# Patient Record
Sex: FEMALE | Race: WHITE | Employment: UNEMPLOYED | URBAN - METROPOLITAN AREA
[De-identification: names, ages, dates, MRNs, and addresses within clinical notes are randomized per-mention and may not be internally consistent; named-entity substitution may affect disease eponyms.]

---

## 2017-07-10 ENCOUNTER — TELEPHONE (OUTPATIENT)
Dept: TELEMETRY | Age: 42
End: 2017-07-10

## 2017-08-23 PROBLEM — E66.9 OBESITY: Chronic | Status: ACTIVE | Noted: 2017-08-23

## 2017-08-23 PROBLEM — A41.9 SEPSIS (HCC): Status: ACTIVE | Noted: 2017-08-23

## 2017-08-23 PROBLEM — T40.1X1A HEROIN OVERDOSE (HCC): Status: ACTIVE | Noted: 2017-08-23

## 2017-08-23 PROBLEM — N39.0 UTI (URINARY TRACT INFECTION): Status: ACTIVE | Noted: 2017-08-23

## 2017-08-23 PROBLEM — E87.5 HYPERKALEMIA: Status: ACTIVE | Noted: 2017-08-23

## 2017-08-23 PROBLEM — J96.01 ACUTE RESPIRATORY FAILURE WITH HYPOXIA AND HYPERCAPNIA (HCC): Status: ACTIVE | Noted: 2017-08-23

## 2017-08-23 PROBLEM — J96.02 ACUTE RESPIRATORY FAILURE WITH HYPOXIA AND HYPERCAPNIA (HCC): Status: ACTIVE | Noted: 2017-08-23

## 2017-08-24 PROBLEM — E66.01 MORBID OBESITY WITH BMI OF 50.0-59.9, ADULT (HCC): Chronic | Status: ACTIVE | Noted: 2017-08-23

## 2017-11-14 ENCOUNTER — CASE MANAGEMENT (OUTPATIENT)
Dept: EMERGENCY DEPT | Age: 42
End: 2017-11-14

## 2017-11-14 PROBLEM — J96.21 ACUTE ON CHRONIC RESPIRATORY FAILURE WITH HYPOXIA AND HYPERCAPNIA (HCC): Status: ACTIVE | Noted: 2017-08-23

## 2017-11-14 PROBLEM — J96.22 ACUTE ON CHRONIC RESPIRATORY FAILURE WITH HYPOXIA AND HYPERCAPNIA (HCC): Status: ACTIVE | Noted: 2017-08-23

## 2017-11-17 ENCOUNTER — TELEPHONE (OUTPATIENT)
Dept: PULMONOLOGY | Age: 42
End: 2017-11-17

## 2017-12-06 ENCOUNTER — TELEPHONE (OUTPATIENT)
Dept: PULMONOLOGY | Age: 42
End: 2017-12-06

## 2017-12-06 PROBLEM — J96.00 ACUTE RESPIRATORY FAILURE (HCC): Status: ACTIVE | Noted: 2017-12-06

## 2017-12-07 ENCOUNTER — CASE MANAGEMENT (OUTPATIENT)
Dept: EMERGENCY DEPT | Age: 42
End: 2017-12-07

## 2017-12-07 PROBLEM — J18.9 HCAP (HEALTHCARE-ASSOCIATED PNEUMONIA): Status: ACTIVE | Noted: 2017-12-07

## 2017-12-07 PROBLEM — A41.9 SEPSIS (HCC): Status: RESOLVED | Noted: 2017-08-23 | Resolved: 2017-12-07

## 2017-12-07 PROBLEM — J18.9 PNEUMONIA DUE TO INFECTIOUS ORGANISM: Status: ACTIVE | Noted: 2017-12-07

## 2017-12-07 PROBLEM — T17.908S: Status: ACTIVE | Noted: 2017-12-07

## 2017-12-07 PROBLEM — J18.9: Status: ACTIVE | Noted: 2017-12-07

## 2017-12-07 PROBLEM — N39.0 UTI (URINARY TRACT INFECTION): Status: RESOLVED | Noted: 2017-08-23 | Resolved: 2017-12-07

## 2017-12-07 PROBLEM — E87.5 HYPERKALEMIA: Status: RESOLVED | Noted: 2017-08-23 | Resolved: 2017-12-07

## 2017-12-14 ENCOUNTER — TELEPHONE (OUTPATIENT)
Dept: INTERNAL MEDICINE CLINIC | Age: 42
End: 2017-12-14

## 2017-12-21 ENCOUNTER — CASE MANAGEMENT (OUTPATIENT)
Dept: EMERGENCY DEPT | Age: 42
End: 2017-12-21

## 2017-12-21 PROBLEM — J96.01 ACUTE RESPIRATORY FAILURE WITH HYPOXIA (HCC): Status: ACTIVE | Noted: 2017-12-21

## 2017-12-25 PROBLEM — J96.01 ACUTE RESPIRATORY FAILURE WITH HYPOXIA (HCC): Status: RESOLVED | Noted: 2017-12-21 | Resolved: 2017-12-25

## 2017-12-25 PROBLEM — J96.00 ACUTE RESPIRATORY FAILURE (HCC): Status: RESOLVED | Noted: 2017-12-06 | Resolved: 2017-12-25

## 2018-01-06 PROBLEM — J96.01 ACUTE RESPIRATORY FAILURE WITH HYPOXIA AND HYPERCAPNIA (HCC): Status: ACTIVE | Noted: 2018-01-06

## 2018-01-06 PROBLEM — J96.02 ACUTE RESPIRATORY FAILURE WITH HYPOXIA AND HYPERCAPNIA (HCC): Status: ACTIVE | Noted: 2018-01-06

## 2018-01-08 ENCOUNTER — CASE MANAGEMENT (OUTPATIENT)
Dept: EMERGENCY DEPT | Age: 43
End: 2018-01-08

## 2018-01-10 ENCOUNTER — TELEPHONE (OUTPATIENT)
Dept: PULMONOLOGY | Age: 43
End: 2018-01-10

## 2018-01-10 PROBLEM — E66.2 OBESITY HYPOVENTILATION SYNDROME (HCC): Status: ACTIVE | Noted: 2018-01-10

## 2018-01-11 DIAGNOSIS — G47.33 OSA (OBSTRUCTIVE SLEEP APNEA): Primary | ICD-10-CM

## 2018-01-12 ENCOUNTER — TELEPHONE (OUTPATIENT)
Dept: MEDSURG UNIT | Age: 43
End: 2018-01-12

## 2018-02-14 ENCOUNTER — OFFICE VISIT (OUTPATIENT)
Dept: PULMONOLOGY | Age: 43
End: 2018-02-14

## 2018-02-14 VITALS
DIASTOLIC BLOOD PRESSURE: 87 MMHG | OXYGEN SATURATION: 98 % | HEIGHT: 63 IN | BODY MASS INDEX: 47.84 KG/M2 | WEIGHT: 270 LBS | HEART RATE: 101 BPM | RESPIRATION RATE: 16 BRPM | TEMPERATURE: 98 F | SYSTOLIC BLOOD PRESSURE: 134 MMHG

## 2018-02-14 DIAGNOSIS — I50.32 CHRONIC DIASTOLIC CHF (CONGESTIVE HEART FAILURE) (HCC): ICD-10-CM

## 2018-02-14 DIAGNOSIS — R06.83 SNORING: Primary | ICD-10-CM

## 2018-02-14 DIAGNOSIS — J96.11 CHRONIC HYPOXEMIC RESPIRATORY FAILURE (HCC): ICD-10-CM

## 2018-02-14 DIAGNOSIS — E66.2 OBESITY HYPOVENTILATION SYNDROME (HCC): ICD-10-CM

## 2018-02-14 DIAGNOSIS — R93.89 ABNORMAL CT OF THE CHEST: ICD-10-CM

## 2018-02-14 DIAGNOSIS — Z72.0 TOBACCO ABUSE DISORDER: ICD-10-CM

## 2018-02-14 DIAGNOSIS — G47.10 HYPERSOMNIA: ICD-10-CM

## 2018-02-14 DIAGNOSIS — R06.81 WITNESSED APNEIC SPELLS: ICD-10-CM

## 2018-02-14 DIAGNOSIS — J41.8 MIXED SIMPLE AND MUCOPURULENT CHRONIC BRONCHITIS (HCC): ICD-10-CM

## 2018-02-14 PROCEDURE — G8427 DOCREV CUR MEDS BY ELIG CLIN: HCPCS | Performed by: NURSE PRACTITIONER

## 2018-02-14 PROCEDURE — G8926 SPIRO NO PERF OR DOC: HCPCS | Performed by: NURSE PRACTITIONER

## 2018-02-14 PROCEDURE — G8417 CALC BMI ABV UP PARAM F/U: HCPCS | Performed by: NURSE PRACTITIONER

## 2018-02-14 PROCEDURE — 3023F SPIROM DOC REV: CPT | Performed by: NURSE PRACTITIONER

## 2018-02-14 PROCEDURE — 99214 OFFICE O/P EST MOD 30 MIN: CPT | Performed by: NURSE PRACTITIONER

## 2018-02-14 PROCEDURE — 4004F PT TOBACCO SCREEN RCVD TLK: CPT | Performed by: NURSE PRACTITIONER

## 2018-02-14 PROCEDURE — G8484 FLU IMMUNIZE NO ADMIN: HCPCS | Performed by: NURSE PRACTITIONER

## 2018-02-14 RX ORDER — GABAPENTIN 100 MG/1
300 CAPSULE ORAL 3 TIMES DAILY
COMMUNITY
End: 2019-08-08 | Stop reason: DRUGHIGH

## 2018-02-14 ASSESSMENT — SLEEP AND FATIGUE QUESTIONNAIRES
ESS TOTAL SCORE: 12
HOW LIKELY ARE YOU TO NOD OFF OR FALL ASLEEP WHILE LYING DOWN TO REST IN THE AFTERNOON WHEN CIRCUMSTANCES PERMIT: 3
HOW LIKELY ARE YOU TO NOD OFF OR FALL ASLEEP WHILE SITTING INACTIVE IN A PUBLIC PLACE: 0
HOW LIKELY ARE YOU TO NOD OFF OR FALL ASLEEP WHEN YOU ARE A PASSENGER IN A CAR FOR AN HOUR WITHOUT A BREAK: 3
HOW LIKELY ARE YOU TO NOD OFF OR FALL ASLEEP WHILE SITTING QUIETLY AFTER LUNCH WITHOUT ALCOHOL: 0
HOW LIKELY ARE YOU TO NOD OFF OR FALL ASLEEP WHILE SITTING AND READING: 3
HOW LIKELY ARE YOU TO NOD OFF OR FALL ASLEEP WHILE SITTING AND TALKING TO SOMEONE: 0
HOW LIKELY ARE YOU TO NOD OFF OR FALL ASLEEP WHILE WATCHING TV: 3
HOW LIKELY ARE YOU TO NOD OFF OR FALL ASLEEP IN A CAR, WHILE STOPPED FOR A FEW MINUTES IN TRAFFIC: 0
NECK CIRCUMFERENCE (INCHES): 20.25

## 2018-02-14 NOTE — PROGRESS NOTES
Pt is UTD on both flu and pneumonia vaccines.
sclera icterus. No conjunctival injection. ENT: No discharge. Pharynx clear. Mallampati class IV. Large tongue with ridging. Neck: Trachea midline. No obvious mass. Neck circumference 20.5\"  Resp: No accessory muscle use. No crackles. No wheezes. No rhonchi. Good air entry. CV: Regular rate. Regular rhythm. No murmur or rub. No edema. GI: Non-tender. Non-distended. No hernia. Skin: Warm and dry. No nodule on exposed extremities. Lymph: No cervical LAD. No supraclavicular LAD. M/S: No cyanosis. No joint deformity. No clubbing. Neuro: Awake. Alert. Moves all four extremities. Psych: Oriented x 3. No anxiety. DATA reviewed by me:   CT Chest 1/6/18:   Multifocal subsegmental atelectasis and/ or focal scarring.  Otherwise no  evidence of acute airspace disease. Persistent nonspecific adenopathy. Assessment:       · COPD  · Chronic respiratory failure with hypoxemia - 3.5L supplemental home oxygen   · Obesity hypoventilation syndrome   · Chronic diastolic CHF - increased BLE edema and weight gain   · Abnormal CT of the chest 8/2017 and 1/6/18 with persistent mediastinal and hilar adenopathy. Will require f/u   · Snoring/Witnessed apnea   · Hypersomnia   · Insomnia   · Inadequate sleep hygiene   · Comorbid conditions: HTN, DM  · Tobacco abuse - 1 ppd for 15 years, down to 5 cigs daily       Plan:       · Supplemental oxygen 3-4L with exertion. Order for portable oxygen concentrator   · Start Spiriva 1 puff daily   · Albuterol INH/Neb Q 4 hrs PRN   · Lasix - take additional tablet for 3 days  · PFTs  · Split night to evaluate for sleep related breathing disorder. · Treatment options were discussed with patient if PSG reveals PAPITO, including CPAP therapy, oral appliances, upper airway surgery and hypoglossal nerve stimulation. · Discussed with patient the pathophysiology of apnea.    · Sleep hygiene discussed and provided written education in AVS  · Avoid sedatives, alcohol and

## 2018-02-14 NOTE — PATIENT INSTRUCTIONS
Uncomfortable bedding can prevent good sleep. Ensure your bedroom is quiet and comfortable. A cooler room along with enough blankets to stay warm is recommended. If your room is too noisy, try a white noise machine. If too bright, try black out shades or an eye mask. Dont take worries to bed. Leave worries about work, school etc. behind you when you go to bed. Some people find it helpful to assign a worry period in the evening or late afternoon to write down your worries and get them out of your system.

## 2018-03-06 ENCOUNTER — HOSPITAL ENCOUNTER (OUTPATIENT)
Dept: SLEEP MEDICINE | Age: 43
Discharge: HOME | End: 2018-03-08
Attending: INTERNAL MEDICINE | Admitting: INTERNAL MEDICINE

## 2018-03-06 DIAGNOSIS — G47.33 OSA (OBSTRUCTIVE SLEEP APNEA): ICD-10-CM

## 2018-03-12 DIAGNOSIS — G47.33 OSA (OBSTRUCTIVE SLEEP APNEA): Primary | ICD-10-CM

## 2018-05-07 ENCOUNTER — TELEPHONE (OUTPATIENT)
Dept: PULMONOLOGY | Age: 43
End: 2018-05-07

## 2018-11-17 ENCOUNTER — APPOINTMENT (OUTPATIENT)
Dept: GENERAL RADIOLOGY | Age: 43
DRG: 140 | End: 2018-11-17
Attending: HOSPITALIST
Payer: MEDICARE

## 2018-11-17 ENCOUNTER — HOSPITAL ENCOUNTER (INPATIENT)
Age: 43
LOS: 1 days | Discharge: HOME | DRG: 140 | End: 2018-11-18
Attending: HOSPITALIST | Admitting: HOSPITALIST
Payer: MEDICARE

## 2018-11-17 PROBLEM — R76.8 HEPATITIS C ANTIBODY POSITIVE IN BLOOD: Status: RESOLVED | Noted: 2017-08-24 | Resolved: 2018-11-17

## 2018-11-17 PROBLEM — J96.20 ACUTE ON CHRONIC RESPIRATORY FAILURE (HCC): Status: ACTIVE | Noted: 2018-11-17

## 2018-11-17 PROBLEM — J96.02 ACUTE RESPIRATORY FAILURE WITH HYPOXIA AND HYPERCAPNIA (HCC): Status: RESOLVED | Noted: 2018-01-06 | Resolved: 2018-11-17

## 2018-11-17 PROBLEM — J18.9: Status: RESOLVED | Noted: 2017-12-07 | Resolved: 2018-11-17

## 2018-11-17 PROBLEM — E66.2 OBESITY HYPOVENTILATION SYNDROME (HCC): Chronic | Status: ACTIVE | Noted: 2018-01-10

## 2018-11-17 PROBLEM — J96.11 CHRONIC RESPIRATORY FAILURE WITH HYPOXIA AND HYPERCAPNIA (HCC): Chronic | Status: ACTIVE | Noted: 2018-11-17

## 2018-11-17 PROBLEM — T40.1X1A HEROIN OVERDOSE (HCC): Chronic | Status: ACTIVE | Noted: 2017-08-23

## 2018-11-17 PROBLEM — J18.9 PNEUMONIA DUE TO INFECTIOUS ORGANISM: Status: RESOLVED | Noted: 2017-12-07 | Resolved: 2018-11-17

## 2018-11-17 PROBLEM — E87.5 HYPERKALEMIA: Status: ACTIVE | Noted: 2018-11-17

## 2018-11-17 PROBLEM — B19.20 HEPATITIS C: Chronic | Status: ACTIVE | Noted: 2018-11-17

## 2018-11-17 PROBLEM — R76.8 HEPATITIS C ANTIBODY POSITIVE IN BLOOD: Status: ACTIVE | Noted: 2017-08-24

## 2018-11-17 PROBLEM — J96.20 ACUTE ON CHRONIC RESPIRATORY FAILURE (HCC): Status: RESOLVED | Noted: 2018-11-17 | Resolved: 2018-11-17

## 2018-11-17 PROBLEM — Z22.322 MRSA (METHICILLIN RESISTANT STAPH AUREUS) CULTURE POSITIVE: Chronic | Status: ACTIVE | Noted: 2018-11-17

## 2018-11-17 PROBLEM — L30.4 INTERTRIGO: Status: ACTIVE | Noted: 2018-11-17

## 2018-11-17 PROBLEM — J96.12 CHRONIC RESPIRATORY FAILURE WITH HYPOXIA AND HYPERCAPNIA (HCC): Chronic | Status: ACTIVE | Noted: 2018-11-17

## 2018-11-17 PROBLEM — R09.89 PULMONARY VASCULAR CONGESTION: Status: ACTIVE | Noted: 2018-11-17

## 2018-11-17 PROBLEM — J96.01 ACUTE RESPIRATORY FAILURE WITH HYPOXIA AND HYPERCAPNIA (HCC): Status: RESOLVED | Noted: 2018-01-06 | Resolved: 2018-11-17

## 2018-11-17 PROBLEM — T17.908S: Status: RESOLVED | Noted: 2017-12-07 | Resolved: 2018-11-17

## 2018-11-17 PROBLEM — J18.9 HCAP (HEALTHCARE-ASSOCIATED PNEUMONIA): Status: RESOLVED | Noted: 2017-12-07 | Resolved: 2018-11-17

## 2018-11-17 LAB
ALBUMIN SERPL-MCNC: 4 G/DL (ref 3.4–5)
ANION GAP SERPL CALCULATED.3IONS-SCNC: 9 MMOL/L (ref 3–16)
APTT: 33.5 SEC (ref 26–36)
BACTERIA: ABNORMAL /HPF
BASE EXCESS ARTERIAL: -3.9 MMOL/L (ref -3–3)
BASOPHILS ABSOLUTE: 0 K/UL (ref 0–0.2)
BASOPHILS RELATIVE PERCENT: 0.4 %
BILIRUBIN URINE: NEGATIVE
BLOOD, URINE: ABNORMAL
BUN BLDV-MCNC: 8 MG/DL (ref 7–20)
CALCIUM SERPL-MCNC: 8.3 MG/DL (ref 8.3–10.6)
CARBOXYHEMOGLOBIN ARTERIAL: 3.7 % (ref 0–1.5)
CASTS 2: ABNORMAL /LPF
CASTS: ABNORMAL /LPF
CHLORIDE BLD-SCNC: 98 MMOL/L (ref 99–110)
CHOLESTEROL, TOTAL: 164 MG/DL (ref 0–199)
CLARITY: CLEAR
CO2: 29 MMOL/L (ref 21–32)
COLOR: YELLOW
CREAT SERPL-MCNC: 1 MG/DL (ref 0.6–1.1)
EOSINOPHILS ABSOLUTE: 0 K/UL (ref 0–0.6)
EOSINOPHILS RELATIVE PERCENT: 0 %
EPITHELIAL CELLS, UA: ABNORMAL /HPF
GFR AFRICAN AMERICAN: >60
GFR NON-AFRICAN AMERICAN: 60
GLUCOSE BLD-MCNC: 142 MG/DL (ref 70–99)
GLUCOSE BLD-MCNC: 156 MG/DL (ref 70–99)
GLUCOSE BLD-MCNC: 167 MG/DL (ref 70–99)
GLUCOSE BLD-MCNC: 173 MG/DL (ref 70–99)
GLUCOSE URINE: NEGATIVE MG/DL
HCO3 ARTERIAL: 25.7 MMOL/L (ref 21–29)
HCT VFR BLD CALC: 47.2 % (ref 36–48)
HDLC SERPL-MCNC: 40 MG/DL (ref 40–60)
HEMOGLOBIN, ART, EXTENDED: 15.9 G/DL (ref 12–16)
HEMOGLOBIN: 15.6 G/DL (ref 12–16)
INR BLD: 1.02 (ref 0.86–1.14)
KETONES, URINE: NEGATIVE MG/DL
LACTIC ACID: 1.9 MMOL/L (ref 0.4–2)
LDL CHOLESTEROL CALCULATED: 106 MG/DL
LEUKOCYTE ESTERASE, URINE: ABNORMAL
LYMPHOCYTES ABSOLUTE: 0.7 K/UL (ref 1–5.1)
LYMPHOCYTES RELATIVE PERCENT: 7.6 %
MAGNESIUM: 2 MG/DL (ref 1.8–2.4)
MCH RBC QN AUTO: 28.4 PG (ref 26–34)
MCHC RBC AUTO-ENTMCNC: 33 G/DL (ref 31–36)
MCV RBC AUTO: 86.2 FL (ref 80–100)
METHEMOGLOBIN ARTERIAL: 0.5 %
MICROSCOPIC EXAMINATION: YES
MONOCYTES ABSOLUTE: 0.1 K/UL (ref 0–1.3)
MONOCYTES RELATIVE PERCENT: 1.4 %
NEUTROPHILS ABSOLUTE: 8.5 K/UL (ref 1.7–7.7)
NEUTROPHILS RELATIVE PERCENT: 90.6 %
NITRITE, URINE: NEGATIVE
O2 CONTENT ARTERIAL: 20 ML/DL
O2 SAT, ARTERIAL: 91.5 %
O2 THERAPY: ABNORMAL
PCO2 ARTERIAL: 66.4 MMHG (ref 35–45)
PDW BLD-RTO: 15.4 % (ref 12.4–15.4)
PERFORMED ON: ABNORMAL
PH ARTERIAL: 7.21 (ref 7.35–7.45)
PH UA: 5
PHOSPHORUS: 4.7 MG/DL (ref 2.5–4.9)
PLATELET # BLD: 171 K/UL (ref 135–450)
PMV BLD AUTO: 9.5 FL (ref 5–10.5)
PO2 ARTERIAL: 69 MMHG (ref 75–108)
POTASSIUM SERPL-SCNC: 5.4 MMOL/L (ref 3.5–5.1)
PRO-BNP: 559 PG/ML (ref 0–124)
PROCALCITONIN: 0.14 NG/ML (ref 0–0.15)
PROTEIN UA: NEGATIVE MG/DL
PROTHROMBIN TIME: 11.6 SEC (ref 9.8–13)
RBC # BLD: 5.48 M/UL (ref 4–5.2)
RBC UA: ABNORMAL /HPF (ref 0–2)
SODIUM BLD-SCNC: 136 MMOL/L (ref 136–145)
SPECIFIC GRAVITY UA: 1.02
TCO2 ARTERIAL: 27.7 MMOL/L
TRIGL SERPL-MCNC: 91 MG/DL (ref 0–150)
TROPONIN: <0.01 NG/ML
TROPONIN: <0.01 NG/ML
TSH REFLEX: 1.14 UIU/ML (ref 0.27–4.2)
UROBILINOGEN, URINE: 0.2 E.U./DL
VLDLC SERPL CALC-MCNC: 18 MG/DL
WBC # BLD: 9.4 K/UL (ref 4–11)
WBC UA: ABNORMAL /HPF (ref 0–5)

## 2018-11-17 PROCEDURE — 85610 PROTHROMBIN TIME: CPT

## 2018-11-17 PROCEDURE — 85025 COMPLETE CBC W/AUTO DIFF WBC: CPT

## 2018-11-17 PROCEDURE — 82803 BLOOD GASES ANY COMBINATION: CPT

## 2018-11-17 PROCEDURE — 83880 ASSAY OF NATRIURETIC PEPTIDE: CPT

## 2018-11-17 PROCEDURE — 94660 CPAP INITIATION&MGMT: CPT

## 2018-11-17 PROCEDURE — 84484 ASSAY OF TROPONIN QUANT: CPT

## 2018-11-17 PROCEDURE — 80061 LIPID PANEL: CPT

## 2018-11-17 PROCEDURE — 83605 ASSAY OF LACTIC ACID: CPT

## 2018-11-17 PROCEDURE — 94761 N-INVAS EAR/PLS OXIMETRY MLT: CPT

## 2018-11-17 PROCEDURE — 36600 WITHDRAWAL OF ARTERIAL BLOOD: CPT

## 2018-11-17 PROCEDURE — 99254 IP/OBS CNSLTJ NEW/EST MOD 60: CPT | Performed by: INTERNAL MEDICINE

## 2018-11-17 PROCEDURE — 94667 MNPJ CHEST WALL 1ST: CPT

## 2018-11-17 PROCEDURE — 6360000002 HC RX W HCPCS: Performed by: INTERNAL MEDICINE

## 2018-11-17 PROCEDURE — 2700000000 HC OXYGEN THERAPY PER DAY

## 2018-11-17 PROCEDURE — 85730 THROMBOPLASTIN TIME PARTIAL: CPT

## 2018-11-17 PROCEDURE — 2060000000 HC ICU INTERMEDIATE R&B

## 2018-11-17 PROCEDURE — 99406 BEHAV CHNG SMOKING 3-10 MIN: CPT

## 2018-11-17 PROCEDURE — 83735 ASSAY OF MAGNESIUM: CPT

## 2018-11-17 PROCEDURE — 87040 BLOOD CULTURE FOR BACTERIA: CPT

## 2018-11-17 PROCEDURE — 80069 RENAL FUNCTION PANEL: CPT

## 2018-11-17 PROCEDURE — 94668 MNPJ CHEST WALL SBSQ: CPT

## 2018-11-17 PROCEDURE — 71045 X-RAY EXAM CHEST 1 VIEW: CPT

## 2018-11-17 PROCEDURE — 83036 HEMOGLOBIN GLYCOSYLATED A1C: CPT

## 2018-11-17 PROCEDURE — 84145 PROCALCITONIN (PCT): CPT

## 2018-11-17 PROCEDURE — 6370000000 HC RX 637 (ALT 250 FOR IP): Performed by: HOSPITALIST

## 2018-11-17 PROCEDURE — 93005 ELECTROCARDIOGRAM TRACING: CPT | Performed by: HOSPITALIST

## 2018-11-17 PROCEDURE — 81001 URINALYSIS AUTO W/SCOPE: CPT

## 2018-11-17 PROCEDURE — 94664 DEMO&/EVAL PT USE INHALER: CPT

## 2018-11-17 PROCEDURE — 84443 ASSAY THYROID STIM HORMONE: CPT

## 2018-11-17 PROCEDURE — 6360000002 HC RX W HCPCS: Performed by: HOSPITALIST

## 2018-11-17 PROCEDURE — 93010 ELECTROCARDIOGRAM REPORT: CPT | Performed by: INTERNAL MEDICINE

## 2018-11-17 PROCEDURE — 94640 AIRWAY INHALATION TREATMENT: CPT

## 2018-11-17 PROCEDURE — 36415 COLL VENOUS BLD VENIPUNCTURE: CPT

## 2018-11-17 PROCEDURE — 2580000003 HC RX 258: Performed by: HOSPITALIST

## 2018-11-17 PROCEDURE — 2500000003 HC RX 250 WO HCPCS: Performed by: HOSPITALIST

## 2018-11-17 RX ORDER — PREDNISONE 10 MG/1
40 TABLET ORAL DAILY
Status: DISCONTINUED | OUTPATIENT
Start: 2018-11-18 | End: 2018-11-18 | Stop reason: HOSPADM

## 2018-11-17 RX ORDER — GUAIFENESIN 600 MG/1
600 TABLET, EXTENDED RELEASE ORAL 2 TIMES DAILY
Status: DISCONTINUED | OUTPATIENT
Start: 2018-11-17 | End: 2018-11-18 | Stop reason: HOSPADM

## 2018-11-17 RX ORDER — FAMOTIDINE 20 MG/1
20 TABLET, FILM COATED ORAL 2 TIMES DAILY
Status: DISCONTINUED | OUTPATIENT
Start: 2018-11-17 | End: 2018-11-18 | Stop reason: HOSPADM

## 2018-11-17 RX ORDER — SODIUM CHLORIDE 0.9 % (FLUSH) 0.9 %
10 SYRINGE (ML) INJECTION EVERY 12 HOURS SCHEDULED
Status: DISCONTINUED | OUTPATIENT
Start: 2018-11-17 | End: 2018-11-18 | Stop reason: HOSPADM

## 2018-11-17 RX ORDER — IBUPROFEN 400 MG/1
400 TABLET ORAL EVERY 6 HOURS PRN
Status: DISCONTINUED | OUTPATIENT
Start: 2018-11-17 | End: 2018-11-18 | Stop reason: HOSPADM

## 2018-11-17 RX ORDER — POTASSIUM CHLORIDE 7.45 MG/ML
10 INJECTION INTRAVENOUS PRN
Status: DISCONTINUED | OUTPATIENT
Start: 2018-11-17 | End: 2018-11-17

## 2018-11-17 RX ORDER — LISINOPRIL 10 MG/1
10 TABLET ORAL DAILY
Status: DISCONTINUED | OUTPATIENT
Start: 2018-11-17 | End: 2018-11-17

## 2018-11-17 RX ORDER — NICOTINE 21 MG/24HR
1 PATCH, TRANSDERMAL 24 HOURS TRANSDERMAL DAILY
Status: DISCONTINUED | OUTPATIENT
Start: 2018-11-17 | End: 2018-11-18 | Stop reason: HOSPADM

## 2018-11-17 RX ORDER — POTASSIUM CHLORIDE 20 MEQ/1
40 TABLET, EXTENDED RELEASE ORAL PRN
Status: DISCONTINUED | OUTPATIENT
Start: 2018-11-17 | End: 2018-11-17

## 2018-11-17 RX ORDER — DEXTROSE MONOHYDRATE 50 MG/ML
100 INJECTION, SOLUTION INTRAVENOUS PRN
Status: DISCONTINUED | OUTPATIENT
Start: 2018-11-17 | End: 2018-11-18 | Stop reason: HOSPADM

## 2018-11-17 RX ORDER — SODIUM CHLORIDE 0.9 % (FLUSH) 0.9 %
10 SYRINGE (ML) INJECTION PRN
Status: DISCONTINUED | OUTPATIENT
Start: 2018-11-17 | End: 2018-11-18 | Stop reason: HOSPADM

## 2018-11-17 RX ORDER — IPRATROPIUM BROMIDE AND ALBUTEROL SULFATE 2.5; .5 MG/3ML; MG/3ML
1 SOLUTION RESPIRATORY (INHALATION) EVERY 4 HOURS
Status: DISCONTINUED | OUTPATIENT
Start: 2018-11-17 | End: 2018-11-17

## 2018-11-17 RX ORDER — FUROSEMIDE 10 MG/ML
40 INJECTION INTRAMUSCULAR; INTRAVENOUS 2 TIMES DAILY
Status: DISCONTINUED | OUTPATIENT
Start: 2018-11-17 | End: 2018-11-17

## 2018-11-17 RX ORDER — POTASSIUM CHLORIDE 20MEQ/15ML
40 LIQUID (ML) ORAL PRN
Status: DISCONTINUED | OUTPATIENT
Start: 2018-11-17 | End: 2018-11-17

## 2018-11-17 RX ORDER — NICOTINE POLACRILEX 4 MG
15 LOZENGE BUCCAL PRN
Status: DISCONTINUED | OUTPATIENT
Start: 2018-11-17 | End: 2018-11-18 | Stop reason: HOSPADM

## 2018-11-17 RX ORDER — ACETAMINOPHEN 325 MG/1
650 TABLET ORAL EVERY 4 HOURS PRN
Status: DISCONTINUED | OUTPATIENT
Start: 2018-11-17 | End: 2018-11-18 | Stop reason: HOSPADM

## 2018-11-17 RX ORDER — DEXTROSE MONOHYDRATE 25 G/50ML
12.5 INJECTION, SOLUTION INTRAVENOUS PRN
Status: DISCONTINUED | OUTPATIENT
Start: 2018-11-17 | End: 2018-11-17 | Stop reason: SDUPTHER

## 2018-11-17 RX ORDER — LEVOFLOXACIN 5 MG/ML
500 INJECTION, SOLUTION INTRAVENOUS EVERY 24 HOURS
Status: DISCONTINUED | OUTPATIENT
Start: 2018-11-17 | End: 2018-11-17

## 2018-11-17 RX ORDER — ONDANSETRON 2 MG/ML
4 INJECTION INTRAMUSCULAR; INTRAVENOUS EVERY 6 HOURS PRN
Status: DISCONTINUED | OUTPATIENT
Start: 2018-11-17 | End: 2018-11-18 | Stop reason: HOSPADM

## 2018-11-17 RX ORDER — NICOTINE POLACRILEX 4 MG
15 LOZENGE BUCCAL PRN
Status: DISCONTINUED | OUTPATIENT
Start: 2018-11-17 | End: 2018-11-17 | Stop reason: SDUPTHER

## 2018-11-17 RX ORDER — ASPIRIN 81 MG/1
81 TABLET ORAL DAILY
Status: DISCONTINUED | OUTPATIENT
Start: 2018-11-17 | End: 2018-11-18 | Stop reason: HOSPADM

## 2018-11-17 RX ORDER — MAGNESIUM SULFATE 1 G/100ML
1 INJECTION INTRAVENOUS PRN
Status: DISCONTINUED | OUTPATIENT
Start: 2018-11-17 | End: 2018-11-17

## 2018-11-17 RX ORDER — LEVOFLOXACIN 500 MG/1
500 TABLET, FILM COATED ORAL DAILY
Status: DISCONTINUED | OUTPATIENT
Start: 2018-11-18 | End: 2018-11-18 | Stop reason: HOSPADM

## 2018-11-17 RX ORDER — DEXTROSE MONOHYDRATE 25 G/50ML
12.5 INJECTION, SOLUTION INTRAVENOUS PRN
Status: DISCONTINUED | OUTPATIENT
Start: 2018-11-17 | End: 2018-11-18 | Stop reason: HOSPADM

## 2018-11-17 RX ORDER — FUROSEMIDE 10 MG/ML
40 INJECTION INTRAMUSCULAR; INTRAVENOUS DAILY
Status: DISCONTINUED | OUTPATIENT
Start: 2018-11-18 | End: 2018-11-18 | Stop reason: HOSPADM

## 2018-11-17 RX ORDER — METHYLPREDNISOLONE SODIUM SUCCINATE 40 MG/ML
40 INJECTION, POWDER, LYOPHILIZED, FOR SOLUTION INTRAMUSCULAR; INTRAVENOUS EVERY 12 HOURS
Status: COMPLETED | OUTPATIENT
Start: 2018-11-17 | End: 2018-11-17

## 2018-11-17 RX ORDER — IPRATROPIUM BROMIDE AND ALBUTEROL SULFATE 2.5; .5 MG/3ML; MG/3ML
1 SOLUTION RESPIRATORY (INHALATION) EVERY 4 HOURS
Status: DISCONTINUED | OUTPATIENT
Start: 2018-11-17 | End: 2018-11-18 | Stop reason: HOSPADM

## 2018-11-17 RX ORDER — GABAPENTIN 100 MG/1
100 CAPSULE ORAL 3 TIMES DAILY
Status: DISCONTINUED | OUTPATIENT
Start: 2018-11-17 | End: 2018-11-17

## 2018-11-17 RX ORDER — SODIUM CHLORIDE 9 MG/ML
INJECTION, SOLUTION INTRAVENOUS
Status: DISPENSED
Start: 2018-11-17 | End: 2018-11-17

## 2018-11-17 RX ORDER — GABAPENTIN 300 MG/1
300 CAPSULE ORAL 3 TIMES DAILY
Status: DISCONTINUED | OUTPATIENT
Start: 2018-11-17 | End: 2018-11-18 | Stop reason: HOSPADM

## 2018-11-17 RX ADMIN — GABAPENTIN 300 MG: 300 CAPSULE ORAL at 13:30

## 2018-11-17 RX ADMIN — SODIUM CHLORIDE, PRESERVATIVE FREE 10 ML: 5 INJECTION INTRAVENOUS at 22:37

## 2018-11-17 RX ADMIN — MICONAZOLE NITRATE: 2 POWDER TOPICAL at 06:24

## 2018-11-17 RX ADMIN — FAMOTIDINE 20 MG: 20 TABLET ORAL at 10:46

## 2018-11-17 RX ADMIN — METHYLPREDNISOLONE SODIUM SUCCINATE 40 MG: 40 INJECTION, POWDER, FOR SOLUTION INTRAMUSCULAR; INTRAVENOUS at 22:34

## 2018-11-17 RX ADMIN — INSULIN LISPRO 1 UNITS: 100 INJECTION, SOLUTION INTRAVENOUS; SUBCUTANEOUS at 13:30

## 2018-11-17 RX ADMIN — IPRATROPIUM BROMIDE AND ALBUTEROL SULFATE 1 AMPULE: .5; 3 SOLUTION RESPIRATORY (INHALATION) at 11:59

## 2018-11-17 RX ADMIN — GABAPENTIN 100 MG: 100 CAPSULE ORAL at 10:46

## 2018-11-17 RX ADMIN — IPRATROPIUM BROMIDE AND ALBUTEROL SULFATE 1 AMPULE: .5; 3 SOLUTION RESPIRATORY (INHALATION) at 16:01

## 2018-11-17 RX ADMIN — ACETAMINOPHEN 650 MG: 325 TABLET ORAL at 14:37

## 2018-11-17 RX ADMIN — MICONAZOLE NITRATE: 2 POWDER TOPICAL at 22:35

## 2018-11-17 RX ADMIN — IPRATROPIUM BROMIDE AND ALBUTEROL SULFATE 1 AMPULE: .5; 3 SOLUTION RESPIRATORY (INHALATION) at 08:10

## 2018-11-17 RX ADMIN — IPRATROPIUM BROMIDE AND ALBUTEROL SULFATE 1 AMPULE: .5; 3 SOLUTION RESPIRATORY (INHALATION) at 20:24

## 2018-11-17 RX ADMIN — ASPIRIN 81 MG: 81 TABLET, COATED ORAL at 10:46

## 2018-11-17 RX ADMIN — IPRATROPIUM BROMIDE AND ALBUTEROL SULFATE 1 AMPULE: .5; 3 SOLUTION RESPIRATORY (INHALATION) at 05:15

## 2018-11-17 RX ADMIN — GUAIFENESIN 600 MG: 600 TABLET, EXTENDED RELEASE ORAL at 14:37

## 2018-11-17 RX ADMIN — SODIUM CHLORIDE, PRESERVATIVE FREE 10 ML: 5 INJECTION INTRAVENOUS at 10:48

## 2018-11-17 RX ADMIN — METHYLPREDNISOLONE SODIUM SUCCINATE 40 MG: 40 INJECTION, POWDER, FOR SOLUTION INTRAMUSCULAR; INTRAVENOUS at 10:47

## 2018-11-17 RX ADMIN — FUROSEMIDE 40 MG: 10 INJECTION, SOLUTION INTRAMUSCULAR; INTRAVENOUS at 10:47

## 2018-11-17 RX ADMIN — LEVOFLOXACIN 500 MG: 5 INJECTION, SOLUTION INTRAVENOUS at 10:47

## 2018-11-17 ASSESSMENT — PAIN DESCRIPTION - ORIENTATION: ORIENTATION: RIGHT;MID

## 2018-11-17 ASSESSMENT — PAIN SCALES - GENERAL
PAINLEVEL_OUTOF10: 10
PAINLEVEL_OUTOF10: 3

## 2018-11-17 ASSESSMENT — PAIN DESCRIPTION - LOCATION: LOCATION: CHEST;FOOT

## 2018-11-17 ASSESSMENT — PAIN DESCRIPTION - PAIN TYPE: TYPE: ACUTE PAIN

## 2018-11-17 NOTE — CONSULTS
INPATIENT PULMONARY CRITICAL CARE CONSULT NOTE      Chief Complaint/Referring Provider:  Patient is being seen at the request of Dr. Deja Branch for a consultation for acute on chronic hypoxemic/hypercapnic respiratory failure on BiPAP. Presenting HPI: Sally Ward is a 24-year-old female who has advanced lung disease, PAPITO and morbid obesity. She has a very heavy smoking history, was smoking up to 2 PPD, did cut back to 0.5 PPD for the last 4-5 months. She tried the electronic cigarettes, now is using a nicotine patch. She has not had a PFT, does not see a pulmonologist regularly. She says she lives in [facility], finds transportation difficulty. She has been seen by the pulmonary group at [facility], has been hospitalized multiple times. She was treated with NIPPV in November 2017, intubated twice in December 2017, intubated in January this year. She had a sleep study in April 2018, had moderate sleep apnea with AHI 29 per hour and desaturation to 73%, placed on BiPAP 17/11 cm H2O with oxygen at 2 LPM.  She says she is compliant with BiPAP. She has been using oxygen between 2 and 4 LPM in the daytime, does not have a pulse oximeter. She is short of breath walking less than 50 feet. He has a cough, does not expectorate every day. She does wheeze. She has gained a lot of weight. She has decreased urine output unless she uses a diuretic, has had a diagnosis of CHF. She had prolonged illness at age 33 due to MRSA infection, had a tracheotomy and prolonged mechanical ventilation as per her history. She has a history of IVDU. The patient was in her usual state of health, but had a cough for the last 2 days. She also had an upper respirator symptoms but no fever, chills or sweats. The patient says she was out in [place] drinking beer with friends when she developed severe onset of chest pain, severe shortness of breath. She was seen at an outside ER and transferred to [facility] for further care. She was placed on BiPAP for hypoxemic and hypercarbic respiratory failure, given Lasix prior to transfer. She has been having a persistent cough which produces retrosternal chest pain. She does feel better since being admitted. She has gained a lot of weight, but lost some recently. The patient lives with her daughter.        Patient Active Problem List    Diagnosis Date Noted    COPD exacerbation ([facility] [address] 11/03/2015     Priority: High    Chronic respiratory failure with hypoxia and hypercapnia on 4 L home O2 11/17/2018    Hx MRSA 11/17/2018    Hepatitis C 11/17/2018    Pulmonary vascular congestion on CXR 11/17/2018    Intertrigo 11/17/2018    Diabetes mellitus type 2, uncontrolled ([address] 11/17/2018    Hyperkalemia 11/17/2018    Neuropathy     Hx IV heroin abuse     PAPITO/OHS on CPAP (non-compliant) 01/10/2018    DM2 (diabetes mellitus, type 2) ([address]     Hx heroin overdose 08/23/2017    Acute on chronic respiratory failure with hypoxia and hypercapnia ([address] 08/23/2017    Morbid obesity with BMI of 50.0-59.9, adult ([address] 08/23/2017    Tobacco smoker 01/19/2012    COPD (chronic obstructive pulmonary disease) ([address] 12/15/2011    HTN (hypertension) 12/15/2011       Past Medical History:   Diagnosis Date    Bacteremia 08/23/2017    staph aureus    CHF (congestive heart failure) ([address]     Colonization status 7/26/12    DNA probe negative for MRSA    COPD (chronic obstructive pulmonary disease) ([address]     Diabetes mellitus ([address]     FOR ABOUT 4 YEARS    DM (diabetes mellitus) ([address]     Drug abuse, IV (HCC)     Hepatitis     Hepatitis C antibody positive in blood 08/24/2017    Hepatitis C AB positive     Heroin overdose ([address]     Hypertension     MRSA infection     LUNGS    Neuropathy     legs with pain    Other disorders of kidney and ureter     KIDNEY FAILURE, ACUTE    Pneumonia     Smoking history         Past Surgical History:   Procedure Laterality Date    BRONCHOSCOPY  12/21/2017    BAL sounds present. No distension tenderness. Musculoskeletal: No cyanosis. No clubbing. No obvious joint deformity. Lymphadenopathy: No cervical or supraclavicular adenopathy. Skin: Skin is warm and dry. No rash or nodules on the exposed extremities. Multiple tattoos. Psychiatric: Normal mood and affect. Behavior is normal.  No anxiety. Neurologic: Alert, awake and oriented. PERRL. Speech fluent        Results:  CBC:   Recent Labs      11/17/18   0720   WBC  9.4   HGB  15.6   HCT  47.2   MCV  86.2   PLT  171     BMP:   Recent Labs      11/17/18   0720   NA  136   K  5.4*   CL  98*   CO2  29   PHOS  4.7   BUN  8   CREATININE  1.0     LIVER PROFILE: No results for input(s): AST, ALT, LIPASE, BILIDIR, BILITOT, ALKPHOS in the last 72 hours. Invalid input(s): AMYLASE,  ALB  PT/INR:   Recent Labs      11/17/18   0720   PROTIME  11.6   INR  1.02     APTT:   Recent Labs      11/17/18   0720   APTT  33.5     UA:  Recent Labs      11/17/18   0622   COLORU  Yellow   PHUR  5.0   LABCAST  10-20 Hyaline*   WBCUA  20-50*   RBCUA  10-20*   BACTERIA  2+*   CLARITYU  Clear   SPECGRAV  1.020   LEUKOCYTESUR  SMALL*   UROBILINOGEN  0.2   BILIRUBINUR  Negative   BLOODU  MODERATE*   GLUCOSEU  Negative       Imaging:  I have reviewed radiology images personally. XR CHEST 1 VW   Final Result   Mild left sided airspace disease could represent atelectasis or pneumonia. Xr Chest 1 Vw    Result Date: 11/17/2018  EXAMINATION: SINGLE XRAY VIEW OF THE CHEST 11/17/2018 5:57 am COMPARISON: Radiograph 03/12/2018 HISTORY: ORDERING SYSTEM PROVIDED HISTORY: acute-on-chronic hypoxemic/hypercapnic respiratory failure TECHNOLOGIST PROVIDED HISTORY: Reason for exam:->acute-on-chronic hypoxemic/hypercapnic respiratory failure Ordering Physician Provided Reason for Exam: acute-on-chronic hypoxemic/hypercapnic respiratory failure FINDINGS: Cardiomediastinal silhouette is unchanged. No pneumothorax.   Metallic device overlying the left

## 2018-11-17 NOTE — PROGRESS NOTES
Notified Dr. Salazar Arias @ 6121 11/17/18 re: pulmonary consult. [phone] [facility]

## 2018-11-17 NOTE — H&P
Medications Prior to Admission:      Prior to Admission medications    Medication Sig Start Date End Date Taking? Authorizing Provider   furosemide (LASIX) 20 MG tablet Take 1 tablet by mouth 2 times daily 3/12/18  Yes Lolita Aparicio MD   lisinopril (PRINIVIL) 10 MG tablet Take 1 tablet by mouth daily 3/12/18  Yes Lolita Aparicio MD   tiotropium (Antonio Juvenal) 18 MCG inhalation capsule Inhale 1 capsule into the lungs daily 2/16/18  Yes Velton Severs, APRN - CNP   gabapentin (NEURONTIN) 100 MG capsule Take 300 mg by mouth 3 times daily. .   Yes Historical Provider, MD   aspirin 81 MG tablet Take 81 mg by mouth daily   Yes Historical Provider, MD   metFORMIN (GLUCOPHAGE) 500 MG tablet Take 2 tablets by mouth 2 times daily (with meals) 1/11/18  Yes Amarilys Morgan MD   glucose monitoring kit (FREESTYLE) monitoring kit 1 kit by Does not apply route daily 12/12/17  Yes Amarilys Morgan MD   glucose blood VI test strips [facility] BLOOD GLUCOSE TEST) strip As needed. 12/12/17  Yes Amarilys Morgan MD   [employer] Lancets Ultra Thin MISC 1 each by Does not apply route daily 12/12/17  Yes Amarilys Morgan MD   OXYGEN Inhale 4 L into the lungs continuous 11/16/17  Yes Dana Yepez MD   predniSONE (DELTASONE) 10 MG tablet Take 3 tabs a day for 3 days,  Then 2 tabs a day for 3 days ,  Then 1 tab a day for 3 days. 1/11/18   Amarilys Morgan MD   nicotine (NICODERM CQ) 14 MG/24HR Place 1 patch onto the skin daily 1/11/18   Amarilys Morgan MD   ipratropium-albuterol (DUONEB) 0.5-2.5 (3) MG/3ML SOLN nebulizer solution Inhale 3 mLs into the lungs 4 times daily 11/16/17   Dana Yepez MD       Allergies:  Pcn [penicillins]; Aspirin; Pcn [penicillins]; and Sulfamethoxazole-trimethoprim    Social History:      The patient currently lives at home    TOBACCO:   reports that she has been smoking E-Cigarettes and Cigarettes. She has a 15.00 pack-year smoking history.  She has never used smokeless tobacco.  ETOH:   reports that she does not drink alcohol. Family History:      Reviewed in detail and Positive as follows:    Family History   Problem Relation Age of Onset    No Known Problems Father     No Known Problems Sister     No Known Problems Brother     No Known Problems Maternal Grandmother     No Known Problems Maternal Grandfather     No Known Problems Paternal Grandmother     No Known Problems Paternal Grandfather     No Known Problems Other     Heart Disease Mother        REVIEW OF SYSTEMS:   Pertinent positives as noted in the HPI. All other systems reviewed and negative. PHYSICAL EXAM PERFORMED:    /60   Pulse 121   Temp 97.8 °F (36.6 °C) (Oral)   Resp 18   Ht 5' 3\" (1.6 m)   Wt 258 lb 13.1 oz (117.4 kg)   SpO2 93%   BMI 45.85 kg/m²     General appearance:  No apparent distress, appears stated age and cooperative. HEENT:  Normal cephalic, atraumatic without obvious deformity. Pupils equal, round, and reactive to light. Extra ocular muscles intact. Conjunctivae/corneas clear. Neck: Supple, with full range of motion. No jugular venous distention. Trachea midline. Respiratory:  Diminished BS, with scant expiratory wheezing, rhonchi noted. Cardiovascular:  Regular rate and rhythm with normal S1/S2 without murmurs, rubs or gallops. Abdomen: Soft, non-tender, non-distended with normal bowel sounds. Musculoskeletal: Trace LE edema bilaterally. Full range of motion without deformity. Skin: Skin color, texture, turgor normal.  No rashes or lesions. Neurologic:  Neurovascularly intact without any focal sensory/motor deficits.   Psychiatric:  Alert and oriented, thought content appropriate, normal insight  Capillary Refill: Brisk,< 3 seconds   Peripheral Pulses: +2 palpable, equal bilaterally       Labs:     Recent Labs      11/17/18   0720   WBC  9.4   HGB  15.6   HCT  47.2   PLT  171     Recent Labs      11/17/18   0720   NA  136   K  5.4*   CL  98*   CO2  29   BUN  8 care provider on file. for the opportunity to be involved in this patient's care. If you have any questions or concerns please feel free to contact me at [phone].

## 2018-11-18 VITALS
BODY MASS INDEX: 46.16 KG/M2 | HEART RATE: 104 BPM | RESPIRATION RATE: 18 BRPM | SYSTOLIC BLOOD PRESSURE: 138 MMHG | DIASTOLIC BLOOD PRESSURE: 86 MMHG | WEIGHT: 260.55 LBS | TEMPERATURE: 98 F | OXYGEN SATURATION: 94 % | HEIGHT: 63 IN

## 2018-11-18 PROBLEM — E66.01 MORBID OBESITY (HCC): Chronic | Status: ACTIVE | Noted: 2018-01-10

## 2018-11-18 LAB
ALBUMIN SERPL-MCNC: 3.9 G/DL (ref 3.4–5)
ANION GAP SERPL CALCULATED.3IONS-SCNC: 7 MMOL/L (ref 3–16)
BASOPHILS ABSOLUTE: 0 K/UL (ref 0–0.2)
BASOPHILS RELATIVE PERCENT: 0.2 %
BUN BLDV-MCNC: 21 MG/DL (ref 7–20)
CALCIUM SERPL-MCNC: 9 MG/DL (ref 8.3–10.6)
CHLORIDE BLD-SCNC: 96 MMOL/L (ref 99–110)
CO2: 33 MMOL/L (ref 21–32)
CREAT SERPL-MCNC: 0.8 MG/DL (ref 0.6–1.1)
EOSINOPHILS ABSOLUTE: 0 K/UL (ref 0–0.6)
EOSINOPHILS RELATIVE PERCENT: 0 %
GFR AFRICAN AMERICAN: >60
GFR NON-AFRICAN AMERICAN: >60
GLUCOSE BLD-MCNC: 107 MG/DL (ref 70–99)
GLUCOSE BLD-MCNC: 140 MG/DL (ref 70–99)
GLUCOSE BLD-MCNC: 175 MG/DL (ref 70–99)
GLUCOSE BLD-MCNC: 176 MG/DL (ref 70–99)
GLUCOSE BLD-MCNC: 194 MG/DL (ref 70–99)
HCT VFR BLD CALC: 46.2 % (ref 36–48)
HEMOGLOBIN: 15 G/DL (ref 12–16)
LYMPHOCYTES ABSOLUTE: 1.3 K/UL (ref 1–5.1)
LYMPHOCYTES RELATIVE PERCENT: 11.8 %
MAGNESIUM: 2.2 MG/DL (ref 1.8–2.4)
MCH RBC QN AUTO: 28 PG (ref 26–34)
MCHC RBC AUTO-ENTMCNC: 32.4 G/DL (ref 31–36)
MCV RBC AUTO: 86.3 FL (ref 80–100)
MONOCYTES ABSOLUTE: 0.5 K/UL (ref 0–1.3)
MONOCYTES RELATIVE PERCENT: 5.1 %
NEUTROPHILS ABSOLUTE: 8.8 K/UL (ref 1.7–7.7)
NEUTROPHILS RELATIVE PERCENT: 82.9 %
PDW BLD-RTO: 15.5 % (ref 12.4–15.4)
PERFORMED ON: ABNORMAL
PHOSPHORUS: 3.9 MG/DL (ref 2.5–4.9)
PLATELET # BLD: 172 K/UL (ref 135–450)
PMV BLD AUTO: 9.8 FL (ref 5–10.5)
POTASSIUM SERPL-SCNC: 4.6 MMOL/L (ref 3.5–5.1)
RBC # BLD: 5.35 M/UL (ref 4–5.2)
SODIUM BLD-SCNC: 136 MMOL/L (ref 136–145)
WBC # BLD: 10.6 K/UL (ref 4–11)

## 2018-11-18 PROCEDURE — 6370000000 HC RX 637 (ALT 250 FOR IP): Performed by: HOSPITALIST

## 2018-11-18 PROCEDURE — 80069 RENAL FUNCTION PANEL: CPT

## 2018-11-18 PROCEDURE — 2580000003 HC RX 258: Performed by: HOSPITALIST

## 2018-11-18 PROCEDURE — 85025 COMPLETE CBC W/AUTO DIFF WBC: CPT

## 2018-11-18 PROCEDURE — 94660 CPAP INITIATION&MGMT: CPT

## 2018-11-18 PROCEDURE — 94668 MNPJ CHEST WALL SBSQ: CPT

## 2018-11-18 PROCEDURE — 94761 N-INVAS EAR/PLS OXIMETRY MLT: CPT

## 2018-11-18 PROCEDURE — 94640 AIRWAY INHALATION TREATMENT: CPT

## 2018-11-18 PROCEDURE — 2700000000 HC OXYGEN THERAPY PER DAY

## 2018-11-18 PROCEDURE — 83735 ASSAY OF MAGNESIUM: CPT

## 2018-11-18 PROCEDURE — 99231 SBSQ HOSP IP/OBS SF/LOW 25: CPT | Performed by: INTERNAL MEDICINE

## 2018-11-18 PROCEDURE — 6360000002 HC RX W HCPCS: Performed by: HOSPITALIST

## 2018-11-18 PROCEDURE — 6370000000 HC RX 637 (ALT 250 FOR IP): Performed by: INTERNAL MEDICINE

## 2018-11-18 PROCEDURE — 36415 COLL VENOUS BLD VENIPUNCTURE: CPT

## 2018-11-18 RX ORDER — GUAIFENESIN 600 MG/1
600 TABLET, EXTENDED RELEASE ORAL 2 TIMES DAILY
Qty: 60 TABLET | Refills: 1 | Status: SHIPPED | OUTPATIENT
Start: 2018-11-18 | End: 2018-12-18

## 2018-11-18 RX ORDER — PREDNISONE 20 MG/1
40 TABLET ORAL DAILY
Qty: 10 TABLET | Refills: 0 | Status: SHIPPED | OUTPATIENT
Start: 2018-11-19 | End: 2018-11-24

## 2018-11-18 RX ADMIN — IPRATROPIUM BROMIDE AND ALBUTEROL SULFATE 1 AMPULE: .5; 3 SOLUTION RESPIRATORY (INHALATION) at 15:47

## 2018-11-18 RX ADMIN — ASPIRIN 81 MG: 81 TABLET, COATED ORAL at 09:17

## 2018-11-18 RX ADMIN — ACETAMINOPHEN 650 MG: 325 TABLET ORAL at 14:12

## 2018-11-18 RX ADMIN — FAMOTIDINE 20 MG: 20 TABLET ORAL at 09:16

## 2018-11-18 RX ADMIN — IPRATROPIUM BROMIDE AND ALBUTEROL SULFATE 1 AMPULE: .5; 3 SOLUTION RESPIRATORY (INHALATION) at 00:06

## 2018-11-18 RX ADMIN — LEVOFLOXACIN 500 MG: 500 TABLET, FILM COATED ORAL at 09:16

## 2018-11-18 RX ADMIN — ACETAMINOPHEN 650 MG: 325 TABLET ORAL at 09:24

## 2018-11-18 RX ADMIN — IPRATROPIUM BROMIDE AND ALBUTEROL SULFATE 1 AMPULE: .5; 3 SOLUTION RESPIRATORY (INHALATION) at 03:56

## 2018-11-18 RX ADMIN — GABAPENTIN 300 MG: 300 CAPSULE ORAL at 09:16

## 2018-11-18 RX ADMIN — PREDNISONE 40 MG: 10 TABLET ORAL at 09:17

## 2018-11-18 RX ADMIN — MICONAZOLE NITRATE: 2 POWDER TOPICAL at 09:18

## 2018-11-18 RX ADMIN — IPRATROPIUM BROMIDE AND ALBUTEROL SULFATE 1 AMPULE: .5; 3 SOLUTION RESPIRATORY (INHALATION) at 12:31

## 2018-11-18 RX ADMIN — INSULIN LISPRO 1 UNITS: 100 INJECTION, SOLUTION INTRAVENOUS; SUBCUTANEOUS at 09:26

## 2018-11-18 RX ADMIN — FUROSEMIDE 40 MG: 10 INJECTION, SOLUTION INTRAMUSCULAR; INTRAVENOUS at 09:17

## 2018-11-18 RX ADMIN — SODIUM CHLORIDE, PRESERVATIVE FREE 10 ML: 5 INJECTION INTRAVENOUS at 09:09

## 2018-11-18 RX ADMIN — GABAPENTIN 300 MG: 300 CAPSULE ORAL at 14:11

## 2018-11-18 RX ADMIN — ENOXAPARIN SODIUM 40 MG: 40 INJECTION SUBCUTANEOUS at 09:16

## 2018-11-18 RX ADMIN — GUAIFENESIN 600 MG: 600 TABLET, EXTENDED RELEASE ORAL at 09:17

## 2018-11-18 RX ADMIN — IPRATROPIUM BROMIDE AND ALBUTEROL SULFATE 1 AMPULE: .5; 3 SOLUTION RESPIRATORY (INHALATION) at 07:53

## 2018-11-18 ASSESSMENT — PAIN SCALES - GENERAL
PAINLEVEL_OUTOF10: 8
PAINLEVEL_OUTOF10: 10
PAINLEVEL_OUTOF10: 8

## 2018-11-18 ASSESSMENT — PAIN DESCRIPTION - ORIENTATION: ORIENTATION: LEFT

## 2018-11-18 ASSESSMENT — PAIN DESCRIPTION - LOCATION: LOCATION: ANKLE

## 2018-11-18 ASSESSMENT — PAIN DESCRIPTION - PAIN TYPE: TYPE: ACUTE PAIN

## 2018-11-18 NOTE — PROGRESS NOTES
11/18/18 0358   NIV Type   Equipment Type V60   Mode BIPAP   Mask Type Full face mask   Mask Size Medium   Settings/Measurements   Comfort Level Good   Using Accessory Muscles No   IPAP 17 cmH20   EPAP 11 cmH2O   Rate Ordered 12   Resp 21   SpO2 98   FiO2  50 %   Vt Exhaled 593 mL   Mask Leak (lpm) 3 lpm   Skin Protection for O2 Device Yes   Breath Sounds   Right Upper Lobe Diminished   Right Middle Lobe Diminished   Right Lower Lobe Diminished   Left Upper Lobe Diminished   Left Lower Lobe Diminished   Patient Observation   Observations mepilex on nose   Alarm Settings   Alarms On Y   Press Low Alarm 6 cmH2O   High Pressure Alarm 30 cmH2O   Apnea (secs) 20 secs   Resp Rate Low Alarm 6   High Respiratory Rate 45 br/min

## 2018-11-18 NOTE — DISCHARGE SUMMARY
Hospital Discharge Summary    Patient's PCP: No primary care provider on file. Admit Date: 11/17/2018   Discharge Date: 11/18/2018    Admitting Physician: Dr. Lexx Cunningham MD  Discharge Physician: Dr. Rosalba Olvera   Consults: pulmonary/intensive care    HPI: 38 yo F admitted with COPD exac. Brief hospital course:   Admitted with acute, chronic hypoxic, hypercapnic respiratory failure due to COPD exac, +- LLL PNA, likely gram neg, underlying PAPITO, HTN, DM 2 uncontrolled. Improved with IV steroids, IV diuresis, IV abx. Cx data neg. Plan to dc to home on Prednisone 40 mg daily x 5 more days, cont home Lasix, also cont abx which was prescribed by her PCP and she filled but did not take, Levaquin. Script for Mucinex as well. Cont home nebs on dc. Cont CPAP qhs, home Metformin. Dc to home today.     Discharge Diagnoses:   Patient Active Problem List   Diagnosis Code    COPD (chronic obstructive pulmonary disease) ([facility] [address] J44.9    HTN (hypertension) I10    Tobacco smoker Z72.0    COPD exacerbation ([facility] [address] J44.1    Hx heroin overdose T40.1X1A    Acute on chronic respiratory failure with hypoxia and hypercapnia (HCC) J96.21, J96.22    Morbid obesity with BMI of 50.0-59.9, adult (HCC) E66.01, Z68.43    DM2 (diabetes mellitus, type 2) (HCC) E11.9    PAPITO/OHS on CPAP (non-compliant) E66.2    Chronic respiratory failure with hypoxia and hypercapnia on 4 L home O2 J96.11, J96.12    Neuropathy G62.9    Hx IV heroin abuse F19.10    Hx MRSA Z22.322    Hepatitis C B19.20    Pulmonary vascular congestion on CXR R09.89    Intertrigo L30.4    Diabetes mellitus type 2, uncontrolled ([facility] [address] E11.65    Hyperkalemia E87.5       Physical Exam: /89   Pulse 95   Temp 98.2 °F (36.8 °C) (Oral)   Resp 18   Ht 5' 3\" (1.6 m)   Wt 260 lb 8.8 oz (118.2 kg)   SpO2 93%   BMI 46.15 kg/m²     Recent Labs      11/17/18   1239  11/17/18   1754  11/18/18   0005  11/18/18   0602  11/18/18   0926   MILAN

## 2018-11-18 NOTE — PROGRESS NOTES
INPATIENT PULMONARY CRITICAL CARE PROGRESS NOTE      Reason for visit:     SUBJECTIVE:  The patient feels much better, although she is still dyspneic and has a cough. She says she will try very hard to quit smoking. She has remained hemodynamically stable, is on oxygen at 3 LPM with SaO2 94%. She did use BiPAP last night. She has minimal phlegm, denies chest pain. She has no GI or  complaints. She is keen to go home today. Physical Exam:  Blood pressure 138/86, pulse 104, temperature 98 °F (36.7 °C), temperature source Oral, resp. rate 18, height 5' 3\" (1.6 m), weight 260 lb 8.8 oz (118.2 kg), SpO2 93 %, not currently breastfeeding.'   Constitutional:  Short, morbidly obese, less congested cough. Hoarse voice improved. No acute distress. HENT:  Oropharynx is clear and moist.  Class III airway. Eyes:  Conjunctivae are normal. No scleral icterus. Neck: Short and large neck, no JVD  Cardiovascular: Distant heart sounds. No lower extremity edema. Pulmonary/Chest: Diminished air entry, minimal scattered wheezes. No rales. No accessory muscle usage or stridor. Abdominal: Deferred. Musculoskeletal: No cyanosis. No clubbing. No obvious joint deformity. Lymphadenopathy: Deferred   Skin: Skin is warm and dry. No rash or nodules on the exposed extremities. Multiple tattoos. Psychiatric: Normal mood and affect. Behavior is normal.  No anxiety. Neurologic: Alert, awake and oriented. PERRL. Speech fluent        Results:  CBC:   Recent Labs      11/17/18   0720  11/18/18   0524   WBC  9.4  10.6   HGB  15.6  15.0   HCT  47.2  46.2   MCV  86.2  86.3   PLT  171  172     BMP:   Recent Labs      11/17/18   0720  11/18/18   0524   NA  136  136   K  5.4*  4.6   CL  98*  96*   CO2  29  33*   PHOS  4.7  3.9   BUN  8  21*   CREATININE  1.0  0.8     LIVER PROFILE: No results for input(s): AST, ALT, LIPASE, BILIDIR, BILITOT, ALKPHOS in the last 72 hours. Invalid input(s):   AMYLASE,  ALB  PT/INR:   Recent

## 2018-11-20 LAB
EKG ATRIAL RATE: 109 BPM
EKG DIAGNOSIS: NORMAL
EKG P AXIS: 65 DEGREES
EKG P-R INTERVAL: 140 MS
EKG Q-T INTERVAL: 368 MS
EKG QRS DURATION: 70 MS
EKG QTC CALCULATION (BAZETT): 495 MS
EKG R AXIS: 86 DEGREES
EKG T AXIS: 30 DEGREES
EKG VENTRICULAR RATE: 109 BPM
ESTIMATED AVERAGE GLUCOSE: 125.5 MG/DL
HBA1C MFR BLD: 6 %

## 2018-11-22 LAB
BLOOD CULTURE, ROUTINE: NORMAL
CULTURE, BLOOD 2: NORMAL

## 2019-05-23 ENCOUNTER — HOSPITAL ENCOUNTER (EMERGENCY)
Age: 44
Discharge: HOME | End: 2019-05-23
Attending: EMERGENCY MEDICINE
Payer: MEDICARE

## 2019-05-23 ENCOUNTER — APPOINTMENT (OUTPATIENT)
Dept: GENERAL RADIOLOGY | Age: 44
End: 2019-05-23
Payer: MEDICARE

## 2019-05-23 VITALS
BODY MASS INDEX: 39.87 KG/M2 | HEIGHT: 63 IN | DIASTOLIC BLOOD PRESSURE: 97 MMHG | SYSTOLIC BLOOD PRESSURE: 182 MMHG | RESPIRATION RATE: 20 BRPM | OXYGEN SATURATION: 93 % | TEMPERATURE: 97.8 F | HEART RATE: 96 BPM | WEIGHT: 225 LBS

## 2019-05-23 DIAGNOSIS — I50.9 ACUTE ON CHRONIC CONGESTIVE HEART FAILURE, UNSPECIFIED HEART FAILURE TYPE (HCC): Primary | ICD-10-CM

## 2019-05-23 LAB
A/G RATIO: 1.1 (ref 1.1–2.2)
ALBUMIN SERPL-MCNC: 3.9 G/DL (ref 3.4–5)
ALP BLD-CCNC: 75 U/L (ref 40–129)
ALT SERPL-CCNC: 30 U/L (ref 10–40)
ANION GAP SERPL CALCULATED.3IONS-SCNC: 7 MMOL/L (ref 3–16)
APTT: 29.2 SEC (ref 26–36)
AST SERPL-CCNC: 25 U/L (ref 15–37)
BASOPHILS ABSOLUTE: 0.1 K/UL (ref 0–0.2)
BASOPHILS RELATIVE PERCENT: 0.9 %
BILIRUB SERPL-MCNC: 0.6 MG/DL (ref 0–1)
BUN BLDV-MCNC: 9 MG/DL (ref 7–20)
CALCIUM SERPL-MCNC: 9.4 MG/DL (ref 8.3–10.6)
CHLORIDE BLD-SCNC: 95 MMOL/L (ref 99–110)
CO2: 36 MMOL/L (ref 21–32)
CREAT SERPL-MCNC: 0.6 MG/DL (ref 0.6–1.1)
EKG ATRIAL RATE: 100 BPM
EKG DIAGNOSIS: NORMAL
EKG P AXIS: 65 DEGREES
EKG P-R INTERVAL: 138 MS
EKG Q-T INTERVAL: 336 MS
EKG QRS DURATION: 62 MS
EKG QTC CALCULATION (BAZETT): 433 MS
EKG R AXIS: 87 DEGREES
EKG T AXIS: 61 DEGREES
EKG VENTRICULAR RATE: 100 BPM
EOSINOPHILS ABSOLUTE: 0.1 K/UL (ref 0–0.6)
EOSINOPHILS RELATIVE PERCENT: 1.2 %
GFR AFRICAN AMERICAN: >60
GFR NON-AFRICAN AMERICAN: >60
GLOBULIN: 3.6 G/DL
GLUCOSE BLD-MCNC: 92 MG/DL (ref 70–99)
HCT VFR BLD CALC: 50 % (ref 36–48)
HEMOGLOBIN: 15.9 G/DL (ref 12–16)
INR BLD: 0.95 (ref 0.86–1.14)
LYMPHOCYTES ABSOLUTE: 2.2 K/UL (ref 1–5.1)
LYMPHOCYTES RELATIVE PERCENT: 26.2 %
MCH RBC QN AUTO: 27.3 PG (ref 26–34)
MCHC RBC AUTO-ENTMCNC: 31.7 G/DL (ref 31–36)
MCV RBC AUTO: 85.9 FL (ref 80–100)
MONOCYTES ABSOLUTE: 0.7 K/UL (ref 0–1.3)
MONOCYTES RELATIVE PERCENT: 8 %
NEUTROPHILS ABSOLUTE: 5.4 K/UL (ref 1.7–7.7)
NEUTROPHILS RELATIVE PERCENT: 63.7 %
PDW BLD-RTO: 16.1 % (ref 12.4–15.4)
PLATELET # BLD: 153 K/UL (ref 135–450)
PMV BLD AUTO: 9.5 FL (ref 5–10.5)
POTASSIUM SERPL-SCNC: 4.5 MMOL/L (ref 3.5–5.1)
PRO-BNP: 1565 PG/ML (ref 0–124)
PROTHROMBIN TIME: 10.8 SEC (ref 9.8–13)
RBC # BLD: 5.81 M/UL (ref 4–5.2)
SODIUM BLD-SCNC: 138 MMOL/L (ref 136–145)
TOTAL PROTEIN: 7.5 G/DL (ref 6.4–8.2)
TROPONIN: <0.01 NG/ML
WBC # BLD: 8.5 K/UL (ref 4–11)

## 2019-05-23 PROCEDURE — 93010 ELECTROCARDIOGRAM REPORT: CPT | Performed by: INTERNAL MEDICINE

## 2019-05-23 PROCEDURE — 84484 ASSAY OF TROPONIN QUANT: CPT

## 2019-05-23 PROCEDURE — 36415 COLL VENOUS BLD VENIPUNCTURE: CPT

## 2019-05-23 PROCEDURE — 71045 X-RAY EXAM CHEST 1 VIEW: CPT

## 2019-05-23 PROCEDURE — 99285 EMERGENCY DEPT VISIT HI MDM: CPT

## 2019-05-23 PROCEDURE — 80053 COMPREHEN METABOLIC PANEL: CPT

## 2019-05-23 PROCEDURE — 6360000002 HC RX W HCPCS: Performed by: EMERGENCY MEDICINE

## 2019-05-23 PROCEDURE — 6370000000 HC RX 637 (ALT 250 FOR IP): Performed by: EMERGENCY MEDICINE

## 2019-05-23 PROCEDURE — 96374 THER/PROPH/DIAG INJ IV PUSH: CPT

## 2019-05-23 PROCEDURE — 93005 ELECTROCARDIOGRAM TRACING: CPT | Performed by: EMERGENCY MEDICINE

## 2019-05-23 PROCEDURE — 85025 COMPLETE CBC W/AUTO DIFF WBC: CPT

## 2019-05-23 PROCEDURE — 83880 ASSAY OF NATRIURETIC PEPTIDE: CPT

## 2019-05-23 PROCEDURE — 85730 THROMBOPLASTIN TIME PARTIAL: CPT

## 2019-05-23 PROCEDURE — 85610 PROTHROMBIN TIME: CPT

## 2019-05-23 RX ORDER — FUROSEMIDE 10 MG/ML
40 INJECTION INTRAMUSCULAR; INTRAVENOUS ONCE
Status: COMPLETED | OUTPATIENT
Start: 2019-05-23 | End: 2019-05-23

## 2019-05-23 RX ORDER — ALBUTEROL SULFATE 90 UG/1
2 AEROSOL, METERED RESPIRATORY (INHALATION) EVERY 4 HOURS PRN
Qty: 1 INHALER | Refills: 0 | Status: SHIPPED | OUTPATIENT
Start: 2019-05-23 | End: 2019-10-04 | Stop reason: SDUPTHER

## 2019-05-23 RX ORDER — FUROSEMIDE 40 MG/1
40 TABLET ORAL DAILY
Qty: 10 TABLET | Refills: 0 | Status: SHIPPED | OUTPATIENT
Start: 2019-05-23 | End: 2019-06-11 | Stop reason: SDUPTHER

## 2019-05-23 RX ORDER — IPRATROPIUM BROMIDE AND ALBUTEROL SULFATE 2.5; .5 MG/3ML; MG/3ML
1 SOLUTION RESPIRATORY (INHALATION) ONCE
Status: COMPLETED | OUTPATIENT
Start: 2019-05-23 | End: 2019-05-23

## 2019-05-23 RX ORDER — LISINOPRIL 10 MG/1
10 TABLET ORAL DAILY
Qty: 10 TABLET | Refills: 0 | Status: SHIPPED | OUTPATIENT
Start: 2019-05-23 | End: 2019-06-11 | Stop reason: SDUPTHER

## 2019-05-23 RX ORDER — IPRATROPIUM BROMIDE AND ALBUTEROL SULFATE 2.5; .5 MG/3ML; MG/3ML
3 SOLUTION RESPIRATORY (INHALATION) 4 TIMES DAILY
Qty: 360 ML | Refills: 1 | Status: SHIPPED | OUTPATIENT
Start: 2019-05-23 | End: 2020-04-24 | Stop reason: SDUPTHER

## 2019-05-23 RX ORDER — PREDNISONE 20 MG/1
60 TABLET ORAL ONCE
Status: COMPLETED | OUTPATIENT
Start: 2019-05-23 | End: 2019-05-23

## 2019-05-23 RX ADMIN — IPRATROPIUM BROMIDE AND ALBUTEROL SULFATE 1 AMPULE: .5; 3 SOLUTION RESPIRATORY (INHALATION) at 14:08

## 2019-05-23 RX ADMIN — PREDNISONE 60 MG: 20 TABLET ORAL at 14:23

## 2019-05-23 RX ADMIN — FUROSEMIDE 40 MG: 10 INJECTION, SOLUTION INTRAMUSCULAR; INTRAVENOUS at 14:23

## 2019-05-23 ASSESSMENT — ENCOUNTER SYMPTOMS
VOMITING: 0
FACIAL SWELLING: 0
VOICE CHANGE: 0
COLOR CHANGE: 0
SWOLLEN GLANDS: 0
COUGH: 1
WHEEZING: 1
SPUTUM PRODUCTION: 0
ABDOMINAL PAIN: 0
SHORTNESS OF BREATH: 1
NAUSEA: 0
TROUBLE SWALLOWING: 0
STRIDOR: 0

## 2019-05-23 ASSESSMENT — PAIN SCALES - GENERAL: PAINLEVEL_OUTOF10: 7

## 2019-05-23 NOTE — ED NOTES
Pt arrived to ED with c/o CP, SOB. Pt states chest pain started last night and described the pain as a heaviness, something sitting/pressure to the center of chest. Pt states nothing makes the pain better or worse. Pt also c/o SOB for the past couple of days that is more than usual. Pt O2 sat was at 85% RA upon arrival; reports usually wears 3L continuous O2 at home. Pt alert and oriented. symone Pack completed EKG. IV placed, blood drawn and taken to lab. Pt verbalizes no further needs at this time. Will cont to monitor. Call light within reach.       Drake Honeycutt RN  05/23/19 0055

## 2019-05-23 NOTE — ED NOTES
Patient provided with discharge instructions and any prescriptions. Follow-up reviewed with patient. No further questions verbalized at this time. Vital signs and patient stable upon discharge.         Lanye Sotelo, RN  05/23/19 6644

## 2019-05-23 NOTE — ED PROVIDER NOTES
[facility]  eMERGENCY dEPARTMENT eNCOUnter      Pt Name: Alex Simmons  MRN: [id number]  Leonelgffrank 1975  Date of evaluation: 5/23/2019  Provider: Angeline Gomez MD    [address] [place] [address]       Chief Complaint   Patient presents with    Chest Pain     Pt c/o chest pain that started last night, states her chest feels heavy. Pt states nothing makes the pain better or worse. Pt also reports SOB that started two days ago that is worse than normal.     Shortness of Breath         HISTORY OF PRESENT ILLNESS   (Location/Symptom, Timing/Onset, Context/Setting, Quality, Duration, Modifying Factors, Severity)  Note limiting factors. The history is provided by the patient. Shortness of Breath   Severity:  Severe  Onset quality:  Gradual  Duration:  2 days  Timing:  Constant  Progression:  Worsening  Chronicity:  Recurrent  Context comment:  Hx of COPD and CHF. Reports she has been off of all of her medications including metformin and lasix and nebulizers for the past month  Relieved by:  Sitting up  Exacerbated by: laying flat. Associated symptoms: chest pain, cough and wheezing    Associated symptoms: no abdominal pain, no fever, no neck pain, no sputum production (patient reports cough non-productive), no swollen glands and no vomiting    Risk factors: no hx of PE/DVT and no prolonged immobilization        Nursing Notes were reviewed. REVIEW OFSYSTEMS    (2-9 systems for level 4, 10 or more for level 5)     Review of Systems   Constitutional: Negative for appetite change, fatigue, fever and unexpected weight change. HENT: Negative for facial swelling, trouble swallowing and voice change. Eyes: Negative for visual disturbance. Respiratory: Positive for cough, shortness of breath and wheezing. Negative for sputum production (patient reports cough non-productive) and stridor. Cardiovascular: Positive for chest pain. Negative for palpitations.    Gastrointestinal: Negative for abdominal pain, nausea and vomiting. Genitourinary: Negative for dysuria and vaginal bleeding. Musculoskeletal: Negative for neck pain and neck stiffness. Skin: Negative for color change and wound. Neurological: Negative for seizures and syncope. Psychiatric/Behavioral: Negative for self-injury and suicidal ideas. Except as noted above the remainder of the review of systems was reviewed and negative. PAST MEDICAL HISTORY     Past Medical History:   Diagnosis Date    Bacteremia 08/23/2017    staph aureus    CHF (congestive heart failure) ([facility] [address]     Colonization status 7/26/12    DNA probe negative for MRSA    COPD (chronic obstructive pulmonary disease) ([facility] [address]     Diabetes mellitus ([facility] [address]     FOR ABOUT 4 YEARS    DM (diabetes mellitus) ([facility] [address]     Drug abuse, IV (HCC)     Hepatitis     Hepatitis C antibody positive in blood 08/24/2017    Hepatitis C AB positive     [facility]     Hypertension     MRSA infection     LUNGS    Neuropathy     legs with pain    Other disorders of kidney and ureter     KIDNEY FAILURE, ACUTE    Pneumonia     Smoking history          SURGICAL HISTORY       Past Surgical History:   Procedure Laterality Date    BRONCHOSCOPY  12/21/2017    BAL    CYSTOSCOPY  1/5/15    cysto with left stent placement    CYSTOSCOPY  10/6/15    stent removal    TRACHEOSTOMY CLOSURE      TUBAL LIGATION      URETER STENT PLACEMENT           CURRENT MEDICATIONS       Previous Medications    ASPIRIN 81 MG TABLET    Take 81 mg by mouth daily    [employer] LANCETS ULTRA THIN MISC    1 each by Does not apply route daily    GABAPENTIN (NEURONTIN) 100 MG CAPSULE    Take 300 mg by mouth 3 times daily. Zenaida Perry GLUCOSE BLOOD VI TEST STRIPS (ACURA BLOOD GLUCOSE TEST) STRIP    As needed.     GLUCOSE MONITORING KIT (FREESTYLE) MONITORING KIT    1 kit by Does not apply route daily    METFORMIN (GLUCOPHAGE) 500 MG TABLET    Take 2 tablets by mouth 2 times daily (with meals)    NICOTINE (NICODERM CQ) 14 MG/24HR    Place 1 patch onto the skin daily    OXYGEN    Inhale 4 L into the lungs continuous    TIOTROPIUM (SPIRIVA HANDIHALER) 18 MCG INHALATION CAPSULE    Inhale 1 capsule into the lungs daily       ALLERGIES     Pcn [penicillins];  Aspirin; Pcn [penicillins]; and Sulfamethoxazole-trimethoprim    FAMILY HISTORY       Family History   Problem Relation Age of Onset    No Known Problems Father     No Known Problems Sister     No Known Problems Brother     No Known Problems Maternal Grandmother     No Known Problems Maternal Grandfather     No Known Problems Paternal Grandmother     No Known Problems Paternal Grandfather     No Known Problems Other     Heart Disease Mother           SOCIAL HISTORY       Social History     Socioeconomic History    Marital status: Single     Spouse name: None    Number of children: None    Years of education: None    Highest education level: None   Occupational History    None   Social Needs    Financial resource strain: None    Food insecurity:     Worry: None     Inability: None    Transportation needs:     Medical: None     Non-medical: None   Tobacco Use    Smoking status: Current Every Day Smoker     Packs/day: 1.00     Years: 15.00     Pack years: 15.00     Types: E-Cigarettes, Cigarettes    Smokeless tobacco: Never Used    Tobacco comment: 2/14/18 - smokes 5 cigs daily   Substance and Sexual Activity    Alcohol use: No    Drug use: No     Comment: Heroin    Sexual activity: Not Currently     Partners: Male   Lifestyle    Physical activity:     Days per week: None     Minutes per session: None    Stress: None   Relationships    Social connections:     Talks on phone: None     Gets together: None     Attends Protestant service: None     Active member of club or organization: None     Attends meetings of clubs or organizations: None     Relationship status: None    Intimate partner violence:     Fear of current or ex partner: None Durations are unremarkable. ST Segments: normal  Reduction in heart rate from previous EKG on 11/17/2018      RADIOLOGY:     Interpretation per the Radiologist below, if available at the time of this note:    XR CHEST PORTABLE   Final Result   Pulmonary vascular congestion with no edema or suspicious infiltrate             LABS:  Labs Reviewed   CBC WITH AUTO DIFFERENTIAL - Abnormal; Notable for the following components:       Result Value    RBC 5.81 (*)     Hematocrit 50.0 (*)     RDW 16.1 (*)     All other components within normal limits    Narrative:     Performed at:  [facility]  [address] [place] [address]  [employer]   Phone [phone]   COMPREHENSIVE METABOLIC PANEL - Abnormal; Notable for the following components:    Chloride 95 (*)     CO2 36 (*)     All other components within normal limits    Narrative:     Performed at:  [facility]  [address] [place], [place] [address],  [employer]   Phone [phone] - Abnormal; Notable for the following components:    Pro-BNP 1,565 (*)     All other components within normal limits    Narrative:     Performed at:  [facility]  [address] [place] [address]  [employer]   Phone [phone]    Narrative:     Performed at:  [facility]  [address] [place] [address]  [employer]   Phone [phone]   APTT    Narrative:     Performed at:  [facility]  [address] [place] [address]  [employer]   Phone [phone]   TROPONIN    Narrative:     Performed at:  [facility]  [address] [place], [place] [address]  [employer]   Phone [phone]       All otherlabs were within normal range or not returned as of this dictation.     EMERGENCY DEPARTMENT COURSE and DIFFERENTIAL DIAGNOSIS/MDM:   Vitals:    Vitals:    05/23/19 1313   BP: (!) 182/97   Pulse: 96   Resp: 20   Temp: 97.8 °F (36.6 °C)   TempSrc: Oral   SpO2: 93%   Weight: 225 lb (102.1 kg)   Height: 5' 3\" (1.6 m)         MDM  Patient's history and physical exam is concerning for mild COPD and CHF exacerbations. The patient reports being out of her Lasix and Cipro for the past month but does report to having some left over metformin this which she took as recently as today. Laboratory evaluation is concerning for elevated BNP but is otherwise unremarkable. Chest x-ray shows pulmonary congestion but no emergent pathology. Troponin is undetectable. No ischemic changes on EKG. The patient is given one dose of IV Lasix as well and it back and forth to the bathroom multiple times. After urinating several times she reports improvement in shortness of breath and complete resolution of chest pain. I discussed the possibility of a PE, heart attack, or other emergent pathology with her but I do not feel this is likely. I feel the patient is appropriate for beta agonists, diuretics, ace inhibitors, and urgent primary care follow-up. An urgent order is placed during this visit. Strict ER return precautions given for any new or worsening symptoms. The patient expresses understanding and agreement with this plan and is discharged home. I estimate there is low risk for pericardial tamponade, pneumothorax, pulmonary embolism, acute coronary syndrome or thoracic aortic dissection, thus I consider the discharge disposition reasonable. We have discussed the diagnosis and risks, and we agree with discharging home to follow-up with their primary doctor. We also discussed returning to the Emergency Department immediately if new or worsening symptoms occur.  We have discussed the symptoms which are most concerning (e.g., bloody sputum, fever, worsening pain or shortenss of breath, vomiting) that necessitate immediate return. Procedures    FINAL IMPRESSION      1. Acute on chronic congestive heart failure, unspecified heart failure type [facility])          DISPOSITION/PLAN   DISPOSITION Decision To Discharge 05/23/2019 03:23:25 PM      PATIENT REFERRED TO:  Nicolas Lyn  [phone]  In 4 days      [place] Emergency Department  [address] [place] [address]  [phone]    If symptoms worsen      DISCHARGE MEDICATIONS:  New Prescriptions    ALBUTEROL SULFATE HFA (PROVENTIL HFA) 108 (90 BASE) MCG/ACT INHALER    Inhale 2 puffs into the lungs every 4 hours as needed for Wheezing or Shortness of Breath (Space out to every 6 hours as symptoms improve) Space out to every 6 hours as symptoms improve. (Please note that portions of this note were completed with a voice recognition program.  Efforts were made to edit the dictations but occasionally words aremis-transcribed. )    Jose Juan Nixon MD (electronically signed)  Attending Emergency Physician           Jose Juan Nixon MD  05/23/19 8482

## 2019-06-06 ENCOUNTER — APPOINTMENT (OUTPATIENT)
Dept: GENERAL RADIOLOGY | Age: 44
End: 2019-06-06
Payer: MEDICARE

## 2019-06-06 ENCOUNTER — HOSPITAL ENCOUNTER (EMERGENCY)
Age: 44
Discharge: HOME | End: 2019-06-06
Attending: EMERGENCY MEDICINE
Payer: MEDICARE

## 2019-06-06 VITALS
OXYGEN SATURATION: 96 % | BODY MASS INDEX: 38.98 KG/M2 | HEART RATE: 102 BPM | HEIGHT: 63 IN | RESPIRATION RATE: 18 BRPM | WEIGHT: 220 LBS | SYSTOLIC BLOOD PRESSURE: 159 MMHG | DIASTOLIC BLOOD PRESSURE: 113 MMHG | TEMPERATURE: 98.8 F

## 2019-06-06 DIAGNOSIS — J44.1 COPD EXACERBATION (HCC): Primary | ICD-10-CM

## 2019-06-06 DIAGNOSIS — J44.9 CHRONIC OBSTRUCTIVE PULMONARY DISEASE, UNSPECIFIED COPD TYPE (HCC): Chronic | ICD-10-CM

## 2019-06-06 DIAGNOSIS — A59.9 TRICHOMONAS INFECTION: ICD-10-CM

## 2019-06-06 DIAGNOSIS — B37.9 YEAST INFECTION: ICD-10-CM

## 2019-06-06 DIAGNOSIS — I10 HYPERTENSION, UNSPECIFIED TYPE: Chronic | ICD-10-CM

## 2019-06-06 LAB
A/G RATIO: 1 (ref 1.1–2.2)
ALBUMIN SERPL-MCNC: 3.6 G/DL (ref 3.4–5)
ALP BLD-CCNC: 77 U/L (ref 40–129)
ALT SERPL-CCNC: 24 U/L (ref 10–40)
ANION GAP SERPL CALCULATED.3IONS-SCNC: 8 MMOL/L (ref 3–16)
AST SERPL-CCNC: 25 U/L (ref 15–37)
BACTERIA: ABNORMAL /HPF
BASOPHILS ABSOLUTE: 0.1 K/UL (ref 0–0.2)
BASOPHILS RELATIVE PERCENT: 0.6 %
BILIRUB SERPL-MCNC: 0.5 MG/DL (ref 0–1)
BILIRUBIN URINE: NEGATIVE
BLOOD, URINE: NEGATIVE
BUN BLDV-MCNC: 8 MG/DL (ref 7–20)
CALCIUM SERPL-MCNC: 8.7 MG/DL (ref 8.3–10.6)
CHLORIDE BLD-SCNC: 98 MMOL/L (ref 99–110)
CLARITY: CLEAR
CO2: 31 MMOL/L (ref 21–32)
COLOR: YELLOW
CREAT SERPL-MCNC: 0.7 MG/DL (ref 0.6–1.1)
EKG ATRIAL RATE: 102 BPM
EKG DIAGNOSIS: NORMAL
EKG P AXIS: 69 DEGREES
EKG P-R INTERVAL: 134 MS
EKG Q-T INTERVAL: 350 MS
EKG QRS DURATION: 74 MS
EKG QTC CALCULATION (BAZETT): 456 MS
EKG R AXIS: 93 DEGREES
EKG T AXIS: 53 DEGREES
EKG VENTRICULAR RATE: 102 BPM
EOSINOPHILS ABSOLUTE: 0.1 K/UL (ref 0–0.6)
EOSINOPHILS RELATIVE PERCENT: 1.3 %
EPITHELIAL CELLS, UA: ABNORMAL /HPF
GFR AFRICAN AMERICAN: >60
GFR NON-AFRICAN AMERICAN: >60
GLOBULIN: 3.6 G/DL
GLUCOSE BLD-MCNC: 149 MG/DL (ref 70–99)
GLUCOSE URINE: NEGATIVE MG/DL
HCT VFR BLD CALC: 47.1 % (ref 36–48)
HEMOGLOBIN: 15.1 G/DL (ref 12–16)
KETONES, URINE: NEGATIVE MG/DL
LACTIC ACID: 0.9 MMOL/L (ref 0.4–2)
LEUKOCYTE ESTERASE, URINE: ABNORMAL
LYMPHOCYTES ABSOLUTE: 2.2 K/UL (ref 1–5.1)
LYMPHOCYTES RELATIVE PERCENT: 20.7 %
MCH RBC QN AUTO: 27.6 PG (ref 26–34)
MCHC RBC AUTO-ENTMCNC: 32.1 G/DL (ref 31–36)
MCV RBC AUTO: 86.1 FL (ref 80–100)
MICROSCOPIC EXAMINATION: YES
MONOCYTES ABSOLUTE: 0.6 K/UL (ref 0–1.3)
MONOCYTES RELATIVE PERCENT: 5.7 %
MUCUS: ABNORMAL /LPF
NEUTROPHILS ABSOLUTE: 7.7 K/UL (ref 1.7–7.7)
NEUTROPHILS RELATIVE PERCENT: 71.7 %
NITRITE, URINE: NEGATIVE
PDW BLD-RTO: 16.6 % (ref 12.4–15.4)
PH UA: 6 (ref 5–8)
PLATELET # BLD: 167 K/UL (ref 135–450)
PMV BLD AUTO: 9.1 FL (ref 5–10.5)
POTASSIUM REFLEX MAGNESIUM: 4 MMOL/L (ref 3.5–5.1)
PRO-BNP: 562 PG/ML (ref 0–124)
PROCALCITONIN: 0.05 NG/ML (ref 0–0.15)
PROTEIN UA: ABNORMAL MG/DL
RBC # BLD: 5.47 M/UL (ref 4–5.2)
RBC UA: ABNORMAL /HPF (ref 0–2)
SODIUM BLD-SCNC: 137 MMOL/L (ref 136–145)
SPECIFIC GRAVITY UA: 1.01 (ref 1–1.03)
TOTAL PROTEIN: 7.2 G/DL (ref 6.4–8.2)
TRICHOMONAS: PRESENT /HPF
TROPONIN: <0.01 NG/ML
URINE REFLEX TO CULTURE: YES
URINE TYPE: ABNORMAL
UROBILINOGEN, URINE: 0.2 E.U./DL
WBC # BLD: 10.7 K/UL (ref 4–11)
WBC UA: ABNORMAL /HPF (ref 0–5)
YEAST: PRESENT /HPF

## 2019-06-06 PROCEDURE — 84145 PROCALCITONIN (PCT): CPT

## 2019-06-06 PROCEDURE — 87086 URINE CULTURE/COLONY COUNT: CPT

## 2019-06-06 PROCEDURE — 6370000000 HC RX 637 (ALT 250 FOR IP): Performed by: PHYSICIAN ASSISTANT

## 2019-06-06 PROCEDURE — 99285 EMERGENCY DEPT VISIT HI MDM: CPT

## 2019-06-06 PROCEDURE — 71046 X-RAY EXAM CHEST 2 VIEWS: CPT

## 2019-06-06 PROCEDURE — 83605 ASSAY OF LACTIC ACID: CPT

## 2019-06-06 PROCEDURE — 96374 THER/PROPH/DIAG INJ IV PUSH: CPT

## 2019-06-06 PROCEDURE — 6360000002 HC RX W HCPCS: Performed by: PHYSICIAN ASSISTANT

## 2019-06-06 PROCEDURE — 85025 COMPLETE CBC W/AUTO DIFF WBC: CPT

## 2019-06-06 PROCEDURE — 83880 ASSAY OF NATRIURETIC PEPTIDE: CPT

## 2019-06-06 PROCEDURE — 93010 ELECTROCARDIOGRAM REPORT: CPT | Performed by: INTERNAL MEDICINE

## 2019-06-06 PROCEDURE — 80053 COMPREHEN METABOLIC PANEL: CPT

## 2019-06-06 PROCEDURE — 93005 ELECTROCARDIOGRAM TRACING: CPT | Performed by: PHYSICIAN ASSISTANT

## 2019-06-06 PROCEDURE — 84484 ASSAY OF TROPONIN QUANT: CPT

## 2019-06-06 PROCEDURE — 87040 BLOOD CULTURE FOR BACTERIA: CPT

## 2019-06-06 PROCEDURE — 6370000000 HC RX 637 (ALT 250 FOR IP)

## 2019-06-06 PROCEDURE — 87491 CHLMYD TRACH DNA AMP PROBE: CPT

## 2019-06-06 PROCEDURE — 87591 N.GONORRHOEAE DNA AMP PROB: CPT

## 2019-06-06 PROCEDURE — 81001 URINALYSIS AUTO W/SCOPE: CPT

## 2019-06-06 RX ORDER — METRONIDAZOLE 250 MG/1
2000 TABLET ORAL ONCE
Status: COMPLETED | OUTPATIENT
Start: 2019-06-06 | End: 2019-06-06

## 2019-06-06 RX ORDER — FLUCONAZOLE 100 MG/1
200 TABLET ORAL ONCE
Status: COMPLETED | OUTPATIENT
Start: 2019-06-06 | End: 2019-06-06

## 2019-06-06 RX ORDER — METRONIDAZOLE 250 MG/1
500 TABLET ORAL ONCE
Status: DISCONTINUED | OUTPATIENT
Start: 2019-06-06 | End: 2019-06-06

## 2019-06-06 RX ORDER — ACETAMINOPHEN 500 MG
500 TABLET ORAL ONCE
Status: COMPLETED | OUTPATIENT
Start: 2019-06-06 | End: 2019-06-06

## 2019-06-06 RX ORDER — ACETAMINOPHEN 500 MG
TABLET ORAL
Status: COMPLETED
Start: 2019-06-06 | End: 2019-06-06

## 2019-06-06 RX ORDER — NITROFURANTOIN 25; 75 MG/1; MG/1
100 CAPSULE ORAL 2 TIMES DAILY
Qty: 10 CAPSULE | Refills: 0 | Status: SHIPPED | OUTPATIENT
Start: 2019-06-06 | End: 2019-06-11

## 2019-06-06 RX ORDER — IPRATROPIUM BROMIDE AND ALBUTEROL SULFATE 2.5; .5 MG/3ML; MG/3ML
1 SOLUTION RESPIRATORY (INHALATION) ONCE
Status: COMPLETED | OUTPATIENT
Start: 2019-06-06 | End: 2019-06-06

## 2019-06-06 RX ORDER — FUROSEMIDE 10 MG/ML
40 INJECTION INTRAMUSCULAR; INTRAVENOUS ONCE
Status: COMPLETED | OUTPATIENT
Start: 2019-06-06 | End: 2019-06-06

## 2019-06-06 RX ADMIN — FUROSEMIDE 40 MG: 10 INJECTION, SOLUTION INTRAMUSCULAR; INTRAVENOUS at 18:00

## 2019-06-06 RX ADMIN — FLUCONAZOLE 200 MG: 100 TABLET ORAL at 19:22

## 2019-06-06 RX ADMIN — IPRATROPIUM BROMIDE AND ALBUTEROL SULFATE 1 AMPULE: .5; 3 SOLUTION RESPIRATORY (INHALATION) at 17:21

## 2019-06-06 RX ADMIN — METRONIDAZOLE 2000 MG: 250 TABLET ORAL at 19:29

## 2019-06-06 RX ADMIN — Medication 500 MG: at 18:08

## 2019-06-06 RX ADMIN — ACETAMINOPHEN 500 MG: 500 TABLET ORAL at 18:08

## 2019-06-06 ASSESSMENT — ENCOUNTER SYMPTOMS
NAUSEA: 0
COUGH: 1
VOMITING: 0
WHEEZING: 1
SHORTNESS OF BREATH: 1

## 2019-06-06 ASSESSMENT — PAIN DESCRIPTION - FREQUENCY: FREQUENCY: CONTINUOUS

## 2019-06-06 ASSESSMENT — PAIN SCALES - GENERAL
PAINLEVEL_OUTOF10: 10
PAINLEVEL_OUTOF10: 7

## 2019-06-06 ASSESSMENT — PAIN DESCRIPTION - PROGRESSION: CLINICAL_PROGRESSION: GRADUALLY WORSENING

## 2019-06-06 ASSESSMENT — PAIN DESCRIPTION - PAIN TYPE: TYPE: ACUTE PAIN

## 2019-06-06 ASSESSMENT — PAIN DESCRIPTION - ORIENTATION: ORIENTATION: RIGHT;LEFT

## 2019-06-06 ASSESSMENT — PAIN DESCRIPTION - ONSET: ONSET: ON-GOING

## 2019-06-06 ASSESSMENT — PAIN DESCRIPTION - LOCATION: LOCATION: LEG

## 2019-06-06 ASSESSMENT — PAIN DESCRIPTION - DESCRIPTORS: DESCRIPTORS: TIGHTNESS

## 2019-06-06 NOTE — ED PROVIDER NOTES
pulmonary disease) ([facility]     Diabetes mellitus ([facility]     FOR ABOUT 4 YEARS    DM (diabetes mellitus) ([facility]     Drug abuse, IV ([facility]     Hepatitis     Hepatitis C antibody positive in blood 08/24/2017    Hepatitis C AB positive     Heroin overdose ([facility]     Hypertension     MRSA infection     LUNGS    Neuropathy     legs with pain    Other disorders of kidney and ureter     KIDNEY FAILURE, ACUTE    Pneumonia     Smoking history          SURGICAL HISTORY       Past Surgical History:   Procedure Laterality Date    BRONCHOSCOPY  12/21/2017    BAL    CYSTOSCOPY  1/5/15    cysto with left stent placement    CYSTOSCOPY  10/6/15    stent removal    TRACHEOSTOMY CLOSURE      TUBAL LIGATION      URETER STENT PLACEMENT           CURRENT MEDICATIONS       Discharge Medication List as of 6/6/2019  6:41 PM      CONTINUE these medications which have NOT CHANGED    Details   ipratropium-albuterol (DUONEB) 0.5-2.5 (3) MG/3ML SOLN nebulizer solution Inhale 3 mLs into the lungs 4 times daily, Disp-360 mL, R-1Print      albuterol sulfate HFA (PROVENTIL HFA) 108 (90 Base) MCG/ACT inhaler Inhale 2 puffs into the lungs every 4 hours as needed for Wheezing or Shortness of Breath (Space out to every 6 hours as symptoms improve) Space out to every 6 hours as symptoms improve., Disp-1 Inhaler, R-0Print      tiotropium (SPIRIVA HANDIHALER) 18 MCG inhalation capsule Inhale 1 capsule into the lungs daily, Disp-30 capsule, R-5Normal      gabapentin (NEURONTIN) 100 MG capsule Take 300 mg by mouth 3 times daily. Emerson Muro Historical Med      aspirin 81 MG tablet Take 81 mg by mouth dailyHistorical Med      metFORMIN (GLUCOPHAGE) 500 MG tablet Take 2 tablets by mouth 2 times daily (with meals), Disp-60 tablet, R-1Print      nicotine (NICODERM CQ) 14 MG/24HR Place 1 patch onto the skin daily, Disp-30 patch, R-0Print      glucose monitoring kit (FREESTYLE) monitoring kit DAILY Starting Tue 12/12/2017, Disp-1 kit, R-0, Print      glucose blood VI test strips [facility] BLOOD GLUCOSE TEST) strip Disp-60 strip, R-0, PrintAs needed. [facility] Lancets Ultra Thin MISC DAILY Starting Tue 12/12/2017, Disp-100 each, R-0, Print      OXYGEN Inhale 4 L into the lungs continuous, R-0Historical Med      furosemide (LASIX) 40 MG tablet Take 1 tablet by mouth daily for 10 days, Disp-10 tablet, R-0Print      lisinopril (PRINIVIL) 10 MG tablet Take 1 tablet by mouth daily for 10 days, Disp-10 tablet, R-0Print               ALLERGIES     Pcn [penicillins];  Aspirin; Pcn [penicillins]; and Sulfamethoxazole-trimethoprim    FAMILY HISTORY       Family History   Problem Relation Age of Onset    No Known Problems Father     No Known Problems Sister     No Known Problems Brother     No Known Problems Maternal Grandmother     No Known Problems Maternal Grandfather     No Known Problems Paternal Grandmother     No Known Problems Paternal Grandfather     No Known Problems Other     Heart Disease Mother          SOCIAL HISTORY       Social History     Socioeconomic History    Marital status: Single     Spouse name: None    Number of children: None    Years of education: None    Highest education level: None   Occupational History    None   Social Needs    Financial resource strain: None    Food insecurity:     Worry: None     Inability: None    Transportation needs:     Medical: None     Non-medical: None   Tobacco Use    Smoking status: Current Every Day Smoker     Packs/day: 0.50     Years: 33.00     Pack years: 16.50     Types: E-Cigarettes, Cigarettes    Smokeless tobacco: Never Used    Tobacco comment: 2/14/18 - smokes 5 cigs daily   Substance and Sexual Activity    Alcohol use: No    Drug use: No     Comment: Heroin    Sexual activity: Not Currently     Partners: Male   Lifestyle    Physical activity:     Days per week: None     Minutes per session: None    Stress: None   Relationships    Social connections:     Talks on phone: None     Gets together: None Attends Congregational service: None     Active member of club or organization: None     Attends meetings of clubs or organizations: None     Relationship status: None    Intimate partner violence:     Fear of current or ex partner: None     Emotionally abused: None     Physically abused: None     Forced sexual activity: None   Other Topics Concern    None   Social History Narrative    ** Merged History Encounter **            SCREENINGS             PHYSICAL EXAM    (up to 7 for level 4, 8 ormore for level 5)     ED Triage Vitals [06/06/19 1626]   BP Temp Temp Source Pulse Resp SpO2 Height Weight   (!) 165/99 98.8 °F (37.1 °C) Oral 106 16 96 % 5' 3\" (1.6 m) 220 lb (99.8 kg)       Physical Exam   Constitutional: She is oriented to person, place, and time. She appears well-developed and well-nourished. obese   HENT:   Head: Normocephalic and atraumatic. Eyes: Right eye exhibits no discharge. Left eye exhibits no discharge. Neck: Normal range of motion. Neck supple. Pulmonary/Chest: Effort normal. No respiratory distress. Musculoskeletal: Normal range of motion. Neurological: She is alert and oriented to person, place, and time. Skin: Skin is warm and dry. No pallor. Psychiatric: She has a normal mood and affect. Her behavior is normal.   Nursing note and vitals reviewed.       DIAGNOSTIC RESULTS   LABS:    Labs Reviewed   CBC WITH AUTO DIFFERENTIAL - Abnormal; Notable for the following components:       Result Value    RBC 5.47 (*)     RDW 16.6 (*)     All other components within normal limits    Narrative:     Performed at:  [facility],  [place], [facility]   Phone [phone]   COMPREHENSIVE METABOLIC PANEL W/ REFLEX TO MG FOR LOW K - Abnormal; Notable for the following components:    Chloride 98 (*)     Glucose 149 (*)     Albumin/Globulin Ratio 1.0 (*)     All other components within normal limits    Narrative:     Performed at:  [facility]) [facility],  [place] [facility]   Phone [phone]   BRAIN NATRIURETIC PEPTIDE - Abnormal; Notable for the following components:    Pro- (*)     All other components within normal limits    Narrative:     Performed at:  [place] [address],  [place] [facility]   Phone [phone]   URINE RT REFLEX TO CULTURE - Abnormal; Notable for the following components:    Protein, UA TRACE (*)     Leukocyte Esterase, Urine SMALL (*)     All other components within normal limits    Narrative:     Performed at:  [place] [address],  [place], [facility]   Phone [phone]   MICROSCOPIC URINALYSIS - Abnormal; Notable for the following components:    Mucus, UA 1+ (*)     WBC, UA 20-50 (*)     Bacteria, UA Rare (*)     Yeast, UA Present (*)     Trichomonas, UA Present (*)     All other components within normal limits    Narrative:     Performed at:  [place] [address],  [place] [employer]   Phone [phone]   CULTURE BLOOD #1    Narrative:     ORDER#: [id number]                          ORDERED BY: ERICKA MURCIA  SOURCE: Blood No site given                COLLECTED:  06/06/19 16:45  ANTIBIOTICS AT NATHAN.:                      RECEIVED :  06/07/19 03:08  If child <=2 yrs old please draw pediatric bottle. ~Blood Culture #1  Performed at:  [facility]  [address] [place], [place] [employer] [address]   Phone [phone]   URINE CULTURE    Narrative:     ORDER#: [id number]                          ORDERED BY: Mason Muro  SOURCE: Urine Clean Catch                  COLLECTED:  06/06/19 18:09  ANTIBIOTICS AT NATHAN.:                      RECEIVED :  06/06/19 18:34  Performed at:  [facility]  [address] [place], [place] [employer] [address]   Phone [phone]   C. TRACHOMATIS N.GONORRHOEAE DNA, URINE    Narrative:     Performed at:  [facility]  [address] [facility], [place] [address]   Phone [phone]   TROPONIN    Narrative:     Performed at:  [facility] 75,  [place], [place]   Phone [phone]   PROCALCITONIN    Narrative:     Performed at:  [facility] [address],  [place], [facility]   Phone [phone]   LACTIC ACID, PLASMA    Narrative:     Performed at:  [facility] [address],  [place], [facility]   Phone [phone]       All other labs were within normal range or notreturned as of this dictation. EKG: All EKG's are interpreted by the Emergency Department Physician who either signs or Co-signs this chart in the absence of a cardiologist.  Please see their note for interpretation of EKG. RADIOLOGY:         Interpretation per the Radiologist below, if available at the time of this note:    XR CHEST STANDARD (2 VW)   Final Result   Increased interstitial markings could be due to interstitial edema. No results found.       PROCEDURES   Unless otherwise noted below, none     Procedures    CRITICAL CARE TIME   N/A    CONSULTS:  None      EMERGENCY DEPARTMENT COURSE and DIFFERENTIAL DIAGNOSIS/MDM:   Vitals:    Vitals:    06/06/19 1626 06/06/19 1640 06/06/19 1803 06/06/19 1806   BP: (!) 165/99 (!) 165/99  (!) 159/113   Pulse: 106  102    Resp: 16  18    Temp: 98.8 °F (37.1 °C)      TempSrc: Oral      SpO2: 96% 96% 95% 96%   Weight: 220 lb (99.8 kg)      Height: 5' 3\" (1.6 m)          Patient was given the following medications:  Medications   ipratropium-albuterol (DUONEB) nebulizer solution 1 ampule (1 ampule Inhalation Given 6/6/19 1721)   ipratropium-albuterol (DUONEB) nebulizer solution 1 ampule (1 ampule Inhalation Given 6/6/19 1721)   furosemide (LASIX) injection 40 mg (40 mg Intravenous Given 6/6/19 1800)   acetaminophen (TYLENOL) tablet 500 mg (500 mg Oral Given 6/6/19 1808)   fluconazole (DIFLUCAN) tablet 200 mg (200 mg Oral Given 6/6/19 1922)   metroNIDAZOLE (FLAGYL) tablet 2,000 mg (2,000 mg Oral Given 6/6/19 1929)       Afebrile, stable, patient presents to the ED for evaluation. Seen in conjunction with attending ED provider who agrees with assessment and plan. Dr Adilia Strickland. Patients PO2 is 96% on 4L NCO2 consistent with patient's chronic hypoxemia. Patients also tachycardic, initial assessment. Patient is provided with several breathing treatments which do help her symptoms on reevaluation  Labs evaluated reveal no leukocytosis. No anemia. No electrolyte abnormality urinalysis shows a small amount of leukocytes, along with a yeast infection and trichomonas. Patient is provided with Flagyl and Diflucan in the department to address treated. Chest x-ray shows interstitial edema. Patient is advised close follow-up with her pulmonologist she verbalizes understanding she will be discharged in stable condition  All questions are answered. ndications for return to the ED are discussed. Patient is advised if any new or worsening symptoms arise they should immediately return to the emergency room. Follow-up with primary care in 1-2 days. The patient tolerated their visit well. They were seen and evaluated by the Kushal Dawkins MD who agreed with the assessment and plan. The patient and / or the family were informed of the results of any tests, a time was given to answer questions, a plan was proposed and they agreed Rian Lara. Results for orders placed or performed during the hospital encounter of 06/06/19   Culture blood #1   Result Value Ref Range    Blood Culture, Routine No growth after 5 days of incubation. Urine Culture   Result Value Ref Range    Urine Culture, Routine       >50,000 CFU/ml mixed skin/urogenital mat.  No further workup   C.trachomatis Protein, UA TRACE (A) Negative mg/dL    Urobilinogen, Urine 0.2 <2.0 E.U./dL    Nitrite, Urine Negative Negative    Leukocyte Esterase, Urine SMALL (A) Negative    Microscopic Examination YES     Urine Reflex to Culture Yes     Urine Type Not Specified    Procalcitonin   Result Value Ref Range    Procalcitonin 0.05 0.00 - 0.15 ng/mL   Lactic Acid, Plasma   Result Value Ref Range    Lactic Acid 0.9 0.4 - 2.0 mmol/L   Microscopic Urinalysis   Result Value Ref Range    Mucus, UA 1+ (A) /LPF    WBC, UA 20-50 (A) 0 - 5 /HPF    RBC, UA None seen 0 - 2 /HPF    Epi Cells 10-20 /HPF    Bacteria, UA Rare (A) /HPF    Yeast, UA Present (A) /HPF    Trichomonas, UA Present (A) /HPF   EKG 12 Lead   Result Value Ref Range    Ventricular Rate 102 BPM    Atrial Rate 102 BPM    P-R Interval 134 ms    QRS Duration 74 ms    Q-T Interval 350 ms    QTc Calculation (Bazett) 456 ms    P Axis 69 degrees    R Axis 93 degrees    T Axis 53 degrees    Diagnosis       Sinus tachycardiaPossible Left atrial enlargementRightward axisBorderline ECGNo previous ECGs availableConfirmed by SELMA MCPHERSON MD ([id number]) on 6/6/2019 5:52:24 PM       I estimate there is LOW risk for PULMONARY EMBOLISM, ACUTE CORONARY SYNDROME, OR THORACIC AORTIC DISSECTION, thus I consider the discharge disposition reasonable. Oj Biswas and I have discussed the diagnosis and risks, and we agree with discharging home to follow-up with their primary doctor. We also discussed returning to the Emergency Department immediately if new or worsening symptoms occur. We have discussed the symptoms which are most concerning (e.g., bloody sputum, fever, worsening pain or shortness of breath, vomiting) that necessitate immediate return. FINAL Impression    1. COPD exacerbation ([address]    2. Yeast infection    3. Trichomonas infection    4. Chronic obstructive pulmonary disease, unspecified COPD type ([address]    5.  Hypertension, unspecified type        Blood pressure (!) 159/113, pulse 102, temperature 98.8 °F (37.1 °C), temperature source Oral, resp. rate 18, height 5' 3\" (1.6 m), weight 220 lb (99.8 kg), last menstrual period 05/08/2019, SpO2 96 %, not currently breastfeeding. FINAL IMPRESSION      1. COPD exacerbation ([facility]    2. Yeast infection    3. Trichomonas infection    4. Chronic obstructive pulmonary disease, unspecified COPD type ([facility]    5.  Hypertension, unspecified type          DISPOSITION/PLAN   DISPOSITION        PATIENT REFERRED TO:  [facility]) Pre-Services  [phone]  Schedule an appointment as soon as possible for a visit   for a recheck in 2-3  days      DISCHARGE MEDICATIONS:  Discharge Medication List as of 6/6/2019  6:41 PM      START taking these medications    Details   nitrofurantoin, macrocrystal-monohydrate, (MACROBID) 100 MG capsule Take 1 capsule by mouth 2 times daily for 5 days, Disp-10 capsule, R-0Print             DISCONTINUED MEDICATIONS:  Discharge Medication List as of 6/6/2019  6:41 PM                 (Please note that portions of this note were completed with a voice recognition program.  Efforts were made to edit the dictations but occasionally words are mis-transcribed.)    Chuck Anaya PA-C (electronically signed)        Chuck Anaya PA-C  06/15/19 [id number]       Virginie Granados PA-C  07/08/19 1193

## 2019-06-07 LAB
C. TRACHOMATIS DNA ,URINE: NEGATIVE
N. GONORRHOEAE DNA, URINE: NEGATIVE

## 2019-06-08 LAB — URINE CULTURE, ROUTINE: NORMAL

## 2019-06-11 ENCOUNTER — APPOINTMENT (OUTPATIENT)
Dept: GENERAL RADIOLOGY | Age: 44
End: 2019-06-11
Payer: MEDICARE

## 2019-06-11 ENCOUNTER — HOSPITAL ENCOUNTER (EMERGENCY)
Age: 44
Discharge: HOME | End: 2019-06-11
Attending: EMERGENCY MEDICINE
Payer: MEDICARE

## 2019-06-11 VITALS
HEIGHT: 63 IN | RESPIRATION RATE: 22 BRPM | WEIGHT: 220 LBS | HEART RATE: 90 BPM | DIASTOLIC BLOOD PRESSURE: 78 MMHG | OXYGEN SATURATION: 96 % | SYSTOLIC BLOOD PRESSURE: 138 MMHG | TEMPERATURE: 97.6 F | BODY MASS INDEX: 38.98 KG/M2

## 2019-06-11 DIAGNOSIS — L02.91 ABSCESS: ICD-10-CM

## 2019-06-11 DIAGNOSIS — J44.1 COPD EXACERBATION (HCC): Primary | ICD-10-CM

## 2019-06-11 DIAGNOSIS — R09.89 PULMONARY VASCULAR CONGESTION: ICD-10-CM

## 2019-06-11 DIAGNOSIS — J98.9 CHRONIC RESPIRATORY DISEASE: ICD-10-CM

## 2019-06-11 DIAGNOSIS — Z99.81 CHRONIC RESPIRATORY FAILURE WITH HYPOXIA, ON HOME OXYGEN THERAPY (HCC): ICD-10-CM

## 2019-06-11 DIAGNOSIS — J96.11 CHRONIC RESPIRATORY FAILURE WITH HYPOXIA, ON HOME OXYGEN THERAPY (HCC): ICD-10-CM

## 2019-06-11 DIAGNOSIS — I10 ESSENTIAL HYPERTENSION: ICD-10-CM

## 2019-06-11 LAB
A/G RATIO: 1 (ref 1.1–2.2)
ALBUMIN SERPL-MCNC: 3.5 G/DL (ref 3.4–5)
ALP BLD-CCNC: 74 U/L (ref 40–129)
ALT SERPL-CCNC: 31 U/L (ref 10–40)
AMPHETAMINE SCREEN, URINE: NORMAL
ANION GAP SERPL CALCULATED.3IONS-SCNC: 7 MMOL/L (ref 3–16)
AST SERPL-CCNC: 27 U/L (ref 15–37)
BACTERIA: ABNORMAL /HPF
BARBITURATE SCREEN URINE: NORMAL
BASE EXCESS ARTERIAL: 6.6 MMOL/L (ref -3–3)
BASOPHILS ABSOLUTE: 0.1 K/UL (ref 0–0.2)
BASOPHILS RELATIVE PERCENT: 0.8 %
BENZODIAZEPINE SCREEN, URINE: NORMAL
BILIRUB SERPL-MCNC: 0.4 MG/DL (ref 0–1)
BILIRUBIN URINE: NEGATIVE
BLOOD CULTURE, ROUTINE: NORMAL
BLOOD, URINE: NEGATIVE
BUN BLDV-MCNC: 13 MG/DL (ref 7–20)
CALCIUM SERPL-MCNC: 9 MG/DL (ref 8.3–10.6)
CANNABINOID SCREEN URINE: NORMAL
CARBOXYHEMOGLOBIN ARTERIAL: 10.2 % (ref 0–1.5)
CHLORIDE BLD-SCNC: 97 MMOL/L (ref 99–110)
CLARITY: CLEAR
CO2: 34 MMOL/L (ref 21–32)
COCAINE METABOLITE SCREEN URINE: NORMAL
COLOR: YELLOW
CREAT SERPL-MCNC: 0.7 MG/DL (ref 0.6–1.1)
EKG ATRIAL RATE: 107 BPM
EKG DIAGNOSIS: NORMAL
EKG P AXIS: 76 DEGREES
EKG P-R INTERVAL: 128 MS
EKG Q-T INTERVAL: 338 MS
EKG QRS DURATION: 76 MS
EKG QTC CALCULATION (BAZETT): 451 MS
EKG R AXIS: 110 DEGREES
EKG T AXIS: 49 DEGREES
EKG VENTRICULAR RATE: 107 BPM
EOSINOPHILS ABSOLUTE: 0.1 K/UL (ref 0–0.6)
EOSINOPHILS RELATIVE PERCENT: 1.5 %
EPITHELIAL CELLS, UA: ABNORMAL /HPF
GFR AFRICAN AMERICAN: >60
GFR NON-AFRICAN AMERICAN: >60
GLOBULIN: 3.5 G/DL
GLUCOSE BLD-MCNC: 95 MG/DL (ref 70–99)
GLUCOSE URINE: NEGATIVE MG/DL
HCG QUALITATIVE: NEGATIVE
HCO3 ARTERIAL: 35.3 MMOL/L (ref 21–29)
HCT VFR BLD CALC: 48.6 % (ref 36–48)
HEMOGLOBIN, ART, EXTENDED: 15.8 G/DL (ref 12–16)
HEMOGLOBIN: 15.3 G/DL (ref 12–16)
KETONES, URINE: NEGATIVE MG/DL
LACTIC ACID: 0.7 MMOL/L (ref 0.4–2)
LEUKOCYTE ESTERASE, URINE: ABNORMAL
LYMPHOCYTES ABSOLUTE: 2.1 K/UL (ref 1–5.1)
LYMPHOCYTES RELATIVE PERCENT: 23.3 %
Lab: NORMAL
MCH RBC QN AUTO: 26.9 PG (ref 26–34)
MCHC RBC AUTO-ENTMCNC: 31.5 G/DL (ref 31–36)
MCV RBC AUTO: 85.2 FL (ref 80–100)
METHADONE SCREEN, URINE: NORMAL
METHEMOGLOBIN ARTERIAL: 0.3 %
MICROSCOPIC EXAMINATION: YES
MONOCYTES ABSOLUTE: 0.8 K/UL (ref 0–1.3)
MONOCYTES RELATIVE PERCENT: 8.6 %
MUCUS: ABNORMAL /LPF
NEUTROPHILS ABSOLUTE: 6 K/UL (ref 1.7–7.7)
NEUTROPHILS RELATIVE PERCENT: 65.8 %
NITRITE, URINE: NEGATIVE
O2 CONTENT ARTERIAL: 19 ML/DL
O2 SAT, ARTERIAL: 93.4 %
O2 THERAPY: ABNORMAL
OPIATE SCREEN URINE: NORMAL
OXYCODONE URINE: NORMAL
PCO2 ARTERIAL: 68.2 MMHG (ref 35–45)
PDW BLD-RTO: 16.8 % (ref 12.4–15.4)
PH ARTERIAL: 7.33 (ref 7.35–7.45)
PH UA: 6
PH UA: 6 (ref 5–8)
PHENCYCLIDINE SCREEN URINE: NORMAL
PLATELET # BLD: 158 K/UL (ref 135–450)
PMV BLD AUTO: 10 FL (ref 5–10.5)
PO2 ARTERIAL: 74 MMHG (ref 75–108)
POTASSIUM REFLEX MAGNESIUM: 4.8 MMOL/L (ref 3.5–5.1)
PRO-BNP: 573 PG/ML (ref 0–124)
PROPOXYPHENE SCREEN: NORMAL
PROTEIN UA: NEGATIVE MG/DL
RBC # BLD: 5.7 M/UL (ref 4–5.2)
RBC UA: ABNORMAL /HPF (ref 0–2)
SODIUM BLD-SCNC: 138 MMOL/L (ref 136–145)
SPECIFIC GRAVITY UA: 1.01 (ref 1–1.03)
TCO2 ARTERIAL: 37.4 MMOL/L
TOTAL PROTEIN: 7 G/DL (ref 6.4–8.2)
TROPONIN: <0.01 NG/ML
URINE REFLEX TO CULTURE: YES
URINE TYPE: ABNORMAL
UROBILINOGEN, URINE: 0.2 E.U./DL
WBC # BLD: 9.1 K/UL (ref 4–11)
WBC UA: ABNORMAL /HPF (ref 0–5)

## 2019-06-11 PROCEDURE — 36600 WITHDRAWAL OF ARTERIAL BLOOD: CPT

## 2019-06-11 PROCEDURE — 87086 URINE CULTURE/COLONY COUNT: CPT

## 2019-06-11 PROCEDURE — 82803 BLOOD GASES ANY COMBINATION: CPT

## 2019-06-11 PROCEDURE — 6370000000 HC RX 637 (ALT 250 FOR IP): Performed by: PHYSICIAN ASSISTANT

## 2019-06-11 PROCEDURE — 6360000002 HC RX W HCPCS: Performed by: PHYSICIAN ASSISTANT

## 2019-06-11 PROCEDURE — 84703 CHORIONIC GONADOTROPIN ASSAY: CPT

## 2019-06-11 PROCEDURE — 87040 BLOOD CULTURE FOR BACTERIA: CPT

## 2019-06-11 PROCEDURE — 96374 THER/PROPH/DIAG INJ IV PUSH: CPT

## 2019-06-11 PROCEDURE — 93005 ELECTROCARDIOGRAM TRACING: CPT | Performed by: PHYSICIAN ASSISTANT

## 2019-06-11 PROCEDURE — 80053 COMPREHEN METABOLIC PANEL: CPT

## 2019-06-11 PROCEDURE — 80307 DRUG TEST PRSMV CHEM ANLYZR: CPT

## 2019-06-11 PROCEDURE — 83880 ASSAY OF NATRIURETIC PEPTIDE: CPT

## 2019-06-11 PROCEDURE — 71045 X-RAY EXAM CHEST 1 VIEW: CPT

## 2019-06-11 PROCEDURE — 99285 EMERGENCY DEPT VISIT HI MDM: CPT

## 2019-06-11 PROCEDURE — 83605 ASSAY OF LACTIC ACID: CPT

## 2019-06-11 PROCEDURE — 85025 COMPLETE CBC W/AUTO DIFF WBC: CPT

## 2019-06-11 PROCEDURE — 81001 URINALYSIS AUTO W/SCOPE: CPT

## 2019-06-11 PROCEDURE — 84484 ASSAY OF TROPONIN QUANT: CPT

## 2019-06-11 PROCEDURE — 93010 ELECTROCARDIOGRAM REPORT: CPT | Performed by: INTERNAL MEDICINE

## 2019-06-11 PROCEDURE — 36415 COLL VENOUS BLD VENIPUNCTURE: CPT

## 2019-06-11 RX ORDER — FUROSEMIDE 40 MG/1
40 TABLET ORAL DAILY
Qty: 7 TABLET | Refills: 0 | Status: SHIPPED | OUTPATIENT
Start: 2019-06-11 | End: 2019-08-08 | Stop reason: SDUPTHER

## 2019-06-11 RX ORDER — DOXYCYCLINE HYCLATE 100 MG
100 TABLET ORAL 2 TIMES DAILY
Qty: 20 TABLET | Refills: 0 | Status: SHIPPED | OUTPATIENT
Start: 2019-06-11 | End: 2019-06-21

## 2019-06-11 RX ORDER — IPRATROPIUM BROMIDE AND ALBUTEROL SULFATE 2.5; .5 MG/3ML; MG/3ML
1 SOLUTION RESPIRATORY (INHALATION) ONCE
Status: COMPLETED | OUTPATIENT
Start: 2019-06-11 | End: 2019-06-11

## 2019-06-11 RX ORDER — PREDNISONE 20 MG/1
40 TABLET ORAL DAILY
Qty: 8 TABLET | Refills: 0 | Status: SHIPPED | OUTPATIENT
Start: 2019-06-11 | End: 2019-06-15

## 2019-06-11 RX ORDER — METHYLPREDNISOLONE SODIUM SUCCINATE 125 MG/2ML
125 INJECTION, POWDER, LYOPHILIZED, FOR SOLUTION INTRAMUSCULAR; INTRAVENOUS ONCE
Status: COMPLETED | OUTPATIENT
Start: 2019-06-11 | End: 2019-06-11

## 2019-06-11 RX ORDER — LISINOPRIL 10 MG/1
10 TABLET ORAL DAILY
Qty: 30 TABLET | Refills: 0 | Status: SHIPPED | OUTPATIENT
Start: 2019-06-11 | End: 2019-08-08

## 2019-06-11 RX ORDER — DOXYCYCLINE HYCLATE 100 MG
100 TABLET ORAL ONCE
Status: COMPLETED | OUTPATIENT
Start: 2019-06-11 | End: 2019-06-11

## 2019-06-11 RX ORDER — FUROSEMIDE 40 MG/1
40 TABLET ORAL ONCE
Status: COMPLETED | OUTPATIENT
Start: 2019-06-11 | End: 2019-06-11

## 2019-06-11 RX ADMIN — IPRATROPIUM BROMIDE AND ALBUTEROL SULFATE 1 AMPULE: .5; 3 SOLUTION RESPIRATORY (INHALATION) at 16:29

## 2019-06-11 RX ADMIN — METHYLPREDNISOLONE SODIUM SUCCINATE 125 MG: 125 INJECTION, POWDER, FOR SOLUTION INTRAMUSCULAR; INTRAVENOUS at 16:29

## 2019-06-11 RX ADMIN — DOXYCYCLINE HYCLATE 100 MG: 100 TABLET, COATED ORAL at 18:18

## 2019-06-11 RX ADMIN — FUROSEMIDE 40 MG: 40 TABLET ORAL at 18:18

## 2019-06-11 ASSESSMENT — ENCOUNTER SYMPTOMS
SHORTNESS OF BREATH: 1
COUGH: 1
SPUTUM PRODUCTION: 0
ABDOMINAL PAIN: 0
VOMITING: 0

## 2019-06-11 ASSESSMENT — PAIN SCALES - GENERAL: PAINLEVEL_OUTOF10: 9

## 2019-06-11 ASSESSMENT — PAIN DESCRIPTION - ORIENTATION: ORIENTATION: RIGHT;LEFT

## 2019-06-11 ASSESSMENT — PAIN DESCRIPTION - DESCRIPTORS: DESCRIPTORS: BURNING

## 2019-06-11 ASSESSMENT — PAIN DESCRIPTION - FREQUENCY: FREQUENCY: CONTINUOUS

## 2019-06-11 ASSESSMENT — PAIN DESCRIPTION - PROGRESSION: CLINICAL_PROGRESSION: GRADUALLY WORSENING

## 2019-06-11 ASSESSMENT — PAIN DESCRIPTION - PAIN TYPE: TYPE: CHRONIC PAIN

## 2019-06-11 ASSESSMENT — PAIN DESCRIPTION - LOCATION: LOCATION: LEG

## 2019-06-11 NOTE — ED NOTES
[facility] and they states that pt had rx filled from 6-7-19 from [facility] but see no blood pressure med or water pill. Pt has a abscess on right breast approx 2 cm times two underneath right breast. Clear drainage at this time.       James Mascorro RN  06/11/19 1640

## 2019-06-11 NOTE — ED TRIAGE NOTES
Pt has a moist cough but not productive. Pt denies fever. Pt gives hx of being on 4 liters on for past year. Pt came into ED with oxygen due to no tank available. Pt states on 2 other pain medicines but doesn't know the names. Pt has wheezing and rales thru out.   Pt is able to talk in full statements
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
## 2019-06-11 NOTE — ED PROVIDER NOTES
The patient was seen by me in conjunction with a mid-level provider (nurse practitioner or physician assistant). I have personally performed and/or participated in the history, exam and medical decision making and agree with all pertinent clinical information. I have also reviewed and agree with the past medical, family and social history unless otherwise noted. All lab results, EKG tracings, and radiographic studies or interpretations were reviewed by me. I have personally performed a face-to-face diagnostic evaluation on the patient. My findings are as follows:    History:  This patient presents emergency department  Old charts reviewed patient does have a history of COPD oxygen dependent on 4 L of oxygen 24/7 patient also has history of peripheral edema possibly cardiomyopathy and CHF says that she has been getting some water weight she did have a change in her diuretic she said  She denies chest pain denies fever denies productive cough said that she is wheezing and is been using her nebulizer and inhaler  It    It looks like she has a long history of COPD looks like she seen Dr. Ary Angel in the past  Interestingly due to her obesity and diabetes  She is also had in her chart heroin overdose so at this point I think this may be multisystem decompensation at this point we are giving the breathing treatments times a Medrol will check her for overt heart failure but I do not hear a heart murmur at this point to suggest endocarditis but since she used IV drugs obviously she needs to be warned about this advised to return if any worsening    Exam shows:    Patient Vitals for the past 24 hrs:   BP Temp Temp src Pulse Resp SpO2 Height Weight   06/11/19 1614 -- -- -- -- -- 96 % -- --   06/11/19 1559 (!) 156/93 97.6 °F (36.4 °C) Oral 108 24 (!) 72 % 5' 3\" (1.6 m) 220 lb (99.8 kg)           Initial pulse ox which was recorded was on room air and is mention she is normally on 24 7 and 4 L she was placed on the 4 L and is doing much better she is actually in the mid 90s now on her normal oxygen. On exam she does have some wheezing but very obese obviously has obstructive sleep apnea no significant rales noted trace pitting edema no of the legs no significant Homans sign no hemoptysis  Slight sinus tach at only [id number] but no [id number] pattern and no signs otherwise of pulmonary embolism    Labs and x-rays reviewed patient was given intravenous site Medrol breathing treatments in the form of bad beta agonist  Lab      Radiology      EKG         Emergency Department Course:              Voice Recognition Dictation disclaimer:  Please note that portions of this chart were generated using Dragon dictation software. Although every effort was made to ensure the accuracy of this automated transcription, some errors in transcription may have occurred.           Ame Felipe MD  06/11/19 1279       Ame Felipe MD  06/11/19 2941

## 2019-06-13 LAB — URINE CULTURE, ROUTINE: NORMAL

## 2019-06-17 LAB
BLOOD CULTURE, ROUTINE: NORMAL
CULTURE, BLOOD 2: NORMAL

## 2019-06-18 ENCOUNTER — APPOINTMENT (OUTPATIENT)
Dept: GENERAL RADIOLOGY | Age: 44
End: 2019-06-18
Payer: MEDICARE

## 2019-06-18 ENCOUNTER — HOSPITAL ENCOUNTER (EMERGENCY)
Age: 44
Discharge: HOME | End: 2019-06-18
Attending: EMERGENCY MEDICINE
Payer: MEDICARE

## 2019-06-18 VITALS
HEIGHT: 63 IN | TEMPERATURE: 98.6 F | BODY MASS INDEX: 41.64 KG/M2 | SYSTOLIC BLOOD PRESSURE: 132 MMHG | RESPIRATION RATE: 27 BRPM | WEIGHT: 235 LBS | OXYGEN SATURATION: 97 % | DIASTOLIC BLOOD PRESSURE: 75 MMHG | HEART RATE: 107 BPM

## 2019-06-18 DIAGNOSIS — I10 HYPERTENSION, UNSPECIFIED TYPE: Chronic | ICD-10-CM

## 2019-06-18 DIAGNOSIS — L03.119 CELLULITIS OF LOWER EXTREMITY, UNSPECIFIED LATERALITY: Primary | ICD-10-CM

## 2019-06-18 LAB
A/G RATIO: 1 (ref 1.1–2.2)
ALBUMIN SERPL-MCNC: 3.6 G/DL (ref 3.4–5)
ALP BLD-CCNC: 68 U/L (ref 40–129)
ALT SERPL-CCNC: 28 U/L (ref 10–40)
ANION GAP SERPL CALCULATED.3IONS-SCNC: 10 MMOL/L (ref 3–16)
AST SERPL-CCNC: 28 U/L (ref 15–37)
BASE EXCESS ARTERIAL: 7 MMOL/L (ref -3–3)
BASOPHILS ABSOLUTE: 0.1 K/UL (ref 0–0.2)
BASOPHILS RELATIVE PERCENT: 1.1 %
BILIRUB SERPL-MCNC: 0.4 MG/DL (ref 0–1)
BILIRUBIN URINE: NEGATIVE
BLOOD, URINE: NEGATIVE
BUN BLDV-MCNC: 15 MG/DL (ref 7–20)
CALCIUM SERPL-MCNC: 9.6 MG/DL (ref 8.3–10.6)
CARBOXYHEMOGLOBIN ARTERIAL: 9.4 % (ref 0–1.5)
CHLORIDE BLD-SCNC: 97 MMOL/L (ref 99–110)
CLARITY: CLEAR
CO2: 31 MMOL/L (ref 21–32)
COLOR: NORMAL
CREAT SERPL-MCNC: 0.7 MG/DL (ref 0.6–1.1)
EOSINOPHILS ABSOLUTE: 0.2 K/UL (ref 0–0.6)
EOSINOPHILS RELATIVE PERCENT: 2.3 %
GFR AFRICAN AMERICAN: >60
GFR NON-AFRICAN AMERICAN: >60
GLOBULIN: 3.7 G/DL
GLUCOSE BLD-MCNC: 108 MG/DL (ref 70–99)
GLUCOSE URINE: NEGATIVE MG/DL
HCO3 ARTERIAL: 33.5 MMOL/L (ref 21–29)
HCT VFR BLD CALC: 49.2 % (ref 36–48)
HEMOGLOBIN, ART, EXTENDED: 16.2 G/DL (ref 12–16)
HEMOGLOBIN: 15.7 G/DL (ref 12–16)
INR BLD: 0.88 (ref 0.86–1.14)
KETONES, URINE: NEGATIVE MG/DL
LACTIC ACID: 1 MMOL/L (ref 0.4–2)
LEUKOCYTE ESTERASE, URINE: NEGATIVE
LYMPHOCYTES ABSOLUTE: 2.4 K/UL (ref 1–5.1)
LYMPHOCYTES RELATIVE PERCENT: 25.6 %
MCH RBC QN AUTO: 27.3 PG (ref 26–34)
MCHC RBC AUTO-ENTMCNC: 32 G/DL (ref 31–36)
MCV RBC AUTO: 85.1 FL (ref 80–100)
METHEMOGLOBIN ARTERIAL: 0.2 %
MICROSCOPIC EXAMINATION: NORMAL
MONOCYTES ABSOLUTE: 0.9 K/UL (ref 0–1.3)
MONOCYTES RELATIVE PERCENT: 9.9 %
NEUTROPHILS ABSOLUTE: 5.8 K/UL (ref 1.7–7.7)
NEUTROPHILS RELATIVE PERCENT: 61.1 %
NITRITE, URINE: NEGATIVE
O2 CONTENT ARTERIAL: 22 ML/DL
O2 SAT, ARTERIAL: 97.3 %
O2 THERAPY: ABNORMAL
PCO2 ARTERIAL: 53.8 MMHG (ref 35–45)
PDW BLD-RTO: 16.7 % (ref 12.4–15.4)
PH ARTERIAL: 7.41 (ref 7.35–7.45)
PH UA: 8 (ref 5–8)
PLATELET # BLD: 126 K/UL (ref 135–450)
PMV BLD AUTO: 9.4 FL (ref 5–10.5)
PO2 ARTERIAL: 96.8 MMHG (ref 75–108)
POTASSIUM REFLEX MAGNESIUM: 4.9 MMOL/L (ref 3.5–5.1)
PRO-BNP: 260 PG/ML (ref 0–124)
PROTEIN UA: NEGATIVE MG/DL
PROTHROMBIN TIME: 10 SEC (ref 9.8–13)
RBC # BLD: 5.78 M/UL (ref 4–5.2)
SODIUM BLD-SCNC: 138 MMOL/L (ref 136–145)
SPECIFIC GRAVITY UA: 1.01 (ref 1–1.03)
TCO2 ARTERIAL: 35.1 MMOL/L
TOTAL PROTEIN: 7.3 G/DL (ref 6.4–8.2)
TROPONIN: <0.01 NG/ML
URINE REFLEX TO CULTURE: NORMAL
URINE TYPE: NORMAL
UROBILINOGEN, URINE: 0.2 E.U./DL
WBC # BLD: 9.6 K/UL (ref 4–11)

## 2019-06-18 PROCEDURE — 83605 ASSAY OF LACTIC ACID: CPT

## 2019-06-18 PROCEDURE — 71046 X-RAY EXAM CHEST 2 VIEWS: CPT

## 2019-06-18 PROCEDURE — 81003 URINALYSIS AUTO W/O SCOPE: CPT

## 2019-06-18 PROCEDURE — 51798 US URINE CAPACITY MEASURE: CPT

## 2019-06-18 PROCEDURE — 99285 EMERGENCY DEPT VISIT HI MDM: CPT

## 2019-06-18 PROCEDURE — 83880 ASSAY OF NATRIURETIC PEPTIDE: CPT

## 2019-06-18 PROCEDURE — 6370000000 HC RX 637 (ALT 250 FOR IP): Performed by: NURSE PRACTITIONER

## 2019-06-18 PROCEDURE — 6360000002 HC RX W HCPCS: Performed by: NURSE PRACTITIONER

## 2019-06-18 PROCEDURE — 82803 BLOOD GASES ANY COMBINATION: CPT

## 2019-06-18 PROCEDURE — 96374 THER/PROPH/DIAG INJ IV PUSH: CPT

## 2019-06-18 PROCEDURE — 87040 BLOOD CULTURE FOR BACTERIA: CPT

## 2019-06-18 PROCEDURE — 85025 COMPLETE CBC W/AUTO DIFF WBC: CPT

## 2019-06-18 PROCEDURE — 80053 COMPREHEN METABOLIC PANEL: CPT

## 2019-06-18 PROCEDURE — 85610 PROTHROMBIN TIME: CPT

## 2019-06-18 PROCEDURE — 93005 ELECTROCARDIOGRAM TRACING: CPT | Performed by: EMERGENCY MEDICINE

## 2019-06-18 PROCEDURE — 84484 ASSAY OF TROPONIN QUANT: CPT

## 2019-06-18 RX ORDER — HYDROCODONE BITARTRATE AND ACETAMINOPHEN 5; 325 MG/1; MG/1
1 TABLET ORAL ONCE
Status: COMPLETED | OUTPATIENT
Start: 2019-06-18 | End: 2019-06-18

## 2019-06-18 RX ORDER — GABAPENTIN 300 MG/1
300 CAPSULE ORAL 3 TIMES DAILY
Qty: 15 CAPSULE | Refills: 0 | Status: SHIPPED | OUTPATIENT
Start: 2019-06-18 | End: 2019-10-04 | Stop reason: SDUPTHER

## 2019-06-18 RX ORDER — FUROSEMIDE 40 MG/1
80 TABLET ORAL 2 TIMES DAILY
Qty: 20 TABLET | Refills: 0 | Status: SHIPPED | OUTPATIENT
Start: 2019-06-18 | End: 2019-08-08 | Stop reason: SDUPTHER

## 2019-06-18 RX ORDER — CLINDAMYCIN HYDROCHLORIDE 150 MG/1
450 CAPSULE ORAL ONCE
Status: COMPLETED | OUTPATIENT
Start: 2019-06-18 | End: 2019-06-18

## 2019-06-18 RX ORDER — CLINDAMYCIN HYDROCHLORIDE 150 MG/1
450 CAPSULE ORAL 3 TIMES DAILY
Qty: 90 CAPSULE | Refills: 0 | Status: SHIPPED | OUTPATIENT
Start: 2019-06-18 | End: 2019-06-28

## 2019-06-18 RX ORDER — FUROSEMIDE 10 MG/ML
80 INJECTION INTRAMUSCULAR; INTRAVENOUS ONCE
Status: COMPLETED | OUTPATIENT
Start: 2019-06-18 | End: 2019-06-18

## 2019-06-18 RX ADMIN — CLINDAMYCIN HYDROCHLORIDE 450 MG: 150 CAPSULE ORAL at 21:11

## 2019-06-18 RX ADMIN — FUROSEMIDE 80 MG: 10 INJECTION, SOLUTION INTRAMUSCULAR; INTRAVENOUS at 18:51

## 2019-06-18 RX ADMIN — HYDROCODONE BITARTRATE AND ACETAMINOPHEN 1 TABLET: 5; 325 TABLET ORAL at 21:11

## 2019-06-18 ASSESSMENT — PAIN DESCRIPTION - DESCRIPTORS
DESCRIPTORS: BURNING;CRAMPING
DESCRIPTORS: BURNING

## 2019-06-18 ASSESSMENT — PAIN DESCRIPTION - PAIN TYPE: TYPE: ACUTE PAIN

## 2019-06-18 ASSESSMENT — ENCOUNTER SYMPTOMS
ABDOMINAL PAIN: 0
SHORTNESS OF BREATH: 1

## 2019-06-18 ASSESSMENT — PAIN DESCRIPTION - ORIENTATION: ORIENTATION: RIGHT;LEFT

## 2019-06-18 ASSESSMENT — PAIN SCALES - GENERAL
PAINLEVEL_OUTOF10: 10
PAINLEVEL_OUTOF10: 7
PAINLEVEL_OUTOF10: 10

## 2019-06-18 ASSESSMENT — PAIN DESCRIPTION - LOCATION: LOCATION: LEG

## 2019-06-18 ASSESSMENT — PAIN - FUNCTIONAL ASSESSMENT: PAIN_FUNCTIONAL_ASSESSMENT: 0-10

## 2019-06-18 ASSESSMENT — PAIN DESCRIPTION - FREQUENCY
FREQUENCY: CONTINUOUS
FREQUENCY: CONTINUOUS

## 2019-06-18 NOTE — ED PROVIDER NOTES
[address] ED  eMERGENCY dEPARTMENT eNCOUnter        Pt Name: Chad Fernandez  MRN: [id number]  [place] 1975  Dateof evaluation: 6/18/2019  Provider: SABAS Oleary - HUMBERTO  PCP: Mena Saxena PA-C  ED Attending: No att. providers found    [facility]       Chief Complaint   Patient presents with    Shortness of Breath     patient states that she is short of breath. patient states she has a hx of CHF and is having increased swelling.  Chest Pain       HISTORY OF PRESENTILLNESS   (Location/Symptom, Timing/Onset, Context/Setting, Quality, Duration, Modifying Factors, Severity)  Note limiting factors. Chad Fernandez is a 53 y.o. female for sob and leg swelling. Onset was the past few days. Duration has been since the onset. Context includes pt states she has a history of chf and has had 20#wt gain in 2 days and has swollen legs. Alleviating factors include nothing. Aggravating factors include nothing. Pain is 10/10. nothing has been used for pain today. Nursing Notes were all reviewed and agreed with or any disagreements were addressed  in the HPI. REVIEW OF SYSTEMS    (2-9 systems for level 4, 10 or more for level 5)     Review of Systems   Constitutional: Negative for fever. Respiratory: Positive for shortness of breath. Pt uses 4lnc o2 at baseline   Cardiovascular: Positive for leg swelling. Negative for chest pain. Gastrointestinal: Negative for abdominal pain. Genitourinary: Negative for difficulty urinating. All other systems reviewed and are negative. Positives and Pertinent negatives as per HPI. Except as noted above in the ROS, all other systems were reviewed and negative.        PAST MEDICAL HISTORY     Past Medical History:   Diagnosis Date    Bacteremia 08/23/2017    staph aureus    [facility]     Colonization status 7/26/12    DNA probe negative for MRSA    COPD [facility] disease[facility]     Diabetes mellitus ([facility]     FOR ABOUT 4 YEARS    DM (diabetes mellitus) ([facility]     Drug abuse, IV ([facility]     Hepatitis     Hepatitis C antibody positive in blood 08/24/2017    Hepatitis C AB positive     Heroin overdose ([facility]     Hypertension     MRSA infection     LUNGS    Neuropathy     legs with pain    Other disorders of kidney and ureter     KIDNEY FAILURE, ACUTE    Pneumonia     Smoking history          SURGICAL HISTORY       Past Surgical History:   Procedure Laterality Date    BRONCHOSCOPY  12/21/2017    BAL    CYSTOSCOPY  1/5/15    cysto with left stent placement    CYSTOSCOPY  10/6/15    stent removal    TRACHEOSTOMY CLOSURE      TUBAL LIGATION      URETER STENT PLACEMENT           CURRENT MEDICATIONS       Discharge Medication List as of 6/18/2019  9:03 PM      CONTINUE these medications which have NOT CHANGED    Details   !! furosemide (LASIX) 40 MG tablet Take 1 tablet by mouth daily for 7 days, Disp-7 tablet, R-0Print      lisinopril (PRINIVIL) 10 MG tablet Take 1 tablet by mouth daily for 10 days, Disp-30 tablet, R-0Print      doxycycline hyclate (VIBRA-TABS) 100 MG tablet Take 1 tablet by mouth 2 times daily for 10 days, Disp-20 tablet, R-0Print      albuterol (PROVENTIL) (5 MG/ML) 0.5% nebulizer solution Take 0.5 mLs by nebulization 2 times daily, Disp-30 mL, R-0Print      ipratropium-albuterol (DUONEB) 0.5-2.5 (3) MG/3ML SOLN nebulizer solution Inhale 3 mLs into the lungs 4 times daily, Disp-360 mL, R-1Print      albuterol sulfate HFA (PROVENTIL HFA) 108 (90 Base) MCG/ACT inhaler Inhale 2 puffs into the lungs every 4 hours as needed for Wheezing or Shortness of Breath (Space out to every 6 hours as symptoms improve) Space out to every 6 hours as symptoms improve., Disp-1 Inhaler, R-0Print      tiotropium (SPIRIVA HANDIHALER) 18 MCG inhalation capsule Inhale 1 capsule into the lungs daily, Disp-30 capsule, R-5Normal      !! gabapentin (NEURONTIN) 100 MG capsule Substance and Sexual Activity    Alcohol use: No    Drug use: No     Comment: Heroin    Sexual activity: Not Currently     Partners: Male   Lifestyle    Physical activity:     Days per week: None     Minutes per session: None    Stress: None   Relationships    Social connections:     Talks on phone: None     Gets together: None     Attends L[employer] service: None     Active member of club or organization: None     Attends meetings of clubs or organizations: None     Relationship status: None    Intimate partner violence:     Fear of current or ex partner: None     Emotionally abused: None     Physically abused: None     Forced sexual activity: None   Other Topics Concern    None   Social History Narrative    ** Merged History Encounter **            SCREENINGS    [place] Coma Scale  Eye Opening: Spontaneous  Best Verbal Response: Oriented  Best Motor Response: Obeys commands  [place] Coma Scale Score: 15        PHYSICAL EXAM  (up to 7 for level 4, 8 or more for level 5)     ED Triage Vitals [06/18/19 1803]   BP Temp Temp Source Pulse Resp SpO2 Height Weight   (!) 208/87 98.6 °F (37 °C) Oral 112 16 99 % 5' 3\" (1.6 m) 235 lb (106.6 kg)       Physical Exam   Constitutional: She is oriented to person, place, and time. Obese female   HENT:   Head: Normocephalic and atraumatic. Neck: Normal range of motion. Cardiovascular: Tachycardia present. Pulmonary/Chest: Effort normal. No respiratory distress. She has decreased breath sounds in the right lower field and the left lower field. She has no rales. Abdominal: Soft. She exhibits no distension. Musculoskeletal: Normal range of motion. Neurological: She is alert and oriented to person, place, and time. Skin: Skin is warm and dry. There is erythema. Pitting edema and erythema to bilat lower legs   Psychiatric: She has a normal mood and affect.        DIAGNOSTIC RESULTS   LABS:    Labs Reviewed   CBC WITH AUTO DIFFERENTIAL - Abnormal; Notable for the following components:       Result Value    RBC 5.78 (*)     Hematocrit 49.2 (*)     RDW 16.7 (*)     Platelets 978 (*)     All other components within normal limits    Narrative:     Performed at:  [place] [address],  [employer] [place]   Phone [phone]   COMPREHENSIVE METABOLIC PANEL W/ REFLEX TO MG FOR LOW K - Abnormal; Notable for the following components:    Chloride 97 (*)     Glucose 108 (*)     Albumin/Globulin Ratio 1.0 (*)     All other components within normal limits    Narrative:     Performed at:  [place] [address],  [employer]   Phone [phone]   BRAIN NATRIURETIC PEPTIDE - Abnormal; Notable for the following components:    Pro- (*)     All other components within normal limits    Narrative:     Performed at:  [place] [address],  [employer]   Phone [phone]   BLOOD GAS, ARTERIAL - Abnormal; Notable for the following components:    pCO2, Arterial 53.8 (*)     HCO3, Arterial 33.5 (*)     Base Excess, Arterial 7.0 (*)     Hemoglobin, Art, Extended 16.2 (*)     Carboxyhgb, Arterial 9.4 (*)     All other components within normal limits    Narrative:     Performed at:  [place] [address],  [employer] [place]   Phone [phone]   CULTURE BLOOD #2   CULTURE BLOOD #1   URINE RT REFLEX TO CULTURE    Narrative:     Performed at:  [place] [address],  [employer] [place]   Phone [phone]   TROPONIN    Narrative:     Performed at:  [place] [address],  [employer] [place]   Phone [phone]   PROTIME-INR    Narrative:     Performed at:  [place] [address],  [employer] [place]   Phone [phone]   LACTIC ACID, PLASMA    Narrative:     Performed at:  [facility]  [address],  [place], [place]   Phone [phone]       All other labs werewithin normal range or not returned as of this dictation. EKG: All EKG's are interpreted by the Emergency Department Physician who either signs or Co-signs this chart in the absence of acardiologist.  Please see their note for interpretation of EKG. RADIOLOGY:       Chest x-ray interpreted by radiologist for mild prominence of interstitial markings. Correlation for interstitial edema is recommended. Interpretation per the Radiologist below, if available at the time of this note:    XR CHEST STANDARD (2 VW)   Final Result   Mild prominence of the interstitial markings. Correlation for interstitial   edema is recommended. No results found. PROCEDURES   Unless otherwise noted below, none     Procedures    CRITICAL CARE TIME   N/A    CONSULTS:  None      EMERGENCYDEPARTMENT COURSE and DIFFERENTIAL DIAGNOSIS/MDM:   Vitals:    Vitals:    06/18/19 1936 06/18/19 2005 06/18/19 2035 06/18/19 2116   BP: (!) 147/94 (!) 144/85 (!) 150/83 132/75   Pulse: 109 109 107 107   Resp: 28 28 16 27   Temp:       TempSrc:       SpO2: 98% 97% 97% 97%   Weight:       Height:           Patient was given the following medications:  Medications   furosemide (LASIX) injection 80 mg (80 mg Intravenous Given 6/18/19 1851)   clindamycin (CLEOCIN) capsule 450 mg (450 mg Oral Given 6/18/19 2111)   HYDROcodone-acetaminophen (NORCO) 5-325 MG per tablet 1 tablet (1 tablet Oral Given 6/18/19 2111)       Patient was seen and evaluated by myself and Dr. Thang Castelan. Patient here today for concerns for shortness of breath. Patient also reports that she has gained 20 pounds in 2 days. She does complain of edema noted to her lower extremities as well. On exam she is awake and alert she was found to be slightly tachycardic on arrival.  Patient uses 4 L of oxygen via nasal cannula at baseline. Lab values have been reviewed and interpreted. Patient was given IV Lasix in the ED. At this point the patient will be discharged home with instructions on cellulitis and hypertension. Patient reports that she has not followed up with cardiology so she was given a referral for cardiology. She was also given clindamycin in the ED and a prescription for clindamycin. She was given a prescription for gabapentin as well. She was encouraged to follow-up with her primary care doctor in the next 2 days and return to the ED for worsening symptoms. Patient was discharged home with all questions answered. The patient tolerated their visit well. I have evaluated thispatient. My supervising physician was available for consultation. The patient and / or the family were informed of the results of any tests, a time was given to answer questions, a plan was proposed and they agreed Rian Lara. FINAL IMPRESSION      1. Cellulitis of lower extremity, unspecified laterality    2. Hypertension, unspecified type          DISPOSITION/PLAN   DISPOSITION Decision To Discharge 06/18/2019 09:00:03 PM      PATIENT REFERRED TO:  Kalin Henderson PA-C  [facility] [address]   [phone]    Schedule an appointment as soon as possible for a visit in 2 days      [facility] [place] [facility]  [address] [facility]  [phone]    If symptoms worsen      DISCHARGE MEDICATIONS:  Discharge Medication List as of 6/18/2019  9:03 PM      START taking these medications    Details   clindamycin (CLEOCIN) 150 MG capsule Take 3 capsules by mouth 3 times daily for 10 days, Disp-90 capsule, R-0Print      !! furosemide (LASIX) 40 MG tablet Take 2 tablets by mouth 2 times daily for 5 days, Disp-20 tablet, R-0Print      !! gabapentin (NEURONTIN) 300 MG capsule Take 1 capsule by mouth 3 times daily for 5 days. , Disp-15 capsule, R-0Print       !! - Potential duplicate medications found. Please discuss with provider. DISCONTINUED MEDICATIONS:  Discharge Medication List as of 6/18/2019  9:03 PM                 (Please note that portions of this note were completed with a voice recognition program.  Efforts were made to edit the dictations but occasionally words are mis-transcribed.)    SABAS Gooden CNP (electronically signed)         SABAS Gooden CNP  06/19/19 0037

## 2019-06-19 LAB
EKG ATRIAL RATE: 104 BPM
EKG DIAGNOSIS: NORMAL
EKG P AXIS: 79 DEGREES
EKG P-R INTERVAL: 132 MS
EKG Q-T INTERVAL: 326 MS
EKG QRS DURATION: 60 MS
EKG QTC CALCULATION (BAZETT): 428 MS
EKG R AXIS: 102 DEGREES
EKG T AXIS: 61 DEGREES
EKG VENTRICULAR RATE: 104 BPM

## 2019-06-19 PROCEDURE — 93010 ELECTROCARDIOGRAM REPORT: CPT | Performed by: INTERNAL MEDICINE

## 2019-06-19 NOTE — ED NOTES
Catheter emptied of 700ml clear yellow urine. Patient complain of ronan leg pain.       Pj Ahn RN  06/18/19 9897

## 2019-06-19 NOTE — ED NOTES
Patient son in hallway asking to speak to provider regarding patient staying as inpatient. Patient reports she did not want to speak to provider, she wants to leave. Information relayed to son.  Patient son left to ubaldo Eaton RN  06/18/19 6542

## 2019-06-19 NOTE — ED NOTES
Discharge instructions reviewed. Patient expressed understanding. IV removed from left AC, tip intact, gauze pressure dressing applied, no complications noted.  Indwelling urinary catheter removed, no complications noted     Nydia Yoder RN  06/18/19 3062

## 2019-06-19 NOTE — ED NOTES
Patient given option to leave with  Indwelling urinary catheter. Patient declined.       Mittie Habermann, RN  06/18/19 2953

## 2019-06-19 NOTE — ED PROVIDER NOTES
ED Course as of Jun 19 0134 Wed Jun 19, 2019   0128 Sinus rhythm at 104. Right axis. . No acute ST-T changes.   Compared to prior June 11, 2019, stable from prior   EKG 12 Lead [WL]   0129 Blood gas, arterial(!):    pH, Arterial 7.412   pCO2, Arterial 53.8(!)   pO2, Arterial 96.8   HCO3, Arterial 33.5(!)   Base Excess, Arterial 7.0(!)   Hemoglobin, Art, Extended 16.2(!)   O2 Sat, Arterial 97.3   Carboxyhgb, Arterial 9.4(!)   Methemoglobin, Arterial 0.2   TCO2 (calc), Art 35.1   O2 Content, Arterial 22   O2 Therapy See comment [WL]   0129 Urinalysis Reflex to Culture:    Color, UA Straw   Clarity, UA Clear   Glucose, UA Negative   Bilirubin, Urine Negative   Ketones, Urine Negative   Specific Gravity, UA 1.010   Blood, Urine Negative   pH, UA 8.0   Protein, UA Negative   Urobilinogen, Urine 0.2   Nitrite, Urine Negative   Leukocyte Esterase, Urine Negative   Microscopic Examination Not Indicated   Urine Reflex to Culture Not Indicated   Urine Type Not Specified [WL]   0129 Troponin:    Troponin <0.01 [WL]   0130 Comprehensive Metabolic Panel w/ Reflex to MG(!):    Sodium 138   Potassium 4.9   Chloride 97(!)   CO2 31   Anion Gap 10   Glucose 108(!)   BUN 15   Creatinine 0.7   GFR Non- >60   GFR African American >60   Calcium 9.6   Total Protein 7.3   Albumin 3.6   Albumin/Globulin Ratio 1.0(!)   Bilirubin 0.4   Alk Phos 68   ALT 28   AST 28   Globulin 3.7 [WL]   0130 Brain Natriuretic Peptide(!):    Pro-(!) [WL]   0130 Lactic Acid, Plasma:    Lactic Acid 1.0 [WL]   0130 Protime-INR:    Prothrombin Time 10.0   INR 0.88 [WL]   0130 CBC Auto Differential(!):    WBC 9.6   RBC 5.78(!)   Hemoglobin Quant 15.7   Hematocrit 49.2(!)   MCV 85.1   MCH 27.3   MCHC 32.0   RDW 16.7(!)   Platelet Count 945(!)   MPV 9.4   Neutrophils % 61.1   Lymphocyte % 25.6   Monocytes % 9.9   Eosinophils % 2.3   Basophils % 1.1   Neutrophils # 5.8   Lymphocytes # 2.4   Monocytes # 0.9   Eosinophils # 0.2 Basophils # 0.1 [WL]   0130 No infiltrate or effusion   XR CHEST STANDARD (2 VW) [WL]   0130 May benefit from admission at that time. Patient discharged in stable condition. Bledsoe Katherine outpatient therapy she as if she continues to inpatient admission at this time. She has had multiple visits but symptoms have been very slowly progressive. Discussed need for follow-up with cardiology and close return precautions she did not have any hypoxia or significant respiratory symptoms requiring on extremities which may be contributing to symptoms. Treated for this. Cellulitic change potential she was treated for same. Also concern for CHF exacerbation. Patient with significant lower extremity edema consistent with, Tender to touch. Erythema and warmth bilateral lower extremity edema with she has lungs are clear with no wheezing rhonchi or rales. On my evaluation patient is nontoxic in no acute distress. She is on her baseline 4 L nasal cannula oxygen. She always lays upright in bed and has not noticed a difference in elevation required. .  Or oxygen requirement shortness of breath. She denies significant increase in and weight gain. She has a history of congestive heart failure patient presents for evaluation of bilateral lower extremity swelling n have personally performed and/or participated in the history, exam and medical decision making and agree with all pertinent clinical information. I have also reviewed and agree with the past medical, family and social history unless otherwise noted.         [WL]      ED Course User Index  [WL] DO Nelida Guerra DO  06/19/19 5952

## 2019-06-23 LAB
BLOOD CULTURE, ROUTINE: NORMAL
CULTURE, BLOOD 2: NORMAL

## 2019-08-08 ENCOUNTER — OFFICE VISIT (OUTPATIENT)
Dept: FAMILY MEDICINE CLINIC | Age: 44
End: 2019-08-08
Payer: MEDICARE

## 2019-08-08 VITALS
WEIGHT: 244.25 LBS | OXYGEN SATURATION: 98 % | BODY MASS INDEX: 44.95 KG/M2 | HEIGHT: 62 IN | TEMPERATURE: 98.2 F | DIASTOLIC BLOOD PRESSURE: 88 MMHG | SYSTOLIC BLOOD PRESSURE: 160 MMHG | HEART RATE: 105 BPM

## 2019-08-08 DIAGNOSIS — J44.9 CHRONIC OBSTRUCTIVE PULMONARY DISEASE, UNSPECIFIED COPD TYPE (HCC): Primary | Chronic | ICD-10-CM

## 2019-08-08 DIAGNOSIS — E11.9 TYPE 2 DIABETES MELLITUS WITHOUT COMPLICATION, WITHOUT LONG-TERM CURRENT USE OF INSULIN (HCC): ICD-10-CM

## 2019-08-08 DIAGNOSIS — I10 HYPERTENSION, UNSPECIFIED TYPE: Chronic | ICD-10-CM

## 2019-08-08 DIAGNOSIS — F17.200 SMOKER: Chronic | ICD-10-CM

## 2019-08-08 DIAGNOSIS — R06.09 DYSPNEA ON EXERTION: ICD-10-CM

## 2019-08-08 DIAGNOSIS — G47.33 OSA (OBSTRUCTIVE SLEEP APNEA): ICD-10-CM

## 2019-08-08 DIAGNOSIS — G62.9 NEUROPATHY: Chronic | ICD-10-CM

## 2019-08-08 PROBLEM — J96.22 ACUTE ON CHRONIC RESPIRATORY FAILURE WITH HYPOXIA AND HYPERCAPNIA (HCC): Status: RESOLVED | Noted: 2017-08-23 | Resolved: 2019-08-08

## 2019-08-08 PROBLEM — E87.5 HYPERKALEMIA: Status: RESOLVED | Noted: 2018-11-17 | Resolved: 2019-08-08

## 2019-08-08 PROBLEM — E66.01 MORBID OBESITY (HCC): Chronic | Status: RESOLVED | Noted: 2018-01-10 | Resolved: 2019-08-08

## 2019-08-08 PROBLEM — J96.21 ACUTE ON CHRONIC RESPIRATORY FAILURE WITH HYPOXIA AND HYPERCAPNIA (HCC): Status: RESOLVED | Noted: 2017-08-23 | Resolved: 2019-08-08

## 2019-08-08 LAB
BASOPHILS ABSOLUTE: 0.1 K/UL (ref 0–0.2)
BASOPHILS RELATIVE PERCENT: 0.6 %
EOSINOPHILS ABSOLUTE: 0.1 K/UL (ref 0–0.6)
EOSINOPHILS RELATIVE PERCENT: 0.8 %
HCT VFR BLD CALC: 52 % (ref 36–48)
HEMOGLOBIN: 16.7 G/DL (ref 12–16)
LYMPHOCYTES ABSOLUTE: 2.3 K/UL (ref 1–5.1)
LYMPHOCYTES RELATIVE PERCENT: 24.5 %
MCH RBC QN AUTO: 28.5 PG (ref 26–34)
MCHC RBC AUTO-ENTMCNC: 32.2 G/DL (ref 31–36)
MCV RBC AUTO: 88.6 FL (ref 80–100)
MONOCYTES ABSOLUTE: 0.8 K/UL (ref 0–1.3)
MONOCYTES RELATIVE PERCENT: 8 %
NEUTROPHILS ABSOLUTE: 6.3 K/UL (ref 1.7–7.7)
NEUTROPHILS RELATIVE PERCENT: 66.1 %
PDW BLD-RTO: 18.7 % (ref 12.4–15.4)
PLATELET # BLD: 140 K/UL (ref 135–450)
PMV BLD AUTO: 9.9 FL (ref 5–10.5)
RBC # BLD: 5.87 M/UL (ref 4–5.2)
WBC # BLD: 9.5 K/UL (ref 4–11)

## 2019-08-08 PROCEDURE — G8427 DOCREV CUR MEDS BY ELIG CLIN: HCPCS | Performed by: NURSE PRACTITIONER

## 2019-08-08 PROCEDURE — 90471 IMMUNIZATION ADMIN: CPT | Performed by: NURSE PRACTITIONER

## 2019-08-08 PROCEDURE — 99205 OFFICE O/P NEW HI 60 MIN: CPT | Performed by: NURSE PRACTITIONER

## 2019-08-08 PROCEDURE — 36415 COLL VENOUS BLD VENIPUNCTURE: CPT | Performed by: NURSE PRACTITIONER

## 2019-08-08 PROCEDURE — G8417 CALC BMI ABV UP PARAM F/U: HCPCS | Performed by: NURSE PRACTITIONER

## 2019-08-08 PROCEDURE — 2022F DILAT RTA XM EVC RTNOPTHY: CPT | Performed by: NURSE PRACTITIONER

## 2019-08-08 PROCEDURE — G8926 SPIRO NO PERF OR DOC: HCPCS | Performed by: NURSE PRACTITIONER

## 2019-08-08 PROCEDURE — 4004F PT TOBACCO SCREEN RCVD TLK: CPT | Performed by: NURSE PRACTITIONER

## 2019-08-08 PROCEDURE — 3023F SPIROM DOC REV: CPT | Performed by: NURSE PRACTITIONER

## 2019-08-08 PROCEDURE — 90746 HEPB VACCINE 3 DOSE ADULT IM: CPT | Performed by: NURSE PRACTITIONER

## 2019-08-08 PROCEDURE — 3046F HEMOGLOBIN A1C LEVEL >9.0%: CPT | Performed by: NURSE PRACTITIONER

## 2019-08-08 RX ORDER — FLUTICASONE FUROATE AND VILANTEROL 100; 25 UG/1; UG/1
1 POWDER RESPIRATORY (INHALATION) DAILY
Qty: 30 EACH | Refills: 2 | Status: SHIPPED | OUTPATIENT
Start: 2019-08-08 | End: 2019-08-12

## 2019-08-08 RX ORDER — FUROSEMIDE 20 MG/1
20 TABLET ORAL 2 TIMES DAILY
COMMUNITY
End: 2020-01-10 | Stop reason: SDUPTHER

## 2019-08-08 RX ORDER — LISINOPRIL 10 MG/1
10 TABLET ORAL DAILY
COMMUNITY
End: 2019-08-08 | Stop reason: SDUPTHER

## 2019-08-08 RX ORDER — LISINOPRIL 10 MG/1
10 TABLET ORAL DAILY
Qty: 90 TABLET | Refills: 1 | Status: SHIPPED | OUTPATIENT
Start: 2019-08-08 | End: 2019-10-04

## 2019-08-08 RX ORDER — ALBUTEROL SULFATE 90 UG/1
2 AEROSOL, METERED RESPIRATORY (INHALATION) EVERY 6 HOURS PRN
Qty: 1 INHALER | Refills: 3 | Status: SHIPPED | OUTPATIENT
Start: 2019-08-08 | End: 2020-09-04 | Stop reason: SDUPTHER

## 2019-08-08 RX ORDER — GABAPENTIN 300 MG/1
300 CAPSULE ORAL 3 TIMES DAILY
Qty: 90 CAPSULE | Refills: 0 | Status: SHIPPED | OUTPATIENT
Start: 2019-08-08 | End: 2019-09-02 | Stop reason: SDUPTHER

## 2019-08-08 RX ORDER — METOPROLOL SUCCINATE 25 MG/1
25 TABLET, EXTENDED RELEASE ORAL DAILY
Qty: 90 TABLET | Refills: 1 | Status: SHIPPED | OUTPATIENT
Start: 2019-08-08 | End: 2020-02-05

## 2019-08-08 ASSESSMENT — ENCOUNTER SYMPTOMS
NAUSEA: 0
SORE THROAT: 0
DIARRHEA: 0
RHINORRHEA: 0
TROUBLE SWALLOWING: 0
ABDOMINAL PAIN: 0
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
## 2019-08-08 NOTE — PATIENT INSTRUCTIONS
Please read the healthy family handout that you were given and share it with your family. Please compare this printed medication list with your medications at home to be sure they are the same. If you have any medications that are different please contact us immediately at [phone]. Also review your allergies that we have listed, these may also include medications that you have not been able to tolerate, make sure everything listed is correct. If you have any allergies that are different please contact us immediately at [phone]. Patient Education        Stopping Smoking: Care Instructions  Your Care Instructions  Cigarette smokers crave the nicotine in cigarettes. Giving it up is much harder than simply changing a habit. Your body has to stop craving the nicotine. It is hard to quit, but you can do it. There are many tools that people use to quit smoking. You may find that combining tools works best for you. There are several steps to quitting. First you get ready to quit. Then you get support to help you. After that, you learn new skills and behaviors to become a nonsmoker. For many people, a necessary step is getting and using medicine. Your doctor will help you set up the plan that best meets your needs. You may want to attend a smoking cessation program to help you quit smoking. When you choose a program, look for one that has proven success. Ask your doctor for ideas. You will greatly increase your chances of success if you take medicine as well as get counseling or join a cessation program.  Some of the changes you feel when you first quit tobacco are uncomfortable. Your body will miss the nicotine at first, and you may feel short-tempered and grumpy. You may have trouble sleeping or concentrating. Medicine can help you deal with these symptoms. You may struggle with changing your smoking habits and rituals. The last step is the tricky one:  Be prepared for the smoking urge to continue for a

## 2019-08-09 ENCOUNTER — TELEPHONE (OUTPATIENT)
Dept: ORTHOPEDIC SURGERY | Age: 44
End: 2019-08-09

## 2019-08-09 ENCOUNTER — TELEPHONE (OUTPATIENT)
Dept: FAMILY MEDICINE CLINIC | Age: 44
End: 2019-08-09

## 2019-08-09 LAB
ESTIMATED AVERAGE GLUCOSE: 122.6 MG/DL
HBA1C MFR BLD: 5.9 %

## 2019-08-12 RX ORDER — BUDESONIDE AND FORMOTEROL FUMARATE DIHYDRATE 80; 4.5 UG/1; UG/1
2 AEROSOL RESPIRATORY (INHALATION) 2 TIMES DAILY
Qty: 1 INHALER | Refills: 3 | Status: SHIPPED | OUTPATIENT
Start: 2019-08-12 | End: 2019-09-25 | Stop reason: SDUPTHER

## 2019-08-28 ENCOUNTER — TELEPHONE (OUTPATIENT)
Dept: FAMILY MEDICINE CLINIC | Age: 44
End: 2019-08-28

## 2019-08-28 NOTE — TELEPHONE ENCOUNTER
Patient has head lice, she was exposed by her grandchildren. She is requesting medication be sent in for her because her insurance will cover it. She does have very long hair and would require extra bottle.

## 2019-09-02 DIAGNOSIS — G62.9 NEUROPATHY: Chronic | ICD-10-CM

## 2019-09-03 RX ORDER — GABAPENTIN 300 MG/1
300 CAPSULE ORAL 3 TIMES DAILY
Qty: 90 CAPSULE | Refills: 2 | Status: SHIPPED | OUTPATIENT
Start: 2019-09-03 | End: 2019-10-04

## 2019-09-25 RX ORDER — BUDESONIDE AND FORMOTEROL FUMARATE DIHYDRATE 80; 4.5 UG/1; UG/1
2 AEROSOL RESPIRATORY (INHALATION) 2 TIMES DAILY
Qty: 1 INHALER | Refills: 3 | Status: SHIPPED | OUTPATIENT
Start: 2019-09-25 | End: 2020-09-04 | Stop reason: SDUPTHER

## 2019-09-25 NOTE — TELEPHONE ENCOUNTER
rx sent to pharmacy for Symbicort, patient said pharmacy did not receive the one that was sent in August

## 2019-10-01 ENCOUNTER — TELEPHONE (OUTPATIENT)
Dept: FAMILY MEDICINE CLINIC | Age: 44
End: 2019-10-01

## 2019-10-04 ENCOUNTER — OFFICE VISIT (OUTPATIENT)
Dept: FAMILY MEDICINE CLINIC | Age: 44
End: 2019-10-04
Payer: MEDICARE

## 2019-10-04 VITALS
DIASTOLIC BLOOD PRESSURE: 94 MMHG | OXYGEN SATURATION: 82 % | SYSTOLIC BLOOD PRESSURE: 160 MMHG | WEIGHT: 241.38 LBS | HEART RATE: 112 BPM | TEMPERATURE: 98.3 F | BODY MASS INDEX: 44.15 KG/M2

## 2019-10-04 DIAGNOSIS — Z23 NEED FOR INFLUENZA VACCINATION: ICD-10-CM

## 2019-10-04 DIAGNOSIS — J96.12 CHRONIC RESPIRATORY FAILURE WITH HYPOXIA AND HYPERCAPNIA (HCC): Chronic | ICD-10-CM

## 2019-10-04 DIAGNOSIS — E11.9 TYPE 2 DIABETES MELLITUS WITHOUT COMPLICATION, WITHOUT LONG-TERM CURRENT USE OF INSULIN (HCC): ICD-10-CM

## 2019-10-04 DIAGNOSIS — R09.89 CHEST CONGESTION: ICD-10-CM

## 2019-10-04 DIAGNOSIS — I10 HYPERTENSION, UNSPECIFIED TYPE: Primary | Chronic | ICD-10-CM

## 2019-10-04 DIAGNOSIS — J96.11 CHRONIC RESPIRATORY FAILURE WITH HYPOXIA AND HYPERCAPNIA (HCC): Chronic | ICD-10-CM

## 2019-10-04 DIAGNOSIS — G62.9 NEUROPATHY: Chronic | ICD-10-CM

## 2019-10-04 LAB
A/G RATIO: 1.3 (ref 1.1–2.2)
ALBUMIN SERPL-MCNC: 4.3 G/DL (ref 3.4–5)
ALP BLD-CCNC: 66 U/L (ref 40–129)
ALT SERPL-CCNC: 47 U/L (ref 10–40)
ANION GAP SERPL CALCULATED.3IONS-SCNC: 17 MMOL/L (ref 3–16)
AST SERPL-CCNC: 41 U/L (ref 15–37)
BILIRUB SERPL-MCNC: 0.7 MG/DL (ref 0–1)
BUN BLDV-MCNC: 14 MG/DL (ref 7–20)
CALCIUM SERPL-MCNC: 9.5 MG/DL (ref 8.3–10.6)
CHLORIDE BLD-SCNC: 101 MMOL/L (ref 99–110)
CO2: 25 MMOL/L (ref 21–32)
CREAT SERPL-MCNC: 0.7 MG/DL (ref 0.6–1.1)
FOLATE: 19.75 NG/ML (ref 4.78–24.2)
GFR AFRICAN AMERICAN: >60
GFR NON-AFRICAN AMERICAN: >60
GLOBULIN: 3.2 G/DL
GLUCOSE BLD-MCNC: 109 MG/DL (ref 70–99)
POTASSIUM SERPL-SCNC: 4.6 MMOL/L (ref 3.5–5.1)
SODIUM BLD-SCNC: 143 MMOL/L (ref 136–145)
TOTAL PROTEIN: 7.5 G/DL (ref 6.4–8.2)
VITAMIN B-12: 494 PG/ML (ref 211–911)

## 2019-10-04 PROCEDURE — 99214 OFFICE O/P EST MOD 30 MIN: CPT | Performed by: NURSE PRACTITIONER

## 2019-10-04 PROCEDURE — 36415 COLL VENOUS BLD VENIPUNCTURE: CPT | Performed by: NURSE PRACTITIONER

## 2019-10-04 PROCEDURE — G8482 FLU IMMUNIZE ORDER/ADMIN: HCPCS | Performed by: NURSE PRACTITIONER

## 2019-10-04 PROCEDURE — 3044F HG A1C LEVEL LT 7.0%: CPT | Performed by: NURSE PRACTITIONER

## 2019-10-04 PROCEDURE — 4004F PT TOBACCO SCREEN RCVD TLK: CPT | Performed by: NURSE PRACTITIONER

## 2019-10-04 PROCEDURE — 90471 IMMUNIZATION ADMIN: CPT | Performed by: NURSE PRACTITIONER

## 2019-10-04 PROCEDURE — 2022F DILAT RTA XM EVC RTNOPTHY: CPT | Performed by: NURSE PRACTITIONER

## 2019-10-04 PROCEDURE — G8417 CALC BMI ABV UP PARAM F/U: HCPCS | Performed by: NURSE PRACTITIONER

## 2019-10-04 PROCEDURE — 90688 IIV4 VACCINE SPLT 0.5 ML IM: CPT | Performed by: NURSE PRACTITIONER

## 2019-10-04 PROCEDURE — G8427 DOCREV CUR MEDS BY ELIG CLIN: HCPCS | Performed by: NURSE PRACTITIONER

## 2019-10-04 RX ORDER — CETIRIZINE HYDROCHLORIDE 10 MG/1
10 TABLET ORAL DAILY
Qty: 30 TABLET | Refills: 0 | Status: SHIPPED | OUTPATIENT
Start: 2019-10-04 | End: 2019-10-31 | Stop reason: SDUPTHER

## 2019-10-04 RX ORDER — LISINOPRIL 20 MG/1
20 TABLET ORAL DAILY
Qty: 90 TABLET | Refills: 0 | Status: SHIPPED | OUTPATIENT
Start: 2019-10-04 | End: 2019-12-30

## 2019-10-04 RX ORDER — GABAPENTIN 400 MG/1
400 CAPSULE ORAL 3 TIMES DAILY
Qty: 90 CAPSULE | Refills: 1 | Status: SHIPPED | OUTPATIENT
Start: 2019-10-04 | End: 2019-11-30 | Stop reason: SDUPTHER

## 2019-10-04 RX ORDER — GUAIFENESIN 600 MG/1
1200 TABLET, EXTENDED RELEASE ORAL 2 TIMES DAILY
Qty: 120 TABLET | Refills: 0 | Status: SHIPPED | OUTPATIENT
Start: 2019-10-04 | End: 2019-11-03

## 2019-10-05 LAB
ESTIMATED AVERAGE GLUCOSE: 119.8 MG/DL
HBA1C MFR BLD: 5.8 %

## 2019-10-31 DIAGNOSIS — R09.89 CHEST CONGESTION: ICD-10-CM

## 2019-10-31 RX ORDER — CETIRIZINE HYDROCHLORIDE 10 MG/1
TABLET ORAL
Qty: 30 TABLET | Refills: 0 | Status: SHIPPED | OUTPATIENT
Start: 2019-10-31 | End: 2020-01-07 | Stop reason: ALTCHOICE

## 2019-11-05 ENCOUNTER — TELEPHONE (OUTPATIENT)
Dept: PULMONOLOGY | Age: 44
End: 2019-11-05

## 2019-11-08 ENCOUNTER — HOSPITAL ENCOUNTER (OUTPATIENT)
Age: 44
Setting detail: OBSERVATION
LOS: 1 days | Discharge: HOME | DRG: 446 | End: 2019-11-09
Attending: EMERGENCY MEDICINE | Admitting: INTERNAL MEDICINE
Payer: MEDICARE

## 2019-11-08 ENCOUNTER — APPOINTMENT (OUTPATIENT)
Dept: CT IMAGING | Age: 44
DRG: 446 | End: 2019-11-08
Payer: MEDICARE

## 2019-11-08 ENCOUNTER — APPOINTMENT (OUTPATIENT)
Dept: GENERAL RADIOLOGY | Age: 44
DRG: 446 | End: 2019-11-08
Payer: MEDICARE

## 2019-11-08 DIAGNOSIS — R09.89 PULMONARY VASCULAR CONGESTION: ICD-10-CM

## 2019-11-08 DIAGNOSIS — A41.9 SEPTICEMIA (HCC): ICD-10-CM

## 2019-11-08 DIAGNOSIS — N20.0 KIDNEY STONE: Primary | ICD-10-CM

## 2019-11-08 DIAGNOSIS — N20.0 NEPHROLITHIASIS: ICD-10-CM

## 2019-11-08 DIAGNOSIS — N30.01 ACUTE CYSTITIS WITH HEMATURIA: ICD-10-CM

## 2019-11-08 LAB
A/G RATIO: 1 (ref 1.1–2.2)
ALBUMIN SERPL-MCNC: 3.8 G/DL (ref 3.4–5)
ALP BLD-CCNC: 65 U/L (ref 40–129)
ALT SERPL-CCNC: 42 U/L (ref 10–40)
ANION GAP SERPL CALCULATED.3IONS-SCNC: 7 MMOL/L (ref 3–16)
AST SERPL-CCNC: 31 U/L (ref 15–37)
BACTERIA: ABNORMAL /HPF
BASOPHILS ABSOLUTE: 0.1 K/UL (ref 0–0.2)
BASOPHILS RELATIVE PERCENT: 0.6 %
BILIRUB SERPL-MCNC: 0.6 MG/DL (ref 0–1)
BILIRUBIN URINE: NEGATIVE
BLOOD, URINE: ABNORMAL
BUN BLDV-MCNC: 17 MG/DL (ref 7–20)
CALCIUM SERPL-MCNC: 9.7 MG/DL (ref 8.3–10.6)
CHLORIDE BLD-SCNC: 93 MMOL/L (ref 99–110)
CLARITY: CLEAR
CO2: 35 MMOL/L (ref 21–32)
COLOR: YELLOW
CREAT SERPL-MCNC: 0.7 MG/DL (ref 0.6–1.1)
EKG ATRIAL RATE: 120 BPM
EKG DIAGNOSIS: NORMAL
EKG P AXIS: 69 DEGREES
EKG P-R INTERVAL: 154 MS
EKG Q-T INTERVAL: 304 MS
EKG QRS DURATION: 58 MS
EKG QTC CALCULATION (BAZETT): 429 MS
EKG R AXIS: 88 DEGREES
EKG T AXIS: 60 DEGREES
EKG VENTRICULAR RATE: 120 BPM
EOSINOPHILS ABSOLUTE: 0 K/UL (ref 0–0.6)
EOSINOPHILS RELATIVE PERCENT: 0 %
GFR AFRICAN AMERICAN: >60
GFR NON-AFRICAN AMERICAN: >60
GLOBULIN: 3.9 G/DL
GLUCOSE BLD-MCNC: 104 MG/DL (ref 70–99)
GLUCOSE BLD-MCNC: 200 MG/DL (ref 70–99)
GLUCOSE URINE: 250 MG/DL
HCG QUALITATIVE: NEGATIVE
HCT VFR BLD CALC: 51.7 % (ref 36–48)
HEMOGLOBIN: 16.6 G/DL (ref 12–16)
KETONES, URINE: NEGATIVE MG/DL
LACTIC ACID, SEPSIS: 1.5 MMOL/L (ref 0.4–1.9)
LEUKOCYTE ESTERASE, URINE: ABNORMAL
LIPASE: 40 U/L (ref 13–60)
LYMPHOCYTES ABSOLUTE: 0.8 K/UL (ref 1–5.1)
LYMPHOCYTES RELATIVE PERCENT: 5.3 %
MCH RBC QN AUTO: 29.1 PG (ref 26–34)
MCHC RBC AUTO-ENTMCNC: 32 G/DL (ref 31–36)
MCV RBC AUTO: 90.8 FL (ref 80–100)
MICROSCOPIC EXAMINATION: YES
MONOCYTES ABSOLUTE: 0.4 K/UL (ref 0–1.3)
MONOCYTES RELATIVE PERCENT: 2.8 %
MUCUS: ABNORMAL /LPF
NEUTROPHILS ABSOLUTE: 13.3 K/UL (ref 1.7–7.7)
NEUTROPHILS RELATIVE PERCENT: 91.3 %
NITRITE, URINE: NEGATIVE
PDW BLD-RTO: 15.5 % (ref 12.4–15.4)
PERFORMED ON: ABNORMAL
PH UA: 7.5 (ref 5–8)
PLATELET # BLD: 142 K/UL (ref 135–450)
PMV BLD AUTO: 9.7 FL (ref 5–10.5)
POTASSIUM REFLEX MAGNESIUM: 5 MMOL/L (ref 3.5–5.1)
PROCALCITONIN: 0.08 NG/ML (ref 0–0.15)
PROTEIN UA: 100 MG/DL
RBC # BLD: 5.69 M/UL (ref 4–5.2)
RBC UA: ABNORMAL /HPF (ref 0–2)
SODIUM BLD-SCNC: 135 MMOL/L (ref 136–145)
SPECIFIC GRAVITY UA: 1.01 (ref 1–1.03)
TOTAL PROTEIN: 7.7 G/DL (ref 6.4–8.2)
TROPONIN: <0.01 NG/ML
URINE REFLEX TO CULTURE: YES
URINE TYPE: ABNORMAL
UROBILINOGEN, URINE: 0.2 E.U./DL
WBC # BLD: 14.6 K/UL (ref 4–11)
WBC UA: ABNORMAL /HPF (ref 0–5)

## 2019-11-08 PROCEDURE — 87186 SC STD MICRODIL/AGAR DIL: CPT

## 2019-11-08 PROCEDURE — 96361 HYDRATE IV INFUSION ADD-ON: CPT

## 2019-11-08 PROCEDURE — 96367 TX/PROPH/DG ADDL SEQ IV INF: CPT

## 2019-11-08 PROCEDURE — 93005 ELECTROCARDIOGRAM TRACING: CPT | Performed by: EMERGENCY MEDICINE

## 2019-11-08 PROCEDURE — 83036 HEMOGLOBIN GLYCOSYLATED A1C: CPT

## 2019-11-08 PROCEDURE — 87077 CULTURE AEROBIC IDENTIFY: CPT

## 2019-11-08 PROCEDURE — 99285 EMERGENCY DEPT VISIT HI MDM: CPT

## 2019-11-08 PROCEDURE — 74176 CT ABD & PELVIS W/O CONTRAST: CPT

## 2019-11-08 PROCEDURE — 6370000000 HC RX 637 (ALT 250 FOR IP): Performed by: EMERGENCY MEDICINE

## 2019-11-08 PROCEDURE — 94660 CPAP INITIATION&MGMT: CPT

## 2019-11-08 PROCEDURE — 6370000000 HC RX 637 (ALT 250 FOR IP): Performed by: INTERNAL MEDICINE

## 2019-11-08 PROCEDURE — 83605 ASSAY OF LACTIC ACID: CPT

## 2019-11-08 PROCEDURE — 2580000003 HC RX 258: Performed by: EMERGENCY MEDICINE

## 2019-11-08 PROCEDURE — 6360000002 HC RX W HCPCS: Performed by: EMERGENCY MEDICINE

## 2019-11-08 PROCEDURE — 83690 ASSAY OF LIPASE: CPT

## 2019-11-08 PROCEDURE — 2700000000 HC OXYGEN THERAPY PER DAY

## 2019-11-08 PROCEDURE — 96375 TX/PRO/DX INJ NEW DRUG ADDON: CPT

## 2019-11-08 PROCEDURE — G0378 HOSPITAL OBSERVATION PER HR: HCPCS

## 2019-11-08 PROCEDURE — 36415 COLL VENOUS BLD VENIPUNCTURE: CPT

## 2019-11-08 PROCEDURE — 96365 THER/PROPH/DIAG IV INF INIT: CPT

## 2019-11-08 PROCEDURE — 85025 COMPLETE CBC W/AUTO DIFF WBC: CPT

## 2019-11-08 PROCEDURE — 87040 BLOOD CULTURE FOR BACTERIA: CPT

## 2019-11-08 PROCEDURE — 84703 CHORIONIC GONADOTROPIN ASSAY: CPT

## 2019-11-08 PROCEDURE — 80053 COMPREHEN METABOLIC PANEL: CPT

## 2019-11-08 PROCEDURE — 87086 URINE CULTURE/COLONY COUNT: CPT

## 2019-11-08 PROCEDURE — 71046 X-RAY EXAM CHEST 2 VIEWS: CPT

## 2019-11-08 PROCEDURE — 94761 N-INVAS EAR/PLS OXIMETRY MLT: CPT

## 2019-11-08 PROCEDURE — 93010 ELECTROCARDIOGRAM REPORT: CPT | Performed by: INTERNAL MEDICINE

## 2019-11-08 PROCEDURE — 84145 PROCALCITONIN (PCT): CPT

## 2019-11-08 PROCEDURE — 81001 URINALYSIS AUTO W/SCOPE: CPT

## 2019-11-08 PROCEDURE — 84484 ASSAY OF TROPONIN QUANT: CPT

## 2019-11-08 RX ORDER — GABAPENTIN 400 MG/1
400 CAPSULE ORAL 3 TIMES DAILY
Status: DISCONTINUED | OUTPATIENT
Start: 2019-11-08 | End: 2019-11-09 | Stop reason: HOSPADM

## 2019-11-08 RX ORDER — ACETAMINOPHEN 325 MG/1
650 TABLET ORAL EVERY 4 HOURS PRN
Status: DISCONTINUED | OUTPATIENT
Start: 2019-11-08 | End: 2019-11-09 | Stop reason: HOSPADM

## 2019-11-08 RX ORDER — MORPHINE SULFATE 4 MG/ML
4 INJECTION, SOLUTION INTRAMUSCULAR; INTRAVENOUS ONCE
Status: COMPLETED | OUTPATIENT
Start: 2019-11-08 | End: 2019-11-08

## 2019-11-08 RX ORDER — SODIUM CHLORIDE 9 MG/ML
1000 INJECTION, SOLUTION INTRAVENOUS CONTINUOUS
Status: DISCONTINUED | OUTPATIENT
Start: 2019-11-08 | End: 2019-11-08

## 2019-11-08 RX ORDER — KETOROLAC TROMETHAMINE 30 MG/ML
30 INJECTION, SOLUTION INTRAMUSCULAR; INTRAVENOUS EVERY 6 HOURS
Status: DISCONTINUED | OUTPATIENT
Start: 2019-11-08 | End: 2019-11-09 | Stop reason: HOSPADM

## 2019-11-08 RX ORDER — SODIUM CHLORIDE 9 MG/ML
INJECTION, SOLUTION INTRAVENOUS CONTINUOUS
Status: DISCONTINUED | OUTPATIENT
Start: 2019-11-08 | End: 2019-11-09

## 2019-11-08 RX ORDER — 0.9 % SODIUM CHLORIDE 0.9 %
500 INTRAVENOUS SOLUTION INTRAVENOUS ONCE
Status: COMPLETED | OUTPATIENT
Start: 2019-11-08 | End: 2019-11-08

## 2019-11-08 RX ORDER — TAMSULOSIN HYDROCHLORIDE 0.4 MG/1
0.4 CAPSULE ORAL DAILY
Status: DISCONTINUED | OUTPATIENT
Start: 2019-11-09 | End: 2019-11-09 | Stop reason: HOSPADM

## 2019-11-08 RX ORDER — DEXTROSE MONOHYDRATE 50 MG/ML
100 INJECTION, SOLUTION INTRAVENOUS PRN
Status: DISCONTINUED | OUTPATIENT
Start: 2019-11-08 | End: 2019-11-09 | Stop reason: HOSPADM

## 2019-11-08 RX ORDER — CIPROFLOXACIN 250 MG/1
250 TABLET, FILM COATED ORAL ONCE
Status: COMPLETED | OUTPATIENT
Start: 2019-11-08 | End: 2019-11-08

## 2019-11-08 RX ORDER — LISINOPRIL 20 MG/1
20 TABLET ORAL DAILY
Status: DISCONTINUED | OUTPATIENT
Start: 2019-11-09 | End: 2019-11-09 | Stop reason: HOSPADM

## 2019-11-08 RX ORDER — NICOTINE POLACRILEX 4 MG
15 LOZENGE BUCCAL PRN
Status: DISCONTINUED | OUTPATIENT
Start: 2019-11-08 | End: 2019-11-09 | Stop reason: HOSPADM

## 2019-11-08 RX ORDER — ONDANSETRON 2 MG/ML
4 INJECTION INTRAMUSCULAR; INTRAVENOUS EVERY 6 HOURS PRN
Status: DISCONTINUED | OUTPATIENT
Start: 2019-11-08 | End: 2019-11-09 | Stop reason: HOSPADM

## 2019-11-08 RX ORDER — KETOROLAC TROMETHAMINE 30 MG/ML
15 INJECTION, SOLUTION INTRAMUSCULAR; INTRAVENOUS ONCE
Status: COMPLETED | OUTPATIENT
Start: 2019-11-08 | End: 2019-11-08

## 2019-11-08 RX ORDER — DEXTROSE MONOHYDRATE 25 G/50ML
12.5 INJECTION, SOLUTION INTRAVENOUS PRN
Status: DISCONTINUED | OUTPATIENT
Start: 2019-11-08 | End: 2019-11-09 | Stop reason: HOSPADM

## 2019-11-08 RX ORDER — SODIUM CHLORIDE 0.9 % (FLUSH) 0.9 %
10 SYRINGE (ML) INJECTION PRN
Status: DISCONTINUED | OUTPATIENT
Start: 2019-11-08 | End: 2019-11-09 | Stop reason: HOSPADM

## 2019-11-08 RX ORDER — ASPIRIN 81 MG/1
81 TABLET, CHEWABLE ORAL DAILY
Status: DISCONTINUED | OUTPATIENT
Start: 2019-11-09 | End: 2019-11-09 | Stop reason: HOSPADM

## 2019-11-08 RX ORDER — IPRATROPIUM BROMIDE AND ALBUTEROL SULFATE 2.5; .5 MG/3ML; MG/3ML
1 SOLUTION RESPIRATORY (INHALATION)
Status: DISCONTINUED | OUTPATIENT
Start: 2019-11-09 | End: 2019-11-09

## 2019-11-08 RX ORDER — METOPROLOL SUCCINATE 25 MG/1
25 TABLET, EXTENDED RELEASE ORAL DAILY
Status: DISCONTINUED | OUTPATIENT
Start: 2019-11-09 | End: 2019-11-09 | Stop reason: HOSPADM

## 2019-11-08 RX ORDER — ONDANSETRON 2 MG/ML
4 INJECTION INTRAMUSCULAR; INTRAVENOUS ONCE
Status: COMPLETED | OUTPATIENT
Start: 2019-11-08 | End: 2019-11-08

## 2019-11-08 RX ORDER — SODIUM CHLORIDE 0.9 % (FLUSH) 0.9 %
10 SYRINGE (ML) INJECTION EVERY 12 HOURS SCHEDULED
Status: DISCONTINUED | OUTPATIENT
Start: 2019-11-08 | End: 2019-11-09 | Stop reason: HOSPADM

## 2019-11-08 RX ADMIN — MEROPENEM 1 G: 1 INJECTION, POWDER, FOR SOLUTION INTRAVENOUS at 21:23

## 2019-11-08 RX ADMIN — KETOROLAC TROMETHAMINE 15 MG: 30 INJECTION, SOLUTION INTRAMUSCULAR; INTRAVENOUS at 19:51

## 2019-11-08 RX ADMIN — MORPHINE SULFATE 4 MG: 4 INJECTION INTRAVENOUS at 18:26

## 2019-11-08 RX ADMIN — ONDANSETRON HYDROCHLORIDE 4 MG: 2 INJECTION, SOLUTION INTRAMUSCULAR; INTRAVENOUS at 18:26

## 2019-11-08 RX ADMIN — HYDROMORPHONE HYDROCHLORIDE 1 MG: 1 INJECTION, SOLUTION INTRAMUSCULAR; INTRAVENOUS; SUBCUTANEOUS at 21:10

## 2019-11-08 RX ADMIN — SODIUM CHLORIDE 1000 ML: 9 INJECTION, SOLUTION INTRAVENOUS at 21:54

## 2019-11-08 RX ADMIN — CIPROFLOXACIN 250 MG: 250 TABLET, FILM COATED ORAL at 19:51

## 2019-11-08 RX ADMIN — SODIUM CHLORIDE 500 ML: 9 INJECTION, SOLUTION INTRAVENOUS at 18:25

## 2019-11-08 ASSESSMENT — PAIN SCALES - GENERAL
PAINLEVEL_OUTOF10: 10
PAINLEVEL_OUTOF10: 8
PAINLEVEL_OUTOF10: 8
PAINLEVEL_OUTOF10: 10

## 2019-11-08 ASSESSMENT — PAIN DESCRIPTION - LOCATION: LOCATION: FLANK

## 2019-11-08 ASSESSMENT — PAIN DESCRIPTION - DESCRIPTORS: DESCRIPTORS: SHARP

## 2019-11-09 ENCOUNTER — ANESTHESIA EVENT (OUTPATIENT)
Dept: OPERATING ROOM | Age: 44
DRG: 446 | End: 2019-11-09
Payer: MEDICARE

## 2019-11-09 ENCOUNTER — APPOINTMENT (OUTPATIENT)
Dept: GENERAL RADIOLOGY | Age: 44
DRG: 446 | End: 2019-11-09
Payer: MEDICARE

## 2019-11-09 ENCOUNTER — ANESTHESIA (OUTPATIENT)
Dept: OPERATING ROOM | Age: 44
DRG: 446 | End: 2019-11-09
Payer: MEDICARE

## 2019-11-09 VITALS
OXYGEN SATURATION: 92 % | HEIGHT: 62 IN | SYSTOLIC BLOOD PRESSURE: 116 MMHG | DIASTOLIC BLOOD PRESSURE: 74 MMHG | TEMPERATURE: 96.9 F | BODY MASS INDEX: 46.01 KG/M2 | WEIGHT: 250 LBS | RESPIRATION RATE: 18 BRPM | HEART RATE: 105 BPM

## 2019-11-09 VITALS — SYSTOLIC BLOOD PRESSURE: 151 MMHG | DIASTOLIC BLOOD PRESSURE: 77 MMHG | OXYGEN SATURATION: 90 %

## 2019-11-09 LAB
ANION GAP SERPL CALCULATED.3IONS-SCNC: 5 MMOL/L (ref 3–16)
BASOPHILS ABSOLUTE: 0 K/UL (ref 0–0.2)
BASOPHILS RELATIVE PERCENT: 0.3 %
BUN BLDV-MCNC: 19 MG/DL (ref 7–20)
CALCIUM SERPL-MCNC: 8.6 MG/DL (ref 8.3–10.6)
CHLORIDE BLD-SCNC: 93 MMOL/L (ref 99–110)
CO2: 33 MMOL/L (ref 21–32)
CREAT SERPL-MCNC: 0.9 MG/DL (ref 0.6–1.1)
EKG ATRIAL RATE: 129 BPM
EKG DIAGNOSIS: NORMAL
EKG P AXIS: 71 DEGREES
EKG P-R INTERVAL: 130 MS
EKG Q-T INTERVAL: 302 MS
EKG QRS DURATION: 60 MS
EKG QTC CALCULATION (BAZETT): 442 MS
EKG R AXIS: 93 DEGREES
EKG T AXIS: 61 DEGREES
EKG VENTRICULAR RATE: 129 BPM
EOSINOPHILS ABSOLUTE: 0 K/UL (ref 0–0.6)
EOSINOPHILS RELATIVE PERCENT: 0.2 %
ESTIMATED AVERAGE GLUCOSE: 125.5 MG/DL
GFR AFRICAN AMERICAN: >60
GFR NON-AFRICAN AMERICAN: >60
GLUCOSE BLD-MCNC: 112 MG/DL (ref 70–99)
GLUCOSE BLD-MCNC: 125 MG/DL (ref 70–99)
GLUCOSE BLD-MCNC: 125 MG/DL (ref 70–99)
GLUCOSE BLD-MCNC: 143 MG/DL (ref 70–99)
HBA1C MFR BLD: 6 %
HCT VFR BLD CALC: 47.2 % (ref 36–48)
HEMOGLOBIN: 15 G/DL (ref 12–16)
LACTIC ACID, SEPSIS: 0.7 MMOL/L (ref 0.4–1.9)
LYMPHOCYTES ABSOLUTE: 1.2 K/UL (ref 1–5.1)
LYMPHOCYTES RELATIVE PERCENT: 9.3 %
MCH RBC QN AUTO: 29.1 PG (ref 26–34)
MCHC RBC AUTO-ENTMCNC: 31.8 G/DL (ref 31–36)
MCV RBC AUTO: 91.5 FL (ref 80–100)
MONOCYTES ABSOLUTE: 0.7 K/UL (ref 0–1.3)
MONOCYTES RELATIVE PERCENT: 5.9 %
NEUTROPHILS ABSOLUTE: 10.7 K/UL (ref 1.7–7.7)
NEUTROPHILS RELATIVE PERCENT: 84.3 %
PDW BLD-RTO: 15.4 % (ref 12.4–15.4)
PERFORMED ON: ABNORMAL
PLATELET # BLD: 122 K/UL (ref 135–450)
PMV BLD AUTO: 9 FL (ref 5–10.5)
POTASSIUM REFLEX MAGNESIUM: 4.5 MMOL/L (ref 3.5–5.1)
RBC # BLD: 5.16 M/UL (ref 4–5.2)
SODIUM BLD-SCNC: 131 MMOL/L (ref 136–145)
WBC # BLD: 12.7 K/UL (ref 4–11)

## 2019-11-09 PROCEDURE — 99238 HOSP IP/OBS DSCHRG MGMT 30/<: CPT | Performed by: INTERNAL MEDICINE

## 2019-11-09 PROCEDURE — 6370000000 HC RX 637 (ALT 250 FOR IP): Performed by: UROLOGY

## 2019-11-09 PROCEDURE — 93010 ELECTROCARDIOGRAM REPORT: CPT | Performed by: INTERNAL MEDICINE

## 2019-11-09 PROCEDURE — C1769 GUIDE WIRE: HCPCS | Performed by: UROLOGY

## 2019-11-09 PROCEDURE — 74420 UROGRAPHY RTRGR +-KUB: CPT

## 2019-11-09 PROCEDURE — 2580000003 HC RX 258: Performed by: ANESTHESIOLOGY

## 2019-11-09 PROCEDURE — 80048 BASIC METABOLIC PNL TOTAL CA: CPT

## 2019-11-09 PROCEDURE — 6360000004 HC RX CONTRAST MEDICATION: Performed by: UROLOGY

## 2019-11-09 PROCEDURE — 7100000001 HC PACU RECOVERY - ADDTL 15 MIN: Performed by: UROLOGY

## 2019-11-09 PROCEDURE — 2500000003 HC RX 250 WO HCPCS: Performed by: ANESTHESIOLOGY

## 2019-11-09 PROCEDURE — 36415 COLL VENOUS BLD VENIPUNCTURE: CPT

## 2019-11-09 PROCEDURE — 85025 COMPLETE CBC W/AUTO DIFF WBC: CPT

## 2019-11-09 PROCEDURE — 94640 AIRWAY INHALATION TREATMENT: CPT

## 2019-11-09 PROCEDURE — 83605 ASSAY OF LACTIC ACID: CPT

## 2019-11-09 PROCEDURE — 3600000014 HC SURGERY LEVEL 4 ADDTL 15MIN: Performed by: UROLOGY

## 2019-11-09 PROCEDURE — 3600000004 HC SURGERY LEVEL 4 BASE: Performed by: UROLOGY

## 2019-11-09 PROCEDURE — 2580000003 HC RX 258: Performed by: INTERNAL MEDICINE

## 2019-11-09 PROCEDURE — 2709999900 HC NON-CHARGEABLE SUPPLY: Performed by: UROLOGY

## 2019-11-09 PROCEDURE — 6360000002 HC RX W HCPCS: Performed by: INTERNAL MEDICINE

## 2019-11-09 PROCEDURE — 6370000000 HC RX 637 (ALT 250 FOR IP): Performed by: INTERNAL MEDICINE

## 2019-11-09 PROCEDURE — 6370000000 HC RX 637 (ALT 250 FOR IP): Performed by: ANESTHESIOLOGY

## 2019-11-09 PROCEDURE — C2617 STENT, NON-COR, TEM W/O DEL: HCPCS | Performed by: UROLOGY

## 2019-11-09 PROCEDURE — 0T778DZ DILATION OF LEFT URETER WITH INTRALUMINAL DEVICE, VIA NATURAL OR ARTIFICIAL OPENING ENDOSCOPIC: ICD-10-PCS | Performed by: UROLOGY

## 2019-11-09 PROCEDURE — 6360000002 HC RX W HCPCS: Performed by: ANESTHESIOLOGY

## 2019-11-09 PROCEDURE — 96366 THER/PROPH/DIAG IV INF ADDON: CPT

## 2019-11-09 PROCEDURE — G0378 HOSPITAL OBSERVATION PER HR: HCPCS

## 2019-11-09 PROCEDURE — 7100000000 HC PACU RECOVERY - FIRST 15 MIN: Performed by: UROLOGY

## 2019-11-09 PROCEDURE — 96376 TX/PRO/DX INJ SAME DRUG ADON: CPT

## 2019-11-09 PROCEDURE — 83036 HEMOGLOBIN GLYCOSYLATED A1C: CPT

## 2019-11-09 PROCEDURE — 6360000002 HC RX W HCPCS: Performed by: UROLOGY

## 2019-11-09 PROCEDURE — 2580000003 HC RX 258: Performed by: UROLOGY

## 2019-11-09 PROCEDURE — BT1F1ZZ FLUOROSCOPY OF LEFT KIDNEY, URETER AND BLADDER USING LOW OSMOLAR CONTRAST: ICD-10-PCS | Performed by: UROLOGY

## 2019-11-09 PROCEDURE — 94150 VITAL CAPACITY TEST: CPT

## 2019-11-09 PROCEDURE — C1758 CATHETER, URETERAL: HCPCS | Performed by: UROLOGY

## 2019-11-09 PROCEDURE — 3700000000 HC ANESTHESIA ATTENDED CARE: Performed by: UROLOGY

## 2019-11-09 PROCEDURE — 3700000001 HC ADD 15 MINUTES (ANESTHESIA): Performed by: UROLOGY

## 2019-11-09 DEVICE — URETERAL STENT
Type: IMPLANTABLE DEVICE | Site: URETER | Status: FUNCTIONAL
Brand: CONTOUR™

## 2019-11-09 RX ORDER — ONDANSETRON 2 MG/ML
4 INJECTION INTRAMUSCULAR; INTRAVENOUS
Status: DISCONTINUED | OUTPATIENT
Start: 2019-11-09 | End: 2019-11-09 | Stop reason: HOSPADM

## 2019-11-09 RX ORDER — TAMSULOSIN HYDROCHLORIDE 0.4 MG/1
0.4 CAPSULE ORAL DAILY
Qty: 30 CAPSULE | Refills: 0 | Status: ON HOLD | OUTPATIENT
Start: 2019-11-10 | End: 2021-01-08 | Stop reason: CLARIF

## 2019-11-09 RX ORDER — MORPHINE SULFATE 10 MG/ML
2 INJECTION, SOLUTION INTRAMUSCULAR; INTRAVENOUS EVERY 5 MIN PRN
Status: DISCONTINUED | OUTPATIENT
Start: 2019-11-09 | End: 2019-11-09 | Stop reason: HOSPADM

## 2019-11-09 RX ORDER — CIPROFLOXACIN 250 MG/1
250 TABLET, FILM COATED ORAL 2 TIMES DAILY
Qty: 10 TABLET | Refills: 0 | Status: ON HOLD | OUTPATIENT
Start: 2019-11-09 | End: 2019-11-14 | Stop reason: HOSPADM

## 2019-11-09 RX ORDER — ONDANSETRON 2 MG/ML
INJECTION INTRAMUSCULAR; INTRAVENOUS PRN
Status: DISCONTINUED | OUTPATIENT
Start: 2019-11-09 | End: 2019-11-09 | Stop reason: SDUPTHER

## 2019-11-09 RX ORDER — OXYCODONE HYDROCHLORIDE AND ACETAMINOPHEN 5; 325 MG/1; MG/1
2 TABLET ORAL PRN
Status: COMPLETED | OUTPATIENT
Start: 2019-11-09 | End: 2019-11-09

## 2019-11-09 RX ORDER — IPRATROPIUM BROMIDE AND ALBUTEROL SULFATE 2.5; .5 MG/3ML; MG/3ML
1 SOLUTION RESPIRATORY (INHALATION) 4 TIMES DAILY
Status: DISCONTINUED | OUTPATIENT
Start: 2019-11-09 | End: 2019-11-09 | Stop reason: HOSPADM

## 2019-11-09 RX ORDER — MIDAZOLAM HYDROCHLORIDE 1 MG/ML
INJECTION INTRAMUSCULAR; INTRAVENOUS PRN
Status: DISCONTINUED | OUTPATIENT
Start: 2019-11-09 | End: 2019-11-09 | Stop reason: SDUPTHER

## 2019-11-09 RX ORDER — MORPHINE SULFATE 10 MG/ML
1 INJECTION, SOLUTION INTRAMUSCULAR; INTRAVENOUS EVERY 5 MIN PRN
Status: DISCONTINUED | OUTPATIENT
Start: 2019-11-09 | End: 2019-11-09 | Stop reason: HOSPADM

## 2019-11-09 RX ORDER — OXYCODONE HYDROCHLORIDE AND ACETAMINOPHEN 5; 325 MG/1; MG/1
1 TABLET ORAL PRN
Status: COMPLETED | OUTPATIENT
Start: 2019-11-09 | End: 2019-11-09

## 2019-11-09 RX ORDER — KETOROLAC TROMETHAMINE 30 MG/ML
30 INJECTION, SOLUTION INTRAMUSCULAR; INTRAVENOUS ONCE
Status: COMPLETED | OUTPATIENT
Start: 2019-11-09 | End: 2019-11-09

## 2019-11-09 RX ORDER — OXYCODONE HYDROCHLORIDE AND ACETAMINOPHEN 5; 325 MG/1; MG/1
1 TABLET ORAL EVERY 6 HOURS PRN
Qty: 12 TABLET | Refills: 0 | Status: ON HOLD | OUTPATIENT
Start: 2019-11-09 | End: 2019-11-14 | Stop reason: HOSPADM

## 2019-11-09 RX ORDER — SODIUM CHLORIDE 9 MG/ML
INJECTION, SOLUTION INTRAVENOUS CONTINUOUS PRN
Status: DISCONTINUED | OUTPATIENT
Start: 2019-11-09 | End: 2019-11-09 | Stop reason: SDUPTHER

## 2019-11-09 RX ORDER — FENTANYL CITRATE 50 UG/ML
INJECTION, SOLUTION INTRAMUSCULAR; INTRAVENOUS PRN
Status: DISCONTINUED | OUTPATIENT
Start: 2019-11-09 | End: 2019-11-09 | Stop reason: SDUPTHER

## 2019-11-09 RX ORDER — FUROSEMIDE 40 MG/1
20 TABLET ORAL 2 TIMES DAILY
Status: DISCONTINUED | OUTPATIENT
Start: 2019-11-09 | End: 2019-11-09 | Stop reason: HOSPADM

## 2019-11-09 RX ORDER — MAGNESIUM HYDROXIDE 1200 MG/15ML
LIQUID ORAL PRN
Status: DISCONTINUED | OUTPATIENT
Start: 2019-11-09 | End: 2019-11-09 | Stop reason: ALTCHOICE

## 2019-11-09 RX ORDER — PROPOFOL 10 MG/ML
INJECTION, EMULSION INTRAVENOUS PRN
Status: DISCONTINUED | OUTPATIENT
Start: 2019-11-09 | End: 2019-11-09 | Stop reason: SDUPTHER

## 2019-11-09 RX ADMIN — HYDROMORPHONE HYDROCHLORIDE 0.5 MG: 1 INJECTION, SOLUTION INTRAMUSCULAR; INTRAVENOUS; SUBCUTANEOUS at 04:50

## 2019-11-09 RX ADMIN — PROPOFOL 30 MG: 10 INJECTION, EMULSION INTRAVENOUS at 09:00

## 2019-11-09 RX ADMIN — MEROPENEM 1 G: 1 INJECTION, POWDER, FOR SOLUTION INTRAVENOUS at 05:35

## 2019-11-09 RX ADMIN — ONDANSETRON 4 MG: 2 INJECTION, SOLUTION INTRAMUSCULAR; INTRAVENOUS at 08:34

## 2019-11-09 RX ADMIN — SODIUM CHLORIDE: 9 INJECTION, SOLUTION INTRAVENOUS at 07:59

## 2019-11-09 RX ADMIN — MIDAZOLAM 1 MG: 1 INJECTION INTRAMUSCULAR; INTRAVENOUS at 08:41

## 2019-11-09 RX ADMIN — FENTANYL CITRATE 50 MCG: 50 INJECTION INTRAMUSCULAR; INTRAVENOUS at 08:38

## 2019-11-09 RX ADMIN — GABAPENTIN 400 MG: 400 CAPSULE ORAL at 00:26

## 2019-11-09 RX ADMIN — KETOROLAC TROMETHAMINE 30 MG: 30 INJECTION, SOLUTION INTRAMUSCULAR at 09:49

## 2019-11-09 RX ADMIN — IPRATROPIUM BROMIDE AND ALBUTEROL SULFATE 1 AMPULE: .5; 3 SOLUTION RESPIRATORY (INHALATION) at 07:27

## 2019-11-09 RX ADMIN — SODIUM CHLORIDE: 900 INJECTION, SOLUTION INTRAVENOUS at 00:26

## 2019-11-09 RX ADMIN — KETOROLAC TROMETHAMINE 30 MG: 30 INJECTION, SOLUTION INTRAMUSCULAR at 11:21

## 2019-11-09 RX ADMIN — OXYCODONE HYDROCHLORIDE AND ACETAMINOPHEN 1 TABLET: 5; 325 TABLET ORAL at 09:38

## 2019-11-09 RX ADMIN — PROPOFOL 30 MG: 10 INJECTION, EMULSION INTRAVENOUS at 08:42

## 2019-11-09 RX ADMIN — KETOROLAC TROMETHAMINE 30 MG: 30 INJECTION, SOLUTION INTRAMUSCULAR at 04:49

## 2019-11-09 RX ADMIN — FENTANYL CITRATE 50 MCG: 50 INJECTION INTRAMUSCULAR; INTRAVENOUS at 08:50

## 2019-11-09 RX ADMIN — Medication 2 PUFF: at 07:28

## 2019-11-09 RX ADMIN — PROPOFOL 30 MG: 10 INJECTION, EMULSION INTRAVENOUS at 08:50

## 2019-11-09 RX ADMIN — IPRATROPIUM BROMIDE AND ALBUTEROL SULFATE 1 AMPULE: .5; 3 SOLUTION RESPIRATORY (INHALATION) at 11:04

## 2019-11-09 RX ADMIN — FAMOTIDINE 20 MG: 10 INJECTION, SOLUTION INTRAVENOUS at 08:34

## 2019-11-09 RX ADMIN — INSULIN LISPRO 2 UNITS: 100 INJECTION, SOLUTION INTRAVENOUS; SUBCUTANEOUS at 11:23

## 2019-11-09 RX ADMIN — MIDAZOLAM 1 MG: 1 INJECTION INTRAMUSCULAR; INTRAVENOUS at 08:42

## 2019-11-09 ASSESSMENT — PAIN DESCRIPTION - ORIENTATION
ORIENTATION: LEFT

## 2019-11-09 ASSESSMENT — PULMONARY FUNCTION TESTS
PIF_VALUE: 0
PIF_VALUE: 1
PIF_VALUE: 0
PIF_VALUE: 1
PIF_VALUE: 0

## 2019-11-09 ASSESSMENT — PAIN SCALES - GENERAL
PAINLEVEL_OUTOF10: 7
PAINLEVEL_OUTOF10: 5
PAINLEVEL_OUTOF10: 7
PAINLEVEL_OUTOF10: 8
PAINLEVEL_OUTOF10: 5
PAINLEVEL_OUTOF10: 3
PAINLEVEL_OUTOF10: 8
PAINLEVEL_OUTOF10: 0

## 2019-11-09 ASSESSMENT — PAIN DESCRIPTION - PAIN TYPE
TYPE: ACUTE PAIN
TYPE: ACUTE PAIN
TYPE: SURGICAL PAIN
TYPE: ACUTE PAIN
TYPE: SURGICAL PAIN
TYPE: ACUTE PAIN

## 2019-11-09 ASSESSMENT — PAIN DESCRIPTION - FREQUENCY
FREQUENCY: CONTINUOUS
FREQUENCY: CONTINUOUS

## 2019-11-09 ASSESSMENT — PAIN DESCRIPTION - DESCRIPTORS
DESCRIPTORS: ACHING
DESCRIPTORS: BURNING
DESCRIPTORS: BURNING
DESCRIPTORS: SQUEEZING

## 2019-11-09 ASSESSMENT — LIFESTYLE VARIABLES: SMOKING_STATUS: 1

## 2019-11-09 ASSESSMENT — PAIN DESCRIPTION - ONSET
ONSET: ON-GOING
ONSET: ON-GOING

## 2019-11-09 ASSESSMENT — PAIN DESCRIPTION - LOCATION
LOCATION: FLANK
LOCATION: GROIN
LOCATION: FLANK
LOCATION: VAGINA

## 2019-11-10 ENCOUNTER — APPOINTMENT (OUTPATIENT)
Dept: GENERAL RADIOLOGY | Age: 44
DRG: 446 | End: 2019-11-10
Payer: MEDICARE

## 2019-11-10 ENCOUNTER — HOSPITAL ENCOUNTER (INPATIENT)
Age: 44
LOS: 4 days | Discharge: HOME | DRG: 446 | End: 2019-11-14
Attending: EMERGENCY MEDICINE | Admitting: INTERNAL MEDICINE
Payer: MEDICARE

## 2019-11-10 ENCOUNTER — APPOINTMENT (OUTPATIENT)
Dept: CT IMAGING | Age: 44
DRG: 446 | End: 2019-11-10
Payer: MEDICARE

## 2019-11-10 DIAGNOSIS — R10.32 ABDOMINAL PAIN, LEFT LOWER QUADRANT: ICD-10-CM

## 2019-11-10 DIAGNOSIS — J96.22 ACUTE ON CHRONIC RESPIRATORY FAILURE WITH HYPOXIA AND HYPERCAPNIA (HCC): Primary | ICD-10-CM

## 2019-11-10 DIAGNOSIS — N28.1 RENAL CYST: ICD-10-CM

## 2019-11-10 DIAGNOSIS — J96.21 ACUTE ON CHRONIC RESPIRATORY FAILURE WITH HYPOXIA AND HYPERCAPNIA (HCC): Primary | ICD-10-CM

## 2019-11-10 DIAGNOSIS — D35.00 ADRENAL ADENOMA, UNSPECIFIED LATERALITY: ICD-10-CM

## 2019-11-10 DIAGNOSIS — J44.1 COPD EXACERBATION (HCC): ICD-10-CM

## 2019-11-10 DIAGNOSIS — E87.20 METABOLIC ACIDOSIS: ICD-10-CM

## 2019-11-10 DIAGNOSIS — K76.89 HEPATIC CYST: ICD-10-CM

## 2019-11-10 PROBLEM — J96.00 ACUTE RESPIRATORY FAILURE (HCC): Status: ACTIVE | Noted: 2019-11-10

## 2019-11-10 LAB
A/G RATIO: 0.8 (ref 1.1–2.2)
ALBUMIN SERPL-MCNC: 3.2 G/DL (ref 3.4–5)
ALP BLD-CCNC: 61 U/L (ref 40–129)
ALT SERPL-CCNC: 36 U/L (ref 10–40)
ANION GAP SERPL CALCULATED.3IONS-SCNC: 6 MMOL/L (ref 3–16)
AST SERPL-CCNC: 27 U/L (ref 15–37)
BASE EXCESS VENOUS: 3.2 MMOL/L (ref -3–3)
BASE EXCESS VENOUS: 3.9 MMOL/L (ref -3–3)
BASOPHILS ABSOLUTE: 0.1 K/UL (ref 0–0.2)
BASOPHILS RELATIVE PERCENT: 0.5 %
BILIRUB SERPL-MCNC: 0.3 MG/DL (ref 0–1)
BUN BLDV-MCNC: 21 MG/DL (ref 7–20)
CALCIUM SERPL-MCNC: 8.6 MG/DL (ref 8.3–10.6)
CARBOXYHEMOGLOBIN: 10 % (ref 0–1.5)
CARBOXYHEMOGLOBIN: 11.4 % (ref 0–1.5)
CHLORIDE BLD-SCNC: 96 MMOL/L (ref 99–110)
CO2: 32 MMOL/L (ref 21–32)
CREAT SERPL-MCNC: 0.8 MG/DL (ref 0.6–1.1)
EOSINOPHILS ABSOLUTE: 0 K/UL (ref 0–0.6)
EOSINOPHILS RELATIVE PERCENT: 0.1 %
ESTIMATED AVERAGE GLUCOSE: 131.2 MG/DL
GFR AFRICAN AMERICAN: >60
GFR NON-AFRICAN AMERICAN: >60
GLOBULIN: 3.9 G/DL
GLUCOSE BLD-MCNC: 162 MG/DL (ref 70–99)
GLUCOSE BLD-MCNC: 174 MG/DL (ref 70–99)
HBA1C MFR BLD: 6.2 %
HCG QUALITATIVE: NEGATIVE
HCO3 VENOUS: 31.3 MMOL/L (ref 23–29)
HCO3 VENOUS: 34.2 MMOL/L (ref 23–29)
HCT VFR BLD CALC: 46.6 % (ref 36–48)
HEMOGLOBIN: 14.8 G/DL (ref 12–16)
LACTIC ACID: 1 MMOL/L (ref 0.4–2)
LYMPHOCYTES ABSOLUTE: 1 K/UL (ref 1–5.1)
LYMPHOCYTES RELATIVE PERCENT: 9.8 %
MCH RBC QN AUTO: 29 PG (ref 26–34)
MCHC RBC AUTO-ENTMCNC: 31.9 G/DL (ref 31–36)
MCV RBC AUTO: 90.9 FL (ref 80–100)
METHEMOGLOBIN VENOUS: 0.1 %
METHEMOGLOBIN VENOUS: 0.3 %
MONOCYTES ABSOLUTE: 0.5 K/UL (ref 0–1.3)
MONOCYTES RELATIVE PERCENT: 5 %
NEUTROPHILS ABSOLUTE: 8.5 K/UL (ref 1.7–7.7)
NEUTROPHILS RELATIVE PERCENT: 84.6 %
O2 CONTENT, VEN: 21 VOL %
O2 CONTENT, VEN: 21 VOL %
O2 SAT, VEN: 96 %
O2 SAT, VEN: 99 %
O2 THERAPY: ABNORMAL
O2 THERAPY: ABNORMAL
ORGANISM: ABNORMAL
PCO2, VEN: 63 MMHG (ref 40–50)
PCO2, VEN: 80 MMHG (ref 40–50)
PDW BLD-RTO: 15.8 % (ref 12.4–15.4)
PERFORMED ON: ABNORMAL
PH VENOUS: 7.25 (ref 7.35–7.45)
PH VENOUS: 7.31 (ref 7.35–7.45)
PLATELET # BLD: 140 K/UL (ref 135–450)
PMV BLD AUTO: 9.3 FL (ref 5–10.5)
PO2, VEN: 194.9 MMHG (ref 25–40)
PO2, VEN: 95.3 MMHG (ref 25–40)
POTASSIUM REFLEX MAGNESIUM: 5.3 MMOL/L (ref 3.5–5.1)
PRO-BNP: 1421 PG/ML (ref 0–124)
PROCALCITONIN: 0.23 NG/ML (ref 0–0.15)
RBC # BLD: 5.12 M/UL (ref 4–5.2)
SODIUM BLD-SCNC: 134 MMOL/L (ref 136–145)
TCO2 CALC VENOUS: 33 MMOL/L
TCO2 CALC VENOUS: 37 MMOL/L
TOTAL PROTEIN: 7.1 G/DL (ref 6.4–8.2)
TROPONIN: <0.01 NG/ML
TROPONIN: <0.01 NG/ML
URINE CULTURE, ROUTINE: ABNORMAL
WBC # BLD: 10.1 K/UL (ref 4–11)

## 2019-11-10 PROCEDURE — 2700000000 HC OXYGEN THERAPY PER DAY

## 2019-11-10 PROCEDURE — 2500000003 HC RX 250 WO HCPCS: Performed by: EMERGENCY MEDICINE

## 2019-11-10 PROCEDURE — 6360000002 HC RX W HCPCS: Performed by: EMERGENCY MEDICINE

## 2019-11-10 PROCEDURE — 6360000004 HC RX CONTRAST MEDICATION: Performed by: EMERGENCY MEDICINE

## 2019-11-10 PROCEDURE — 83605 ASSAY OF LACTIC ACID: CPT

## 2019-11-10 PROCEDURE — 82803 BLOOD GASES ANY COMBINATION: CPT

## 2019-11-10 PROCEDURE — 87040 BLOOD CULTURE FOR BACTERIA: CPT

## 2019-11-10 PROCEDURE — 6370000000 HC RX 637 (ALT 250 FOR IP): Performed by: INTERNAL MEDICINE

## 2019-11-10 PROCEDURE — 71260 CT THORAX DX C+: CPT

## 2019-11-10 PROCEDURE — 84703 CHORIONIC GONADOTROPIN ASSAY: CPT

## 2019-11-10 PROCEDURE — 36415 COLL VENOUS BLD VENIPUNCTURE: CPT

## 2019-11-10 PROCEDURE — 2580000003 HC RX 258: Performed by: EMERGENCY MEDICINE

## 2019-11-10 PROCEDURE — 80053 COMPREHEN METABOLIC PANEL: CPT

## 2019-11-10 PROCEDURE — 2000000000 HC ICU R&B

## 2019-11-10 PROCEDURE — 83880 ASSAY OF NATRIURETIC PEPTIDE: CPT

## 2019-11-10 PROCEDURE — 6370000000 HC RX 637 (ALT 250 FOR IP): Performed by: EMERGENCY MEDICINE

## 2019-11-10 PROCEDURE — 96374 THER/PROPH/DIAG INJ IV PUSH: CPT

## 2019-11-10 PROCEDURE — 85025 COMPLETE CBC W/AUTO DIFF WBC: CPT

## 2019-11-10 PROCEDURE — 84484 ASSAY OF TROPONIN QUANT: CPT

## 2019-11-10 PROCEDURE — 93005 ELECTROCARDIOGRAM TRACING: CPT | Performed by: EMERGENCY MEDICINE

## 2019-11-10 PROCEDURE — 94660 CPAP INITIATION&MGMT: CPT

## 2019-11-10 PROCEDURE — 94640 AIRWAY INHALATION TREATMENT: CPT

## 2019-11-10 PROCEDURE — 83036 HEMOGLOBIN GLYCOSYLATED A1C: CPT

## 2019-11-10 PROCEDURE — 99285 EMERGENCY DEPT VISIT HI MDM: CPT

## 2019-11-10 PROCEDURE — 96361 HYDRATE IV INFUSION ADD-ON: CPT

## 2019-11-10 PROCEDURE — 74177 CT ABD & PELVIS W/CONTRAST: CPT

## 2019-11-10 PROCEDURE — 6360000002 HC RX W HCPCS: Performed by: INTERNAL MEDICINE

## 2019-11-10 PROCEDURE — 2580000003 HC RX 258: Performed by: INTERNAL MEDICINE

## 2019-11-10 PROCEDURE — 96375 TX/PRO/DX INJ NEW DRUG ADDON: CPT

## 2019-11-10 PROCEDURE — 94761 N-INVAS EAR/PLS OXIMETRY MLT: CPT

## 2019-11-10 PROCEDURE — 94644 CONT INHLJ TX 1ST HOUR: CPT

## 2019-11-10 PROCEDURE — 71045 X-RAY EXAM CHEST 1 VIEW: CPT

## 2019-11-10 PROCEDURE — 84145 PROCALCITONIN (PCT): CPT

## 2019-11-10 RX ORDER — DIPHENHYDRAMINE HYDROCHLORIDE 50 MG/ML
6.25 INJECTION INTRAMUSCULAR; INTRAVENOUS ONCE
Status: COMPLETED | OUTPATIENT
Start: 2019-11-10 | End: 2019-11-10

## 2019-11-10 RX ORDER — ALBUTEROL SULFATE 2.5 MG/3ML
2.5 SOLUTION RESPIRATORY (INHALATION)
Status: DISCONTINUED | OUTPATIENT
Start: 2019-11-10 | End: 2019-11-11

## 2019-11-10 RX ORDER — IPRATROPIUM BROMIDE AND ALBUTEROL SULFATE 2.5; .5 MG/3ML; MG/3ML
1 SOLUTION RESPIRATORY (INHALATION) ONCE
Status: DISCONTINUED | OUTPATIENT
Start: 2019-11-10 | End: 2019-11-10

## 2019-11-10 RX ORDER — METOPROLOL SUCCINATE 25 MG/1
25 TABLET, EXTENDED RELEASE ORAL DAILY
Status: DISCONTINUED | OUTPATIENT
Start: 2019-11-10 | End: 2019-11-14 | Stop reason: HOSPADM

## 2019-11-10 RX ORDER — NALOXONE HYDROCHLORIDE 1 MG/ML
2 INJECTION INTRAMUSCULAR; INTRAVENOUS; SUBCUTANEOUS ONCE
Status: COMPLETED | OUTPATIENT
Start: 2019-11-10 | End: 2019-11-10

## 2019-11-10 RX ORDER — MORPHINE SULFATE 2 MG/ML
2 INJECTION, SOLUTION INTRAMUSCULAR; INTRAVENOUS ONCE
Status: COMPLETED | OUTPATIENT
Start: 2019-11-10 | End: 2019-11-10

## 2019-11-10 RX ORDER — SODIUM CHLORIDE 9 MG/ML
INJECTION, SOLUTION INTRAVENOUS CONTINUOUS
Status: DISCONTINUED | OUTPATIENT
Start: 2019-11-10 | End: 2019-11-12

## 2019-11-10 RX ORDER — LISINOPRIL 20 MG/1
20 TABLET ORAL DAILY
Status: DISCONTINUED | OUTPATIENT
Start: 2019-11-10 | End: 2019-11-14 | Stop reason: HOSPADM

## 2019-11-10 RX ORDER — MORPHINE SULFATE 4 MG/ML
4 INJECTION, SOLUTION INTRAMUSCULAR; INTRAVENOUS ONCE
Status: DISCONTINUED | OUTPATIENT
Start: 2019-11-10 | End: 2019-11-10

## 2019-11-10 RX ORDER — CETIRIZINE HYDROCHLORIDE 10 MG/1
10 TABLET ORAL DAILY
Status: DISCONTINUED | OUTPATIENT
Start: 2019-11-10 | End: 2019-11-14 | Stop reason: HOSPADM

## 2019-11-10 RX ORDER — MORPHINE SULFATE 2 MG/ML
2 INJECTION, SOLUTION INTRAMUSCULAR; INTRAVENOUS
Status: DISCONTINUED | OUTPATIENT
Start: 2019-11-10 | End: 2019-11-14 | Stop reason: HOSPADM

## 2019-11-10 RX ORDER — 0.9 % SODIUM CHLORIDE 0.9 %
500 INTRAVENOUS SOLUTION INTRAVENOUS ONCE
Status: COMPLETED | OUTPATIENT
Start: 2019-11-10 | End: 2019-11-10

## 2019-11-10 RX ORDER — IPRATROPIUM BROMIDE AND ALBUTEROL SULFATE 2.5; .5 MG/3ML; MG/3ML
3 SOLUTION RESPIRATORY (INHALATION) ONCE
Status: COMPLETED | OUTPATIENT
Start: 2019-11-10 | End: 2019-11-10

## 2019-11-10 RX ORDER — SODIUM CHLORIDE 0.9 % (FLUSH) 0.9 %
10 SYRINGE (ML) INJECTION PRN
Status: DISCONTINUED | OUTPATIENT
Start: 2019-11-10 | End: 2019-11-14 | Stop reason: HOSPADM

## 2019-11-10 RX ORDER — GUAIFENESIN/DEXTROMETHORPHAN 100-10MG/5
5 SYRUP ORAL EVERY 4 HOURS PRN
Status: DISCONTINUED | OUTPATIENT
Start: 2019-11-10 | End: 2019-11-14 | Stop reason: HOSPADM

## 2019-11-10 RX ORDER — NALOXONE HYDROCHLORIDE 1 MG/ML
1 INJECTION INTRAMUSCULAR; INTRAVENOUS; SUBCUTANEOUS ONCE
Status: COMPLETED | OUTPATIENT
Start: 2019-11-10 | End: 2019-11-10

## 2019-11-10 RX ORDER — ONDANSETRON 2 MG/ML
4 INJECTION INTRAMUSCULAR; INTRAVENOUS EVERY 6 HOURS PRN
Status: DISCONTINUED | OUTPATIENT
Start: 2019-11-10 | End: 2019-11-14 | Stop reason: HOSPADM

## 2019-11-10 RX ORDER — ONDANSETRON 2 MG/ML
4 INJECTION INTRAMUSCULAR; INTRAVENOUS ONCE
Status: COMPLETED | OUTPATIENT
Start: 2019-11-10 | End: 2019-11-10

## 2019-11-10 RX ORDER — SODIUM CHLORIDE 0.9 % (FLUSH) 0.9 %
10 SYRINGE (ML) INJECTION EVERY 12 HOURS SCHEDULED
Status: DISCONTINUED | OUTPATIENT
Start: 2019-11-10 | End: 2019-11-14 | Stop reason: HOSPADM

## 2019-11-10 RX ORDER — FUROSEMIDE 20 MG/1
20 TABLET ORAL 2 TIMES DAILY
Status: DISCONTINUED | OUTPATIENT
Start: 2019-11-10 | End: 2019-11-10

## 2019-11-10 RX ORDER — ASPIRIN 81 MG/1
81 TABLET ORAL DAILY
Status: DISCONTINUED | OUTPATIENT
Start: 2019-11-10 | End: 2019-11-14 | Stop reason: HOSPADM

## 2019-11-10 RX ORDER — DEXTROSE MONOHYDRATE 50 MG/ML
100 INJECTION, SOLUTION INTRAVENOUS PRN
Status: DISCONTINUED | OUTPATIENT
Start: 2019-11-10 | End: 2019-11-14 | Stop reason: HOSPADM

## 2019-11-10 RX ORDER — TAMSULOSIN HYDROCHLORIDE 0.4 MG/1
0.4 CAPSULE ORAL DAILY
Status: DISCONTINUED | OUTPATIENT
Start: 2019-11-10 | End: 2019-11-14 | Stop reason: HOSPADM

## 2019-11-10 RX ORDER — METHYLPREDNISOLONE SODIUM SUCCINATE 40 MG/ML
40 INJECTION, POWDER, LYOPHILIZED, FOR SOLUTION INTRAMUSCULAR; INTRAVENOUS EVERY 12 HOURS
Status: DISCONTINUED | OUTPATIENT
Start: 2019-11-11 | End: 2019-11-14 | Stop reason: HOSPADM

## 2019-11-10 RX ORDER — LEVOFLOXACIN 5 MG/ML
500 INJECTION, SOLUTION INTRAVENOUS EVERY 24 HOURS
Status: DISCONTINUED | OUTPATIENT
Start: 2019-11-10 | End: 2019-11-13

## 2019-11-10 RX ORDER — NICOTINE POLACRILEX 4 MG
15 LOZENGE BUCCAL PRN
Status: DISCONTINUED | OUTPATIENT
Start: 2019-11-10 | End: 2019-11-14 | Stop reason: HOSPADM

## 2019-11-10 RX ORDER — IPRATROPIUM BROMIDE AND ALBUTEROL SULFATE 2.5; .5 MG/3ML; MG/3ML
1 SOLUTION RESPIRATORY (INHALATION) EVERY 4 HOURS
Status: DISCONTINUED | OUTPATIENT
Start: 2019-11-10 | End: 2019-11-11

## 2019-11-10 RX ORDER — IPRATROPIUM BROMIDE AND ALBUTEROL SULFATE 2.5; .5 MG/3ML; MG/3ML
1 SOLUTION RESPIRATORY (INHALATION)
Status: DISCONTINUED | OUTPATIENT
Start: 2019-11-10 | End: 2019-11-10

## 2019-11-10 RX ORDER — ALBUTEROL SULFATE 90 UG/1
2 AEROSOL, METERED RESPIRATORY (INHALATION) EVERY 6 HOURS PRN
Status: DISCONTINUED | OUTPATIENT
Start: 2019-11-10 | End: 2019-11-11

## 2019-11-10 RX ORDER — DEXAMETHASONE SODIUM PHOSPHATE 10 MG/ML
8 INJECTION INTRAMUSCULAR; INTRAVENOUS ONCE
Status: COMPLETED | OUTPATIENT
Start: 2019-11-10 | End: 2019-11-10

## 2019-11-10 RX ORDER — DEXTROSE MONOHYDRATE 25 G/50ML
12.5 INJECTION, SOLUTION INTRAVENOUS PRN
Status: DISCONTINUED | OUTPATIENT
Start: 2019-11-10 | End: 2019-11-14 | Stop reason: HOSPADM

## 2019-11-10 RX ADMIN — MORPHINE SULFATE 2 MG: 2 INJECTION, SOLUTION INTRAMUSCULAR; INTRAVENOUS at 22:41

## 2019-11-10 RX ADMIN — NALOXONE HYDROCHLORIDE 2 MG: 1 INJECTION PARENTERAL at 15:38

## 2019-11-10 RX ADMIN — DEXAMETHASONE SODIUM PHOSPHATE 8 MG: 10 INJECTION, SOLUTION INTRAMUSCULAR; INTRAVENOUS at 13:28

## 2019-11-10 RX ADMIN — INSULIN LISPRO 1 UNITS: 100 INJECTION, SOLUTION INTRAVENOUS; SUBCUTANEOUS at 22:41

## 2019-11-10 RX ADMIN — ALBUTEROL SULFATE 5 MG: 2.5 SOLUTION RESPIRATORY (INHALATION) at 15:59

## 2019-11-10 RX ADMIN — DIPHENHYDRAMINE HYDROCHLORIDE 6.25 MG: 50 INJECTION, SOLUTION INTRAMUSCULAR; INTRAVENOUS at 13:36

## 2019-11-10 RX ADMIN — SODIUM CHLORIDE 500 ML: 9 INJECTION, SOLUTION INTRAVENOUS at 15:48

## 2019-11-10 RX ADMIN — FAMOTIDINE 20 MG: 10 INJECTION, SOLUTION INTRAVENOUS at 13:39

## 2019-11-10 RX ADMIN — IOPAMIDOL 85 ML: 755 INJECTION, SOLUTION INTRAVENOUS at 14:25

## 2019-11-10 RX ADMIN — SODIUM CHLORIDE 500 ML: 9 INJECTION, SOLUTION INTRAVENOUS at 13:21

## 2019-11-10 RX ADMIN — NITROGLYCERIN 1 INCH: 20 OINTMENT TOPICAL at 13:23

## 2019-11-10 RX ADMIN — MORPHINE SULFATE 2 MG: 2 INJECTION, SOLUTION INTRAMUSCULAR; INTRAVENOUS at 13:24

## 2019-11-10 RX ADMIN — SODIUM CHLORIDE: 9 INJECTION, SOLUTION INTRAVENOUS at 18:24

## 2019-11-10 RX ADMIN — IPRATROPIUM BROMIDE AND ALBUTEROL SULFATE 3 AMPULE: .5; 3 SOLUTION RESPIRATORY (INHALATION) at 13:06

## 2019-11-10 RX ADMIN — LEVOFLOXACIN 500 MG: 5 INJECTION, SOLUTION INTRAVENOUS at 18:24

## 2019-11-10 RX ADMIN — IPRATROPIUM BROMIDE AND ALBUTEROL SULFATE 1 AMPULE: .5; 3 SOLUTION RESPIRATORY (INHALATION) at 22:15

## 2019-11-10 RX ADMIN — ONDANSETRON HYDROCHLORIDE 4 MG: 2 INJECTION, SOLUTION INTRAMUSCULAR; INTRAVENOUS at 13:26

## 2019-11-10 RX ADMIN — NALOXONE HYDROCHLORIDE 1 MG: 1 INJECTION PARENTERAL at 15:15

## 2019-11-10 RX ADMIN — ENOXAPARIN SODIUM 40 MG: 40 INJECTION SUBCUTANEOUS at 18:25

## 2019-11-10 RX ADMIN — IPRATROPIUM BROMIDE AND ALBUTEROL SULFATE 1 AMPULE: .5; 3 SOLUTION RESPIRATORY (INHALATION) at 18:45

## 2019-11-10 RX ADMIN — Medication 10 ML: at 20:29

## 2019-11-10 ASSESSMENT — PAIN SCALES - GENERAL
PAINLEVEL_OUTOF10: 8
PAINLEVEL_OUTOF10: 0
PAINLEVEL_OUTOF10: 2
PAINLEVEL_OUTOF10: 10
PAINLEVEL_OUTOF10: 10
PAINLEVEL_OUTOF10: 4

## 2019-11-10 ASSESSMENT — PAIN DESCRIPTION - ONSET: ONSET: AWAKENED FROM SLEEP

## 2019-11-10 ASSESSMENT — ENCOUNTER SYMPTOMS
WHEEZING: 1
CHEST TIGHTNESS: 1
NAUSEA: 1
COUGH: 1
SHORTNESS OF BREATH: 1
ABDOMINAL PAIN: 1
STRIDOR: 0
VOMITING: 0
BACK PAIN: 1

## 2019-11-10 ASSESSMENT — PAIN DESCRIPTION - LOCATION
LOCATION: FLANK
LOCATION: FLANK

## 2019-11-10 ASSESSMENT — PAIN DESCRIPTION - FREQUENCY: FREQUENCY: CONTINUOUS

## 2019-11-10 ASSESSMENT — PAIN DESCRIPTION - DESCRIPTORS
DESCRIPTORS: SHARP;BURNING
DESCRIPTORS: BURNING

## 2019-11-10 ASSESSMENT — PAIN DESCRIPTION - ORIENTATION
ORIENTATION: LEFT
ORIENTATION: LEFT

## 2019-11-10 ASSESSMENT — PAIN DESCRIPTION - PAIN TYPE: TYPE: ACUTE PAIN

## 2019-11-11 ENCOUNTER — TELEPHONE (OUTPATIENT)
Dept: FAMILY MEDICINE CLINIC | Age: 44
End: 2019-11-11

## 2019-11-11 LAB
ANION GAP SERPL CALCULATED.3IONS-SCNC: 8 MMOL/L (ref 3–16)
BASE EXCESS ARTERIAL: 1.6 MMOL/L (ref -3–3)
BASE EXCESS ARTERIAL: 5.2 MMOL/L (ref -3–3)
BASOPHILS ABSOLUTE: 0 K/UL (ref 0–0.2)
BASOPHILS RELATIVE PERCENT: 0.1 %
BUN BLDV-MCNC: 19 MG/DL (ref 7–20)
CALCIUM SERPL-MCNC: 8.5 MG/DL (ref 8.3–10.6)
CARBOXYHEMOGLOBIN ARTERIAL: 3.6 % (ref 0–1.5)
CHLORIDE BLD-SCNC: 99 MMOL/L (ref 99–110)
CO2: 31 MMOL/L (ref 21–32)
CREAT SERPL-MCNC: 0.8 MG/DL (ref 0.6–1.1)
EKG ATRIAL RATE: 118 BPM
EKG DIAGNOSIS: NORMAL
EKG P AXIS: 64 DEGREES
EKG P-R INTERVAL: 132 MS
EKG Q-T INTERVAL: 316 MS
EKG QRS DURATION: 74 MS
EKG QTC CALCULATION (BAZETT): 442 MS
EKG R AXIS: 95 DEGREES
EKG T AXIS: 46 DEGREES
EKG VENTRICULAR RATE: 118 BPM
EOSINOPHILS ABSOLUTE: 0 K/UL (ref 0–0.6)
EOSINOPHILS RELATIVE PERCENT: 0 %
ESTIMATED AVERAGE GLUCOSE: 134.1 MG/DL
GFR AFRICAN AMERICAN: >60
GFR NON-AFRICAN AMERICAN: >60
GLUCOSE BLD-MCNC: 114 MG/DL (ref 70–99)
GLUCOSE BLD-MCNC: 124 MG/DL (ref 70–99)
GLUCOSE BLD-MCNC: 137 MG/DL (ref 70–99)
GLUCOSE BLD-MCNC: 153 MG/DL (ref 70–99)
GLUCOSE BLD-MCNC: 161 MG/DL (ref 70–99)
GLUCOSE BLD-MCNC: 99 MG/DL (ref 70–99)
HBA1C MFR BLD: 6.3 %
HCO3 ARTERIAL: 31.7 MMOL/L (ref 21–29)
HCO3 ARTERIAL: 34.3 MMOL/L (ref 21–29)
HCT VFR BLD CALC: 43.3 % (ref 36–48)
HEMOGLOBIN, ART, EXTENDED: 14.7 G/DL (ref 12–16)
HEMOGLOBIN: 13.6 G/DL (ref 12–16)
LV EF: 65 %
LVEF MODALITY: NORMAL
LYMPHOCYTES ABSOLUTE: 0.9 K/UL (ref 1–5.1)
LYMPHOCYTES RELATIVE PERCENT: 10.1 %
MCH RBC QN AUTO: 29 PG (ref 26–34)
MCHC RBC AUTO-ENTMCNC: 31.5 G/DL (ref 31–36)
MCV RBC AUTO: 92 FL (ref 80–100)
METHEMOGLOBIN ARTERIAL: 0.2 %
MONOCYTES ABSOLUTE: 0.7 K/UL (ref 0–1.3)
MONOCYTES RELATIVE PERCENT: 8.2 %
NEUTROPHILS ABSOLUTE: 7.3 K/UL (ref 1.7–7.7)
NEUTROPHILS RELATIVE PERCENT: 81.6 %
O2 CONTENT ARTERIAL: 19 ML/DL
O2 SAT, ARTERIAL: 92.4 %
O2 SAT, ARTERIAL: 98.4 %
O2 THERAPY: ABNORMAL
O2 THERAPY: ABNORMAL
PCO2 ARTERIAL: 71.9 MMHG (ref 35–45)
PCO2 ARTERIAL: 76.9 MMHG (ref 35–45)
PDW BLD-RTO: 15.3 % (ref 12.4–15.4)
PERFORMED ON: ABNORMAL
PERFORMED ON: NORMAL
PH ARTERIAL: 7.23 (ref 7.35–7.45)
PH ARTERIAL: 7.3 (ref 7.35–7.45)
PLATELET # BLD: 138 K/UL (ref 135–450)
PMV BLD AUTO: 8.9 FL (ref 5–10.5)
PO2 ARTERIAL: 148.5 MMHG (ref 75–108)
PO2 ARTERIAL: 72.5 MMHG (ref 75–108)
POTASSIUM SERPL-SCNC: 5.3 MMOL/L (ref 3.5–5.1)
RBC # BLD: 4.7 M/UL (ref 4–5.2)
SODIUM BLD-SCNC: 138 MMOL/L (ref 136–145)
TCO2 ARTERIAL: 34.1 MMOL/L
TCO2 ARTERIAL: 36.6 MMOL/L
TROPONIN: <0.01 NG/ML
WBC # BLD: 8.9 K/UL (ref 4–11)

## 2019-11-11 PROCEDURE — 6360000004 HC RX CONTRAST MEDICATION: Performed by: INTERNAL MEDICINE

## 2019-11-11 PROCEDURE — 6370000000 HC RX 637 (ALT 250 FOR IP): Performed by: INTERNAL MEDICINE

## 2019-11-11 PROCEDURE — 36600 WITHDRAWAL OF ARTERIAL BLOOD: CPT

## 2019-11-11 PROCEDURE — 36415 COLL VENOUS BLD VENIPUNCTURE: CPT

## 2019-11-11 PROCEDURE — 2580000003 HC RX 258: Performed by: INTERNAL MEDICINE

## 2019-11-11 PROCEDURE — 82803 BLOOD GASES ANY COMBINATION: CPT

## 2019-11-11 PROCEDURE — 99255 IP/OBS CONSLTJ NEW/EST HI 80: CPT | Performed by: INTERNAL MEDICINE

## 2019-11-11 PROCEDURE — 6360000002 HC RX W HCPCS: Performed by: INTERNAL MEDICINE

## 2019-11-11 PROCEDURE — 94640 AIRWAY INHALATION TREATMENT: CPT

## 2019-11-11 PROCEDURE — 84484 ASSAY OF TROPONIN QUANT: CPT

## 2019-11-11 PROCEDURE — C8929 TTE W OR WO FOL WCON,DOPPLER: HCPCS

## 2019-11-11 PROCEDURE — 94660 CPAP INITIATION&MGMT: CPT

## 2019-11-11 PROCEDURE — 2700000000 HC OXYGEN THERAPY PER DAY

## 2019-11-11 PROCEDURE — 99232 SBSQ HOSP IP/OBS MODERATE 35: CPT | Performed by: INTERNAL MEDICINE

## 2019-11-11 PROCEDURE — 2060000000 HC ICU INTERMEDIATE R&B

## 2019-11-11 PROCEDURE — 85025 COMPLETE CBC W/AUTO DIFF WBC: CPT

## 2019-11-11 PROCEDURE — 80048 BASIC METABOLIC PNL TOTAL CA: CPT

## 2019-11-11 PROCEDURE — 93010 ELECTROCARDIOGRAM REPORT: CPT | Performed by: INTERNAL MEDICINE

## 2019-11-11 PROCEDURE — 94761 N-INVAS EAR/PLS OXIMETRY MLT: CPT

## 2019-11-11 RX ORDER — IPRATROPIUM BROMIDE AND ALBUTEROL SULFATE 2.5; .5 MG/3ML; MG/3ML
1 SOLUTION RESPIRATORY (INHALATION)
Status: DISCONTINUED | OUTPATIENT
Start: 2019-11-11 | End: 2019-11-14 | Stop reason: HOSPADM

## 2019-11-11 RX ORDER — GABAPENTIN 400 MG/1
400 CAPSULE ORAL 3 TIMES DAILY
Status: DISCONTINUED | OUTPATIENT
Start: 2019-11-11 | End: 2019-11-14 | Stop reason: HOSPADM

## 2019-11-11 RX ORDER — ALBUTEROL SULFATE 2.5 MG/3ML
2.5 SOLUTION RESPIRATORY (INHALATION) EVERY 4 HOURS PRN
Status: DISCONTINUED | OUTPATIENT
Start: 2019-11-11 | End: 2019-11-14 | Stop reason: HOSPADM

## 2019-11-11 RX ORDER — KETOROLAC TROMETHAMINE 30 MG/ML
15 INJECTION, SOLUTION INTRAMUSCULAR; INTRAVENOUS EVERY 6 HOURS PRN
Status: DISCONTINUED | OUTPATIENT
Start: 2019-11-11 | End: 2019-11-14 | Stop reason: HOSPADM

## 2019-11-11 RX ADMIN — CETIRIZINE HYDROCHLORIDE 10 MG: 10 TABLET ORAL at 08:33

## 2019-11-11 RX ADMIN — MUPIROCIN: 20 OINTMENT TOPICAL at 21:07

## 2019-11-11 RX ADMIN — INSULIN LISPRO 1 UNITS: 100 INJECTION, SOLUTION INTRAVENOUS; SUBCUTANEOUS at 12:04

## 2019-11-11 RX ADMIN — IPRATROPIUM BROMIDE AND ALBUTEROL SULFATE 1 AMPULE: .5; 3 SOLUTION RESPIRATORY (INHALATION) at 11:19

## 2019-11-11 RX ADMIN — ONDANSETRON HYDROCHLORIDE 4 MG: 2 INJECTION, SOLUTION INTRAMUSCULAR; INTRAVENOUS at 21:14

## 2019-11-11 RX ADMIN — GUAIFENESIN AND DEXTROMETHORPHAN 5 ML: 100; 10 SYRUP ORAL at 00:25

## 2019-11-11 RX ADMIN — KETOROLAC TROMETHAMINE 15 MG: 30 INJECTION, SOLUTION INTRAMUSCULAR at 17:18

## 2019-11-11 RX ADMIN — METHYLPREDNISOLONE SODIUM SUCCINATE 40 MG: 40 INJECTION, POWDER, FOR SOLUTION INTRAMUSCULAR; INTRAVENOUS at 08:25

## 2019-11-11 RX ADMIN — Medication 10 ML: at 08:33

## 2019-11-11 RX ADMIN — GABAPENTIN 400 MG: 400 CAPSULE ORAL at 14:16

## 2019-11-11 RX ADMIN — MORPHINE SULFATE 2 MG: 2 INJECTION, SOLUTION INTRAMUSCULAR; INTRAVENOUS at 21:13

## 2019-11-11 RX ADMIN — MORPHINE SULFATE 2 MG: 2 INJECTION, SOLUTION INTRAMUSCULAR; INTRAVENOUS at 17:18

## 2019-11-11 RX ADMIN — KETOROLAC TROMETHAMINE 15 MG: 30 INJECTION, SOLUTION INTRAMUSCULAR at 10:12

## 2019-11-11 RX ADMIN — METOPROLOL SUCCINATE 25 MG: 25 TABLET, FILM COATED, EXTENDED RELEASE ORAL at 08:33

## 2019-11-11 RX ADMIN — IPRATROPIUM BROMIDE AND ALBUTEROL SULFATE 1 AMPULE: .5; 3 SOLUTION RESPIRATORY (INHALATION) at 15:12

## 2019-11-11 RX ADMIN — IPRATROPIUM BROMIDE AND ALBUTEROL SULFATE 1 AMPULE: .5; 3 SOLUTION RESPIRATORY (INHALATION) at 23:33

## 2019-11-11 RX ADMIN — GABAPENTIN 400 MG: 400 CAPSULE ORAL at 10:12

## 2019-11-11 RX ADMIN — SODIUM CHLORIDE: 9 INJECTION, SOLUTION INTRAVENOUS at 23:58

## 2019-11-11 RX ADMIN — Medication 10 ML: at 21:07

## 2019-11-11 RX ADMIN — ENOXAPARIN SODIUM 40 MG: 40 INJECTION SUBCUTANEOUS at 08:25

## 2019-11-11 RX ADMIN — SODIUM CHLORIDE: 9 INJECTION, SOLUTION INTRAVENOUS at 08:24

## 2019-11-11 RX ADMIN — MORPHINE SULFATE 2 MG: 2 INJECTION, SOLUTION INTRAMUSCULAR; INTRAVENOUS at 07:52

## 2019-11-11 RX ADMIN — ASPIRIN 81 MG: 81 TABLET, COATED ORAL at 08:33

## 2019-11-11 RX ADMIN — KETOROLAC TROMETHAMINE 15 MG: 30 INJECTION, SOLUTION INTRAMUSCULAR at 00:25

## 2019-11-11 RX ADMIN — KETOROLAC TROMETHAMINE 15 MG: 30 INJECTION, SOLUTION INTRAMUSCULAR at 23:44

## 2019-11-11 RX ADMIN — LEVOFLOXACIN 500 MG: 5 INJECTION, SOLUTION INTRAVENOUS at 17:23

## 2019-11-11 RX ADMIN — MUPIROCIN: 20 OINTMENT TOPICAL at 08:35

## 2019-11-11 RX ADMIN — SODIUM CHLORIDE: 9 INJECTION, SOLUTION INTRAVENOUS at 06:22

## 2019-11-11 RX ADMIN — METHYLPREDNISOLONE SODIUM SUCCINATE 40 MG: 40 INJECTION, POWDER, FOR SOLUTION INTRAMUSCULAR; INTRAVENOUS at 21:07

## 2019-11-11 RX ADMIN — PERFLUTREN 1.43 MG: 6.52 INJECTION, SUSPENSION INTRAVENOUS at 08:32

## 2019-11-11 RX ADMIN — GABAPENTIN 400 MG: 400 CAPSULE ORAL at 21:07

## 2019-11-11 RX ADMIN — IPRATROPIUM BROMIDE AND ALBUTEROL SULFATE 1 AMPULE: .5; 3 SOLUTION RESPIRATORY (INHALATION) at 19:45

## 2019-11-11 RX ADMIN — IPRATROPIUM BROMIDE AND ALBUTEROL SULFATE 1 AMPULE: .5; 3 SOLUTION RESPIRATORY (INHALATION) at 07:13

## 2019-11-11 RX ADMIN — TAMSULOSIN HYDROCHLORIDE 0.4 MG: 0.4 CAPSULE ORAL at 08:33

## 2019-11-11 RX ADMIN — LISINOPRIL 20 MG: 20 TABLET ORAL at 08:33

## 2019-11-11 RX ADMIN — IPRATROPIUM BROMIDE AND ALBUTEROL SULFATE 1 AMPULE: .5; 3 SOLUTION RESPIRATORY (INHALATION) at 02:20

## 2019-11-11 RX ADMIN — INSULIN LISPRO 1 UNITS: 100 INJECTION, SOLUTION INTRAVENOUS; SUBCUTANEOUS at 17:18

## 2019-11-11 RX ADMIN — MORPHINE SULFATE 2 MG: 2 INJECTION, SOLUTION INTRAMUSCULAR; INTRAVENOUS at 14:16

## 2019-11-11 ASSESSMENT — PAIN SCALES - GENERAL
PAINLEVEL_OUTOF10: 6
PAINLEVEL_OUTOF10: 7
PAINLEVEL_OUTOF10: 10
PAINLEVEL_OUTOF10: 0
PAINLEVEL_OUTOF10: 10
PAINLEVEL_OUTOF10: 6
PAINLEVEL_OUTOF10: 10
PAINLEVEL_OUTOF10: 6
PAINLEVEL_OUTOF10: 10
PAINLEVEL_OUTOF10: 8

## 2019-11-11 ASSESSMENT — PAIN - FUNCTIONAL ASSESSMENT: PAIN_FUNCTIONAL_ASSESSMENT: PREVENTS OR INTERFERES SOME ACTIVE ACTIVITIES AND ADLS

## 2019-11-11 ASSESSMENT — PAIN DESCRIPTION - PAIN TYPE
TYPE: ACUTE PAIN
TYPE: ACUTE PAIN

## 2019-11-11 ASSESSMENT — PAIN DESCRIPTION - PROGRESSION: CLINICAL_PROGRESSION: GRADUALLY WORSENING

## 2019-11-11 ASSESSMENT — PAIN DESCRIPTION - DESCRIPTORS
DESCRIPTORS: BURNING
DESCRIPTORS: SHARP;BURNING

## 2019-11-11 ASSESSMENT — PAIN DESCRIPTION - FREQUENCY
FREQUENCY: CONTINUOUS
FREQUENCY: CONTINUOUS

## 2019-11-11 ASSESSMENT — PAIN DESCRIPTION - ONSET
ONSET: ON-GOING
ONSET: ON-GOING

## 2019-11-11 ASSESSMENT — PAIN DESCRIPTION - ORIENTATION
ORIENTATION: LEFT
ORIENTATION: LEFT

## 2019-11-11 ASSESSMENT — PAIN DESCRIPTION - LOCATION
LOCATION: FLANK
LOCATION: FLANK

## 2019-11-12 LAB
GLUCOSE BLD-MCNC: 146 MG/DL (ref 70–99)
GLUCOSE BLD-MCNC: 147 MG/DL (ref 70–99)
GLUCOSE BLD-MCNC: 150 MG/DL (ref 70–99)
GLUCOSE BLD-MCNC: 195 MG/DL (ref 70–99)
PERFORMED ON: ABNORMAL

## 2019-11-12 PROCEDURE — 99232 SBSQ HOSP IP/OBS MODERATE 35: CPT | Performed by: INTERNAL MEDICINE

## 2019-11-12 PROCEDURE — 6370000000 HC RX 637 (ALT 250 FOR IP): Performed by: INTERNAL MEDICINE

## 2019-11-12 PROCEDURE — 2060000000 HC ICU INTERMEDIATE R&B

## 2019-11-12 PROCEDURE — 6360000002 HC RX W HCPCS: Performed by: INTERNAL MEDICINE

## 2019-11-12 PROCEDURE — 87641 MR-STAPH DNA AMP PROBE: CPT

## 2019-11-12 PROCEDURE — 94640 AIRWAY INHALATION TREATMENT: CPT

## 2019-11-12 PROCEDURE — 94761 N-INVAS EAR/PLS OXIMETRY MLT: CPT

## 2019-11-12 PROCEDURE — 2500000003 HC RX 250 WO HCPCS: Performed by: INTERNAL MEDICINE

## 2019-11-12 PROCEDURE — 2700000000 HC OXYGEN THERAPY PER DAY

## 2019-11-12 PROCEDURE — 2580000003 HC RX 258: Performed by: INTERNAL MEDICINE

## 2019-11-12 PROCEDURE — 94660 CPAP INITIATION&MGMT: CPT

## 2019-11-12 RX ORDER — FUROSEMIDE 40 MG/1
20 TABLET ORAL 2 TIMES DAILY
Status: DISCONTINUED | OUTPATIENT
Start: 2019-11-12 | End: 2019-11-14 | Stop reason: HOSPADM

## 2019-11-12 RX ADMIN — Medication 10 ML: at 21:31

## 2019-11-12 RX ADMIN — INSULIN LISPRO 1 UNITS: 100 INJECTION, SOLUTION INTRAVENOUS; SUBCUTANEOUS at 17:15

## 2019-11-12 RX ADMIN — HYDROMORPHONE HYDROCHLORIDE 0.5 MG: 1 INJECTION, SOLUTION INTRAMUSCULAR; INTRAVENOUS; SUBCUTANEOUS at 21:30

## 2019-11-12 RX ADMIN — Medication 10 ML: at 21:15

## 2019-11-12 RX ADMIN — INSULIN LISPRO 1 UNITS: 100 INJECTION, SOLUTION INTRAVENOUS; SUBCUTANEOUS at 11:39

## 2019-11-12 RX ADMIN — INSULIN LISPRO 1 UNITS: 100 INJECTION, SOLUTION INTRAVENOUS; SUBCUTANEOUS at 21:22

## 2019-11-12 RX ADMIN — MORPHINE SULFATE 2 MG: 2 INJECTION, SOLUTION INTRAMUSCULAR; INTRAVENOUS at 13:49

## 2019-11-12 RX ADMIN — FUROSEMIDE 20 MG: 40 TABLET ORAL at 17:15

## 2019-11-12 RX ADMIN — Medication 10 ML: at 09:25

## 2019-11-12 RX ADMIN — IPRATROPIUM BROMIDE AND ALBUTEROL SULFATE 1 AMPULE: .5; 3 SOLUTION RESPIRATORY (INHALATION) at 15:34

## 2019-11-12 RX ADMIN — MORPHINE SULFATE 2 MG: 2 INJECTION, SOLUTION INTRAMUSCULAR; INTRAVENOUS at 03:57

## 2019-11-12 RX ADMIN — GABAPENTIN 400 MG: 400 CAPSULE ORAL at 21:15

## 2019-11-12 RX ADMIN — MUPIROCIN: 20 OINTMENT TOPICAL at 09:25

## 2019-11-12 RX ADMIN — IPRATROPIUM BROMIDE AND ALBUTEROL SULFATE 1 AMPULE: .5; 3 SOLUTION RESPIRATORY (INHALATION) at 23:02

## 2019-11-12 RX ADMIN — ENOXAPARIN SODIUM 40 MG: 40 INJECTION SUBCUTANEOUS at 09:35

## 2019-11-12 RX ADMIN — TAMSULOSIN HYDROCHLORIDE 0.4 MG: 0.4 CAPSULE ORAL at 09:35

## 2019-11-12 RX ADMIN — HYDROMORPHONE HYDROCHLORIDE 0.5 MG: 1 INJECTION, SOLUTION INTRAMUSCULAR; INTRAVENOUS; SUBCUTANEOUS at 15:23

## 2019-11-12 RX ADMIN — IPRATROPIUM BROMIDE AND ALBUTEROL SULFATE 1 AMPULE: .5; 3 SOLUTION RESPIRATORY (INHALATION) at 11:11

## 2019-11-12 RX ADMIN — IPRATROPIUM BROMIDE AND ALBUTEROL SULFATE 1 AMPULE: .5; 3 SOLUTION RESPIRATORY (INHALATION) at 07:07

## 2019-11-12 RX ADMIN — LEVOFLOXACIN 500 MG: 5 INJECTION, SOLUTION INTRAVENOUS at 17:15

## 2019-11-12 RX ADMIN — METOPROLOL SUCCINATE 25 MG: 25 TABLET, FILM COATED, EXTENDED RELEASE ORAL at 09:35

## 2019-11-12 RX ADMIN — MORPHINE SULFATE 2 MG: 2 INJECTION, SOLUTION INTRAMUSCULAR; INTRAVENOUS at 09:35

## 2019-11-12 RX ADMIN — CETIRIZINE HYDROCHLORIDE 10 MG: 10 TABLET ORAL at 09:35

## 2019-11-12 RX ADMIN — GABAPENTIN 400 MG: 400 CAPSULE ORAL at 09:35

## 2019-11-12 RX ADMIN — KETOROLAC TROMETHAMINE 15 MG: 30 INJECTION, SOLUTION INTRAMUSCULAR at 11:39

## 2019-11-12 RX ADMIN — GABAPENTIN 400 MG: 400 CAPSULE ORAL at 13:49

## 2019-11-12 RX ADMIN — IPRATROPIUM BROMIDE AND ALBUTEROL SULFATE 1 AMPULE: .5; 3 SOLUTION RESPIRATORY (INHALATION) at 19:45

## 2019-11-12 RX ADMIN — GUAIFENESIN AND DEXTROMETHORPHAN 5 ML: 100; 10 SYRUP ORAL at 10:58

## 2019-11-12 RX ADMIN — MUPIROCIN: 20 OINTMENT TOPICAL at 21:22

## 2019-11-12 RX ADMIN — METHYLPREDNISOLONE SODIUM SUCCINATE 40 MG: 40 INJECTION, POWDER, FOR SOLUTION INTRAMUSCULAR; INTRAVENOUS at 09:25

## 2019-11-12 RX ADMIN — LISINOPRIL 20 MG: 20 TABLET ORAL at 09:35

## 2019-11-12 RX ADMIN — ASPIRIN 81 MG: 81 TABLET, COATED ORAL at 09:35

## 2019-11-12 RX ADMIN — METHYLPREDNISOLONE SODIUM SUCCINATE 40 MG: 40 INJECTION, POWDER, FOR SOLUTION INTRAMUSCULAR; INTRAVENOUS at 21:14

## 2019-11-12 ASSESSMENT — PAIN DESCRIPTION - ONSET
ONSET: ON-GOING

## 2019-11-12 ASSESSMENT — PAIN SCALES - GENERAL
PAINLEVEL_OUTOF10: 8
PAINLEVEL_OUTOF10: 5
PAINLEVEL_OUTOF10: 2
PAINLEVEL_OUTOF10: 3
PAINLEVEL_OUTOF10: 7
PAINLEVEL_OUTOF10: 0
PAINLEVEL_OUTOF10: 8
PAINLEVEL_OUTOF10: 0
PAINLEVEL_OUTOF10: 8
PAINLEVEL_OUTOF10: 0
PAINLEVEL_OUTOF10: 8

## 2019-11-12 ASSESSMENT — PAIN DESCRIPTION - DESCRIPTORS
DESCRIPTORS: SHARP
DESCRIPTORS: ACHING;JABBING;PRESSURE

## 2019-11-12 ASSESSMENT — PAIN DESCRIPTION - ORIENTATION
ORIENTATION: LEFT;LOWER
ORIENTATION: LEFT;RIGHT
ORIENTATION: RIGHT;LEFT
ORIENTATION: RIGHT;LEFT
ORIENTATION: LEFT
ORIENTATION: LEFT

## 2019-11-12 ASSESSMENT — PAIN DESCRIPTION - PAIN TYPE
TYPE: ACUTE PAIN

## 2019-11-12 ASSESSMENT — PAIN DESCRIPTION - FREQUENCY
FREQUENCY: CONTINUOUS

## 2019-11-12 ASSESSMENT — PAIN DESCRIPTION - LOCATION
LOCATION: FLANK
LOCATION: FLANK
LOCATION: FLANK;ABDOMEN
LOCATION: FLANK
LOCATION: FLANK
LOCATION: ABDOMEN

## 2019-11-12 ASSESSMENT — PAIN DESCRIPTION - PROGRESSION
CLINICAL_PROGRESSION: GRADUALLY WORSENING
CLINICAL_PROGRESSION: GRADUALLY WORSENING
CLINICAL_PROGRESSION: NOT CHANGED
CLINICAL_PROGRESSION: GRADUALLY WORSENING

## 2019-11-13 ENCOUNTER — ANESTHESIA EVENT (OUTPATIENT)
Dept: OPERATING ROOM | Age: 44
DRG: 446 | End: 2019-11-13
Payer: MEDICARE

## 2019-11-13 ENCOUNTER — APPOINTMENT (OUTPATIENT)
Dept: GENERAL RADIOLOGY | Age: 44
DRG: 446 | End: 2019-11-13
Payer: MEDICARE

## 2019-11-13 ENCOUNTER — ANESTHESIA (OUTPATIENT)
Dept: OPERATING ROOM | Age: 44
DRG: 446 | End: 2019-11-13
Payer: MEDICARE

## 2019-11-13 VITALS — DIASTOLIC BLOOD PRESSURE: 109 MMHG | OXYGEN SATURATION: 98 % | SYSTOLIC BLOOD PRESSURE: 136 MMHG

## 2019-11-13 LAB
BLOOD CULTURE, ROUTINE: NORMAL
CULTURE, BLOOD 2: NORMAL
GLUCOSE BLD-MCNC: 115 MG/DL (ref 70–99)
GLUCOSE BLD-MCNC: 134 MG/DL (ref 70–99)
GLUCOSE BLD-MCNC: 134 MG/DL (ref 70–99)
GLUCOSE BLD-MCNC: 138 MG/DL (ref 70–99)
GLUCOSE BLD-MCNC: 140 MG/DL (ref 70–99)
GLUCOSE BLD-MCNC: 150 MG/DL (ref 70–99)
GLUCOSE BLD-MCNC: 162 MG/DL (ref 70–99)
HCG(URINE) PREGNANCY TEST: NEGATIVE
MRSA SCREEN RT-PCR: NORMAL
PERFORMED ON: ABNORMAL

## 2019-11-13 PROCEDURE — 94660 CPAP INITIATION&MGMT: CPT

## 2019-11-13 PROCEDURE — 6360000002 HC RX W HCPCS: Performed by: NURSE ANESTHETIST, CERTIFIED REGISTERED

## 2019-11-13 PROCEDURE — 6370000000 HC RX 637 (ALT 250 FOR IP): Performed by: UROLOGY

## 2019-11-13 PROCEDURE — 2500000003 HC RX 250 WO HCPCS: Performed by: NURSE ANESTHETIST, CERTIFIED REGISTERED

## 2019-11-13 PROCEDURE — 2580000003 HC RX 258: Performed by: INTERNAL MEDICINE

## 2019-11-13 PROCEDURE — 2500000003 HC RX 250 WO HCPCS: Performed by: INTERNAL MEDICINE

## 2019-11-13 PROCEDURE — 94761 N-INVAS EAR/PLS OXIMETRY MLT: CPT

## 2019-11-13 PROCEDURE — 6360000002 HC RX W HCPCS: Performed by: UROLOGY

## 2019-11-13 PROCEDURE — 3700000001 HC ADD 15 MINUTES (ANESTHESIA): Performed by: UROLOGY

## 2019-11-13 PROCEDURE — 6370000000 HC RX 637 (ALT 250 FOR IP): Performed by: INTERNAL MEDICINE

## 2019-11-13 PROCEDURE — 7100000000 HC PACU RECOVERY - FIRST 15 MIN: Performed by: UROLOGY

## 2019-11-13 PROCEDURE — 88300 SURGICAL PATH GROSS: CPT

## 2019-11-13 PROCEDURE — 2720000010 HC SURG SUPPLY STERILE: Performed by: UROLOGY

## 2019-11-13 PROCEDURE — 2700000000 HC OXYGEN THERAPY PER DAY

## 2019-11-13 PROCEDURE — 99232 SBSQ HOSP IP/OBS MODERATE 35: CPT | Performed by: INTERNAL MEDICINE

## 2019-11-13 PROCEDURE — C1769 GUIDE WIRE: HCPCS | Performed by: UROLOGY

## 2019-11-13 PROCEDURE — 3700000000 HC ANESTHESIA ATTENDED CARE: Performed by: UROLOGY

## 2019-11-13 PROCEDURE — 94640 AIRWAY INHALATION TREATMENT: CPT

## 2019-11-13 PROCEDURE — 6370000000 HC RX 637 (ALT 250 FOR IP): Performed by: ANESTHESIOLOGY

## 2019-11-13 PROCEDURE — 2580000003 HC RX 258: Performed by: UROLOGY

## 2019-11-13 PROCEDURE — 82365 CALCULUS SPECTROSCOPY: CPT

## 2019-11-13 PROCEDURE — 2060000000 HC ICU INTERMEDIATE R&B

## 2019-11-13 PROCEDURE — 0TP98DZ REMOVAL OF INTRALUMINAL DEVICE FROM URETER, VIA NATURAL OR ARTIFICIAL OPENING ENDOSCOPIC: ICD-10-PCS | Performed by: UROLOGY

## 2019-11-13 PROCEDURE — 0TC78ZZ EXTIRPATION OF MATTER FROM LEFT URETER, VIA NATURAL OR ARTIFICIAL OPENING ENDOSCOPIC: ICD-10-PCS | Performed by: UROLOGY

## 2019-11-13 PROCEDURE — 2709999900 HC NON-CHARGEABLE SUPPLY: Performed by: UROLOGY

## 2019-11-13 PROCEDURE — 84703 CHORIONIC GONADOTROPIN ASSAY: CPT

## 2019-11-13 PROCEDURE — 3600000014 HC SURGERY LEVEL 4 ADDTL 15MIN: Performed by: UROLOGY

## 2019-11-13 PROCEDURE — C1758 CATHETER, URETERAL: HCPCS | Performed by: UROLOGY

## 2019-11-13 PROCEDURE — 6360000002 HC RX W HCPCS: Performed by: INTERNAL MEDICINE

## 2019-11-13 PROCEDURE — 6360000004 HC RX CONTRAST MEDICATION: Performed by: UROLOGY

## 2019-11-13 PROCEDURE — 74420 UROGRAPHY RTRGR +-KUB: CPT

## 2019-11-13 PROCEDURE — 6360000002 HC RX W HCPCS: Performed by: ANESTHESIOLOGY

## 2019-11-13 PROCEDURE — 3600000004 HC SURGERY LEVEL 4 BASE: Performed by: UROLOGY

## 2019-11-13 PROCEDURE — 7100000001 HC PACU RECOVERY - ADDTL 15 MIN: Performed by: UROLOGY

## 2019-11-13 RX ORDER — SODIUM CHLORIDE, SODIUM LACTATE, POTASSIUM CHLORIDE, CALCIUM CHLORIDE 600; 310; 30; 20 MG/100ML; MG/100ML; MG/100ML; MG/100ML
INJECTION, SOLUTION INTRAVENOUS ONCE
Status: DISCONTINUED | OUTPATIENT
Start: 2019-11-13 | End: 2019-11-14 | Stop reason: HOSPADM

## 2019-11-13 RX ORDER — OXYCODONE HYDROCHLORIDE AND ACETAMINOPHEN 5; 325 MG/1; MG/1
2 TABLET ORAL PRN
Status: COMPLETED | OUTPATIENT
Start: 2019-11-13 | End: 2019-11-13

## 2019-11-13 RX ORDER — GLYCOPYRROLATE 0.2 MG/ML
INJECTION INTRAMUSCULAR; INTRAVENOUS PRN
Status: DISCONTINUED | OUTPATIENT
Start: 2019-11-13 | End: 2019-11-13 | Stop reason: SDUPTHER

## 2019-11-13 RX ORDER — FENTANYL CITRATE 50 UG/ML
INJECTION, SOLUTION INTRAMUSCULAR; INTRAVENOUS PRN
Status: DISCONTINUED | OUTPATIENT
Start: 2019-11-13 | End: 2019-11-13 | Stop reason: SDUPTHER

## 2019-11-13 RX ORDER — OXYCODONE HYDROCHLORIDE AND ACETAMINOPHEN 5; 325 MG/1; MG/1
1 TABLET ORAL PRN
Status: COMPLETED | OUTPATIENT
Start: 2019-11-13 | End: 2019-11-13

## 2019-11-13 RX ORDER — LEVOFLOXACIN 500 MG/1
500 TABLET, FILM COATED ORAL DAILY
Status: DISCONTINUED | OUTPATIENT
Start: 2019-11-14 | End: 2019-11-14 | Stop reason: HOSPADM

## 2019-11-13 RX ORDER — ROCURONIUM BROMIDE 10 MG/ML
INJECTION, SOLUTION INTRAVENOUS PRN
Status: DISCONTINUED | OUTPATIENT
Start: 2019-11-13 | End: 2019-11-13 | Stop reason: SDUPTHER

## 2019-11-13 RX ORDER — SODIUM CHLORIDE, SODIUM LACTATE, POTASSIUM CHLORIDE, CALCIUM CHLORIDE 600; 310; 30; 20 MG/100ML; MG/100ML; MG/100ML; MG/100ML
INJECTION, SOLUTION INTRAVENOUS ONCE
Status: DISCONTINUED | OUTPATIENT
Start: 2019-11-13 | End: 2019-11-13 | Stop reason: CLARIF

## 2019-11-13 RX ORDER — PROPOFOL 10 MG/ML
INJECTION, EMULSION INTRAVENOUS PRN
Status: DISCONTINUED | OUTPATIENT
Start: 2019-11-13 | End: 2019-11-13 | Stop reason: SDUPTHER

## 2019-11-13 RX ORDER — ONDANSETRON 2 MG/ML
4 INJECTION INTRAMUSCULAR; INTRAVENOUS EVERY 30 MIN PRN
Status: DISCONTINUED | OUTPATIENT
Start: 2019-11-13 | End: 2019-11-13

## 2019-11-13 RX ORDER — LIDOCAINE HYDROCHLORIDE 20 MG/ML
JELLY TOPICAL PRN
Status: DISCONTINUED | OUTPATIENT
Start: 2019-11-13 | End: 2019-11-13 | Stop reason: ALTCHOICE

## 2019-11-13 RX ORDER — DIPHENHYDRAMINE HYDROCHLORIDE 50 MG/ML
6.25 INJECTION INTRAMUSCULAR; INTRAVENOUS
Status: DISCONTINUED | OUTPATIENT
Start: 2019-11-13 | End: 2019-11-13

## 2019-11-13 RX ORDER — MAGNESIUM HYDROXIDE 1200 MG/15ML
LIQUID ORAL PRN
Status: DISCONTINUED | OUTPATIENT
Start: 2019-11-13 | End: 2019-11-13 | Stop reason: ALTCHOICE

## 2019-11-13 RX ORDER — LABETALOL HYDROCHLORIDE 5 MG/ML
5 INJECTION, SOLUTION INTRAVENOUS
Status: DISCONTINUED | OUTPATIENT
Start: 2019-11-13 | End: 2019-11-13

## 2019-11-13 RX ORDER — SODIUM CHLORIDE, SODIUM LACTATE, POTASSIUM CHLORIDE, CALCIUM CHLORIDE 600; 310; 30; 20 MG/100ML; MG/100ML; MG/100ML; MG/100ML
INJECTION, SOLUTION INTRAVENOUS CONTINUOUS
Status: DISCONTINUED | OUTPATIENT
Start: 2019-11-13 | End: 2019-11-13 | Stop reason: CLARIF

## 2019-11-13 RX ORDER — HYDRALAZINE HYDROCHLORIDE 20 MG/ML
5 INJECTION INTRAMUSCULAR; INTRAVENOUS EVERY 30 MIN PRN
Status: DISCONTINUED | OUTPATIENT
Start: 2019-11-13 | End: 2019-11-13

## 2019-11-13 RX ADMIN — HYDROMORPHONE HYDROCHLORIDE 0.5 MG: 1 INJECTION, SOLUTION INTRAMUSCULAR; INTRAVENOUS; SUBCUTANEOUS at 20:52

## 2019-11-13 RX ADMIN — METFORMIN HYDROCHLORIDE 1000 MG: 500 TABLET ORAL at 16:21

## 2019-11-13 RX ADMIN — FENTANYL CITRATE 150 MCG: 50 INJECTION, SOLUTION INTRAMUSCULAR; INTRAVENOUS at 13:31

## 2019-11-13 RX ADMIN — Medication 10 ML: at 08:55

## 2019-11-13 RX ADMIN — SUGAMMADEX 200 MG: 100 INJECTION, SOLUTION INTRAVENOUS at 14:10

## 2019-11-13 RX ADMIN — FUROSEMIDE 20 MG: 40 TABLET ORAL at 20:41

## 2019-11-13 RX ADMIN — GLYCOPYRROLATE 0.2 MG: 0.2 INJECTION, SOLUTION INTRAMUSCULAR; INTRAVENOUS at 13:31

## 2019-11-13 RX ADMIN — PROPOFOL 200 MG: 10 INJECTION, EMULSION INTRAVENOUS at 13:32

## 2019-11-13 RX ADMIN — OXYCODONE HYDROCHLORIDE AND ACETAMINOPHEN 1 TABLET: 5; 325 TABLET ORAL at 15:03

## 2019-11-13 RX ADMIN — TAMSULOSIN HYDROCHLORIDE 0.4 MG: 0.4 CAPSULE ORAL at 08:55

## 2019-11-13 RX ADMIN — IPRATROPIUM BROMIDE AND ALBUTEROL SULFATE 1 AMPULE: .5; 3 SOLUTION RESPIRATORY (INHALATION) at 07:18

## 2019-11-13 RX ADMIN — METHYLPREDNISOLONE SODIUM SUCCINATE 40 MG: 40 INJECTION, POWDER, FOR SOLUTION INTRAMUSCULAR; INTRAVENOUS at 20:38

## 2019-11-13 RX ADMIN — METHYLPREDNISOLONE SODIUM SUCCINATE 40 MG: 40 INJECTION, POWDER, FOR SOLUTION INTRAMUSCULAR; INTRAVENOUS at 08:55

## 2019-11-13 RX ADMIN — ROCURONIUM BROMIDE 20 MG: 10 INJECTION, SOLUTION INTRAVENOUS at 13:53

## 2019-11-13 RX ADMIN — IPRATROPIUM BROMIDE AND ALBUTEROL SULFATE 1 AMPULE: .5; 3 SOLUTION RESPIRATORY (INHALATION) at 23:47

## 2019-11-13 RX ADMIN — GABAPENTIN 400 MG: 400 CAPSULE ORAL at 20:41

## 2019-11-13 RX ADMIN — IPRATROPIUM BROMIDE AND ALBUTEROL SULFATE 1 AMPULE: .5; 3 SOLUTION RESPIRATORY (INHALATION) at 19:53

## 2019-11-13 RX ADMIN — HYDROMORPHONE HYDROCHLORIDE 0.5 MG: 1 INJECTION, SOLUTION INTRAMUSCULAR; INTRAVENOUS; SUBCUTANEOUS at 09:04

## 2019-11-13 RX ADMIN — IPRATROPIUM BROMIDE AND ALBUTEROL SULFATE 1 AMPULE: .5; 3 SOLUTION RESPIRATORY (INHALATION) at 10:57

## 2019-11-13 RX ADMIN — Medication 10 ML: at 20:37

## 2019-11-13 RX ADMIN — INSULIN LISPRO 1 UNITS: 100 INJECTION, SOLUTION INTRAVENOUS; SUBCUTANEOUS at 04:23

## 2019-11-13 RX ADMIN — Medication 10 ML: at 05:17

## 2019-11-13 RX ADMIN — FENTANYL CITRATE 100 MCG: 50 INJECTION, SOLUTION INTRAMUSCULAR; INTRAVENOUS at 13:53

## 2019-11-13 RX ADMIN — ROCURONIUM BROMIDE 50 MG: 10 INJECTION, SOLUTION INTRAVENOUS at 13:32

## 2019-11-13 RX ADMIN — HYDROMORPHONE HYDROCHLORIDE 0.5 MG: 1 INJECTION, SOLUTION INTRAMUSCULAR; INTRAVENOUS; SUBCUTANEOUS at 14:58

## 2019-11-13 RX ADMIN — LEVOFLOXACIN 500 MG: 5 INJECTION, SOLUTION INTRAVENOUS at 16:22

## 2019-11-13 RX ADMIN — GUAIFENESIN AND DEXTROMETHORPHAN 5 ML: 100; 10 SYRUP ORAL at 09:04

## 2019-11-13 RX ADMIN — METOPROLOL SUCCINATE 25 MG: 25 TABLET, FILM COATED, EXTENDED RELEASE ORAL at 08:55

## 2019-11-13 RX ADMIN — INSULIN LISPRO 1 UNITS: 100 INJECTION, SOLUTION INTRAVENOUS; SUBCUTANEOUS at 00:17

## 2019-11-13 RX ADMIN — MUPIROCIN: 20 OINTMENT TOPICAL at 08:55

## 2019-11-13 RX ADMIN — HYDROMORPHONE HYDROCHLORIDE 0.5 MG: 1 INJECTION, SOLUTION INTRAMUSCULAR; INTRAVENOUS; SUBCUTANEOUS at 05:13

## 2019-11-13 ASSESSMENT — PULMONARY FUNCTION TESTS
PIF_VALUE: 29
PIF_VALUE: 36
PIF_VALUE: 28
PIF_VALUE: 30
PIF_VALUE: 27
PIF_VALUE: 28
PIF_VALUE: 29
PIF_VALUE: 0
PIF_VALUE: 18
PIF_VALUE: 29
PIF_VALUE: 28
PIF_VALUE: 29
PIF_VALUE: 3
PIF_VALUE: 28
PIF_VALUE: 37
PIF_VALUE: 27
PIF_VALUE: 1
PIF_VALUE: 6
PIF_VALUE: 28
PIF_VALUE: 28
PIF_VALUE: 27
PIF_VALUE: 27
PIF_VALUE: 0
PIF_VALUE: 28
PIF_VALUE: 29
PIF_VALUE: 1
PIF_VALUE: 28
PIF_VALUE: 29
PIF_VALUE: 31
PIF_VALUE: 29
PIF_VALUE: 28
PIF_VALUE: 29
PIF_VALUE: 36
PIF_VALUE: 29
PIF_VALUE: 3
PIF_VALUE: 27
PIF_VALUE: 30
PIF_VALUE: 28
PIF_VALUE: 27
PIF_VALUE: 28
PIF_VALUE: 0
PIF_VALUE: 27
PIF_VALUE: 27
PIF_VALUE: 4
PIF_VALUE: 29
PIF_VALUE: 28
PIF_VALUE: 29
PIF_VALUE: 27
PIF_VALUE: 29
PIF_VALUE: 3
PIF_VALUE: 10

## 2019-11-13 ASSESSMENT — PAIN DESCRIPTION - PAIN TYPE
TYPE: SURGICAL PAIN
TYPE: ACUTE PAIN
TYPE: ACUTE PAIN

## 2019-11-13 ASSESSMENT — PAIN SCALES - GENERAL
PAINLEVEL_OUTOF10: 7
PAINLEVEL_OUTOF10: 0
PAINLEVEL_OUTOF10: 0
PAINLEVEL_OUTOF10: 7
PAINLEVEL_OUTOF10: 6
PAINLEVEL_OUTOF10: 7
PAINLEVEL_OUTOF10: 5
PAINLEVEL_OUTOF10: 6
PAINLEVEL_OUTOF10: 6
PAINLEVEL_OUTOF10: 7
PAINLEVEL_OUTOF10: 6

## 2019-11-13 ASSESSMENT — PAIN DESCRIPTION - LOCATION
LOCATION: FLANK
LOCATION: FLANK
LOCATION: VAGINA

## 2019-11-13 ASSESSMENT — PAIN DESCRIPTION - ORIENTATION
ORIENTATION: LEFT
ORIENTATION: LEFT

## 2019-11-13 ASSESSMENT — PAIN DESCRIPTION - ONSET
ONSET: GRADUAL
ONSET: ON-GOING

## 2019-11-13 ASSESSMENT — PAIN DESCRIPTION - DESCRIPTORS
DESCRIPTORS: ACHING;STABBING
DESCRIPTORS: BURNING
DESCRIPTORS: ACHING

## 2019-11-13 ASSESSMENT — PAIN DESCRIPTION - PROGRESSION
CLINICAL_PROGRESSION: NOT CHANGED
CLINICAL_PROGRESSION: GRADUALLY WORSENING

## 2019-11-13 ASSESSMENT — PAIN - FUNCTIONAL ASSESSMENT
PAIN_FUNCTIONAL_ASSESSMENT: ACTIVITIES ARE NOT PREVENTED
PAIN_FUNCTIONAL_ASSESSMENT: ACTIVITIES ARE NOT PREVENTED

## 2019-11-13 ASSESSMENT — COPD QUESTIONNAIRES: CAT_SEVERITY: SEVERE

## 2019-11-13 ASSESSMENT — PAIN DESCRIPTION - FREQUENCY
FREQUENCY: INTERMITTENT
FREQUENCY: CONTINUOUS

## 2019-11-13 ASSESSMENT — LIFESTYLE VARIABLES: SMOKING_STATUS: 1

## 2019-11-13 ASSESSMENT — ENCOUNTER SYMPTOMS: SHORTNESS OF BREATH: 1

## 2019-11-14 ENCOUNTER — TELEPHONE (OUTPATIENT)
Dept: PULMONOLOGY | Age: 44
End: 2019-11-14

## 2019-11-14 VITALS
BODY MASS INDEX: 46.09 KG/M2 | RESPIRATION RATE: 22 BRPM | OXYGEN SATURATION: 96 % | DIASTOLIC BLOOD PRESSURE: 86 MMHG | WEIGHT: 260.1 LBS | HEART RATE: 99 BPM | SYSTOLIC BLOOD PRESSURE: 148 MMHG | HEIGHT: 63 IN | TEMPERATURE: 97.9 F

## 2019-11-14 LAB
GLUCOSE BLD-MCNC: 112 MG/DL (ref 70–99)
GLUCOSE BLD-MCNC: 129 MG/DL (ref 70–99)
GLUCOSE BLD-MCNC: 147 MG/DL (ref 70–99)
PERFORMED ON: ABNORMAL

## 2019-11-14 PROCEDURE — 6370000000 HC RX 637 (ALT 250 FOR IP): Performed by: UROLOGY

## 2019-11-14 PROCEDURE — 6360000002 HC RX W HCPCS: Performed by: UROLOGY

## 2019-11-14 PROCEDURE — 99239 HOSP IP/OBS DSCHRG MGMT >30: CPT | Performed by: INTERNAL MEDICINE

## 2019-11-14 PROCEDURE — 2700000000 HC OXYGEN THERAPY PER DAY

## 2019-11-14 PROCEDURE — 2580000003 HC RX 258: Performed by: UROLOGY

## 2019-11-14 PROCEDURE — 94640 AIRWAY INHALATION TREATMENT: CPT

## 2019-11-14 PROCEDURE — 94761 N-INVAS EAR/PLS OXIMETRY MLT: CPT

## 2019-11-14 PROCEDURE — 99232 SBSQ HOSP IP/OBS MODERATE 35: CPT | Performed by: INTERNAL MEDICINE

## 2019-11-14 RX ORDER — PREDNISONE 20 MG/1
TABLET ORAL
Qty: 9 TABLET | Refills: 0 | Status: SHIPPED | OUTPATIENT
Start: 2019-11-14 | End: 2020-01-07 | Stop reason: ALTCHOICE

## 2019-11-14 RX ORDER — LEVOFLOXACIN 500 MG/1
500 TABLET, FILM COATED ORAL DAILY
Qty: 4 TABLET | Refills: 0 | Status: SHIPPED | OUTPATIENT
Start: 2019-11-14 | End: 2019-11-18

## 2019-11-14 RX ADMIN — LEVOFLOXACIN 500 MG: 500 TABLET, FILM COATED ORAL at 09:22

## 2019-11-14 RX ADMIN — LISINOPRIL 20 MG: 20 TABLET ORAL at 09:23

## 2019-11-14 RX ADMIN — METFORMIN HYDROCHLORIDE 1000 MG: 500 TABLET ORAL at 09:23

## 2019-11-14 RX ADMIN — IPRATROPIUM BROMIDE AND ALBUTEROL SULFATE 1 AMPULE: .5; 3 SOLUTION RESPIRATORY (INHALATION) at 11:24

## 2019-11-14 RX ADMIN — CETIRIZINE HYDROCHLORIDE 10 MG: 10 TABLET ORAL at 09:22

## 2019-11-14 RX ADMIN — GABAPENTIN 400 MG: 400 CAPSULE ORAL at 09:23

## 2019-11-14 RX ADMIN — HYDROMORPHONE HYDROCHLORIDE 0.5 MG: 1 INJECTION, SOLUTION INTRAMUSCULAR; INTRAVENOUS; SUBCUTANEOUS at 04:43

## 2019-11-14 RX ADMIN — Medication 10 ML: at 09:32

## 2019-11-14 RX ADMIN — METOPROLOL SUCCINATE 25 MG: 25 TABLET, FILM COATED, EXTENDED RELEASE ORAL at 09:23

## 2019-11-14 RX ADMIN — METHYLPREDNISOLONE SODIUM SUCCINATE 40 MG: 40 INJECTION, POWDER, FOR SOLUTION INTRAMUSCULAR; INTRAVENOUS at 09:23

## 2019-11-14 RX ADMIN — HYDROMORPHONE HYDROCHLORIDE 0.5 MG: 1 INJECTION, SOLUTION INTRAMUSCULAR; INTRAVENOUS; SUBCUTANEOUS at 09:25

## 2019-11-14 RX ADMIN — ASPIRIN 81 MG: 81 TABLET, COATED ORAL at 09:22

## 2019-11-14 RX ADMIN — IPRATROPIUM BROMIDE AND ALBUTEROL SULFATE 1 AMPULE: .5; 3 SOLUTION RESPIRATORY (INHALATION) at 06:56

## 2019-11-14 RX ADMIN — TAMSULOSIN HYDROCHLORIDE 0.4 MG: 0.4 CAPSULE ORAL at 09:34

## 2019-11-14 RX ADMIN — FUROSEMIDE 20 MG: 40 TABLET ORAL at 09:23

## 2019-11-14 ASSESSMENT — PAIN DESCRIPTION - LOCATION
LOCATION: FLANK
LOCATION: FLANK

## 2019-11-14 ASSESSMENT — PAIN SCALES - GENERAL
PAINLEVEL_OUTOF10: 0
PAINLEVEL_OUTOF10: 6
PAINLEVEL_OUTOF10: 7

## 2019-11-14 ASSESSMENT — PAIN DESCRIPTION - PAIN TYPE
TYPE: ACUTE PAIN
TYPE: ACUTE PAIN

## 2019-11-14 ASSESSMENT — PAIN DESCRIPTION - DESCRIPTORS
DESCRIPTORS: ACHING
DESCRIPTORS: OTHER (COMMENT)

## 2019-11-14 ASSESSMENT — PAIN DESCRIPTION - PROGRESSION: CLINICAL_PROGRESSION: GRADUALLY WORSENING

## 2019-11-14 ASSESSMENT — PAIN DESCRIPTION - ONSET
ONSET: ON-GOING
ONSET: GRADUAL

## 2019-11-14 ASSESSMENT — PAIN DESCRIPTION - ORIENTATION
ORIENTATION: LEFT
ORIENTATION: LEFT

## 2019-11-14 ASSESSMENT — PAIN DESCRIPTION - FREQUENCY
FREQUENCY: INTERMITTENT
FREQUENCY: INTERMITTENT

## 2019-11-15 ENCOUNTER — TELEPHONE (OUTPATIENT)
Dept: FAMILY MEDICINE CLINIC | Age: 44
End: 2019-11-15

## 2019-11-15 LAB
BLOOD CULTURE, ROUTINE: NORMAL
CULTURE, BLOOD 2: NORMAL

## 2019-11-16 LAB
CALCULI COMPOSITION: NORMAL
MASS: 129 MG
STONE DESCRIPTION: NORMAL
STONE NUMBER: NORMAL
STONE SIZE: NORMAL MM

## 2019-12-16 ENCOUNTER — TELEPHONE (OUTPATIENT)
Dept: PULMONOLOGY | Age: 44
End: 2019-12-16

## 2019-12-30 DIAGNOSIS — I10 HYPERTENSION, UNSPECIFIED TYPE: Chronic | ICD-10-CM

## 2019-12-30 RX ORDER — LISINOPRIL 20 MG/1
TABLET ORAL
Qty: 90 TABLET | Refills: 0 | Status: SHIPPED | OUTPATIENT
Start: 2019-12-30 | End: 2020-01-09 | Stop reason: SDUPTHER

## 2020-01-07 ENCOUNTER — OFFICE VISIT (OUTPATIENT)
Dept: FAMILY MEDICINE CLINIC | Age: 45
End: 2020-01-07
Payer: MEDICARE

## 2020-01-07 VITALS
WEIGHT: 255.38 LBS | SYSTOLIC BLOOD PRESSURE: 162 MMHG | HEART RATE: 101 BPM | BODY MASS INDEX: 45.24 KG/M2 | DIASTOLIC BLOOD PRESSURE: 90 MMHG | TEMPERATURE: 98 F | OXYGEN SATURATION: 92 %

## 2020-01-07 LAB
A/G RATIO: 1.4 (ref 1.1–2.2)
ALBUMIN SERPL-MCNC: 4.2 G/DL (ref 3.4–5)
ALP BLD-CCNC: 58 U/L (ref 40–129)
ALT SERPL-CCNC: 59 U/L (ref 10–40)
ANION GAP SERPL CALCULATED.3IONS-SCNC: 12 MMOL/L (ref 3–16)
AST SERPL-CCNC: 45 U/L (ref 15–37)
BASOPHILS ABSOLUTE: 0.1 K/UL (ref 0–0.2)
BASOPHILS RELATIVE PERCENT: 0.9 %
BILIRUB SERPL-MCNC: 0.6 MG/DL (ref 0–1)
BUN BLDV-MCNC: 18 MG/DL (ref 7–20)
CALCIUM SERPL-MCNC: 9.1 MG/DL (ref 8.3–10.6)
CHLORIDE BLD-SCNC: 97 MMOL/L (ref 99–110)
CO2: 29 MMOL/L (ref 21–32)
CREAT SERPL-MCNC: 0.9 MG/DL (ref 0.6–1.1)
CREATININE URINE POCT: 50
EOSINOPHILS ABSOLUTE: 0.1 K/UL (ref 0–0.6)
EOSINOPHILS RELATIVE PERCENT: 1.5 %
GFR AFRICAN AMERICAN: >60
GFR NON-AFRICAN AMERICAN: >60
GLOBULIN: 3 G/DL
GLUCOSE BLD-MCNC: 97 MG/DL (ref 70–99)
HCT VFR BLD CALC: 48.8 % (ref 36–48)
HEMOGLOBIN: 15.9 G/DL (ref 12–16)
LYMPHOCYTES ABSOLUTE: 2 K/UL (ref 1–5.1)
LYMPHOCYTES RELATIVE PERCENT: 24.5 %
MCH RBC QN AUTO: 29.2 PG (ref 26–34)
MCHC RBC AUTO-ENTMCNC: 32.6 G/DL (ref 31–36)
MCV RBC AUTO: 89.8 FL (ref 80–100)
MICROALBUMIN/CREAT 24H UR: 150 MG/G{CREAT}
MICROALBUMIN/CREAT UR-RTO: >300
MONOCYTES ABSOLUTE: 0.6 K/UL (ref 0–1.3)
MONOCYTES RELATIVE PERCENT: 7.1 %
NEUTROPHILS ABSOLUTE: 5.3 K/UL (ref 1.7–7.7)
NEUTROPHILS RELATIVE PERCENT: 66 %
PDW BLD-RTO: 16.2 % (ref 12.4–15.4)
PLATELET # BLD: 136 K/UL (ref 135–450)
PMV BLD AUTO: 9.8 FL (ref 5–10.5)
POTASSIUM SERPL-SCNC: 5.1 MMOL/L (ref 3.5–5.1)
RBC # BLD: 5.44 M/UL (ref 4–5.2)
SODIUM BLD-SCNC: 138 MMOL/L (ref 136–145)
TOTAL PROTEIN: 7.2 G/DL (ref 6.4–8.2)
WBC # BLD: 8 K/UL (ref 4–11)

## 2020-01-07 PROCEDURE — 3046F HEMOGLOBIN A1C LEVEL >9.0%: CPT | Performed by: NURSE PRACTITIONER

## 2020-01-07 PROCEDURE — G8417 CALC BMI ABV UP PARAM F/U: HCPCS | Performed by: NURSE PRACTITIONER

## 2020-01-07 PROCEDURE — G8427 DOCREV CUR MEDS BY ELIG CLIN: HCPCS | Performed by: NURSE PRACTITIONER

## 2020-01-07 PROCEDURE — 82044 UR ALBUMIN SEMIQUANTITATIVE: CPT | Performed by: NURSE PRACTITIONER

## 2020-01-07 PROCEDURE — 2022F DILAT RTA XM EVC RTNOPTHY: CPT | Performed by: NURSE PRACTITIONER

## 2020-01-07 PROCEDURE — 99215 OFFICE O/P EST HI 40 MIN: CPT | Performed by: NURSE PRACTITIONER

## 2020-01-07 PROCEDURE — G8482 FLU IMMUNIZE ORDER/ADMIN: HCPCS | Performed by: NURSE PRACTITIONER

## 2020-01-07 PROCEDURE — 36415 COLL VENOUS BLD VENIPUNCTURE: CPT | Performed by: NURSE PRACTITIONER

## 2020-01-07 PROCEDURE — 4004F PT TOBACCO SCREEN RCVD TLK: CPT | Performed by: NURSE PRACTITIONER

## 2020-01-07 RX ORDER — GABAPENTIN 600 MG/1
600 TABLET ORAL 3 TIMES DAILY
Qty: 90 TABLET | Refills: 2 | Status: SHIPPED | OUTPATIENT
Start: 2020-01-07 | End: 2020-04-01 | Stop reason: SDUPTHER

## 2020-01-07 ASSESSMENT — PATIENT HEALTH QUESTIONNAIRE - PHQ9
SUM OF ALL RESPONSES TO PHQ QUESTIONS 1-9: 0
SUM OF ALL RESPONSES TO PHQ9 QUESTIONS 1 & 2: 0
2. FEELING DOWN, DEPRESSED OR HOPELESS: 0
SUM OF ALL RESPONSES TO PHQ QUESTIONS 1-9: 0
1. LITTLE INTEREST OR PLEASURE IN DOING THINGS: 0

## 2020-01-07 NOTE — PATIENT INSTRUCTIONS
Please read the healthy family handout that you were given and share it with your family. Please compare this printed medication list with your medications at home to be sure they are the same. If you have any medications that are different please contact us immediately at [phone]. Also review your allergies that we have listed, these may also include medications that you have not been able to tolerate, make sure everything listed is correct. If you have any allergies that are different please contact us immediately at [phone]. Patient Education        Stopping Smoking: Care Instructions  Your Care Instructions  Cigarette smokers crave the nicotine in cigarettes. Giving it up is much harder than simply changing a habit. Your body has to stop craving the nicotine. It is hard to quit, but you can do it. There are many tools that people use to quit smoking. You may find that combining tools works best for you. There are several steps to quitting. First you get ready to quit. Then you get support to help you. After that, you learn new skills and behaviors to become a nonsmoker. For many people, a necessary step is getting and using medicine. Your doctor will help you set up the plan that best meets your needs. You may want to attend a smoking cessation program to help you quit smoking. When you choose a program, look for one that has proven success. Ask your doctor for ideas. You will greatly increase your chances of success if you take medicine as well as get counseling or join a cessation program.  Some of the changes you feel when you first quit tobacco are uncomfortable. Your body will miss the nicotine at first, and you may feel short-tempered and grumpy. You may have trouble sleeping or concentrating. Medicine can help you deal with these symptoms. You may struggle with changing your smoking habits and rituals. The last step is the tricky one:  Be prepared for the smoking urge to continue for a time. This is a lot to deal with, but keep at it. You will feel better. Follow-up care is a key part of your treatment and safety. Be sure to make and go to all appointments, and call your doctor if you are having problems. It's also a good idea to know your test results and keep a list of the medicines you take. How can you care for yourself at home? · Ask your family, friends, and coworkers for support. You have a better chance of quitting if you have help and support. · Join a support group, such as Nicotine Anonymous, for people who are trying to quit smoking. · Consider signing up for a smoking cessation program, such as the American Lung Association's Freedom from Smoking program.  · Get text messaging support. Go to the website at www.smokefree. gov to sign up for the [facility] program.  · Set a quit date. Pick your date carefully so that it is not right in the middle of a big deadline or stressful time. Once you quit, do not even take a puff. Get rid of all ashtrays and lighters after your last cigarette. Clean your house and your clothes so that they do not smell of smoke. · Learn how to be a nonsmoker. Think about ways you can avoid those things that make you reach for a cigarette. ? Avoid situations that put you at greatest risk for smoking. For some people, it is hard to have a drink with friends without smoking. For others, they might skip a coffee break with coworkers who smoke. ? Change your daily routine. Take a different route to work or eat a meal in a different place. · Cut down on stress. Calm yourself or release tension by doing an activity you enjoy, such as reading a book, taking a hot bath, or gardening. · Talk to your doctor or pharmacist about nicotine replacement therapy, which replaces the nicotine in your body. You still get nicotine but you do not use tobacco. Nicotine replacement products help you slowly reduce the amount of nicotine you need.  These products come in several forms, many of them available over-the-counter:  ? Nicotine patches  ? Nicotine gum and lozenges  ? Nicotine inhaler  · Ask your doctor about bupropion (Wellbutrin) or varenicline (Chantix), which are prescription medicines. They do not contain nicotine. They help you by reducing withdrawal symptoms, such as stress and anxiety. · Some people find hypnosis, acupuncture, and massage helpful for ending the smoking habit. · Eat a healthy diet and get regular exercise. Having healthy habits will help your body move past its craving for nicotine. · Be prepared to keep trying. Most people are not successful the first few times they try to quit. Do not get mad at yourself if you smoke again. Make a list of things you learned and think about when you want to try again, such as next week, next month, or next year. Where can you learn more? Go to https://Em[employer]hollieweb.[employer]. org and sign in to your [employer] account. Enter V297 in the [employer] box to learn more about \"Stopping Smoking: Care Instructions. \"     If you do not have an account, please click on the \"Sign Up Now\" link. Current as of: September 26, 2018  Content Version: 12.1  © 1994-4879 [employer]wise, [employer]. Care instructions adapted under license by [facility] [address] If you have questions about a medical condition or this instruction, always ask your healthcare professional. [address]  any warranty or liability for your use of this information.

## 2020-01-07 NOTE — PROGRESS NOTES
go a long period of time without eating. She does not know which foods she should avoid to help her lose weight and also help control her sugar. She states that she has noticed that she has had over the past month she has been more short of breath when walking from one into the house to the other. Knows she has gained weight and needs to lose it to help improve her health. She denies abnormal fatigue, diaphoresis, shaking, confusion, nausea, delayed wound healing. She has not had her diabetic eye exam.  She does report peripheral neuralgia especially in her hands and feet. This is been getting worse over the past month. She has had more burning and tingling sensation in her hands. She is starting to drop things. She has had more neuropathic pain in the right leg as well. She does take gabapentin but does not feel it is been as effective. Denies any recent injuries, bowel or bladder incontinence, saddle numbness, weakness or fever. HTN  Current treatment includes lisinopril. She is compliant with medication. Denies adverse effects. She does not check her blood pressure at home but feels that it is running okay. Last visit her medication was increased. She states she has been taking that dose. Denies headache, abnormal fatigue, chest pain, palpitations, jaw pain, diaphoresis. As long as she takes Lasix she does not have ankle swelling. She does smoke.     Current Outpatient Medications   Medication Sig Dispense Refill    lisinopril (PRINIVIL;ZESTRIL) 20 MG tablet TAKE 1 TABLET BY MOUTH EVERY DAY 90 tablet 0    gabapentin (NEURONTIN) 400 MG capsule TAKE 1 CAPSULE BY MOUTH 3 TIMES DAILY 90 capsule 1    budesonide-formoterol (SYMBICORT) 80-4.5 MCG/ACT AERO Inhale 2 puffs into the lungs 2 times daily 1 Inhaler 3    furosemide (LASIX) 20 MG tablet Take 20 mg by mouth 2 times daily      metoprolol succinate (TOPROL XL) 25 MG extended release tablet Take 1 tablet by mouth daily 90 tablet 1    metFORMIN (GLUCOPHAGE) 1000 MG tablet Take 1 tablet by mouth 2 times daily (with meals) 180 tablet 1    albuterol sulfate HFA (PROVENTIL HFA) 108 (90 Base) MCG/ACT inhaler Inhale 2 puffs into the lungs every 6 hours as needed for Wheezing 1 Inhaler 3    ipratropium-albuterol (DUONEB) 0.5-2.5 (3) MG/3ML SOLN nebulizer solution Inhale 3 mLs into the lungs 4 times daily 360 mL 1    aspirin 81 MG tablet Take 81 mg by mouth daily      glucose monitoring kit (FREESTYLE) monitoring kit 1 kit by Does not apply route daily 1 kit 0    glucose blood VI test strips (ACURA BLOOD GLUCOSE TEST) strip As needed. 60 strip 0    CVS Lancets Ultra Thin MISC 1 each by Does not apply route daily 100 each 0    OXYGEN Inhale 4 L into the lungs continuous  0    tamsulosin (FLOMAX) 0.4 MG capsule Take 1 capsule by mouth daily 30 capsule 0    albuterol (PROVENTIL) (5 MG/ML) 0.5% nebulizer solution Take 0.5 mLs by nebulization 2 times daily 30 mL 0     No current facility-administered medications for this visit. Patient's past medical history, surgical history, family history, medications,  and allergies  were all reviewed and updated as appropriate today. Review of Systems  See HPI    Physical Exam  Vitals signs and nursing note reviewed. Constitutional:       General: She is not in acute distress. Appearance: She is well-developed. She is obese. She is not toxic-appearing. HENT:      Head: Normocephalic and atraumatic. Eyes:      Extraocular Movements: Extraocular movements intact. Neck:      Musculoskeletal: Neck supple. Cardiovascular:      Rate and Rhythm: Normal rate and regular rhythm. Pulses: Normal pulses. Pulmonary:      Effort: Pulmonary effort is normal.      Comments: Oxygen per nasal cannula  Musculoskeletal:      Right lower leg: No edema. Left lower leg: No edema.       Comments: Foot exam:     Cyanosis or signs of ischemia - none      Ulcerations - none    Callouses - no

## 2020-01-08 LAB
ESTIMATED AVERAGE GLUCOSE: 125.5 MG/DL
HBA1C MFR BLD: 6 %

## 2020-01-09 ENCOUNTER — TELEPHONE (OUTPATIENT)
Dept: FAMILY MEDICINE CLINIC | Age: 45
End: 2020-01-09

## 2020-01-09 RX ORDER — LISINOPRIL 40 MG/1
TABLET ORAL
Qty: 1 TABLET | Refills: 0
Start: 2020-01-09 | End: 2020-06-29 | Stop reason: SDUPTHER

## 2020-01-09 NOTE — TELEPHONE ENCOUNTER
Patient had a question about why you were increasing her bp pill and not her water pill but then she said she doesn't have any water pills to take and if she doesn't take them she can't urinate right. Is she supposed to be taking water pill daily?

## 2020-01-09 NOTE — TELEPHONE ENCOUNTER
I increased her blood pressure pill because her blood pressure is not controlled. I did not stop her water pill. She is supposed to be taking Lasix 20 mg twice daily.

## 2020-01-10 RX ORDER — FUROSEMIDE 20 MG/1
20 TABLET ORAL 2 TIMES DAILY
Qty: 60 TABLET | Refills: 0 | Status: SHIPPED | OUTPATIENT
Start: 2020-01-10 | End: 2020-02-21

## 2020-02-05 RX ORDER — METOPROLOL SUCCINATE 25 MG/1
TABLET, EXTENDED RELEASE ORAL
Qty: 30 TABLET | Refills: 5 | Status: SHIPPED | OUTPATIENT
Start: 2020-02-05 | End: 2020-09-29 | Stop reason: SDUPTHER

## 2020-02-21 RX ORDER — FUROSEMIDE 20 MG/1
20 TABLET ORAL 2 TIMES DAILY
Qty: 60 TABLET | Refills: 2 | Status: SHIPPED | OUTPATIENT
Start: 2020-02-21 | End: 2020-04-24

## 2020-02-26 ENCOUNTER — TELEPHONE (OUTPATIENT)
Dept: PULMONOLOGY | Age: 45
End: 2020-02-26

## 2020-02-26 NOTE — TELEPHONE ENCOUNTER
Patient did not show for 2-4 week hospital follow-up appointment  with Alberto Roldan on 2/26/20    Same Day Cancellation: No    Patient rescheduled:  No    New appointment: n/a    Patient was also no show on: 12/16/19,11/5/19 and 5/7/18    LOV   ASSESSMENT:11/14/19  [facility] note  · Acute on chronic hypoxemic and hypercapnic respiratory failure  · COPD with acute exacerbation  · Left ureteral stent on 11/9/2019  · Nephrolithiasis  · Diabetes mellitus  · Ongoing tobacco abuse     PLAN:  · BIPAP HS, 17/11 fixed pressure (changed by me from Auto)  · Prednisone taper  · Inhaled bronchodilators   · Levaquin D#4  · Okay with me for home

## 2020-04-01 ENCOUNTER — NURSE TRIAGE (OUTPATIENT)
Dept: OTHER | Facility: CLINIC | Age: 45
End: 2020-04-01

## 2020-04-01 RX ORDER — GABAPENTIN 600 MG/1
600 TABLET ORAL 3 TIMES DAILY
Qty: 90 TABLET | Refills: 2 | Status: SHIPPED | OUTPATIENT
Start: 2020-04-01 | End: 2020-06-24

## 2020-04-01 NOTE — TELEPHONE ENCOUNTER
Date of last refill of this med was 1/7, # of pills given 90 and # of refills given 2. Their next appointment is 4/21, the last date patient was seen was 1/7. Does patient have medication agreement on file? Yes  Has drug screen been done in last 12 months if needed?  no

## 2020-04-01 NOTE — TELEPHONE ENCOUNTER
Controlled Substance Monitoring:    Acute and Chronic Pain Monitoring:   RX Monitoring 4/1/2020   Periodic Controlled Substance Monitoring No signs of potential drug abuse or diversion identified.

## 2020-04-01 NOTE — TELEPHONE ENCOUNTER
Reason for Disposition   [1] Numbness (i.e., loss of sensation) of the face, arm / hand, or leg / foot on one side of the body AND [2] gradual onset (e.g., days to weeks) AND [3] present now    Answer Assessment - Initial Assessment Questions  1. SYMPTOM: \"What is the main symptom you are concerned about? \" (e.g., weakness, numbness)      Weakness, numbness, feels cool, unable to open jars, there is a pulse, swelling   2. ONSET: \"When did this start? \" (minutes, hours, days; while sleeping)      4 days   3. LAST NORMAL: \"When was the last time you were normal (no symptoms)? \"      5 days  4. PATTERN \"Does this come and go, or has it been constant since it started? \"  \"Is it present now? \"      constant  5. CARDIAC SYMPTOMS: \"Have you had any of the following symptoms: chest pain, difficulty breathing, palpitations? \"      CHF   O2 4LPM  6. NEUROLOGIC SYMPTOMS: \"Have you had any of the following symptoms: headache, dizziness, vision loss, double vision, changes in speech, unsteady on your feet? \"      Headache, 2 weeks ago pain in right eye, palm of hand itches  7. OTHER SYMPTOMS: \"Do you have any other symptoms? \"      NA  8. PREGNANCY: \"Is there any chance you are pregnant? \" \"When was your last menstrual period? \"      1 month ago/tubal ligation    Protocols used: NEUROLOGIC DEFICIT-ADULT-    Received call from [address] on VM. Spoke w/caty d/t symptoms, triage recommendation to go to [facility] \[facility]\"" or speak w/provider in PCP office. Paged [address] on call for [facility] practice directly to patient. Advised pt to CB if she has not heard back in 30 minutes. Patient triaged using appropriate protocol. Care advice given per protocol. Patient/Caregiver verbalized understanding of care advice and disposition. Please do not respond to the triage nurse through this encounter. Any subsequent communication should be directly with the patient.

## 2020-04-02 NOTE — TELEPHONE ENCOUNTER
Patient called on line answering service with complaints of numbness and weakness in extremity. On-call physician/NP spoke with patient and instructed them  To go to the ER immediately for evaluation. Patient reports that she would go in the morning. On call NP advised of risks associated with not being evaluated in a timely manner and patient aware. I was notified of patient's complaints upon arriving to the office this morning at 8 AM.  Multiple phone call attempts were made by myself. I called [phone] which is listed as patient's cell phone however she did not answer and was unable to leave a voicemail. I then called [phone] which is listed as patient's home number however it has been disconnected. Patient's son in law and daughter were both listed as emergency contacts in her chart in which they were both called and phone numbers had been disconnected as well. Patient needs to be contacted and advised to go immediately to the Emergency department for evaluation.

## 2020-04-24 ENCOUNTER — VIRTUAL VISIT (OUTPATIENT)
Dept: FAMILY MEDICINE CLINIC | Age: 45
End: 2020-04-24
Payer: MEDICARE

## 2020-04-24 PROBLEM — J96.22 ACUTE ON CHRONIC RESPIRATORY FAILURE WITH HYPOXIA AND HYPERCAPNIA (HCC): Status: RESOLVED | Noted: 2017-08-23 | Resolved: 2020-04-24

## 2020-04-24 PROBLEM — J96.01 ACUTE RESPIRATORY FAILURE WITH HYPOXIA (HCC): Status: RESOLVED | Noted: 2017-12-21 | Resolved: 2020-04-24

## 2020-04-24 PROBLEM — J96.21 ACUTE ON CHRONIC RESPIRATORY FAILURE WITH HYPOXIA AND HYPERCAPNIA (HCC): Status: RESOLVED | Noted: 2017-08-23 | Resolved: 2020-04-24

## 2020-04-24 PROBLEM — J96.00 ACUTE RESPIRATORY FAILURE (HCC): Status: RESOLVED | Noted: 2019-11-10 | Resolved: 2020-04-24

## 2020-04-24 PROCEDURE — 99214 OFFICE O/P EST MOD 30 MIN: CPT | Performed by: NURSE PRACTITIONER

## 2020-04-24 RX ORDER — IPRATROPIUM BROMIDE AND ALBUTEROL SULFATE 2.5; .5 MG/3ML; MG/3ML
3 SOLUTION RESPIRATORY (INHALATION) 4 TIMES DAILY
Qty: 360 ML | Refills: 1 | Status: SHIPPED | OUTPATIENT
Start: 2020-04-24 | End: 2020-09-04 | Stop reason: SDUPTHER

## 2020-04-24 RX ORDER — NICOTINE 21 MG/24HR
1 PATCH, TRANSDERMAL 24 HOURS TRANSDERMAL DAILY
Qty: 42 PATCH | Refills: 0 | Status: ON HOLD | OUTPATIENT
Start: 2020-04-24 | End: 2021-01-08 | Stop reason: CLARIF

## 2020-04-24 RX ORDER — FUROSEMIDE 20 MG/1
60 TABLET ORAL DAILY
Qty: 60 TABLET | Refills: 1 | Status: SHIPPED | OUTPATIENT
Start: 2020-04-24 | End: 2020-11-03 | Stop reason: SDUPTHER

## 2020-04-24 NOTE — PROGRESS NOTES
Angela Ruvalcaba is a 50 y.o. female evaluated via telephone on 4/24/2020. Consent:  She and/or health care decision maker is aware that that she may receive a bill for this telephone service, depending on her insurance coverage, and has provided verbal consent to proceed: Yes    Patient does not have capabilities to do virtual visit therefore telephone encounter was performed. Documentation:  I communicated with the patient and/or health care decision maker about checkup. Patient reports that she is compliant with all medications denies any missed doses. No dizziness or lightheadedness. Patient reports that she has had some worsening shortness of breath contributed to her COPD and had to increase her oxygen to 4 L over the past month and a half. Patient was previously on 3 to 3-1/2 L nasal cannula. Patient reports that shortness of breath is worse in the mornings when she wakes up and has to walk to the bathroom she has to stop because she is so short of breath. Patient reports some chest tightness and feels like \"fire burning in her chest.\"  With shortness of breath. Patient denies any fatigue, chills, or fever. No productive cough. No unintentional weight loss or weight gain. Patient reports that when she feels short of breath sometimes it is related to fluid in her lungs. Patient reports that she takes Lasix 40 mg daily but would like to increase to see if that will help. Patient reports that when she has to increase her oxygen it does cause her to have a headache at times. Patient reports headache over the past 2 days denies any blurred vision visual disturbances or dizziness/lightheadedness. No abdominal pain or discomfort. No nausea, vomiting, or diarrhea. No urinary changes. No dark or tarry stools. Patient smokes half pack per day but would like to quit. Patient denies any recreational drug use.   Patient reports that last weight was 259 pounds and reports losing 8 to 9 pounds for a lamp 1 to 2 months ago. Patient reports that she feels her neuropathy is getting worse. Patient has not seen neurology in quite some time. Referral was given for neurology to follow-up on worsening neuropathy. Patient verbalizes and acknowledges. Continue with current therapy at this time. Labs ordered and pending. 3.  Obstructive sleep apnea  Controlled. Patient reports that she wears a CPAP at night. Patient reports waking up feeling rested. Continue with current treatment. 4.  Hypertension  Stable. Patient reports that she has a blood pressure cuff at home but cannot find it. Patient was instructed on the importance of finding the blood pressure cuff and if inability to find it to let me know so that we can discuss ordering one if patient unable to get one over-the-counter. Patient aware of importance of checking blood pressure daily due to previous uncontrolled readings. Patient reports that she is compliant with metoprolol and lisinopril daily. Patient reports some episodes of chest tightness associated with shortness of breath/COPD. No dizziness or lightheadedness. Continue with current therapy. Labs ordered and pending. 5.  Smoker  Patient has longstanding history of tobacco use. Patient reports that she has cut down recently and is down to 1/2 pack/day. Patient reports that she would like to discontinue smoking cigarettes daily. Patient reports that she has tried nicotine patches in the past but something changed with her insurance but would like to try them again. Patient aware of risks associated with tobacco use, cardiovascular disease, diabetes mellitus, and uncontrolled hypertension. Patient's will be started on nicotine patches and follow-up as recommended. 6.  Obesity  Stable. Patient reports that she has lost approximately 8 pounds since previous visit. Patient instructed on continuing with diet and exercise.   Discussion with healthy diet habits, exercise,

## 2020-06-18 ENCOUNTER — OFFICE VISIT (OUTPATIENT)
Dept: FAMILY MEDICINE CLINIC | Age: 45
End: 2020-06-18
Payer: MEDICARE

## 2020-06-18 VITALS
DIASTOLIC BLOOD PRESSURE: 66 MMHG | TEMPERATURE: 97.1 F | HEART RATE: 100 BPM | BODY MASS INDEX: 47.12 KG/M2 | WEIGHT: 266 LBS | OXYGEN SATURATION: 98 % | SYSTOLIC BLOOD PRESSURE: 158 MMHG

## 2020-06-18 PROCEDURE — 99213 OFFICE O/P EST LOW 20 MIN: CPT | Performed by: NURSE PRACTITIONER

## 2020-06-18 RX ORDER — ALBUTEROL SULFATE 90 UG/1
2 AEROSOL, METERED RESPIRATORY (INHALATION) EVERY 6 HOURS PRN
Qty: 1 INHALER | Refills: 3 | Status: CANCELLED | OUTPATIENT
Start: 2020-06-18

## 2020-06-18 ASSESSMENT — ENCOUNTER SYMPTOMS
VOMITING: 0
SHORTNESS OF BREATH: 0
RHINORRHEA: 0
WHEEZING: 0
SORE THROAT: 0
CHEST TIGHTNESS: 0
COUGH: 0
ABDOMINAL PAIN: 0
ABDOMINAL DISTENTION: 0
DIARRHEA: 0
NAUSEA: 0

## 2020-06-18 NOTE — PROGRESS NOTES
Non-medical: Not on file   Tobacco Use    Smoking status: Current Every Day Smoker     Packs/day: 1.00     Years: 33.00     Pack years: 33.00     Types: E-Cigarettes, Cigarettes    Smokeless tobacco: Never Used    Tobacco comment: 2/14/18 - smokes 5 cigs daily   Substance and Sexual Activity    Alcohol use: No    Drug use: No     Comment: Heroin    Sexual activity: Not Currently     Partners: Male   Lifestyle    Physical activity     Days per week: Not on file     Minutes per session: Not on file    Stress: Not on file   Relationships    Social connections     Talks on phone: Not on file     Gets together: Not on file     Attends Congregation service: Not on file     Active member of club or organization: Not on file     Attends meetings of clubs or organizations: Not on file     Relationship status: Not on file    Intimate partner violence     Fear of current or ex partner: Not on file     Emotionally abused: Not on file     Physically abused: Not on file     Forced sexual activity: Not on file   Other Topics Concern    Not on file   Social History Narrative    ** Merged History Encounter **          Current Outpatient Medications   Medication Sig Dispense Refill    ipratropium-albuterol (DUONEB) 0.5-2.5 (3) MG/3ML SOLN nebulizer solution Inhale 3 mLs into the lungs 4 times daily 360 mL 1    furosemide (LASIX) 20 MG tablet Take 3 tablets by mouth daily 60 tablet 1    metoprolol succinate (TOPROL XL) 25 MG extended release tablet TAKE 1 TABLET BY MOUTH EVERY DAY 30 tablet 5    metFORMIN (GLUCOPHAGE) 500 MG tablet Take 1 tablet by mouth 2 times daily (with meals) 60 tablet 5    lisinopril (PRINIVIL;ZESTRIL) 40 MG tablet TAKE 1 TABLET BY MOUTH EVERY DAY 1 tablet 0    budesonide-formoterol (SYMBICORT) 80-4.5 MCG/ACT AERO Inhale 2 puffs into the lungs 2 times daily 1 Inhaler 3    glucose blood VI test strips (ACURA BLOOD GLUCOSE TEST) strip As needed.  60 strip 0    [employer] Lancets Ultra Thin MISC 1 each by LMP 05/01/2020   SpO2 98%   BMI 47.12 kg/m²   Physical Exam  Vitals signs reviewed. Constitutional:       General: She is not in acute distress. Appearance: Normal appearance. She is well-developed. She is not diaphoretic. HENT:      Head: Normocephalic and atraumatic. Right Ear: Tympanic membrane normal.      Left Ear: Tympanic membrane normal.      Nose: Nose normal.      Mouth/Throat:      Mouth: Mucous membranes are moist.      Pharynx: Oropharynx is clear. No oropharyngeal exudate or posterior oropharyngeal erythema. Eyes:      General:         Right eye: No discharge. Left eye: No discharge. Pupils: Pupils are equal, round, and reactive to light. Neck:      Musculoskeletal: Normal range of motion and neck supple. Vascular: No JVD. Cardiovascular:      Rate and Rhythm: Normal rate and regular rhythm. Pulses: Normal pulses. Pulmonary:      Effort: Pulmonary effort is normal.   Abdominal:      General: Bowel sounds are normal.      Palpations: Abdomen is soft. Tenderness: There is no guarding. Musculoskeletal:         General: Tenderness and signs of injury present. No swelling or deformity. Left shoulder: She exhibits decreased range of motion, bony tenderness, swelling and pain. Arms:    Skin:     General: Skin is warm and dry. Capillary Refill: Capillary refill takes 2 to 3 seconds. Coloration: Skin is not pale. Findings: No bruising, lesion or rash. Neurological:      General: No focal deficit present. Mental Status: She is alert and oriented to person, place, and time. Psychiatric:         Mood and Affect: Mood normal.         Behavior: Behavior normal.            ASSESSMENT/PLAN:   1. Localized pain of left shoulder joint  Patient presents today with a one-week history of left shoulder pain. Patient reports that she does have history of injury to left shoulder with result of rotator cuff damage.   Patient reports that

## 2020-06-24 RX ORDER — GABAPENTIN 600 MG/1
600 TABLET ORAL 3 TIMES DAILY
Qty: 90 TABLET | Refills: 2 | Status: SHIPPED | OUTPATIENT
Start: 2020-06-24 | End: 2020-09-25

## 2020-06-24 NOTE — TELEPHONE ENCOUNTER
Date of last refill of this med was 4/1, # of pills given 90 and # of refills given 2. Their next appointment is 7/24, the last date patient was seen was 4/24. Does patient have medication agreement on file? Yes  Has drug screen been done in last 12 months if needed?  no

## 2020-06-29 RX ORDER — LISINOPRIL 40 MG/1
TABLET ORAL
Qty: 30 TABLET | Refills: 0 | Status: SHIPPED | OUTPATIENT
Start: 2020-06-29 | End: 2020-08-26

## 2020-06-30 RX ORDER — LISINOPRIL 20 MG/1
TABLET ORAL
Qty: 90 TABLET | Refills: 1 | OUTPATIENT
Start: 2020-06-30

## 2020-07-31 ENCOUNTER — TELEPHONE (OUTPATIENT)
Dept: FAMILY MEDICINE CLINIC | Age: 45
End: 2020-07-31

## 2020-07-31 NOTE — LETTER
[id number]  Phone: [phone]  Fax: [phone]      August 3, 2020     [place] [address]      Dear Blayne Winn:    I am writing you because I have been informed of your missed appointment(s). We ask that you please call 24 hours in advance if you are unable to make your appointment so that we can give that time to another patient in need. We care about you and the management of your healthcare and want to make sure that you follow up as recommended. I have to also mention, that in an effort to provide quality care and timely appointments to all my patients, it is our policy that patients be discharged from the practice if they do not show for three scheduled appointments. You would be informed of this termination by mail, and would have 30 days from the date of discharge to locate another physician. We're sorry you were unable to keep your appointment and hope that you are doing well. We would like to continue treating your healthcare needs. Please call the office to let us know your plans for treatment and to reschedule your appointment.      Sincerely,    Kelly Oshea

## 2020-07-31 NOTE — TELEPHONE ENCOUNTER
Patient no show for appointment on 7/31/20 unable to reach patient no answering and no voicemail set up

## 2020-08-26 RX ORDER — LISINOPRIL 40 MG/1
TABLET ORAL
Qty: 30 TABLET | Refills: 3 | Status: SHIPPED | OUTPATIENT
Start: 2020-08-26 | End: 2020-11-03 | Stop reason: SDUPTHER

## 2020-09-04 ENCOUNTER — OFFICE VISIT (OUTPATIENT)
Dept: FAMILY MEDICINE CLINIC | Age: 45
End: 2020-09-04
Payer: MEDICARE

## 2020-09-04 VITALS
OXYGEN SATURATION: 95 % | HEART RATE: 103 BPM | TEMPERATURE: 98.4 F | SYSTOLIC BLOOD PRESSURE: 152 MMHG | DIASTOLIC BLOOD PRESSURE: 88 MMHG

## 2020-09-04 PROCEDURE — 36415 COLL VENOUS BLD VENIPUNCTURE: CPT | Performed by: NURSE PRACTITIONER

## 2020-09-04 PROCEDURE — 99215 OFFICE O/P EST HI 40 MIN: CPT | Performed by: NURSE PRACTITIONER

## 2020-09-04 RX ORDER — ALBUTEROL SULFATE 90 UG/1
2 AEROSOL, METERED RESPIRATORY (INHALATION) EVERY 6 HOURS PRN
Qty: 1 INHALER | Refills: 3 | Status: SHIPPED | OUTPATIENT
Start: 2020-09-04 | End: 2020-11-03 | Stop reason: SDUPTHER

## 2020-09-04 RX ORDER — IPRATROPIUM BROMIDE AND ALBUTEROL SULFATE 2.5; .5 MG/3ML; MG/3ML
3 SOLUTION RESPIRATORY (INHALATION) 4 TIMES DAILY
Qty: 360 ML | Refills: 1 | Status: SHIPPED | OUTPATIENT
Start: 2020-09-04 | End: 2020-11-03 | Stop reason: SDUPTHER

## 2020-09-04 RX ORDER — BUDESONIDE AND FORMOTEROL FUMARATE DIHYDRATE 80; 4.5 UG/1; UG/1
2 AEROSOL RESPIRATORY (INHALATION) 2 TIMES DAILY
Qty: 1 INHALER | Refills: 3 | Status: SHIPPED | OUTPATIENT
Start: 2020-09-04 | End: 2020-11-03 | Stop reason: SDUPTHER

## 2020-09-04 ASSESSMENT — ENCOUNTER SYMPTOMS
SORE THROAT: 0
COUGH: 1
CHEST TIGHTNESS: 1
ABDOMINAL PAIN: 0
RHINORRHEA: 0
WHEEZING: 0
VOMITING: 0
SHORTNESS OF BREATH: 0
ABDOMINAL DISTENTION: 0
DIARRHEA: 0
NAUSEA: 0

## 2020-09-04 NOTE — PATIENT INSTRUCTIONS
Please read the healthy family handout that you were given and share it with your family. Please compare this printed medication list with your medications at home to be sure they are the same. If you have any medications that are different please contact us immediately at [phone]. Also review your allergies that we have listed, these may also include medications that you have not been able to tolerate, make sure everything listed is correct. If you have any allergies that are different please contact us immediately at [phone]. Patient Education        Stopping Smoking: Care Instructions  Your Care Instructions     Cigarette smokers crave the nicotine in cigarettes. Giving it up is much harder than simply changing a habit. Your body has to stop craving the nicotine. It is hard to quit, but you can do it. There are many tools that people use to quit smoking. You may find that combining tools works best for you. There are several steps to quitting. First you get ready to quit. Then you get support to help you. After that, you learn new skills and behaviors to become a nonsmoker. For many people, a necessary step is getting and using medicine. Your doctor will help you set up the plan that best meets your needs. You may want to attend a smoking cessation program to help you quit smoking. When you choose a program, look for one that has proven success. Ask your doctor for ideas. You will greatly increase your chances of success if you take medicine as well as get counseling or join a cessation program.  Some of the changes you feel when you first quit tobacco are uncomfortable. Your body will miss the nicotine at first, and you may feel short-tempered and grumpy. You may have trouble sleeping or concentrating. Medicine can help you deal with these symptoms. You may struggle with changing your smoking habits and rituals. The last step is the tricky one:  Be prepared for the smoking urge to continue for a time. This is a lot to deal with, but keep at it. You will feel better. Follow-up care is a key part of your treatment and safety. Be sure to make and go to all appointments, and call your doctor if you are having problems. It's also a good idea to know your test results and keep a list of the medicines you take. How can you care for yourself at home? · Ask your family, friends, and coworkers for support. You have a better chance of quitting if you have help and support. · Join a support group, such as Nicotine Anonymous, for people who are trying to quit smoking. · Consider signing up for a smoking cessation program, such as the American Lung Association's Freedom from Smoking program.  · Get text messaging support. Go to the website at www.smokefree. gov to sign up for the [facility] program.  · Set a quit date. Pick your date carefully so that it is not right in the middle of a big deadline or stressful time. Once you quit, do not even take a puff. Get rid of all ashtrays and lighters after your last cigarette. Clean your house and your clothes so that they do not smell of smoke. · Learn how to be a nonsmoker. Think about ways you can avoid those things that make you reach for a cigarette. ? Avoid situations that put you at greatest risk for smoking. For some people, it is hard to have a drink with friends without smoking. For others, they might skip a coffee break with coworkers who smoke. ? Change your daily routine. Take a different route to work or eat a meal in a different place. · Cut down on stress. Calm yourself or release tension by doing an activity you enjoy, such as reading a book, taking a hot bath, or gardening. · Talk to your doctor or pharmacist about nicotine replacement therapy, which replaces the nicotine in your body. You still get nicotine but you do not use tobacco. Nicotine replacement products help you slowly reduce the amount of nicotine you need.  These products come in

## 2020-09-04 NOTE — PROGRESS NOTES
CHIEF COMPLAINT  Chief Complaint   Patient presents with   Helga Williamson is a 50 y.o. female who presents to the office for checkup. Patient denies any recent changes in medications. Patient seen previously via virtual visit and was given multiple referrals however she has not been able to follow-up due to no ride. Patient reports that she still smokes half pack per day but is trying to use nicotine gum. Patient reports that she used nicotine patches but they irritated her skin. No recent hospitalizations. No new unusual fatigue or unexplained weight loss. Patient reports that she does wear 4 L nasal cannula oxygen continuously. Patient reports that when she does not wear her oxygen she comes short of breath and has chest pain. Patient reports that sometimes lightheadedness as well. Patient reports that when she has oxygen on she does not have those symptoms. Patient reports chronic cough nonproductive. No fever or chills. No nausea, vomiting, diarrhea. No abdominal pain or discomfort. Patient reports that she has not had her inhalers in over a year. Patient reports upon arrival that her portable oxygen was out and does not deliver until Mondays. Patient has seen pulmonology in the past however she did not show up to multiple appointments and was canceled from their services. No change in urinary habits. No dark or tarry stools. Patient reports that she has ongoing shoulder issues. Patient reports that she has had left shoulder pain for years due to \"some type of injury when I was unconscious in the hospital years ago. \"  Patient reports that over the past month she has had worsening pain in her left shoulder and limited range of motion. Patient reports that it started after she leaned back onto the wall. No fall. No unilateral weakness or numbness and tingling. Patient reports that she always has numbness and tingling in her right hand.   Patient has history of budesonide-formoterol (SYMBICORT) 80-4.5 MCG/ACT AERO Inhale 2 puffs into the lungs 2 times daily 1 Inhaler 3    ipratropium-albuterol (DUONEB) 0.5-2.5 (3) MG/3ML SOLN nebulizer solution Inhale 3 mLs into the lungs 4 times daily 360 mL 1    lisinopril (PRINIVIL;ZESTRIL) 40 MG tablet TAKE 1 TABLET BY MOUTH EVERY DAY 30 tablet 3    gabapentin (NEURONTIN) 600 MG tablet Take 1 tablet by mouth 3 times daily for 30 days. 90 tablet 2    furosemide (LASIX) 20 MG tablet Take 3 tablets by mouth daily 60 tablet 1    metoprolol succinate (TOPROL XL) 25 MG extended release tablet TAKE 1 TABLET BY MOUTH EVERY DAY 30 tablet 5    metFORMIN (GLUCOPHAGE) 500 MG tablet Take 1 tablet by mouth 2 times daily (with meals) 60 tablet 5    glucose monitoring kit (FREESTYLE) monitoring kit 1 kit by Does not apply route daily 1 kit 0    glucose blood VI test strips (ACURA BLOOD GLUCOSE TEST) strip As needed. 60 strip 0    [employer] Lancets Ultra Thin MISC 1 each by Does not apply route daily 100 each 0    OXYGEN Inhale 4 L into the lungs continuous  0    nicotine (NICODERM CQ) 14 MG/24HR Place 1 patch onto the skin daily 42 patch 0    tamsulosin (FLOMAX) 0.4 MG capsule Take 1 capsule by mouth daily 30 capsule 0    albuterol (PROVENTIL) (5 MG/ML) 0.5% nebulizer solution Take 0.5 mLs by nebulization 2 times daily 30 mL 0     No current facility-administered medications for this visit. Allergies   Allergen Reactions    Pcn [Penicillins] Shortness Of Breath and Swelling    Aspirin Other (See Comments)     Cause GI upset. But takes 81 mg aspirin at home    Pcn [Penicillins]      swelling    Sulfamethoxazole-Trimethoprim Hives       REVIEW OF SYSTEMS  Review of Systems   Constitutional: Negative for activity change, appetite change, chills and fever. HENT: Negative for congestion, rhinorrhea and sore throat. Eyes: Negative for visual disturbance. Respiratory: Positive for cough and chest tightness.  Negative for shortness of breath and wheezing. Cardiovascular: Negative for chest pain and leg swelling. Gastrointestinal: Negative for abdominal distention, abdominal pain, diarrhea, nausea and vomiting. Genitourinary: Negative for dysuria, frequency, hematuria and urgency. Musculoskeletal: Positive for arthralgias. Negative for gait problem and myalgias. Skin: Negative for rash. Neurological: Negative for dizziness, weakness, light-headedness, numbness and headaches. Psychiatric/Behavioral: Negative for self-injury and sleep disturbance. The patient is not nervous/anxious. PHYSICAL EXAM  BP (!) 152/88   Pulse 103   Temp 98.4 °F (36.9 °C) (Oral)   SpO2 95%   Physical Exam  Vitals signs reviewed. Constitutional:       General: She is not in acute distress. Appearance: Normal appearance. She is well-developed. She is not diaphoretic. HENT:      Head: Normocephalic and atraumatic. Right Ear: Tympanic membrane normal.      Left Ear: Tympanic membrane normal.      Nose: Nose normal.      Mouth/Throat:      Mouth: Mucous membranes are moist.      Pharynx: Oropharynx is clear. No oropharyngeal exudate or posterior oropharyngeal erythema. Eyes:      General:         Right eye: No discharge. Left eye: No discharge. Pupils: Pupils are equal, round, and reactive to light. Neck:      Musculoskeletal: Normal range of motion and neck supple. Vascular: No JVD. Cardiovascular:      Rate and Rhythm: Normal rate. Pulses: Normal pulses. Pulmonary:      Effort: No respiratory distress. Breath sounds: No stridor. Examination of the right-lower field reveals decreased breath sounds. Examination of the left-lower field reveals decreased breath sounds. Decreased breath sounds present. No wheezing, rhonchi or rales. Chest:      Chest wall: No tenderness. Abdominal:      General: Bowel sounds are normal. There is no distension. Palpations: Abdomen is soft. Tenderness:  There is no abdominal tenderness. There is no guarding. Musculoskeletal: Normal range of motion. General: No swelling or deformity. Skin:     General: Skin is warm and dry. Capillary Refill: Capillary refill takes less than 2 seconds. Coloration: Skin is not pale. Findings: No bruising, lesion or rash. Neurological:      General: No focal deficit present. Mental Status: She is alert and oriented to person, place, and time. Psychiatric:         Mood and Affect: Mood normal.         Behavior: Behavior normal.          ASSESSMENT/PLAN:   1. Type 2 diabetes mellitus without complication, without long-term current use of insulin ([facility])  Stable. Patient reports checking her blood sugar 3 times a week. Patient reports that this morning it was 162. Patient reports 1 week ago it was in the 300s but she is unsure of what she ate the night before. Patient denies any further episodes of hyperglycemia or hypoglycemia. Patient reports she is compliant with metformin 500 mg twice a day. Patient encouraged to monitor saturated fat intake, sugars, and carbohydrates to help with weight loss and diabetic control. I did recommend patient to follow-up with ophthalmology as previously directed for ophthalmology retinal exam.  Patient verbalized and acknowledges. Follow-up in 4 months, sooner for new or worsening symptoms.  - HEMOGLOBIN A1C  - COMPREHENSIVE METABOLIC PANEL    2. Chronic respiratory failure with hypoxia and hypercapnia on 4 L home O2/ Chronic obstructive pulmonary disease, unspecified COPD type ([address]  Stable. Patient reports that she has been using her DuoNeb's however she has been out of her inhalers for over a year. Patient reports that she wears 4 L nasal oxygen continuously. Patient initially reports that her portable oxygen was out of oxygen it would not be delivered till Monday. Patient initially reported multiple NT tanks at home.   Patient oxygen supplier [employer] was chest tightness only when she is short of breath and out of oxygen. No further episodes. Continue current treatment and management follow-up in 4 months, sooner for new or worsening symptoms. 4. Smoker /Encounter for smoking cessation counseling  Smoking cessation was discussed in detail with patient. Patient aware of risks associated with chronic respiratory failure/COPD/obstructive sleep apnea with use of tobacco products. Patient reports that she has decreased. Patient reports smoking half pack per day. Patient reports that she tried nicotine patches but they irritated her skin. Patient reports that she has been using the gum. I did encourage continue with smoking cessation patient verbalized acknowledges. 5. Morbid obesity with BMI of 50.0-59.9, adult ([facility] [address]  Uncontrolled. Patient noncompliant with diet and exercise. Patient educated on risks associated with obesity, heart disease, diabetes and other comorbidities that she has. Patient encouraged to monitor dietary intake and to exercise to help with weight loss. Patient verbalized and acknowledges will follow-up in 4 to 6 months, sooner for new or worsening symptoms.  - Lipid, Fasting; Future  - CBC Auto Differential    6. Chronic left shoulder pain  Patient reports ongoing left shoulder pain. Patient reports that it happened while she was in the hospital years ago. Patient reports that she was unconscious and had airway in place. Patient reports that she is not sure what happened when she was being transferred or if there was an injury while she was unconscious. Patient reports that she always has left shoulder pain. Patient reports that a month ago she did lean back into the wall and had worsening pain. Patient is right-hand dominant. Patient reports limited range of motion associated with shoulder. I did recommend patient following up with the [facility]. Patient was given referral at previous appointment.   Continue with current treatment and recommendations for orthopedics. Patient was given referral to neurology at previous virtual visit. Patient reports that she has not had a ride to go to the appointments. I did recommend that patient follow-up with recommended referrals and management of chronic illnesses for proper treatment. Patient acknowledges. The note was completed using Dragon voice recognition transcription. Every effort was made to ensure accuracy; however, inadvertent  transcription errors may be present despite my best efforts to edit errors.     Estephania Choudhury, APRN - CNP

## 2020-09-05 LAB
A/G RATIO: 1.4 (ref 1.1–2.2)
ALBUMIN SERPL-MCNC: 4.2 G/DL (ref 3.4–5)
ALP BLD-CCNC: 55 U/L (ref 40–129)
ALT SERPL-CCNC: 51 U/L (ref 10–40)
ANION GAP SERPL CALCULATED.3IONS-SCNC: 11 MMOL/L (ref 3–16)
AST SERPL-CCNC: 39 U/L (ref 15–37)
BASOPHILS ABSOLUTE: 0.1 K/UL (ref 0–0.2)
BASOPHILS RELATIVE PERCENT: 0.8 %
BILIRUB SERPL-MCNC: 0.6 MG/DL (ref 0–1)
BUN BLDV-MCNC: 14 MG/DL (ref 7–20)
CALCIUM SERPL-MCNC: 9.8 MG/DL (ref 8.3–10.6)
CHLORIDE BLD-SCNC: 96 MMOL/L (ref 99–110)
CO2: 28 MMOL/L (ref 21–32)
CREAT SERPL-MCNC: 0.8 MG/DL (ref 0.6–1.1)
EOSINOPHILS ABSOLUTE: 0.1 K/UL (ref 0–0.6)
EOSINOPHILS RELATIVE PERCENT: 1 %
ESTIMATED AVERAGE GLUCOSE: 131.2 MG/DL
GFR AFRICAN AMERICAN: >60
GFR NON-AFRICAN AMERICAN: >60
GLOBULIN: 3 G/DL
GLUCOSE BLD-MCNC: 118 MG/DL (ref 70–99)
HBA1C MFR BLD: 6.2 %
HCT VFR BLD CALC: 48.6 % (ref 36–48)
HEMOGLOBIN: 15.9 G/DL (ref 12–16)
LYMPHOCYTES ABSOLUTE: 2.6 K/UL (ref 1–5.1)
LYMPHOCYTES RELATIVE PERCENT: 26.9 %
MCH RBC QN AUTO: 29.8 PG (ref 26–34)
MCHC RBC AUTO-ENTMCNC: 32.8 G/DL (ref 31–36)
MCV RBC AUTO: 90.8 FL (ref 80–100)
MONOCYTES ABSOLUTE: 0.7 K/UL (ref 0–1.3)
MONOCYTES RELATIVE PERCENT: 7.5 %
NEUTROPHILS ABSOLUTE: 6.1 K/UL (ref 1.7–7.7)
NEUTROPHILS RELATIVE PERCENT: 63.8 %
PDW BLD-RTO: 14.3 % (ref 12.4–15.4)
PLATELET # BLD: 143 K/UL (ref 135–450)
PMV BLD AUTO: 10.6 FL (ref 5–10.5)
POTASSIUM SERPL-SCNC: 4.6 MMOL/L (ref 3.5–5.1)
RBC # BLD: 5.35 M/UL (ref 4–5.2)
SODIUM BLD-SCNC: 135 MMOL/L (ref 136–145)
TOTAL PROTEIN: 7.2 G/DL (ref 6.4–8.2)
WBC # BLD: 9.5 K/UL (ref 4–11)

## 2020-09-25 RX ORDER — GABAPENTIN 600 MG/1
600 TABLET ORAL 3 TIMES DAILY
Qty: 90 TABLET | Refills: 2 | Status: SHIPPED | OUTPATIENT
Start: 2020-09-25 | End: 2020-11-03 | Stop reason: SDUPTHER

## 2020-09-25 RX ORDER — GABAPENTIN 600 MG/1
600 TABLET ORAL 3 TIMES DAILY
Qty: 90 TABLET | Refills: 2 | OUTPATIENT
Start: 2020-09-25 | End: 2020-10-25

## 2020-09-25 NOTE — TELEPHONE ENCOUNTER
Date of last refill of this med was 6/24, # of pills given 90 and # of refills given 2. Their next appointment is not scheduled, the last date patient was seen was 9/4. Does patient have medication agreement on file? Yes  Has drug screen been done in last 12 months if needed?  no

## 2020-09-29 RX ORDER — METOPROLOL SUCCINATE 25 MG/1
25 TABLET, EXTENDED RELEASE ORAL DAILY
Qty: 30 TABLET | Refills: 5 | Status: SHIPPED | OUTPATIENT
Start: 2020-09-29 | End: 2020-11-03 | Stop reason: SDUPTHER

## 2020-11-03 ENCOUNTER — TELEPHONE (OUTPATIENT)
Dept: FAMILY MEDICINE CLINIC | Age: 45
End: 2020-11-03

## 2020-11-03 RX ORDER — BUDESONIDE AND FORMOTEROL FUMARATE DIHYDRATE 80; 4.5 UG/1; UG/1
2 AEROSOL RESPIRATORY (INHALATION) 2 TIMES DAILY
Qty: 1 INHALER | Refills: 3 | Status: ON HOLD | OUTPATIENT
Start: 2020-11-03 | End: 2021-01-08 | Stop reason: SDUPTHER

## 2020-11-03 RX ORDER — METOPROLOL SUCCINATE 25 MG/1
25 TABLET, EXTENDED RELEASE ORAL DAILY
Qty: 30 TABLET | Refills: 1 | Status: ON HOLD | OUTPATIENT
Start: 2020-11-03 | End: 2021-01-08 | Stop reason: SDUPTHER

## 2020-11-03 RX ORDER — IPRATROPIUM BROMIDE AND ALBUTEROL SULFATE 2.5; .5 MG/3ML; MG/3ML
3 SOLUTION RESPIRATORY (INHALATION) 4 TIMES DAILY
Qty: 360 ML | Refills: 1 | Status: ON HOLD | OUTPATIENT
Start: 2020-11-03 | End: 2021-01-08 | Stop reason: CLARIF

## 2020-11-03 RX ORDER — GABAPENTIN 600 MG/1
600 TABLET ORAL 3 TIMES DAILY
Qty: 90 TABLET | Refills: 1 | Status: ON HOLD | OUTPATIENT
Start: 2020-11-03 | End: 2021-01-08

## 2020-11-03 RX ORDER — FUROSEMIDE 20 MG/1
60 TABLET ORAL DAILY
Qty: 90 TABLET | Refills: 1 | Status: SHIPPED | OUTPATIENT
Start: 2020-11-03 | End: 2020-12-31

## 2020-11-03 RX ORDER — ALBUTEROL SULFATE 90 UG/1
2 AEROSOL, METERED RESPIRATORY (INHALATION) EVERY 6 HOURS PRN
Qty: 1 INHALER | Refills: 1 | Status: ON HOLD | OUTPATIENT
Start: 2020-11-03 | End: 2021-01-08 | Stop reason: SDUPTHER

## 2020-11-03 RX ORDER — LISINOPRIL 40 MG/1
40 TABLET ORAL DAILY
Qty: 30 TABLET | Refills: 1 | Status: ON HOLD | OUTPATIENT
Start: 2020-11-03 | End: 2021-01-08 | Stop reason: SDUPTHER

## 2020-11-03 NOTE — TELEPHONE ENCOUNTER
Patient lost the box that had all her medications. She is requesting a rf on all her medications. Date of last refill of this med was 9-25-20, # of pills given 90 and # of refills given 2. Their next appointment is not scheduled, the last date patient was seen was 9-4-20. Does patient have medication agreement on file? Yes  Has drug screen been done in last 12 months if needed?  no

## 2020-11-03 NOTE — TELEPHONE ENCOUNTER
----- Message from Luz Balderas sent at 11/3/2020  3:42 PM EST -----  Subject: Message to Provider    QUESTIONS  Information for Provider? Patient is needing to speak the doctor about   medication refill is needing one due to losing the medicine yesterday and   can't find them. If they could refill or call and talk to patient would be   great.  ---------------------------------------------------------------------------  --------------  CALL BACK INFO  What is the best way for the office to contact you? OK to leave message on   voicemail  Preferred Call Back Phone Number? [phone]  ---------------------------------------------------------------------------  --------------  SCRIPT ANSWERS  Relationship to Patient?  Self

## 2020-11-04 RX ORDER — NEBULIZER ACCESSORIES
1 KIT MISCELLANEOUS DAILY PRN
Qty: 1 KIT | Refills: 0 | Status: SHIPPED | OUTPATIENT
Start: 2020-11-04 | End: 2021-04-05

## 2020-11-04 RX ORDER — BLOOD-GLUCOSE METER
1 KIT MISCELLANEOUS DAILY
Qty: 1 KIT | Refills: 0 | Status: SHIPPED | OUTPATIENT
Start: 2020-11-04 | End: 2021-04-05 | Stop reason: SDUPTHER

## 2020-11-04 RX ORDER — LANCETS
1 EACH MISCELLANEOUS DAILY
Qty: 100 EACH | Refills: 0 | Status: SHIPPED | OUTPATIENT
Start: 2020-11-04 | End: 2021-04-05 | Stop reason: SDUPTHER

## 2020-11-05 RX ORDER — NEBULIZER ACCESSORIES
1 KIT MISCELLANEOUS DAILY PRN
Qty: 1 KIT | Refills: 0 | Status: SHIPPED | OUTPATIENT
Start: 2020-11-05 | End: 2021-04-05

## 2020-11-23 ENCOUNTER — TELEPHONE (OUTPATIENT)
Dept: FAMILY MEDICINE CLINIC | Age: 45
End: 2020-11-23

## 2020-11-23 NOTE — TELEPHONE ENCOUNTER
Tried to call pt and let her know she is up for oxygen requalification. Call would not go thru due to restrictions. I faxed paper back to [facility] with information. Per Albaro Valdes she must see pulmonologist. A referral was done at last appointment.

## 2020-12-05 PROBLEM — J96.20 ACUTE ON CHRONIC RESPIRATORY FAILURE (HCC): Status: ACTIVE | Noted: 2020-12-05

## 2020-12-31 RX ORDER — FUROSEMIDE 20 MG/1
TABLET ORAL
Qty: 90 TABLET | Refills: 1 | Status: ON HOLD | OUTPATIENT
Start: 2020-12-31 | End: 2021-01-08 | Stop reason: SDUPTHER

## 2020-12-31 NOTE — TELEPHONE ENCOUNTER
Refilled medication per verbal order from provider.   Future appt not scheduled  Last appt 09/04/2020

## 2021-01-05 ENCOUNTER — HOSPITAL ENCOUNTER (INPATIENT)
Age: 46
LOS: 3 days | Discharge: HOME | DRG: 812 | End: 2021-01-08
Attending: EMERGENCY MEDICINE | Admitting: INTERNAL MEDICINE
Payer: MEDICARE

## 2021-01-05 ENCOUNTER — APPOINTMENT (OUTPATIENT)
Dept: GENERAL RADIOLOGY | Age: 46
DRG: 812 | End: 2021-01-05
Payer: MEDICARE

## 2021-01-05 ENCOUNTER — APPOINTMENT (OUTPATIENT)
Dept: CT IMAGING | Age: 46
DRG: 812 | End: 2021-01-05
Payer: MEDICARE

## 2021-01-05 DIAGNOSIS — J44.9 CHRONIC OBSTRUCTIVE PULMONARY DISEASE, UNSPECIFIED COPD TYPE (HCC): Chronic | ICD-10-CM

## 2021-01-05 DIAGNOSIS — J96.12 CHRONIC RESPIRATORY FAILURE WITH HYPOXIA AND HYPERCAPNIA (HCC): Chronic | ICD-10-CM

## 2021-01-05 DIAGNOSIS — J96.11 CHRONIC RESPIRATORY FAILURE WITH HYPOXIA AND HYPERCAPNIA (HCC): Chronic | ICD-10-CM

## 2021-01-05 DIAGNOSIS — J44.1 COPD EXACERBATION (HCC): ICD-10-CM

## 2021-01-05 DIAGNOSIS — I49.3 PVC'S (PREMATURE VENTRICULAR CONTRACTIONS): ICD-10-CM

## 2021-01-05 DIAGNOSIS — E87.20 LACTIC ACIDOSIS: ICD-10-CM

## 2021-01-05 DIAGNOSIS — G62.9 NEUROPATHY: Chronic | ICD-10-CM

## 2021-01-05 DIAGNOSIS — T40.601A OPIATE OVERDOSE, ACCIDENTAL OR UNINTENTIONAL, INITIAL ENCOUNTER (HCC): Primary | ICD-10-CM

## 2021-01-05 DIAGNOSIS — J96.22 ACUTE ON CHRONIC RESPIRATORY FAILURE WITH HYPERCAPNIA (HCC): ICD-10-CM

## 2021-01-05 DIAGNOSIS — I10 HYPERTENSION, UNSPECIFIED TYPE: Chronic | ICD-10-CM

## 2021-01-05 DIAGNOSIS — S01.01XA LACERATION OF SCALP, INITIAL ENCOUNTER: ICD-10-CM

## 2021-01-05 PROBLEM — T50.901A DRUG OVERDOSE, ACCIDENTAL OR UNINTENTIONAL, INITIAL ENCOUNTER: Status: ACTIVE | Noted: 2021-01-05

## 2021-01-05 PROBLEM — T50.901A ACCIDENTAL DRUG OVERDOSE: Status: ACTIVE | Noted: 2021-01-05

## 2021-01-05 LAB
A/G RATIO: 1.2 (ref 1.1–2.2)
ACETAMINOPHEN LEVEL: <5 UG/ML (ref 10–30)
ALBUMIN SERPL-MCNC: 4.2 G/DL (ref 3.4–5)
ALP BLD-CCNC: 66 U/L (ref 40–129)
ALT SERPL-CCNC: 75 U/L (ref 10–40)
ANION GAP SERPL CALCULATED.3IONS-SCNC: 14 MMOL/L (ref 3–16)
AST SERPL-CCNC: 215 U/L (ref 15–37)
BASE EXCESS ARTERIAL: -1.4 MMOL/L (ref -3–3)
BASE EXCESS ARTERIAL: -1.8 MMOL/L (ref -3–3)
BASE EXCESS ARTERIAL: -3.1 MMOL/L (ref -3–3)
BASOPHILS ABSOLUTE: 0.1 K/UL (ref 0–0.2)
BASOPHILS RELATIVE PERCENT: 0.4 %
BILIRUB SERPL-MCNC: 0.5 MG/DL (ref 0–1)
BUN BLDV-MCNC: 23 MG/DL (ref 7–20)
CALCIUM SERPL-MCNC: 9.1 MG/DL (ref 8.3–10.6)
CARBOXYHEMOGLOBIN ARTERIAL: 4.4 % (ref 0–1.5)
CARBOXYHEMOGLOBIN ARTERIAL: 4.5 % (ref 0–1.5)
CARBOXYHEMOGLOBIN ARTERIAL: 5.7 % (ref 0–1.5)
CHLORIDE BLD-SCNC: 98 MMOL/L (ref 99–110)
CO2: 28 MMOL/L (ref 21–32)
CREAT SERPL-MCNC: 1.1 MG/DL (ref 0.6–1.1)
EKG ATRIAL RATE: 104 BPM
EKG DIAGNOSIS: NORMAL
EKG P AXIS: 64 DEGREES
EKG P-R INTERVAL: 148 MS
EKG Q-T INTERVAL: 374 MS
EKG QRS DURATION: 82 MS
EKG QTC CALCULATION (BAZETT): 491 MS
EKG R AXIS: 81 DEGREES
EKG T AXIS: 24 DEGREES
EKG VENTRICULAR RATE: 104 BPM
EOSINOPHILS ABSOLUTE: 0 K/UL (ref 0–0.6)
EOSINOPHILS RELATIVE PERCENT: 0.1 %
ETHANOL: NORMAL MG/DL (ref 0–0.08)
GFR AFRICAN AMERICAN: >60
GFR NON-AFRICAN AMERICAN: 53
GLOBULIN: 3.5 G/DL
GLUCOSE BLD-MCNC: 106 MG/DL (ref 70–99)
HCG QUALITATIVE: NEGATIVE
HCO3 ARTERIAL: 28 MMOL/L (ref 21–29)
HCO3 ARTERIAL: 28.9 MMOL/L (ref 21–29)
HCO3 ARTERIAL: 29.3 MMOL/L (ref 21–29)
HCT VFR BLD CALC: 45.5 % (ref 36–48)
HEMOGLOBIN, ART, EXTENDED: 14.1 G/DL
HEMOGLOBIN, ART, EXTENDED: 14.5 G/DL (ref 12–16)
HEMOGLOBIN, ART, EXTENDED: 14.7 G/DL (ref 12–16)
HEMOGLOBIN: 14.4 G/DL (ref 12–16)
LACTIC ACID: 0.6 MMOL/L (ref 0.4–2)
LACTIC ACID: 5.6 MMOL/L (ref 0.4–2)
LYMPHOCYTES ABSOLUTE: 0.9 K/UL (ref 1–5.1)
LYMPHOCYTES RELATIVE PERCENT: 5.2 %
MAGNESIUM: 2.1 MG/DL (ref 1.8–2.4)
MCH RBC QN AUTO: 27.4 PG (ref 26–34)
MCHC RBC AUTO-ENTMCNC: 31.6 G/DL (ref 31–36)
MCV RBC AUTO: 86.9 FL (ref 80–100)
METHEMOGLOBIN ARTERIAL: 0.3 %
METHEMOGLOBIN ARTERIAL: 0.3 %
METHEMOGLOBIN ARTERIAL: 0.4 % (ref 0–1.4)
MONOCYTES ABSOLUTE: 0.9 K/UL (ref 0–1.3)
MONOCYTES RELATIVE PERCENT: 5.1 %
NEUTROPHILS ABSOLUTE: 15.5 K/UL (ref 1.7–7.7)
NEUTROPHILS RELATIVE PERCENT: 89.2 %
O2 CONTENT ARTERIAL: 15 ML/DL
O2 CONTENT ARTERIAL: 18 ML/DL
O2 SAT, ARTERIAL: 80.6 %
O2 SAT, ARTERIAL: 92.3 %
O2 SAT, ARTERIAL: 94 % (ref 93–100)
O2 THERAPY: ABNORMAL
O2 THERAPY: ABNORMAL
PCO2 ARTERIAL: 73.1 MMHG (ref 35–45)
PCO2 ARTERIAL: 82.8 MMHG (ref 35–45)
PCO2 ARTERIAL: 91.2 MMHG (ref 35–45)
PDW BLD-RTO: 15 % (ref 12.4–15.4)
PH ARTERIAL: 7.12 (ref 7.35–7.45)
PH ARTERIAL: 7.17 (ref 7.35–7.45)
PH ARTERIAL: 7.21 (ref 7.35–7.45)
PLATELET # BLD: 141 K/UL (ref 135–450)
PMV BLD AUTO: 9.7 FL (ref 5–10.5)
PO2 ARTERIAL: 48.8 MMHG (ref 75–108)
PO2 ARTERIAL: 72 MMHG (ref 75–108)
PO2 ARTERIAL: 74.5 MMHG (ref 75–108)
POTASSIUM REFLEX MAGNESIUM: 4.1 MMOL/L (ref 3.5–5.1)
PRO-BNP: 832 PG/ML (ref 0–124)
RBC # BLD: 5.24 M/UL (ref 4–5.2)
SODIUM BLD-SCNC: 140 MMOL/L (ref 136–145)
TCO2 ARTERIAL: 31 MMOL/L
TCO2 ARTERIAL: 31.7 MMOL/L
TCO2 ARTERIAL: 31.8 MMOL/L
TOTAL PROTEIN: 7.7 G/DL (ref 6.4–8.2)
TROPONIN: <0.01 NG/ML
WBC # BLD: 17.3 K/UL (ref 4–11)

## 2021-01-05 PROCEDURE — 83605 ASSAY OF LACTIC ACID: CPT

## 2021-01-05 PROCEDURE — 71250 CT THORAX DX C-: CPT

## 2021-01-05 PROCEDURE — 96374 THER/PROPH/DIAG INJ IV PUSH: CPT

## 2021-01-05 PROCEDURE — 80053 COMPREHEN METABOLIC PANEL: CPT

## 2021-01-05 PROCEDURE — 72125 CT NECK SPINE W/O DYE: CPT

## 2021-01-05 PROCEDURE — 82803 BLOOD GASES ANY COMBINATION: CPT

## 2021-01-05 PROCEDURE — 70450 CT HEAD/BRAIN W/O DYE: CPT

## 2021-01-05 PROCEDURE — C9113 INJ PANTOPRAZOLE SODIUM, VIA: HCPCS | Performed by: PHYSICIAN ASSISTANT

## 2021-01-05 PROCEDURE — 93005 ELECTROCARDIOGRAM TRACING: CPT | Performed by: EMERGENCY MEDICINE

## 2021-01-05 PROCEDURE — 83735 ASSAY OF MAGNESIUM: CPT

## 2021-01-05 PROCEDURE — U0003 INFECTIOUS AGENT DETECTION BY NUCLEIC ACID (DNA OR RNA); SEVERE ACUTE RESPIRATORY SYNDROME CORONAVIRUS 2 (SARS-COV-2) (CORONAVIRUS DISEASE [COVID-19]), AMPLIFIED PROBE TECHNIQUE, MAKING USE OF HIGH THROUGHPUT TECHNOLOGIES AS DESCRIBED BY CMS-2020-01-R: HCPCS

## 2021-01-05 PROCEDURE — 36415 COLL VENOUS BLD VENIPUNCTURE: CPT

## 2021-01-05 PROCEDURE — 84703 CHORIONIC GONADOTROPIN ASSAY: CPT

## 2021-01-05 PROCEDURE — G0480 DRUG TEST DEF 1-7 CLASSES: HCPCS

## 2021-01-05 PROCEDURE — 84484 ASSAY OF TROPONIN QUANT: CPT

## 2021-01-05 PROCEDURE — 94660 CPAP INITIATION&MGMT: CPT

## 2021-01-05 PROCEDURE — 99285 EMERGENCY DEPT VISIT HI MDM: CPT

## 2021-01-05 PROCEDURE — 36600 WITHDRAWAL OF ARTERIAL BLOOD: CPT

## 2021-01-05 PROCEDURE — 2580000003 HC RX 258: Performed by: EMERGENCY MEDICINE

## 2021-01-05 PROCEDURE — 96361 HYDRATE IV INFUSION ADD-ON: CPT

## 2021-01-05 PROCEDURE — 85025 COMPLETE CBC W/AUTO DIFF WBC: CPT

## 2021-01-05 PROCEDURE — 83880 ASSAY OF NATRIURETIC PEPTIDE: CPT

## 2021-01-05 PROCEDURE — 6360000002 HC RX W HCPCS: Performed by: PHYSICIAN ASSISTANT

## 2021-01-05 PROCEDURE — 93010 ELECTROCARDIOGRAM REPORT: CPT | Performed by: INTERNAL MEDICINE

## 2021-01-05 PROCEDURE — 6370000000 HC RX 637 (ALT 250 FOR IP): Performed by: EMERGENCY MEDICINE

## 2021-01-05 PROCEDURE — 2000000000 HC ICU R&B

## 2021-01-05 PROCEDURE — 6360000002 HC RX W HCPCS: Performed by: EMERGENCY MEDICINE

## 2021-01-05 RX ORDER — NICOTINE 21 MG/24HR
1 PATCH, TRANSDERMAL 24 HOURS TRANSDERMAL DAILY
Status: DISCONTINUED | OUTPATIENT
Start: 2021-01-06 | End: 2021-01-08 | Stop reason: HOSPADM

## 2021-01-05 RX ORDER — ACETAMINOPHEN 500 MG
1000 TABLET ORAL ONCE
Status: COMPLETED | OUTPATIENT
Start: 2021-01-05 | End: 2021-01-05

## 2021-01-05 RX ORDER — KETOROLAC TROMETHAMINE 30 MG/ML
15 INJECTION, SOLUTION INTRAMUSCULAR; INTRAVENOUS ONCE
Status: COMPLETED | OUTPATIENT
Start: 2021-01-05 | End: 2021-01-05

## 2021-01-05 RX ORDER — PROMETHAZINE HYDROCHLORIDE 25 MG/1
12.5 TABLET ORAL EVERY 6 HOURS PRN
Status: DISCONTINUED | OUTPATIENT
Start: 2021-01-05 | End: 2021-01-08 | Stop reason: HOSPADM

## 2021-01-05 RX ORDER — DEXTROSE MONOHYDRATE 25 G/50ML
12.5 INJECTION, SOLUTION INTRAVENOUS PRN
Status: DISCONTINUED | OUTPATIENT
Start: 2021-01-05 | End: 2021-01-08 | Stop reason: HOSPADM

## 2021-01-05 RX ORDER — GABAPENTIN 600 MG/1
600 TABLET ORAL 3 TIMES DAILY
Status: DISCONTINUED | OUTPATIENT
Start: 2021-01-05 | End: 2021-01-08 | Stop reason: HOSPADM

## 2021-01-05 RX ORDER — NICOTINE POLACRILEX 4 MG
15 LOZENGE BUCCAL PRN
Status: DISCONTINUED | OUTPATIENT
Start: 2021-01-05 | End: 2021-01-08 | Stop reason: HOSPADM

## 2021-01-05 RX ORDER — LISINOPRIL 40 MG/1
40 TABLET ORAL DAILY
Status: DISCONTINUED | OUTPATIENT
Start: 2021-01-06 | End: 2021-01-08 | Stop reason: HOSPADM

## 2021-01-05 RX ORDER — SODIUM CHLORIDE 0.9 % (FLUSH) 0.9 %
10 SYRINGE (ML) INJECTION EVERY 12 HOURS SCHEDULED
Status: DISCONTINUED | OUTPATIENT
Start: 2021-01-05 | End: 2021-01-08 | Stop reason: HOSPADM

## 2021-01-05 RX ORDER — ONDANSETRON 2 MG/ML
4 INJECTION INTRAMUSCULAR; INTRAVENOUS EVERY 6 HOURS PRN
Status: DISCONTINUED | OUTPATIENT
Start: 2021-01-05 | End: 2021-01-08 | Stop reason: HOSPADM

## 2021-01-05 RX ORDER — METHYLPREDNISOLONE SODIUM SUCCINATE 125 MG/2ML
125 INJECTION, POWDER, LYOPHILIZED, FOR SOLUTION INTRAMUSCULAR; INTRAVENOUS ONCE
Status: COMPLETED | OUTPATIENT
Start: 2021-01-05 | End: 2021-01-05

## 2021-01-05 RX ORDER — ALBUTEROL SULFATE 90 UG/1
2 AEROSOL, METERED RESPIRATORY (INHALATION) EVERY 6 HOURS PRN
Status: DISCONTINUED | OUTPATIENT
Start: 2021-01-05 | End: 2021-01-06

## 2021-01-05 RX ORDER — INSULIN LISPRO 100 [IU]/ML
0-3 INJECTION, SOLUTION INTRAVENOUS; SUBCUTANEOUS NIGHTLY
Status: DISCONTINUED | OUTPATIENT
Start: 2021-01-05 | End: 2021-01-08 | Stop reason: HOSPADM

## 2021-01-05 RX ORDER — ACETAMINOPHEN 325 MG/1
650 TABLET ORAL EVERY 6 HOURS PRN
Status: DISCONTINUED | OUTPATIENT
Start: 2021-01-05 | End: 2021-01-08 | Stop reason: HOSPADM

## 2021-01-05 RX ORDER — IPRATROPIUM BROMIDE AND ALBUTEROL SULFATE 2.5; .5 MG/3ML; MG/3ML
1 SOLUTION RESPIRATORY (INHALATION) 4 TIMES DAILY
Status: DISCONTINUED | OUTPATIENT
Start: 2021-01-05 | End: 2021-01-05 | Stop reason: CLARIF

## 2021-01-05 RX ORDER — DEXTROSE MONOHYDRATE 50 MG/ML
100 INJECTION, SOLUTION INTRAVENOUS PRN
Status: DISCONTINUED | OUTPATIENT
Start: 2021-01-05 | End: 2021-01-08 | Stop reason: HOSPADM

## 2021-01-05 RX ORDER — PANTOPRAZOLE SODIUM 40 MG/10ML
40 INJECTION, POWDER, LYOPHILIZED, FOR SOLUTION INTRAVENOUS ONCE
Status: COMPLETED | OUTPATIENT
Start: 2021-01-05 | End: 2021-01-05

## 2021-01-05 RX ORDER — 0.9 % SODIUM CHLORIDE 0.9 %
1000 INTRAVENOUS SOLUTION INTRAVENOUS ONCE
Status: COMPLETED | OUTPATIENT
Start: 2021-01-05 | End: 2021-01-05

## 2021-01-05 RX ORDER — SODIUM CHLORIDE 0.9 % (FLUSH) 0.9 %
10 SYRINGE (ML) INJECTION PRN
Status: DISCONTINUED | OUTPATIENT
Start: 2021-01-05 | End: 2021-01-08 | Stop reason: HOSPADM

## 2021-01-05 RX ORDER — METOPROLOL SUCCINATE 25 MG/1
25 TABLET, EXTENDED RELEASE ORAL DAILY
Status: DISCONTINUED | OUTPATIENT
Start: 2021-01-06 | End: 2021-01-06

## 2021-01-05 RX ORDER — ACETAMINOPHEN 650 MG/1
650 SUPPOSITORY RECTAL EVERY 6 HOURS PRN
Status: DISCONTINUED | OUTPATIENT
Start: 2021-01-05 | End: 2021-01-08 | Stop reason: HOSPADM

## 2021-01-05 RX ORDER — KETOROLAC TROMETHAMINE 15 MG/ML
15 INJECTION, SOLUTION INTRAMUSCULAR; INTRAVENOUS ONCE
Status: COMPLETED | OUTPATIENT
Start: 2021-01-05 | End: 2021-01-05

## 2021-01-05 RX ORDER — POLYETHYLENE GLYCOL 3350 17 G/17G
17 POWDER, FOR SOLUTION ORAL DAILY PRN
Status: DISCONTINUED | OUTPATIENT
Start: 2021-01-05 | End: 2021-01-08 | Stop reason: HOSPADM

## 2021-01-05 RX ORDER — INSULIN LISPRO 100 [IU]/ML
0-6 INJECTION, SOLUTION INTRAVENOUS; SUBCUTANEOUS
Status: DISCONTINUED | OUTPATIENT
Start: 2021-01-06 | End: 2021-01-08 | Stop reason: HOSPADM

## 2021-01-05 RX ADMIN — PANTOPRAZOLE SODIUM 40 MG: 40 INJECTION, POWDER, FOR SOLUTION INTRAVENOUS at 22:46

## 2021-01-05 RX ADMIN — KETOROLAC TROMETHAMINE 15 MG: 15 INJECTION, SOLUTION INTRAMUSCULAR; INTRAVENOUS at 14:54

## 2021-01-05 RX ADMIN — SODIUM CHLORIDE 1000 ML: 9 INJECTION, SOLUTION INTRAVENOUS at 13:38

## 2021-01-05 RX ADMIN — METHYLPREDNISOLONE SODIUM SUCCINATE 125 MG: 125 INJECTION, POWDER, FOR SOLUTION INTRAMUSCULAR; INTRAVENOUS at 13:57

## 2021-01-05 RX ADMIN — KETOROLAC TROMETHAMINE 15 MG: 30 INJECTION, SOLUTION INTRAMUSCULAR at 22:46

## 2021-01-05 RX ADMIN — ACETAMINOPHEN 1000 MG: 500 TABLET ORAL at 20:44

## 2021-01-05 ASSESSMENT — PAIN DESCRIPTION - FREQUENCY: FREQUENCY: INTERMITTENT

## 2021-01-05 ASSESSMENT — PAIN DESCRIPTION - ONSET: ONSET: ON-GOING

## 2021-01-05 ASSESSMENT — PAIN DESCRIPTION - PAIN TYPE: TYPE: ACUTE PAIN

## 2021-01-05 ASSESSMENT — PAIN DESCRIPTION - LOCATION: LOCATION: CHEST;HEAD

## 2021-01-05 ASSESSMENT — PAIN DESCRIPTION - DESCRIPTORS: DESCRIPTORS: DISCOMFORT

## 2021-01-05 NOTE — ED PROVIDER NOTES
[facility]  EMERGENCY DEPT VISIT      Patient Identification  Lilliana Smallwood is a 50 y.o. female. Chief Complaint   Drug Overdose      History of Present Illness: This is a  50 y.o. female who presents via ambulance to the ED with complaints of being found unresponsive with possible overdose. Cyanotic at scene with weak pulse. Received narcan 4mg IM with improvement. Patient has a history of COPD with chronic respiratory failure and wears 4 L of oxygen nasal cannula at all times. She admits that she did take her oxygen off for about an hour to an hour and a half. She had taken a shower and then was doing some cleaning in the kitchen and using a lot of bleach. She states that she has been aching for the last 2 to 3 days all over so took a few Percocet tablets which she has not prescribed. She does not know what happened but the next thing she knew she was waking up on the floor. She did sustain a laceration to her left parietal region. She was off her oxygen when she was found. Family members had tried to wake her up by soaking her with water. She also admits that she did not wear her BiPAP last night.     Past Medical History:   Diagnosis Date    Acute cystitis with hematuria     Bacteremia 08/23/2017    staph aureus    CHF [facility]congestive heart failure) [facility])     Colonization status 7/26/12    DNA probe negative for MRSA    COPD (chronic obstructive pulmonary disease) ([address]     Diabetes mellitus ([address]     FOR ABOUT 4 YEARS    DM (diabetes mellitus) ([address]     Drug abuse, IV (HCC)     Hepatitis     Hepatitis C antibody positive in blood 08/24/2017    Hepatitis C AB positive     Heroin overdose ([address]     Hypertension     MRSA infection     LUNGS    Neuropathy     legs with pain    Other disorders of kidney and ureter     KIDNEY FAILURE, ACUTE    Pneumonia     Smoking history        Past Surgical History:   Procedure Laterality Date    BRONCHOSCOPY  12/21/2017    BAL    CYSTOSCOPY  1/5/15 cysto with left stent placement    CYSTOSCOPY  10/6/15    stent removal    CYSTOSCOPY  11/09/2019    left uretroscopy with stent placement    CYSTOSCOPY Left 11/13/2019    LEFT URETEROSCOPY WITH HOLMIUM LASER LITHOTRIPSY, STENT REMOVAL,  STONE EXTRACTION     CYSTOSCOPY INSERTION / REMOVAL STENT / STONE Left 11/9/2019    CYSTOSCOPY, URETEROSCOPY, STENT PLACEMENT performed by Isis Soriano at [facility] / STONE Left 11/13/2019    CYSTOSCOPY, LEFT URETEROSCOPY WITH HOLMIUM LASER LITHOTRIPSY, STENT REMOVAL,  STONE EXTRACTION performed by Patricia Vieyra MD at [address]           Current Facility-Administered Medications:     acetaminophen (TYLENOL) tablet 1,000 mg, 1,000 mg, Oral, Once, Taylor Wesley MD    Current Outpatient Medications:     furosemide (LASIX) 20 MG tablet, TAKE 3 TABLETS BY MOUTH EVERY DAY, Disp: 90 tablet, Rfl: 1    Respiratory Therapy Supplies (NEBULIZER/TUBING/MOUTHPIECE) KIT, 1 kit by Does not apply route daily as needed (wheezing), Disp: 1 kit, Rfl: 0    Respiratory Therapy Supplies (NEBULIZER/TUBING/MOUTHPIECE) KIT, 1 kit by Does not apply route daily as needed (wheezing), Disp: 1 kit, Rfl: 0    glucose monitoring kit (FREESTYLE) monitoring kit, 1 kit by Does not apply route daily E11.9, Disp: 1 kit, Rfl: 0    blood glucose test strips (ACURA BLOOD GLUCOSE TEST) strip, Test once daily E11.9, Disp: 50 strip, Rfl: 5    [employer] Lancets Ultra Thin MISC, 1 each by Does not apply route daily E11.9, Disp: 100 each, Rfl: 0    metoprolol succinate (TOPROL XL) 25 MG extended release tablet, Take 1 tablet by mouth daily, Disp: 30 tablet, Rfl: 1    gabapentin (NEURONTIN) 600 MG tablet, Take 1 tablet by mouth 3 times daily for 30 days. , Disp: 90 tablet, Rfl: 1    albuterol sulfate HFA (PROVENTIL HFA) 108 (90 Base) MCG/ACT inhaler, Inhale 2 puffs into the lungs every 6 hours as needed for Wheezing, Disp: 1 Inhaler, Rfl: 1    budesonide-formoterol (SYMBICORT) 80-4.5 MCG/ACT AERO, Inhale 2 puffs into the lungs 2 times daily, Disp: 1 Inhaler, Rfl: 3    ipratropium-albuterol (DUONEB) 0.5-2.5 (3) MG/3ML SOLN nebulizer solution, Inhale 3 mLs into the lungs 4 times daily, Disp: 360 mL, Rfl: 1    lisinopril (PRINIVIL;ZESTRIL) 40 MG tablet, Take 1 tablet by mouth daily, Disp: 30 tablet, Rfl: 1    metFORMIN (GLUCOPHAGE) 500 MG tablet, Take 1 tablet by mouth 2 times daily (with meals), Disp: 60 tablet, Rfl: 1    nicotine (NICODERM CQ) 14 MG/24HR, Place 1 patch onto the skin daily, Disp: 42 patch, Rfl: 0    tamsulosin (FLOMAX) 0.4 MG capsule, Take 1 capsule by mouth daily, Disp: 30 capsule, Rfl: 0    albuterol (PROVENTIL) (5 MG/ML) 0.5% nebulizer solution, Take 0.5 mLs by nebulization 2 times daily, Disp: 30 mL, Rfl: 0    OXYGEN, Inhale 4 L into the lungs continuous, Disp: , Rfl: 0    Allergies   Allergen Reactions    Pcn [Penicillins] Shortness Of Breath and Swelling    Aspirin Other (See Comments)     Cause GI upset. But takes 81 mg aspirin at home    Pcn [Penicillins]      swelling    Sulfamethoxazole-Trimethoprim Hives       Social History     Socioeconomic History    Marital status:      Spouse name: Not on file    Number of children: Not on file    Years of education: Not on file    Highest education level: Not on file   Occupational History    Not on file   Social Needs    Financial resource strain: Not on file    Food insecurity     Worry: Not on file     Inability: Not on file    Transportation needs     Medical: Not on file     Non-medical: Not on file   Tobacco Use    Smoking status: Current Every Day Smoker     Packs/day: 1.00     Years: 33.00     Pack years: 33.00     Types: E-Cigarettes, Cigarettes    Smokeless tobacco: Never Used    Tobacco comment: 2/14/18 - smokes 5 cigs daily   Substance and Sexual Activity    Alcohol use: No    Drug use:  No Edu? --       Excl. in ? --      HEAD: Normocephalic, 1 inch laceration left parietal region, bruising left forehead  EYES:  Extraocular muscles are intact. Pupils equal round and reactive to light. Conjunctivas are pink. Negative scleral icterus. ENT:  Mucous membranes are moist.  Pharynx without erythema or exudates. NECK: Nontender and supple. No cervical adenopathy. CHEST: rhonchi bilaterally. HEART:  tachycardic rate and regular rhythm. No murmurs. Strong and equal pulses in the upper and lower extremities. ABDOMEN: Soft,  nondistended, positive bowel sounds. abdomen is nontender. No rebound. no guarding. MUSCULOSKELETAL: The calves are nontender to palpation. Active range of motion of the upper and lower extremities. No edema. NEUROLOGICAL: Awake, alert and oriented x 3. Power intact in the upper and lower extremities. Sensation is intact to light touch in the upper and lower extremities. Cranial Nerves 2-12 are intact. No truncal ataxia. No dysarthria or aphasia. DERMATOLOGIC: No petechiae, rashes. . No erythema. PSYCH: normal mood and affect. Normal thought content. ED COURSE AND MEDICAL DECISION MAKING:  I have reviewed Kip Garcia old records which reveal the following pertinent information:      ECHO Summary   Definity contrast administered. Left ventricular systolic function is hyperdynamic with ejection fraction   estimated at greater than 65 %. No regional wall motion abnormalities are noted. There is mild concentric left ventricular hypertrophy. Grade II diastolic dysfunction with elevated filing pressure. The right atrium is mildly dilated. Mild posterior mitral annular calcification is present. Mild Mitral regurgitation is present. Moderate tricuspid regurgitation. Systolic pulmonary artery pressure (SPAP) estimated at 60 mmHg (RA pressure   15 mmHg), consistent with severe pulmonary hypertension. There is a pleural effusion.       Signature ------------------------------------------------------------------   Electronically signed by Paulette Curtis MD, [facility] (Interpreting   physician) on 11/11/2019 at 11:00 AM    EKG as interpreted by myself:  sinus tachycardia, fpwu=254 with PVCs frequent   Axis is   Normal  QTc is  491ms  Intervals and Durations are unremarkable. Poor R wave progression   Non specific ST-T wave changes appreciated. No evidence of acute ischemia. Compared to prior EKG dated 11/10/19, PVCs are new    Radiology:  Films have been read by radiologist as noted in chart unless otherwise stated. Other radiologic studies (i.e. CT, MRI, ultrasounds, etc ) have been interpreted by radiologist.     [address]   Final Result      No acute intracranial abnormality. Left frontal cephalohematoma. CT CERVICAL SPINE WO CONTRAST   Final Result   Impression:      Negative for fracture or static subluxation of the cervical spine. Visualized bilateral cervical lymph nodes, which are favored to be reactive. Correlate clinically. CT CHEST WO CONTRAST   Final Result      No acute findings in the chest.      Prominent central pulmonary arterial trunk and main pulmonary arteries, similar to the prior study.           Labs:  Results for orders placed or performed during the hospital encounter of 01/05/21   CBC Auto Differential   Result Value Ref Range    WBC 17.3 (H) 4.0 - 11.0 K/uL    RBC 5.24 (H) 4.00 - 5.20 M/uL    Hemoglobin 14.4 12.0 - 16.0 g/dL    Hematocrit 45.5 36.0 - 48.0 %    MCV 86.9 80.0 - 100.0 fL    MCH 27.4 26.0 - 34.0 pg    MCHC 31.6 31.0 - 36.0 g/dL    RDW 15.0 12.4 - 15.4 %    Platelets 118 808 - 870 K/uL    MPV 9.7 5.0 - 10.5 fL    Neutrophils % 89.2 %    Lymphocytes % 5.2 %    Monocytes % 5.1 %    Eosinophils % 0.1 %    Basophils % 0.4 %    Neutrophils Absolute 15.5 (H) 1.7 - 7.7 K/uL    Lymphocytes Absolute 0.9 (L) 1.0 - 5.1 K/uL    Monocytes Absolute 0.9 0.0 - 1.3 K/uL    Eosinophils Absolute 0.0 0.0 field with no sign of tendon, nerve, or vascular injury. No foreign bodies were identified. It was closed with 6 staples. There were no complications during the procedure. 1825 patient easily aroused to voice on bipap 16/8, sats mid 90s on 4 liters. ABG with stable to slight worsening CO2. bipap settings increased. Patient does not feel she needs intubated at this time. Awake and talking in full sentences and arguing at times    Medical decision making:  Patient found unresponsive at home. Did take extra pian med and woke with narcan however was off CPAP and oxygen. Cough and sob but no fever. CT chest with no pneumonia. COVID testing sent. Has COPD with multiple admission and both BIPAP and ventilated in past admissions. hypercaribc requiring BIPAP. Likely due to combined COPD exacerbation and lack of CPAP over 24 hours and unresponsive period. Lactic acid high on arrival but likely more due to hypoxic event than infection. Does not appear septic. Started on bipap in ED but still deteriorated slightly. adjustments being made. Patient awake and alert at times. I spoke with Dr. Talat Bledsoe. We thoroughly discussed the history, physical exam, laboratory and imaging studies, as well as, emergency department course. Based upon that discussion, we've decided to admit Wyatt Shannon for further observation and evaluation of Rachel Powell's dyspnea. As I have deemed necessary from their history, physical, and studies, I have considered and evaluated Wyatt Shannon for the following diagnoses:  ACUTE CORONARY SYNDROME, CHRONIC OBSTRUCTIVE PULMONARY DISEASE, CONGESTIVE HEART FAILURE, PERICARDIAL TAMPONADE, PNEUMONIA, PNEUMOTHORAX, PULMONARY EMBOLISM, OVERDOSE, SEVERE COVID, ICH,  AND SEPSIS.    7:00 PM EST  Updated Dr Thang Guerin. Changed to ICU status. Continue bipap    7:35 PM EST  Discussed case with dR Crowder, critical care [facility]. Agrees with plan to continue to adjust bipap.  Currently on 18/6    Clinical Impression:  1. Opiate overdose, accidental or unintentional, initial encounter ([facility]    2. Acute on chronic respiratory failure with hypercapnia (HCC)    3. Laceration of scalp, initial encounter    4. Lactic acidosis    5. PVC's (premature ventricular contractions)        Dispo:  Patient will be  admitted at this time. Patient was informed of this decision and agrees with plan. I have discussed lab and xray findings with patient and they understand. Questions were answered to the best of my ability. Discharge vitals:  Blood pressure 109/60, pulse 101, resp. rate 20, height 5' 3\" (1.6 m), weight 230 lb (104.3 kg), last menstrual period 12/05/2020, SpO2 90 %, not currently breastfeeding. Prescriptions given:   New Prescriptions    No medications on file       Critical care time: 80 minutes excluding procedures. There was concern for respiratory failure. This was managed by supplemental oxygen and bipap    This chart was created using Dragon voice recognition software.         Anabella Deal MD  01/05/21 9813

## 2021-01-05 NOTE — Clinical Note
Patient Class: Inpatient [101]   REQUIRED: Diagnosis: Drug overdose, accidental or unintentional, initial encounter [[id number]]   Estimated Length of Stay: Estimated stay of more than 2 midnights   Admitting Provider: Rosmeyr Orellana [[id number]]

## 2021-01-05 NOTE — ED NOTES
Cleaned and irrigated with 250 ml solution of HIBI clens and NaCl, pt tolerated well.   Left side of head laceration     Fili Rios, DON  01/05/21 1545

## 2021-01-05 NOTE — ED TRIAGE NOTES
Pt reported pt was unresponsive cyanotic upon arrival. 4 mg narcan given IM sat's in 50  Assisted with ventilation.  After pt responded she admitted to taking percocet and gabapentin to relieve the pain

## 2021-01-06 PROBLEM — T40.601A OPIATE OVERDOSE (HCC): Status: ACTIVE | Noted: 2017-08-23

## 2021-01-06 LAB
AMORPHOUS: ABNORMAL /HPF
AMPHETAMINE SCREEN, URINE: POSITIVE
ANION GAP SERPL CALCULATED.3IONS-SCNC: 10 MMOL/L (ref 3–16)
BACTERIA: ABNORMAL /HPF
BARBITURATE SCREEN URINE: ABNORMAL
BASE EXCESS ARTERIAL: 4 (ref -3–3)
BASE EXCESS ARTERIAL: 4 (ref -3–3)
BASE EXCESS ARTERIAL: 5 (ref -3–3)
BASOPHILS ABSOLUTE: 0 K/UL (ref 0–0.2)
BASOPHILS RELATIVE PERCENT: 0.1 %
BENZODIAZEPINE SCREEN, URINE: ABNORMAL
BILIRUBIN URINE: ABNORMAL
BLOOD, URINE: ABNORMAL
BUN BLDV-MCNC: 25 MG/DL (ref 7–20)
CALCIUM SERPL-MCNC: 8.9 MG/DL (ref 8.3–10.6)
CANNABINOID SCREEN URINE: ABNORMAL
CHLORIDE BLD-SCNC: 100 MMOL/L (ref 99–110)
CLARITY: CLEAR
CO2: 27 MMOL/L (ref 21–32)
COARSE CASTS, UA: ABNORMAL /LPF (ref 0–2)
COCAINE METABOLITE SCREEN URINE: POSITIVE
COLOR: YELLOW
CREAT SERPL-MCNC: 1 MG/DL (ref 0.6–1.1)
EOSINOPHILS ABSOLUTE: 0 K/UL (ref 0–0.6)
EOSINOPHILS RELATIVE PERCENT: 0.3 %
EPITHELIAL CELLS, UA: ABNORMAL /HPF (ref 0–5)
GFR AFRICAN AMERICAN: >60
GFR NON-AFRICAN AMERICAN: 59
GLUCOSE BLD-MCNC: 156 MG/DL (ref 70–99)
GLUCOSE BLD-MCNC: 157 MG/DL (ref 70–99)
GLUCOSE BLD-MCNC: 165 MG/DL (ref 70–99)
GLUCOSE BLD-MCNC: 168 MG/DL (ref 70–99)
GLUCOSE BLD-MCNC: 177 MG/DL (ref 70–99)
GLUCOSE BLD-MCNC: 179 MG/DL (ref 70–99)
GLUCOSE URINE: NEGATIVE MG/DL
HCO3 ARTERIAL: 31.3 MMOL/L (ref 21–29)
HCO3 ARTERIAL: 31.3 MMOL/L (ref 21–29)
HCO3 ARTERIAL: 32.7 MMOL/L (ref 21–29)
HCT VFR BLD CALC: 41 % (ref 36–48)
HEMOGLOBIN: 13.4 G/DL (ref 12–16)
KETONES, URINE: ABNORMAL MG/DL
LEUKOCYTE ESTERASE, URINE: NEGATIVE
LYMPHOCYTES ABSOLUTE: 0.7 K/UL (ref 1–5.1)
LYMPHOCYTES RELATIVE PERCENT: 7 %
Lab: ABNORMAL
MAGNESIUM: 2.1 MG/DL (ref 1.8–2.4)
MCH RBC QN AUTO: 28.4 PG (ref 26–34)
MCHC RBC AUTO-ENTMCNC: 32.6 G/DL (ref 31–36)
MCV RBC AUTO: 87.2 FL (ref 80–100)
METHADONE SCREEN, URINE: ABNORMAL
MICROSCOPIC EXAMINATION: YES
MONOCYTES ABSOLUTE: 0.1 K/UL (ref 0–1.3)
MONOCYTES RELATIVE PERCENT: 1.3 %
NEUTROPHILS ABSOLUTE: 9.6 K/UL (ref 1.7–7.7)
NEUTROPHILS RELATIVE PERCENT: 91.3 %
NITRITE, URINE: NEGATIVE
O2 SAT, ARTERIAL: 78 % (ref 93–100)
O2 SAT, ARTERIAL: 90 % (ref 93–100)
O2 SAT, ARTERIAL: 93 % (ref 93–100)
OPIATE SCREEN URINE: ABNORMAL
OXYCODONE URINE: ABNORMAL
PCO2 ARTERIAL: 70.1 MM HG (ref 35–45)
PCO2 ARTERIAL: 70.5 MM HG (ref 35–45)
PCO2 ARTERIAL: 85.4 MM HG (ref 35–45)
PDW BLD-RTO: 15.1 % (ref 12.4–15.4)
PERFORMED ON: ABNORMAL
PH ARTERIAL: 7.19 (ref 7.35–7.45)
PH ARTERIAL: 7.25 (ref 7.35–7.45)
PH ARTERIAL: 7.26 (ref 7.35–7.45)
PH UA: 6
PH UA: 6 (ref 5–8)
PHENCYCLIDINE SCREEN URINE: ABNORMAL
PLATELET # BLD: 120 K/UL (ref 135–450)
PMV BLD AUTO: 9.5 FL (ref 5–10.5)
PO2 ARTERIAL: 50.9 MM HG (ref 75–108)
PO2 ARTERIAL: 75.6 MM HG (ref 75–108)
PO2 ARTERIAL: 79.8 MM HG (ref 75–108)
POC SAMPLE TYPE: ABNORMAL
POTASSIUM REFLEX MAGNESIUM: 4.8 MMOL/L (ref 3.5–5.1)
PROCALCITONIN: 0.16 NG/ML (ref 0–0.15)
PROPOXYPHENE SCREEN: ABNORMAL
PROTEIN UA: 30 MG/DL
RBC # BLD: 4.7 M/UL (ref 4–5.2)
RBC UA: ABNORMAL /HPF (ref 0–4)
SARS-COV-2: NOT DETECTED
SODIUM BLD-SCNC: 137 MMOL/L (ref 136–145)
SPECIFIC GRAVITY UA: >=1.03 (ref 1–1.03)
TCO2 ARTERIAL: 34 MMOL/L
TCO2 ARTERIAL: 34 MMOL/L
TCO2 ARTERIAL: 35 MMOL/L
URINE TYPE: ABNORMAL
UROBILINOGEN, URINE: 0.2 E.U./DL
WBC # BLD: 10.6 K/UL (ref 4–11)
WBC UA: ABNORMAL /HPF (ref 0–5)
YEAST: PRESENT /HPF

## 2021-01-06 PROCEDURE — 84145 PROCALCITONIN (PCT): CPT

## 2021-01-06 PROCEDURE — 83735 ASSAY OF MAGNESIUM: CPT

## 2021-01-06 PROCEDURE — 97166 OT EVAL MOD COMPLEX 45 MIN: CPT

## 2021-01-06 PROCEDURE — 94660 CPAP INITIATION&MGMT: CPT

## 2021-01-06 PROCEDURE — 36600 WITHDRAWAL OF ARTERIAL BLOOD: CPT

## 2021-01-06 PROCEDURE — 6360000002 HC RX W HCPCS: Performed by: STUDENT IN AN ORGANIZED HEALTH CARE EDUCATION/TRAINING PROGRAM

## 2021-01-06 PROCEDURE — 36415 COLL VENOUS BLD VENIPUNCTURE: CPT

## 2021-01-06 PROCEDURE — 85025 COMPLETE CBC W/AUTO DIFF WBC: CPT

## 2021-01-06 PROCEDURE — 6360000002 HC RX W HCPCS: Performed by: PHYSICIAN ASSISTANT

## 2021-01-06 PROCEDURE — 97535 SELF CARE MNGMENT TRAINING: CPT

## 2021-01-06 PROCEDURE — 97530 THERAPEUTIC ACTIVITIES: CPT

## 2021-01-06 PROCEDURE — 99223 1ST HOSP IP/OBS HIGH 75: CPT | Performed by: INTERNAL MEDICINE

## 2021-01-06 PROCEDURE — 6370000000 HC RX 637 (ALT 250 FOR IP): Performed by: PHYSICIAN ASSISTANT

## 2021-01-06 PROCEDURE — 2700000000 HC OXYGEN THERAPY PER DAY

## 2021-01-06 PROCEDURE — 6370000000 HC RX 637 (ALT 250 FOR IP): Performed by: STUDENT IN AN ORGANIZED HEALTH CARE EDUCATION/TRAINING PROGRAM

## 2021-01-06 PROCEDURE — 94640 AIRWAY INHALATION TREATMENT: CPT

## 2021-01-06 PROCEDURE — 2580000003 HC RX 258: Performed by: PHYSICIAN ASSISTANT

## 2021-01-06 PROCEDURE — 80048 BASIC METABOLIC PNL TOTAL CA: CPT

## 2021-01-06 PROCEDURE — 2000000000 HC ICU R&B

## 2021-01-06 PROCEDURE — 82947 ASSAY GLUCOSE BLOOD QUANT: CPT

## 2021-01-06 PROCEDURE — 80307 DRUG TEST PRSMV CHEM ANLYZR: CPT

## 2021-01-06 PROCEDURE — 94761 N-INVAS EAR/PLS OXIMETRY MLT: CPT

## 2021-01-06 PROCEDURE — 81001 URINALYSIS AUTO W/SCOPE: CPT

## 2021-01-06 PROCEDURE — 97116 GAIT TRAINING THERAPY: CPT

## 2021-01-06 PROCEDURE — 82803 BLOOD GASES ANY COMBINATION: CPT

## 2021-01-06 PROCEDURE — 97161 PT EVAL LOW COMPLEX 20 MIN: CPT

## 2021-01-06 PROCEDURE — 94664 DEMO&/EVAL PT USE INHALER: CPT

## 2021-01-06 RX ORDER — BUDESONIDE 0.25 MG/2ML
250 INHALANT ORAL 2 TIMES DAILY
Status: DISCONTINUED | OUTPATIENT
Start: 2021-01-06 | End: 2021-01-08 | Stop reason: HOSPADM

## 2021-01-06 RX ORDER — BUDESONIDE AND FORMOTEROL FUMARATE DIHYDRATE 80; 4.5 UG/1; UG/1
2 AEROSOL RESPIRATORY (INHALATION) 2 TIMES DAILY
Status: DISCONTINUED | OUTPATIENT
Start: 2021-01-06 | End: 2021-01-06

## 2021-01-06 RX ORDER — ARFORMOTEROL TARTRATE 15 UG/2ML
15 SOLUTION RESPIRATORY (INHALATION) 2 TIMES DAILY
Status: DISCONTINUED | OUTPATIENT
Start: 2021-01-06 | End: 2021-01-08 | Stop reason: HOSPADM

## 2021-01-06 RX ORDER — PROCHLORPERAZINE EDISYLATE 5 MG/ML
5 INJECTION INTRAMUSCULAR; INTRAVENOUS
Status: COMPLETED | OUTPATIENT
Start: 2021-01-06 | End: 2021-01-06

## 2021-01-06 RX ORDER — PREDNISONE 20 MG/1
40 TABLET ORAL DAILY
Status: DISCONTINUED | OUTPATIENT
Start: 2021-01-06 | End: 2021-01-08 | Stop reason: HOSPADM

## 2021-01-06 RX ORDER — ALBUTEROL SULFATE 2.5 MG/3ML
2.5 SOLUTION RESPIRATORY (INHALATION) 2 TIMES DAILY
Status: DISCONTINUED | OUTPATIENT
Start: 2021-01-06 | End: 2021-01-06

## 2021-01-06 RX ORDER — IPRATROPIUM BROMIDE AND ALBUTEROL SULFATE 2.5; .5 MG/3ML; MG/3ML
3 SOLUTION RESPIRATORY (INHALATION) 4 TIMES DAILY
Status: DISCONTINUED | OUTPATIENT
Start: 2021-01-06 | End: 2021-01-07

## 2021-01-06 RX ADMIN — ARFORMOTEROL TARTRATE 15 MCG: 15 SOLUTION RESPIRATORY (INHALATION) at 21:15

## 2021-01-06 RX ADMIN — ACETAMINOPHEN 650 MG: 325 TABLET ORAL at 20:37

## 2021-01-06 RX ADMIN — PREDNISONE 40 MG: 20 TABLET ORAL at 12:13

## 2021-01-06 RX ADMIN — IPRATROPIUM BROMIDE AND ALBUTEROL SULFATE 3 ML: .5; 3 SOLUTION RESPIRATORY (INHALATION) at 11:05

## 2021-01-06 RX ADMIN — INSULIN LISPRO 1 UNITS: 100 INJECTION, SOLUTION INTRAVENOUS; SUBCUTANEOUS at 20:25

## 2021-01-06 RX ADMIN — ARFORMOTEROL TARTRATE 15 MCG: 15 SOLUTION RESPIRATORY (INHALATION) at 11:05

## 2021-01-06 RX ADMIN — BUDESONIDE 250 MCG: 0.25 INHALANT RESPIRATORY (INHALATION) at 11:05

## 2021-01-06 RX ADMIN — GABAPENTIN 600 MG: 600 TABLET, FILM COATED ORAL at 14:56

## 2021-01-06 RX ADMIN — IPRATROPIUM BROMIDE AND ALBUTEROL SULFATE 3 ML: .5; 3 SOLUTION RESPIRATORY (INHALATION) at 15:40

## 2021-01-06 RX ADMIN — Medication 10 ML: at 20:48

## 2021-01-06 RX ADMIN — GABAPENTIN 600 MG: 600 TABLET, FILM COATED ORAL at 20:30

## 2021-01-06 RX ADMIN — INSULIN LISPRO 1 UNITS: 100 INJECTION, SOLUTION INTRAVENOUS; SUBCUTANEOUS at 17:22

## 2021-01-06 RX ADMIN — LISINOPRIL 40 MG: 40 TABLET ORAL at 09:22

## 2021-01-06 RX ADMIN — GABAPENTIN 600 MG: 600 TABLET, FILM COATED ORAL at 09:22

## 2021-01-06 RX ADMIN — ENOXAPARIN SODIUM 40 MG: 40 INJECTION SUBCUTANEOUS at 09:22

## 2021-01-06 RX ADMIN — INSULIN LISPRO 1 UNITS: 100 INJECTION, SOLUTION INTRAVENOUS; SUBCUTANEOUS at 12:02

## 2021-01-06 RX ADMIN — INSULIN LISPRO 1 UNITS: 100 INJECTION, SOLUTION INTRAVENOUS; SUBCUTANEOUS at 09:28

## 2021-01-06 RX ADMIN — ACETAMINOPHEN 650 MG: 325 TABLET ORAL at 12:10

## 2021-01-06 RX ADMIN — INSULIN LISPRO 1 UNITS: 100 INJECTION, SOLUTION INTRAVENOUS; SUBCUTANEOUS at 01:20

## 2021-01-06 RX ADMIN — FUROSEMIDE 60 MG: 40 TABLET ORAL at 09:22

## 2021-01-06 RX ADMIN — BUDESONIDE 250 MCG: 0.25 INHALANT RESPIRATORY (INHALATION) at 21:15

## 2021-01-06 RX ADMIN — IPRATROPIUM BROMIDE AND ALBUTEROL SULFATE 3 ML: .5; 3 SOLUTION RESPIRATORY (INHALATION) at 21:15

## 2021-01-06 RX ADMIN — PROCHLORPERAZINE EDISYLATE 5 MG: 5 INJECTION INTRAMUSCULAR; INTRAVENOUS at 17:19

## 2021-01-06 ASSESSMENT — PAIN SCALES - GENERAL
PAINLEVEL_OUTOF10: 3
PAINLEVEL_OUTOF10: 0
PAINLEVEL_OUTOF10: 3

## 2021-01-06 ASSESSMENT — PAIN DESCRIPTION - LOCATION
LOCATION: HEAD
LOCATION: HEAD

## 2021-01-06 ASSESSMENT — PAIN DESCRIPTION - FREQUENCY
FREQUENCY: INTERMITTENT

## 2021-01-06 ASSESSMENT — PAIN DESCRIPTION - ONSET: ONSET: ON-GOING

## 2021-01-06 ASSESSMENT — PAIN DESCRIPTION - PROGRESSION
CLINICAL_PROGRESSION: GRADUALLY WORSENING
CLINICAL_PROGRESSION: NOT CHANGED

## 2021-01-06 ASSESSMENT — PAIN DESCRIPTION - PAIN TYPE
TYPE: ACUTE PAIN
TYPE: ACUTE PAIN

## 2021-01-06 ASSESSMENT — PAIN DESCRIPTION - DESCRIPTORS: DESCRIPTORS: DISCOMFORT

## 2021-01-06 NOTE — PROGRESS NOTES
ICU residents and RT made aware of results. Results for Flash Cabrera (MRN [id number]) as of 1/6/2021 03:50   Ref. Range 1/6/2021 03:47   pH, Arterial Latest Ref Range: 7.350 - 7.450  7. 191 (LL)   pCO2, Arterial Latest Ref Range: 35.0 - 45.0 mm Hg 85.4 (HH)   pO2, Arterial Latest Ref Range: 75.0 - 108.0 mm Hg 75.6   HCO3, Arterial Latest Ref Range: 21.0 - 29.0 mmol/L 32.7 (H)   TCO2 (calc), Art Latest Ref Range: Not Established mmol/L 35   Base Excess, Arterial Latest Ref Range: -3 - 3  5 (H)   O2 Sat, Arterial Latest Ref Range: 93 - 100 % 90 (L)   Sample Type Unknown ART

## 2021-01-06 NOTE — PROGRESS NOTES
Pt is A+oX4. Bipap removed this AM @ 0930 and she was then placed on her baseline 4L NC. Swallow eval passed. Carb control diet started and tolerated. Pt is up as tolerated. Vital signs are stable. No acute distress symptoms noted. Will continue to monitor. This RN called daughter Xavier Felton and left a message.

## 2021-01-06 NOTE — H&P
Internal Medicine PGY- 1 Resident History & Physical ICU      PCP: SABAS Guzman CNP    Date of Admission: 1/5/2021    Chief Complaint:  Drug overdose    HPI:   Patient is a 50 y.o. female with PMHx significant for CHF, COPD, DMII, Hepatitis C, HTN, PAPITO who was transported to [facility] ED after being found unresponsive due to suspected drug overdose. Received 4 mg IM Narcan with improvement. She apparently took her oxygen off for about an hour and a half whe she took a shower and some cleanig around her house. Took some Perceocet of which she was not prescribed for body aches over the last 2-3 days. She is unable to recall what happened after that but next remembers she was waking up on the floor. Of note she was not wearing her oxygen on EMS arrival to residence. In the ED CBC showed leukocytosis (17.3) with predominant neutrophils and decreased lymphocytes. EBMP unremarkable. Pro-. Troponin <0.01. Ethanol not detected. Hepatic panel with elevated AST (215) and ALT (75). Lactic acid 5.6. ABG showed pH 7.166, pCO2 82.8, pO2 48.8, HCO3 29.3, O2 sat 81%. CT Thorax with no acute findings. CT Cervical soine without fracture and bilateral cervical lymph nodes favored to be reactive. Head CT negative for acute intracranial abnormality. EKG with sinus tachycardia. Patient was transferred to [facility] ICU for further evaluation. On exam when she reached ICU she is on home 4L and able to speak without difficulty. No in creased work of breathing. She states she was recently admitted to hospital in [place] and did require intubation. She reports nonspecific body aches and some discomfort to forehead. She denies productive cough, fever, chills, abdominal pain, nausea, vomiting, HA.      Past Medical History:          Diagnosis Date    Acute cystitis with hematuria     Bacteremia 08/23/2017    staph aureus    [facility]est[facility]     Colonization status 7/26/12    DNA probe negative for MRSA    [facility] disease[facility]     Diabetes mellitus ([facility] [address] [facility]     FOR ABOUT 4 YEARS    DM (diabetes mellitus) ([facility] [address]     Drug abuse, IV ([facility] [address] [facility]     Hepatitis     Hepatitis C antibody positive in blood 08/24/2017    Hepatitis C AB positive     Heroin overdose ([facility] [address] [facility]     Hypertension     MRSA infection     LUNGS    Neuropathy     legs with pain    Other disorders of kidney and ureter     KIDNEY FAILURE, ACUTE    Pneumonia     Smoking history        PastSurgical History:          Procedure Laterality Date    BRONCHOSCOPY  12/21/2017    BAL    CYSTOSCOPY  1/5/15    cysto with left stent placement    CYSTOSCOPY  10/6/15    stent removal    CYSTOSCOPY  11/09/2019    left uretroscopy with stent placement    CYSTOSCOPY Left 11/13/2019    LEFT URETEROSCOPY WITH HOLMIUM LASER LITHOTRIPSY, STENT REMOVAL,  STONE EXTRACTION     CYSTOSCOPY INSERTION / REMOVAL STENT / STONE Left 11/9/2019    CYSTOSCOPY, URETEROSCOPY, STENT PLACEMENT performed by Aurea Dawkins at [address] / Francesca Chamberlain / STONE Left 11/13/2019    CYSTOSCOPY, LEFT URETEROSCOPY WITH HOLMIUM LASER LITHOTRIPSY, STENT REMOVAL,  STONE EXTRACTION performed by Yaneli High MD at [place] [address]      URETER STENT PLACEMENT         Medications Prior to Admission:      Prior to Admission medications    Medication Sig Start Date End Date Taking?  Authorizing Provider   furosemide (LASIX) 20 MG tablet TAKE 3 TABLETS BY MOUTH EVERY DAY 12/31/20   SABAS Elkins CNP   Respiratory Therapy Supplies (NEBULIZER/TUBING/MOUTHPIECE) KIT 1 kit by Does not apply route daily as needed (wheezing) 11/5/20   SABAS Elkins CNP   Respiratory Therapy Supplies (NEBULIZER/TUBING/MOUTHPIECE) KIT 1 kit by Does not apply route daily as needed (wheezing) 11/4/20   SABAS Elkins CNP   glucose monitoring kit (FREESTYLE) monitoring kit 1 kit by Does not apply route daily E11.9 11/4/20   Arlette Oppenheim, APRN - CNP   blood glucose test strips [facility] BLOOD GLUCOSE TEST) strip Test once daily E11.9 11/4/20   Arlette Oppenheim, APRN - CNP   [employer] Lancets Ultra Thin MISC 1 each by Does not apply route daily E11.9 11/4/20   Arlette Oppenheim, APRN - CNP   metoprolol succinate (TOPROL XL) 25 MG extended release tablet Take 1 tablet by mouth daily 11/3/20   Arlette Oppenheim, APRN - CNP   gabapentin (NEURONTIN) 600 MG tablet Take 1 tablet by mouth 3 times daily for 30 days. 11/3/20 12/3/20  Arlette Oppenheim, APRN - CNP   albuterol sulfate HFA (PROVENTIL HFA) 108 (90 Base) MCG/ACT inhaler Inhale 2 puffs into the lungs every 6 hours as needed for Wheezing 11/3/20   Arlette Oppenheim, APRN - CNP   budesonide-formoterol (SYMBICORT) 80-4.5 MCG/ACT AERO Inhale 2 puffs into the lungs 2 times daily 11/3/20   Arlette Oppenheim, APRN - CNP   ipratropium-albuterol (DUONEB) 0.5-2.5 (3) MG/3ML SOLN nebulizer solution Inhale 3 mLs into the lungs 4 times daily 11/3/20   Arlette Oppenheim, APRN - CNP   lisinopril (PRINIVIL;ZESTRIL) 40 MG tablet Take 1 tablet by mouth daily 11/3/20   Arlette Oppenheim, APRN - CNP   metFORMIN (GLUCOPHAGE) 500 MG tablet Take 1 tablet by mouth 2 times daily (with meals) 11/3/20   Arlette Oppenheim, APRN - CNP   nicotine (NICODERM CQ) 14 MG/24HR Place 1 patch onto the skin daily 4/24/20 6/5/20  Arlette Oppenheim, APRN - CNP   tamsulosin [facility]) 0.4 MG capsule Take 1 capsule by mouth daily 11/10/19 12/10/19  Ronald Jones MD   albuterol (PROVENTIL) (5 MG/ML) 0.5% nebulizer solution Take 0.5 mLs by nebulization 2 times daily 10/4/19 11/10/19  SABAS Dallas CNP   OXYGEN Inhale 4 L into the lungs continuous 11/16/17   Leon Kramer MD       Allergies: Pcn [penicillins], Aspirin, Pcn [penicillins], and Sulfamethoxazole-trimethoprim    Social History:      TOBACCO:   reports that she has been smoking e-cigarettes and cigarettes. She has a 33.00 pack-year smoking history.  She has never used smokeless reviewed the CXR with the following interpretation:     CT HEAD WO CONTRAST   Final Result      No acute intracranial abnormality. Left frontal cephalohematoma. CT CERVICAL SPINE WO CONTRAST   Final Result   Impression:      Negative for fracture or static subluxation of the cervical spine. Visualized bilateral cervical lymph nodes, which are favored to be reactive. Correlate clinically. CT CHEST WO CONTRAST   Final Result      No acute findings in the chest.      Prominent central pulmonary arterial trunk and main pulmonary arteries, similar to the prior study. ASSESSMENT & PLAN:  Patient is a 50 y.o. female with PMHx significant for CHF, COPD, DMII, Hepatitis C, HTN, PAPITO who was transported to [facility] ED after being found unresponsive due to suspected drug overdose.     Type 2 Respiratory failure 2/2 drug overdose  - History of multiple intubations  - Continue BiPAP for which she has been noncompliant for reported 1 day  - ABG: pH 7.119, pCO2 91.2, pO2 72, HCO3 29, O2 sat 92%  - CT Thorax with no acute findings  - at baseline 4L O2    Acute Encephalopathy  - 2/2 drug overdose and hypercapnia  - improved following Narcan (4mg x1)  - continue BiPAP  - will continue to monitor    Diabetes mellitus  - HgbA1c in 9/2020 was 6.2  - hold home Metformin with elevated AST/ALT  - LDSSI  - hypoglycemia protocol  - NPO at this time once stabilized carb control diet    COPD not in exacerbation  - on 4L O2 at home  - non complaint with BiPAP  - Duonebs      Diastolic CHF not in exacerbation  - Last ECHO 11/2019 showed EF >65% with grade II DD  - resume home Lasix    Tobacco abuse  - nicotine patch    Hypertension  - resume home meds (Metoprolol and Lisinopril)    GI Prophylaxis: None  Sedation: None  DVT Prophylaxis: Lovenox  Fluids: None  IV access:    Intubated: No  Diet: No diet orders on file    I will discuss the patient with Dr. Cristian Carlin MD  Internal Medicine Resident, PGY- 1  Perfect serve

## 2021-01-06 NOTE — PROGRESS NOTES
4 Eyes Skin Assessment     NAME:  Raman Elizalde OF BIRTH:  1975  MEDICAL RECORD NUMBER:  [id number]    The patient is being assess for  Admission    I agree that 2 RN's have performed a thorough Head to Toe Skin Assessment on the patient. ALL assessment sites listed below have been assessed. Areas assessed by both nurses:    Head, Face, Ears, Shoulders, Back, Chest, Arms, Elbows, Hands, Sacrum. Buttock, Coccyx, Ischium and Legs. Feet and Heels        Does the Patient have a Wound?  Laceration to left head, staples in place  Scattered bruising        Andres Prevention initiated:  NA   Wound Care Orders initiated:  NA    Pressure Injury (Stage 3,4, Unstageable, DTI, NWPT, and Complex wounds) if present place consult order under [de-identified] NA    New and Established Ostomies if present place consult order under : NA      Nurse 1 eSignature: Electronically signed by Armani Mathur RN on 1/5/21 at 11:12 PM EST    **SHARE this note so that the co-signing nurse is able to place an eSignature**    Nurse 2 eSignature: Electronically signed by Tk Justice RN on 1/6/21 at 3:22 AM EST

## 2021-01-06 NOTE — PROGRESS NOTES
Pt arrival to room 4501 via [facility] from [facility] ED. Pt is alert and oriented x4, no signs of acute distress noted. Pt refusing bipap at this time due to bruising on face, causes pain. 4 L NC on, SpO2% 97%. Respirations even and easy. Pt reports that she has been up for 2 days due to moving, has not been on home cpap. Pt reports that she took a pain pill and then woke up in the ambulance. Pt does report that she is a former heroin user but has not used in years. HR 99, sinus rhythm. Pt does report chest pain, radiating to left arm x 1 day. Pt denies any exposure to covid and was recently negative. Pt denies any other symptoms. ICU residents at bedside. Pt in droplet plus precautions for covid rule out. CHG bath provided upon arrival per protocol. levo     Last Written Prescription Date: 1/6/17  Last Quantity: 90, # refills: 1  [place] Office Visit [place] [facility] prescribing provider: 6/27/17        TSH   Date Value Ref Range Status   05/05/2017 1.34 0.40 - 4.00 mU/L Final

## 2021-01-06 NOTE — PROGRESS NOTES
RESPIRATORY THERAPY ASSESSMENT    Name:  Donna [facility] [address] Record Number:  [id number]  Age: 50 y.o. Gender: female  : 1975  Today's Date:  2021  Room:  [address] [place] [address]    Assessment     Is the patient being admitted for a COPD or Asthma exacerbation? No   (If yes the patient will be seen every 4 hours for the first 24 hours and then reassessed)    Patient Admission Diagnosis      Allergies  Allergies   Allergen Reactions    Pcn [Penicillins] Shortness Of Breath and Swelling    Aspirin Other (See Comments)     Cause GI upset. But takes 81 mg aspirin at home    Pcn [Penicillins]      swelling    Sulfamethoxazole-Trimethoprim Hives             Pulmonary History:COPD and CHF/Pulmonary Edema  Home Oxygen Therapy:  4 liters/min via nasal cannula  Home Respiratory Therapy:Albuterol, Albuterol/Ipratropium Bromide HHN and Budesonide/Formoterol    Current Respiratory Therapy:  combivent QID  Treatment Type: EKG       Respiratory Severity Index(RSI)   Patients with orders for inhalation medications, oxygen, or any therapeutic treatment modality will be placed on Respiratory Protocol. They will be assessed with the first treatment and at least every 72 hours thereafter. The following severity scale will be used to determine frequency of treatment intervention.     Smoking History: Pulmonary Disease or Smoking History, Greater than 15 pack year = 2    Social History  Social History     Tobacco Use    Smoking status: Current Every Day Smoker     Packs/day: 1.00     Years: 33.00     Pack years: 33.00     Types: E-Cigarettes, Cigarettes    Smokeless tobacco: Never Used    Tobacco comment: 16 - smokes 5 cigs daily   Substance Use Topics    Alcohol use: No    Drug use: No     Comment: Heroin       Recent Surgical History: None = 0  Past Surgical History  Past Surgical History:   Procedure Laterality Date    BRONCHOSCOPY  2017    BAL    CYSTOSCOPY  1/5/15    cysto with left stent placement    CYSTOSCOPY  10/6/15    stent removal    CYSTOSCOPY  11/09/2019    left uretroscopy with stent placement    CYSTOSCOPY Left 11/13/2019    LEFT URETEROSCOPY WITH HOLMIUM LASER LITHOTRIPSY, STENT REMOVAL,  STONE EXTRACTION     CYSTOSCOPY INSERTION / REMOVAL STENT / STONE Left 11/9/2019    CYSTOSCOPY, URETEROSCOPY, STENT PLACEMENT performed by Rebecca Penaloza at [address] / [address] / Veronica Portillo Left 11/13/2019    CYSTOSCOPY, LEFT URETEROSCOPY WITH HOLMIUM LASER LITHOTRIPSY, STENT REMOVAL,  STONE EXTRACTION performed by Joni Woody MD at [place] [address]      URETER STENT PLACEMENT         Level of Consciousness: Alert, Oriented, and Cooperative = 0    Level of Activity: Walking unassisted = 0    Respiratory Pattern: Regular Pattern; RR 8-20 = 0    Breath Sounds: Clear = 0    Sputum   ,  ,    Cough: Strong, spontaneous, non-productive = 0    Vital Signs   /79   Pulse 96   Temp 98.1 °F (36.7 °C) (Oral)   Resp 14   Ht 5' 3\" (1.6 m)   Wt 238 lb 5.1 oz (108.1 kg)   LMP 12/05/2020   SpO2 95%   BMI 42.22 kg/m²   SPO2 (COPD values may differ): 90-91% on room air or greater than 92% on FiO2 24- 28% = 1    Peak Flow (asthma only): not applicable = 0    RSI: 0-4 = See once and convert to home regimen or discontinue        Plan       Goals: mobilize retained secretions, volume expansion and improve oxygenation    Patient/caregiver was educated on the proper method of use for Respiratory Care Devices:  Yes      Level of patient/caregiver understanding able to:   ? Verbalize understanding   ? Demonstrate understanding       ? Teach back        ? Needs reinforcement       ? No available caregiver               ? Other:     Response to education:  Excellent     Is patient being placed on Home Treatment Regimen? Yes     Does the patient have everything they need prior to discharge?   NA     Comments: uses bipap at home unknown the patient's RSI, but not less than home treatment regimen frequency. 5. Bronchodilator(s) will be discontinued if patient has a RSI less than 9 and has received no scheduled or as needed treatment for 72  Hrs. Patients Ordered on a Mucolytic Agent:    1. Must always be administered with a bronchodilator. 2. Discontinue if patient experiences worsened bronchospasm, or secretions have lessened to the point that the patient is able to clear them with a cough. Anti-inflammatory and Combination Medications:    1. If the patient lacks prior history of lung disease, is not using inhaled anti-inflammatory medication at home, and lacks wheezing by examination or by history for at least 24 hours, contact physician for possible discontinuation.

## 2021-01-06 NOTE — ED NOTES
[facility] here to transport pt to room [facility], [address] [place] [address]  01/05/21 [id number]

## 2021-01-06 NOTE — ED NOTES
Report called to [facility] AND [facility] RN in ICU and transport to be here to pick pt up at 2100 and pt going to room 2621 Maryam Gusman RN  01/05/21 2019

## 2021-01-06 NOTE — CONSULTS
ICU HISTORY AND PHYSICAL       Hospital Day:   ICU Day:                                                          Code:Full Code  Admit Date: 1/5/2021  PCP: SABAS Finn CNP                                  CC: Drug overdose    HISTORY OF PRESENT ILLNESS:   Patient is a 50 y.o. female with PMHx significant for CHF, COPD, DMII, Hepatitis C, HTN, PAPITO who was transported to [facility] ED after being found unresponsive due to suspected drug overdose. Received 4 mg IM Narcan with improvement. She apparently took her oxygen off for about an hour and a half whe she took a shower and some cleanig around her house. Took some Perceocet of which she was not prescribed for body aches over the last 2-3 days. She is unable to recall what happened after that but next remembers she was waking up on the floor. Of note she was not wearing her oxygen on EMS arrival to residence. In the ED CBC showed leukocytosis (17.3) with predominant neutrophils and decreased lymphocytes. EBMP unremarkable. Pro-. Troponin <0.01. Ethanol not detected. Hepatic panel with elevated AST (215) and ALT (75). Lactic acid 5.6. ABG showed pH 7.166, pCO2 82.8, pO2 48.8, HCO3 29.3, O2 sat 81%. CT Thorax with no acute findings. CT Cervical soine without fracture and bilateral cervical lymph nodes favored to be reactive. Head CT negative for acute intracranial abnormality. EKG with sinus tachycardia. Patient was transferred to [facility] ICU for further evaluation. On exam when she reached ICU she is on home 4L and able to speak without difficulty. No in creased work of breathing. She states she was recently admitted to hospital in [place] and did require intubation. She reports nonspecific body aches and some discomfort to forehead. She denies productive cough, fever, chills, abdominal pain, nausea, vomiting, HA. Saw patient this morning, currently on BiPAP, not in acute distress.  Patient is complaining of headaches, likely from the fall on the floor, and shortness of breath. Patient denies fever, chills, chest pain, nausea, or vomiting. PAST HISTORY:     Past Medical History:   Diagnosis Date    Acute cystitis with hematuria     Bacteremia 08/23/2017    staph aureus    [facility]     Colonization status 7/26/12    DNA probe negative for MRSA    [facility]     Diabetes mellitus ([facility]     FOR ABOUT 4 YEARS    DM [facility]diabetes mellitus) [facility]     Drug abuse, IV [facility]     Hepatitis     Hepatitis C antibody positive in blood 08/24/2017    Hepatitis C AB positive     [facility] overdose ([facility]     Hypertension     MRSA infection     LUNGS    Neuropathy     legs with pain    Other disorders of kidney and ureter     KIDNEY FAILURE, ACUTE    Pneumonia     Smoking history        Past Surgical History:   Procedure Laterality Date    BRONCHOSCOPY  12/21/2017    BAL    CYSTOSCOPY  1/5/15    cysto with left stent placement    CYSTOSCOPY  10/6/15    stent removal    CYSTOSCOPY  11/09/2019    left uretroscopy with stent placement    CYSTOSCOPY Left 11/13/2019    LEFT URETEROSCOPY WITH HOLMIUM LASER LITHOTRIPSY, STENT REMOVAL,  STONE EXTRACTION     CYSTOSCOPY INSERTION / REMOVAL STENT / STONE Left 11/9/2019    CYSTOSCOPY, URETEROSCOPY, STENT PLACEMENT performed by Navya Jimenez at [address] [facility] / REMOVAL Quillian Blacksmith / STONE Left 11/13/2019    CYSTOSCOPY, LEFT URETEROSCOPY WITH HOLMIUM LASER LITHOTRIPSY, STENT REMOVAL,  STONE EXTRACTION performed by Brittany Rouse MD at [place] [address]      URETER STENT PLACEMENT         SocialHistory:     Alcohol:  Illicit drugs: no use  Tobacco:  reports that she has been smoking e-cigarettes and cigarettes. She has a 33.00 pack-year smoking history.  She has never used smokeless tobacco.    Family History:  Family History   Problem Relation Age of Onset    Heart Disease Mother  No Known Problems Father     Heart Disease Sister     No Known Problems Brother     No Known Problems Maternal Grandmother     No Known Problems Maternal Grandfather     No Known Problems Paternal Grandmother     No Known Problems Paternal Grandfather     No Known Problems Other        MEDICATIONS:     No current facility-administered medications on file prior to encounter. Current Outpatient Medications on File Prior to Encounter   Medication Sig Dispense Refill    furosemide (LASIX) 20 MG tablet TAKE 3 TABLETS BY MOUTH EVERY DAY 90 tablet 1    metoprolol succinate (TOPROL XL) 25 MG extended release tablet Take 1 tablet by mouth daily 30 tablet 1    gabapentin (NEURONTIN) 600 MG tablet Take 1 tablet by mouth 3 times daily for 30 days.  90 tablet 1    metFORMIN (GLUCOPHAGE) 500 MG tablet Take 1 tablet by mouth 2 times daily (with meals) 60 tablet 1    OXYGEN Inhale 4 L into the lungs continuous  0    Respiratory Therapy Supplies (NEBULIZER/TUBING/MOUTHPIECE) KIT 1 kit by Does not apply route daily as needed (wheezing) 1 kit 0    Respiratory Therapy Supplies (NEBULIZER/TUBING/MOUTHPIECE) KIT 1 kit by Does not apply route daily as needed (wheezing) 1 kit 0    glucose monitoring kit (FREESTYLE) monitoring kit 1 kit by Does not apply route daily E11.9 1 kit 0    blood glucose test strips (ACURA BLOOD GLUCOSE TEST) strip Test once daily E11.9 50 strip 5    [employer] Lancets Ultra Thin MISC 1 each by Does not apply route daily E11.9 100 each 0    albuterol sulfate HFA (PROVENTIL HFA) 108 (90 Base) MCG/ACT inhaler Inhale 2 puffs into the lungs every 6 hours as needed for Wheezing 1 Inhaler 1    budesonide-formoterol (SYMBICORT) 80-4.5 MCG/ACT AERO Inhale 2 puffs into the lungs 2 times daily 1 Inhaler 3    ipratropium-albuterol (DUONEB) 0.5-2.5 (3) MG/3ML SOLN nebulizer solution Inhale 3 mLs into the lungs 4 times daily 360 mL 1    lisinopril (PRINIVIL;ZESTRIL) 40 MG tablet Take 1 tablet by mouth daily (Patient taking differently: Take 40 mg by mouth 2 times daily ) 30 tablet 1    nicotine (NICODERM CQ) 14 MG/24HR Place 1 patch onto the skin daily 42 patch 0    tamsulosin (FLOMAX) 0.4 MG capsule Take 1 capsule by mouth daily 30 capsule 0    albuterol (PROVENTIL) (5 MG/ML) 0.5% nebulizer solution Take 0.5 mLs by nebulization 2 times daily 30 mL 0         Scheduled Meds:   sodium chloride flush  10 mL Intravenous 2 times per day    enoxaparin  40 mg Subcutaneous Daily    insulin lispro  0-6 Units Subcutaneous TID WC    insulin lispro  0-3 Units Subcutaneous Nightly    furosemide  60 mg Oral Daily    gabapentin  600 mg Oral TID    lisinopril  40 mg Oral Daily    nicotine  1 patch Transdermal Daily      Continuous Infusions:   dextrose       PRN Meds:albuterol-ipratropium, sodium chloride flush, promethazine **OR** ondansetron, polyethylene glycol, acetaminophen **OR** acetaminophen, glucose, dextrose, glucagon (rDNA), dextrose, albuterol sulfate HFA    Allergies: Allergies   Allergen Reactions    Pcn [Penicillins] Shortness Of Breath and Swelling    Aspirin Other (See Comments)     Cause GI upset. But takes 81 mg aspirin at home    Pcn [Penicillins]      swelling    Sulfamethoxazole-Trimethoprim Hives       REVIEW OF SYSTEMS:       History obtained from the patient    Review of Systems    PHYSICAL EXAM:       Vitals: /62   Pulse 96   Temp 98.3 °F (36.8 °C) (Axillary)   Resp 14   Ht 5' 3\" (1.6 m)   Wt 238 lb 5.1 oz (108.1 kg)   LMP 12/05/2020   SpO2 95%   BMI 42.22 kg/m²     I/O:  No intake or output data in the 24 hours ending 01/06/21 0908  No intake/output data recorded. No intake/output data recorded. Physical Examination:     Physical Exam  HENT:      Head: Normocephalic and atraumatic. Mouth/Throat:      Mouth: Mucous membranes are moist.      Pharynx: Oropharynx is clear. Cardiovascular:      Rate and Rhythm: Normal rate and regular rhythm.       Heart sounds: Normal heart sounds. No murmur. No friction rub. No gallop. Pulmonary:      Effort: Pulmonary effort is normal.      Breath sounds: Rales present. No wheezing or rhonchi. Abdominal:      General: Abdomen is flat. Bowel sounds are normal.      Palpations: Abdomen is soft. Tenderness: There is no abdominal tenderness. Musculoskeletal:      Right lower leg: No edema. Left lower leg: No edema. Skin:     General: Skin is warm and dry. Neurological:      General: No focal deficit present. Mental Status: She is oriented to person, place, and time. Mental status is at baseline. Psychiatric:         Mood and Affect: Mood normal.         Thought Content: Thought content normal.         Access:                         -Peripheral Access Day#:1                            Recent Labs     01/06/21  0347 01/06/21  0633   PHART 7.191* 7.258*   MHL8LWB 85.4* 70.1*   PO2ART 75.6 50.9*       DATA:       Labs:  CBC:   Recent Labs     01/05/21  1211 01/06/21  0612   WBC 17.3* 10.6   HGB 14.4 13.4   HCT 45.5 41.0    120*       BMP:   Recent Labs     01/05/21  1210 01/06/21  0612    137   K 4.1 4.8   CL 98* 100   CO2 28 27   BUN 23* 25*   CREATININE 1.1 1.0   GLUCOSE 106* 168*     LFT's:   Recent Labs     01/05/21  1210   *   ALT 75*   BILITOT 0.5   ALKPHOS 66     Troponin:   Recent Labs     01/05/21  1210   TROPONINI <0.01     BNP:No results for input(s): BNP in the last 72 hours. ABGs:   Recent Labs     01/06/21  0347 01/06/21  0633   PHART 7.191* 7.258*   ARL6YWC 85.4* 70.1*   PO2ART 75.6 50.9*     INR: No results for input(s): INR in the last 72 hours.     U/A:  Recent Labs     01/06/21  0456   COLORU Yellow   PHUR 6.0  6.0   WBCUA 6-9*   RBCUA 0-2   YEAST Present*   BACTERIA 2+*   CLARITYU Clear   SPECGRAV >=1.030   LEUKOCYTESUR Negative   UROBILINOGEN 0.2   BILIRUBINUR SMALL*   BLOODU SMALL*   GLUCOSEU Negative   AMORPHOUS 2+       CT HEAD WO CONTRAST   Final Result      No acute intracranial abnormality. Left frontal cephalohematoma. CT CERVICAL SPINE WO CONTRAST   Final Result   Impression:      Negative for fracture or static subluxation of the cervical spine. Visualized bilateral cervical lymph nodes, which are favored to be reactive. Correlate clinically. CT CHEST WO CONTRAST   Final Result      No acute findings in the chest.      Prominent central pulmonary arterial trunk and main pulmonary arteries, similar to the prior study. EKG:   Echo:  Micro:     ASSESSMENT AND PLAN:   Patient is a 50 y.o. female with PMHx significant for CHF, COPD, DMII, Hepatitis C, HTN, PAPITO who was transported to [facility] ED after being found unresponsive due to suspected drug overdose.     Type 2 Respiratory failure 2/2 drug overdose vs COPD exacerbation   - History of multiple intubations  - holding bipap  - ABG: pH 7.258, pCO2 70.1, pO2 50.9, HCO3 31.3, O2 sat 95%  - CT Thorax with no acute findings  - at baseline 4L O2   - duobebs q4h, prednisone 40 PO daily.     Acute Encephalopathy  - 2/2 drug overdose and hypercapnia  - improved following Narcan (4mg x1)  - hold BiPAP  - will continue to monitor     Diabetes mellitus  - HgbA1c in 9/2020 was 6.2  - hold home Metformin with elevated AST/ALT  - LDSSI  - hypoglycemia protocol  - NPO at this time once stabilized carb control diet     COPD not in exacerbation  - on 4L O2 at home  - non complaint with BiPAP  - Duonebs       Diastolic CHF not in exacerbation  - Last ECHO 11/2019 showed EF >65% with grade II DD  - resume home Lasix     Tobacco abuse  - nicotine patch     Hypertension  - Holding home metoprolol, UDS + for cocaine  - restart home lisinorpil    Code Status:Full Code  FEN: NPO  PPX: Lovenox  DISPO:     This patient has been staffed and discussed with Dr. Andria Burrell MD.  -----------------------------  Nola Becerril DO, PGY-1  1/6/2021  9:08 AM  Patient examined, findings as discussed with Dr. Omar Hutson.   Agree with assessment and plan as above. Hypercapnia has been gradually improving with continued use of BiPAP support. Important tender her history is that she states she had not been using BiPAP for several nights prior to admission. She denies history suggesting an acute respiratory infection. She also denies taking any other nonprescribed medication other than a \"pain pill. \"  Note UDS negative for opiate. Continuing treatment of COPD, ventilatory support with nocturnal BiPAP.

## 2021-01-06 NOTE — PROGRESS NOTES
history. has a past surgical history that includes Tubal ligation; tracheostomy closure; Cystoscopy (1/5/15); Ureter stent placement; Cystocopy (10/6/15); bronchoscopy (12/21/2017); Cystocopy (11/09/2019); CYSTOSCOPY INSERTION / REMOVAL STENT / STONE (Left, 11/9/2019); Cystoscopy (Left, 11/13/2019); and CYSTOSCOPY INSERTION / REMOVAL STENT / STONE (Left, 11/13/2019). Restrictions  Position Activity Restriction  Other position/activity restrictions: activity as tolerated  Vision/Hearing  Vision: Impaired  Vision Exceptions: Wears glasses at all times(glasses not here today)  Hearing: Within functional limits     Subjective  General  Chart Reviewed: Yes  Additional Pertinent Hx: Admit 1/5 after being found unresponsive; c-spine CT: (-) fx; head CT: (+) Left frontal cephalohematoma; chest CT: (-) acute; PMHx: CHF, COPD, HTN, DM, IV drug abuse, neuropathy, (+) smoker, h/o trach  Referring Practitioner: Lauren Green MD  Diagnosis: overdose  Subjective  Subjective: Pt found supine in bed. Agreeable to PT.   Pain Screening  Patient Currently in Pain: Yes(chronic back pain, not rated, RN aware)       Orientation  Orientation  Overall Orientation Status: Within Functional Limits(oriented to self, hospital - needs cues for correct one, situation, not oriented to month)  Social/Functional History  Social/Functional History  Lives With: Family(brother (medical issuses) and father)  Type of Home: House(duplex)  Home Layout: Able to Live on Main level with bedroom/bathroom  Home Access: Level entry  Bathroom Shower/Tub: Walk-in shower  Bathroom Toilet: Standard(RTS with arms)  Bathroom Equipment: Grab bars in shower, Built-in shower seat  Home Equipment: Rolling walker  ADL Assistance: Independent  Homemaking Assistance: Independent  Ambulation Assistance: Independent  Transfer Assistance: Independent  Active : No  Additional Comments: uses motorized scooter at grocery store - doesnt take oxygen to the store 2* does not have equipment for portable O2  Cognition        Objective          AROM RLE (degrees)  RLE AROM: WFL  AROM LLE (degrees)  LLE AROM : WFL  Strength RLE  Strength RLE: WFL  Strength LLE  Strength LLE: WFL        Bed mobility  Supine to Sit: Stand by assistance  Scooting: Stand by assistance  Transfers  Sit to Stand: Contact guard assistance(from EOB, toilet, bedside chair)  Stand to sit: Contact guard assistance  Ambulation  Ambulation?: Yes  Ambulation 1  Device: No Device  Assistance: Contact guard assistance(progressing to SBA with practice)  Quality of Gait: slow, unsteady initially and reaching for walls, decreased carolyn  Distance: 10 ft; 30 ft x 2     Balance  Sitting - Static: Good  Sitting - Dynamic: Good  Standing - Static: Fair  Standing - Dynamic: Fair      Treatment included gait and transfer training, pt education.   Plan   Plan  Times per week: 2-5  Current Treatment Recommendations: Balance Training, Functional Mobility Training, Patient/Caregiver Education & Training, Transfer Training, Gait Training  Safety Devices  Type of devices: Call light within reach, Chair alarm in place, Nurse notified, Gait belt, Left in chair    G-Code       OutComes Score                                                  AM-PAC Score  -PAC Inpatient Mobility Raw Score : 18 (01/06/21 1505)  AM-PAC Inpatient T-Scale Score : 43.63 (01/06/21 1505)  Mobility Inpatient CMS 0-100% Score: 46.58 (01/06/21 1505)  Mobility Inpatient CMS G-Code Modifier : CK (01/06/21 1505)          Goals  Short term goals  Time Frame for Short term goals: discharge  Short term goal 1: Pt will transfer sit <--> stand with supervision  Short term goal 2: Pt will amb 50 ft with LRAD and supervision  Patient Goals   Patient goals : return home       Therapy Time   Individual Concurrent Group Co-treatment   Time In 1357         Time Out 1450         Minutes 53                 Timed Code Treatment Minutes:  38    Total Treatment Minutes:  53    If patient is discharged prior to next treatment, this note will serve as the discharge summary.   Terese Reyes, PT, DPT  [id number]

## 2021-01-06 NOTE — CARE COORDINATION
Case Management Assessment           Initial Evaluation                Date / Time of Evaluation: 1/6/2021 10:14 AM                 Assessment Completed by: Evan Titusmatthieu     Patient is from home with family but does not have her phone, clothes or portable O2 tank. She has no numbers to call and when I asked about her emergency contacts Jesus Carlos was the only one that she agreed to have me try to contact. I called the number provided in her chart but it went straight to voicemail. I did leave a message asking them to call me back. At this point the patient would need a ride to home at d/c and would need a portable O2 tank supplied. I called Jose to let him know this. If the patient has new medications at d/c she would like them filled here through [facility]bed[facility]. Her address was not correct in the system so I did change this. Patient did not need any resources and did not have any other needs from . She will need clothing to return to home though. Patient Name: Shanda Crawford     YOB: 1975  Diagnosis: Drug overdose, accidental or unintentional, initial encounter [T50.901A]  Accidental drug overdose, initial encounter Saulo Arely     Date / Time: 1/5/2021 11:48 AM    Patient Admission Status: Inpatient    If patient is discharged prior to next notation, then this note serves as note for discharge by case management.      Current PCP: SABAS Dorado - CNP  Clinic Patient: No    Chart Reviewed: Yes  Patient/ Family Interviewed: Yes    Initial assessment completed at bedside with: patient    Hospitalization in the last 30 days: No    Emergency Contacts:  Extended Emergency Contact Information  Primary Emergency Contact: Ines Hankins of [address] [place] [address] Phone: [phone]  Mobile Phone: [phone]  Relation: Child  Secondary Emergency Contact: [address] Phone: [phone]  Relation: Child    Advance Directives:   Code Status: Full Code    Healthcare Power of : Yes  Agent: Perico Hammer  Contact Number: [phone]    Copy present: Yes     In paper Chart: No    Scanned into EMR Yes    Financial  Payor: Denny Jobs / Plan: Denny Jobs / Product Type: *No Product type* /     Pre-cert required for SNF: Yes    Pharmacy    [facility] [address], [address] [phone] Klarissa Juarez [phone]  [address] [facility] [address]  Phone: [phone] Fax: [phone]      Potential assistance Purchasing Medications: Potential Assistance Purchasing Medications: No  Does Patient want to participate in local refill/ meds to beds program?: Not Assessed    Meds To Beds General Rules:  1. Can ONLY be done Monday- Friday between 8:30am-5pm  2. Prescription(s) must be in pharmacy by 3pm to be filled same day  3. Copy of patient's insurance/ prescription drug card and patient face sheet must be sent along with the prescription(s)  4. Cost of Rx cannot be added to hospital bill. If financial assistance is needed, please contact unit  or ;  or  CANNOT provide pharmacy voucher for patients co-pays  5.  Patients can then  the prescription on their way out of the hospital at discharge, or pharmacy can deliver to the bedside if staff is available. (payment due at time of pick-up or delivery - cash, check, or card accepted)     Able to afford home medications/ co-pay costs: Yes    ADLS  Support Systems: Children    PT AM-PAC:   /24  OT AM-PAC:   /24    [place]: two level home  Steps: 1 step    Plans to RETURN to current housing: Yes  Barriers to RETURNING to current housing: does not have transport to home or portable O2 tank      Home Oxygen and [address] [place] prior to admission: Yes  [address]: [facility]  Phone: [phone]   Other Respiratory Equipment: bipap at home as well    Informed of need to bring portable home O2 tank on day of DISCHARGE for nursing to connect prior to leaving: Yes  Verbalized agreement/Understanding: No  Person to bring portable tank at discharge: currently there is no one available      DISCHARGE PLAN:  Disposition: Home- No Services Needed    Transportation PLAN for discharge: TBD     Factors facilitating achievement of predicted outcomes: Family support, Cooperative and Pleasant    Barriers to discharge: monitoring blood gas    Additional Case Management Notes: I did get a call back from Jeanette and she does not have a car but she will try to see if she can borrow a family member's car to drop off her portable O2 tank. The Plan for Transition of Care is related to the following treatment goals of Drug overdose, accidental or unintentional, initial encounter [T50.901A]  Accidental drug overdose, initial encounter [T50.901A]    The Patient and/or patient representative [facility] [place] and her family were provided with a choice of provider and agrees with the discharge plan Not Indicated    Freedom of choice list was provided with basic dialogue that supports the patient's individualized plan of care/goals and shares the quality data associated with the providers.  Not Indicated    Care Transition patient: No    Cely Lopez RN  [facility] [employer]  Case Management Department  Ph: [phone]   Fax: [phone]

## 2021-01-06 NOTE — ED NOTES
Spoke with transfer center and transport luisana be here at 2100     Renetta Strauss [facility]  01/05/21 1950

## 2021-01-06 NOTE — PROGRESS NOTES
Occupational Therapy   Occupational Therapy Initial Assessment, Treatment and D/c Note   Date: 2021   Patient Name: Wyatt Shannon  MRN: [id number]     : 1975    Date of Service: 2021    Discharge Recommendations: Wyatt Shannon scored a 21/24 on the AM-PAC ADL Inpatient form. At this time, no further OT is recommended upon discharge. Recommend patient returns to prior setting with prior services. OT Equipment Recommendations  Equipment Needed: No    Assessment   Assessment: pt from home with family -- awaiting daughter to move into new 2 family. Pt demo functioning close to stated baseline with functional mobility/ transfers and ADL. No DME needs. No ongoing OT indicated. Pt plans for home at d/c. Will sign off from OT services  Decision Making: Medium Complexity  OT Education: OT Role;Plan of Care;ADL Adaptive Strategies  Patient Education: verb understanding  No Skilled OT: At baseline function; Safe to return home; No OT goals identified  REQUIRES OT FOLLOW UP: No  Activity Tolerance  Activity Tolerance: Patient Tolerated treatment well  Activity Tolerance: Pt demo moderate activity on 4L o2 -- O2 sats 92-93%  Safety Devices  Safety Devices in place: Yes  Type of devices: Call light within reach;Nurse notified; Left in chair;Chair alarm in place           Patient Diagnosis(es): The primary encounter diagnosis was Opiate overdose, accidental or unintentional, initial encounter ([facility] [address] Diagnoses of Acute on chronic respiratory failure with hypercapnia ([address] Laceration of scalp, initial encounter, Lactic acidosis, and PVC's (premature ventricular contractions) were also pertinent to this visit.      has a past medical history of Acute cystitis with hematuria, Bacteremia, CHF (congestive heart failure) ([address] Colonization status, COPD (chronic obstructive pulmonary disease) ([facility] [address] Diabetes mellitus ([facility] [address] DM (diabetes mellitus) ([facility] [address] Drug abuse, IV ([facility] [address] Hepatitis, Hepatitis C antibody positive in blood, Heroin overdose ([facility] [address] Hypertension, MRSA infection, Neuropathy, Other disorders of kidney and ureter, Pneumonia, and Smoking history. has a past surgical history that includes Tubal ligation; tracheostomy closure; Cystoscopy (1/5/15); Ureter stent placement; Cystocopy (10/6/15); bronchoscopy (12/21/2017); Cystocopy (11/09/2019); CYSTOSCOPY INSERTION / REMOVAL STENT / STONE (Left, 11/9/2019); Cystoscopy (Left, 11/13/2019); and CYSTOSCOPY INSERTION / REMOVAL STENT / STONE (Left, 11/13/2019). Restrictions  Position Activity Restriction  Other position/activity restrictions: activity as tolerated    Subjective   General  Chart Reviewed: Yes  Additional Pertinent Hx: Admit 1/5 to ICU with drug overdose- pt found down at home. responded to  Narcan, UA + for amphetamine and cocaine                            PMHX: DM, HTN, CHF, + hx of drug abuse / overdose, COPD wears 4L o2 at baseline- non compliant ,  + smoker  Family / Caregiver Present: No  Diagnosis: Drug overdose  Subjective  Subjective: \" I need anyone to hover over me \" pt in bed agreeable for OOB/OT eval and tx. Pt reports was cleaning with bleach and fell.   Patient Currently in Pain: Yes(chronic back pain, not rated, RN aware)  Vital Signs  Patient Currently in Pain: Yes(chronic back pain, not rated, RN aware)    Social/Functional History  Social/Functional History  Lives With: Family(brother (medical issue) and father)  Type of Home: House(duplex)  Home Layout: Able to Live on Main level with bedroom/bathroom  Home Access: Level entry  Bathroom Shower/Tub: Walk-in shower  Bathroom Toilet: Standard(RTS with arms)  Bathroom Equipment: Grab bars in shower, Built-in shower seat  Home Equipment: Rolling walker  ADL Assistance: Independent  Homemaking Assistance: Independent  Ambulation Assistance: Independent  Transfer Assistance: Independent  Active : No  Additional Comments: uses motorized scooter at grocery store - doesn't take oxygen to the store 2* does not have equipment for portable O2       Objective  Treatment included functional transfer training, ADL's and pt. education. Orientation  Overall Orientation Status: Within Functional Limits(struggled with month and year -- \" I don't care about that on a good day\")     Balance  Sitting Balance: Independent  Standing Balance: Independent  Standing Balance  Time: 3 mins x 2 , 4 mins x 1 and 5 mins x 1  Activity: functional transfers /stance at sink /  functional mobility in room / bathroom  Functional Mobility  Functional - Mobility Device: No device  Activity: To/from bathroom; Other  Assist Level: Stand by assistance  Toilet Transfers  Toilet - Technique: Ambulating  Equipment Used: Standard toilet  Toilet Transfer: Stand by [employer] Comments: with rail - pt reports has rails / raised seat at home  ADL  Feeding: Independent;Setup  Grooming: Independent;Setup(stance at sink for ADLs. Pt asking questions about head lac and staples)  LE Dressing: Contact guard assistance;Minimal assistance  Toileting: Supervision(using rail for steady assist.)  Additional Comments: Pt edu on home safety. Tone RUE  RUE Tone: Normotonic  Tone LUE  LUE Tone: Normotonic  Coordination  Movements Are Fluid And Coordinated: Yes  Quality of Movement Other  Comment: Pt demo        Transfers  Sit to stand: Contact guard assistance  Stand to sit: Stand by assistance  Transfer Comments: pt slightly impulsive at times  Vision - Basic Assessment  Prior Vision: No visual deficits  Cognition  Overall Cognitive Status: Exceptions  Arousal/Alertness: Appropriate responses to stimuli  Following Commands:  Follows one step commands consistently  Attention Span: Appears intact  Memory: Decreased recall of recent events  Safety Judgement: Decreased awareness of need for assistance  Insights: Decreased awareness of deficits  Initiation: Does not require cues  Sequencing: Does not require cues  Cognition Comment: Pt demo poor insight into deficits.  Pt changing story regarding home setting / events leading up fall / unconsciousness    LUE AROM (degrees)  LUE AROM : WFL  Left Hand AROM (degrees)  Left Hand AROM: WFL  RUE AROM (degrees)  RUE AROM : WFL  Right Hand AROM (degrees)  Right Hand AROM: WFL  LUE Strength  Gross LUE Strength: WFL  L Hand General: 4+/5  RUE Strength  Gross RUE Strength: WFL  R Hand General: 4+/5     Hand Dominance  Hand Dominance: Right     Plan D/c Acute OT services      AM-PAC Score  AM-[facility] Inpatient Daily Activity Raw Score: 21 (01/06/21 1448)  AM-PAC Inpatient ADL T-Scale Score : 44.27 (01/06/21 1448)  ADL Inpatient CMS 0-100% Score: 32.79 (01/06/21 1448)  ADL Inpatient CMS G-Code Modifier : CJ (01/06/21 1448)    Therapy Time   Individual Concurrent Group Co-treatment   Time In 1357         Time Out 1450         Minutes 53              Timed Code Treatment Minutes:   39 mins    Total Treatment Minutes:  48 mins       Anselrowdy See, OT

## 2021-01-06 NOTE — PROGRESS NOTES
RESPIRATORY THERAPY ASSESSMENT    Name:  Donna [address] Record Number:  [id number]  Age: 50 y.o. Gender: female  : 1975  Today's Date:  2021  Room:  [address] [place] [address]    Assessment     Is the patient being admitted for a COPD or Asthma exacerbation? No   (If yes the patient will be seen every 4 hours for the first 24 hours and then reassessed)    Patient Admission Diagnosis      Allergies  Allergies   Allergen Reactions    Pcn [Penicillins] Shortness Of Breath and Swelling    Aspirin Other (See Comments)     Cause GI upset. But takes 81 mg aspirin at home    Pcn [Penicillins]      swelling    Sulfamethoxazole-Trimethoprim Hives         Pulmonary History:COPD and CHF/Pulmonary Edema  Home Oxygen Therapy:  4 liters/min via nasal cannula  Home Respiratory Therapy:Albuterol, Albuterol/Ipratropium Bromide HHN and Budesonide/Formoterol    Current Respiratory Therapy: HHN Duoneb QID and Pulm, Brovana BID, bipap  Treatment Type: HHN  Medications: Arformoterol    Respiratory Severity Index(RSI)   Patients with orders for inhalation medications, oxygen, or any therapeutic treatment modality will be placed on Respiratory Protocol. They will be assessed with the first treatment and at least every 72 hours thereafter. The following severity scale will be used to determine frequency of treatment intervention.     Smoking History: Pulmonary Disease or Smoking History, Greater than 15 pack year = 2    Social History  Social History     Tobacco Use    Smoking status: Current Every Day Smoker     Packs/day: 1.00     Years: 33.00     Pack years: 33.00     Types: E-Cigarettes, Cigarettes    Smokeless tobacco: Never Used    Tobacco comment: 16 - smokes 5 cigs daily   Substance Use Topics    Alcohol use: No    Drug use: No     Comment: Heroin       Recent Surgical History: None = 0  Past Surgical History  Past Surgical History:   Procedure Laterality Date    BRONCHOSCOPY  2017    BAL    increased to a maximum of every 4 hours. If greater than every 4 hours is required, the physician will be contacted. 4. If the bronchospasm improves, the frequency of the bronchodilator can be decreased, based on the patient's RSI, but not less than home treatment regimen frequency. 5. Bronchodilator(s) will be discontinued if patient has a RSI less than 9 and has received no scheduled or as needed treatment for 72  Hrs. Patients Ordered on a Mucolytic Agent:    1. Must always be administered with a bronchodilator. 2. Discontinue if patient experiences worsened bronchospasm, or secretions have lessened to the point that the patient is able to clear them with a cough. Anti-inflammatory and Combination Medications:    1. If the patient lacks prior history of lung disease, is not using inhaled anti-inflammatory medication at home, and lacks wheezing by examination or by history for at least 24 hours, contact physician for possible discontinuation.

## 2021-01-06 NOTE — PROGRESS NOTES
Hospitalist Progress Note      PCP: SABAS Guzman - CNP    Date of Admission: 1/5/2021    Angela [address] course  Patient is a 70-year-old female with history of severe pulmonary hypertension, COPD on 4 L home oxygen, PAPITO on nocturnal BiPAP, diabetes mellitus type 2 CHF with diastolic dysfunction hepatitis C presented to [place] ER for unconsciousness. Patient reported that she was not using her oxygen and BiPAP because she was cleaning her house and she thinks that her BiPAP is not working fine. She was admitted to hospital in [place] 2 months ago with a similar situation. She never followed up with her pulmonary after discharge. In ER patient received Narcan she was noted to be in hypoxic hypercapnic respiratory failure requiring BiPAP. Subjective  Patient seen and examined today in the ICU. She is coughing up denies any worsening of shortness of breath on 4 L of oxygen denies any chest pain, nausea, vomiting fever, chills.     Medications:  Reviewed    Infusion Medications    dextrose       Scheduled Medications    albuterol  2.5 mg Nebulization BID    ipratropium-albuterol  3 mL Inhalation 4x Daily    Arformoterol Tartrate  15 mcg Nebulization BID    budesonide  250 mcg Nebulization BID    predniSONE  40 mg Oral Daily    sodium chloride flush  10 mL Intravenous 2 times per day    enoxaparin  40 mg Subcutaneous Daily    insulin lispro  0-6 Units Subcutaneous TID WC    insulin lispro  0-3 Units Subcutaneous Nightly    furosemide  60 mg Oral Daily    gabapentin  600 mg Oral TID    lisinopril  40 mg Oral Daily    nicotine  1 patch Transdermal Daily     PRN Meds: albuterol-ipratropium, sodium chloride flush, promethazine **OR** ondansetron, polyethylene glycol, acetaminophen **OR** acetaminophen, glucose, dextrose, glucagon (rDNA), dextrose, albuterol sulfate HFA    No intake or output data in the 24 hours ending 01/06/21 1239    Physical Exam Performed:    /65   Pulse 96 Temp 98.2 °F (36.8 °C) (Oral)   Resp 13   Ht 5' 3\" (1.6 m)   Wt 238 lb 5.1 oz (108.1 kg)   LMP 12/05/2020   SpO2 94%   BMI 42.22 kg/m²     General appearance: in mild resp distress,   HEENT: Pupils equal, round, and reactive to light. Conjunctivae/corneas clear. Neck: Supple, with full range of motion. No jugular venous distention. Trachea midline. Respiratory:  Normal respiratory effort. Clear to auscultation, no wheeze, diminshed breathing sounds due to body habitus   Cardiovascular: Regular rate and rhythm with normal S1/S2 without murmurs, rubs or gallops. Abdomen: Soft, non-tender, non-distended with normal bowel sounds. Musculoskeletal: No clubbing, cyanosis or edema bilaterally. Skin: Skin color, texture, turgor normal.  No rashes or lesions. Neurologic:  Neurovascularly intact without any focal sensory/motor deficits. Cranial nerves: II-XII intact, grossly non-focal.  Psychiatric: Alert and oriented, thought content appropriate, normal insight  Capillary Refill: Brisk,< 3 seconds   Peripheral Pulses: +2 palpable, equal bilaterally       Labs:   Recent Labs     01/05/21  1211 01/06/21  0612   WBC 17.3* 10.6   HGB 14.4 13.4   HCT 45.5 41.0    120*     Recent Labs     01/05/21  1210 01/06/21  0612    137   K 4.1 4.8   CL 98* 100   CO2 28 27   BUN 23* 25*   CREATININE 1.1 1.0   CALCIUM 9.1 8.9     Recent Labs     01/05/21  1210   *   ALT 75*   BILITOT 0.5   ALKPHOS 66     No results for input(s): INR in the last 72 hours. Recent Labs     01/05/21  1210   TROPONINI <0.01       Urinalysis:      Lab Results   Component Value Date    NITRU Negative 01/06/2021    WBCUA 6-9 01/06/2021    BACTERIA 2+ 01/06/2021    RBCUA 0-2 01/06/2021    BLOODU SMALL 01/06/2021    SPECGRAV >=1.030 01/06/2021    GLUCOSEU Negative 01/06/2021    GLUCOSEU NEGATIVE 01/02/2012       Radiology:  CT HEAD WO CONTRAST   Final Result      No acute intracranial abnormality. Left frontal cephalohematoma. CT CERVICAL SPINE WO CONTRAST   Final Result   Impression:      Negative for fracture or static subluxation of the cervical spine. Visualized bilateral cervical lymph nodes, which are favored to be reactive. Correlate clinically. CT CHEST WO CONTRAST   Final Result      No acute findings in the chest.      Prominent central pulmonary arterial trunk and main pulmonary arteries, similar to the prior study. Assessment/Plan:    Active Hospital Problems    Diagnosis Date Noted    Drug overdose, accidental or unintentional, initial encounter Gena El 01/05/2021    Accidental drug overdose [T50.901A] 01/05/2021     Assessment and plan  #Acute toxic metabolic encephalopathy suspected secondary to urine drug screen positive for amphetamine and cocaine  Patient denied use of cocaine she admits that she took her old Percocet. #Acute on chronic hypoxic hypercapnic respiratory failure  On 4 L home oxygen  2/2 to BiPAP non compliance. #PAPITO on nocturnal BiPAP  Patient reported that her BiPAP is not functioning. Advised to follow-up with her pulmonary soon after discharge. #COPD not in exacerbation  Continue breathing treatment with albuterol, Brovana and Pulmicort. #Severe pulmonary hypertension    #Heart failure with preserved EF not in exacerbation  EF 22%, grade 2 diastolic dysfunction  Continue Lasix 60 mg daily, lisinopril. #Active tobacco abuse  Counseled regarding cessation. #Drug abuse  UDS positive for cocaine. #Diabetes mellitus type 2  Hemoglobin A1c 6.2 9/2020  Continue insulin sliding scale for now. #Obesity (Body mass index is 42.22 kg/m². ) - Complicating assessment and treatment. Placing patient at risk for multiple co-morbidities as well as early death and contributing to the patient's presentation. Counseled on weight loss.      DVT Prophylaxis: lovenox  Diet: DIET CARB CONTROL; Carb Control: 4 carb choices (60 gms)/meal  Code Status: Full Code    PT/OT Eval Status: consulted. Dispo - inpatient. Likely d/c in 1-2 days pending clinical course. Can be transferred to [facility].      This chart was likely completed using voice recognition technology and may contain unintended grammatical , phraseology,and/or punctuation errors      Greta Mullins MD

## 2021-01-07 LAB
ANION GAP SERPL CALCULATED.3IONS-SCNC: 7 MMOL/L (ref 3–16)
BASE EXCESS ARTERIAL: 12 (ref -3–3)
BASOPHILS ABSOLUTE: 0 K/UL (ref 0–0.2)
BASOPHILS RELATIVE PERCENT: 0.1 %
BUN BLDV-MCNC: 26 MG/DL (ref 7–20)
CALCIUM SERPL-MCNC: 8.9 MG/DL (ref 8.3–10.6)
CHLORIDE BLD-SCNC: 99 MMOL/L (ref 99–110)
CO2: 34 MMOL/L (ref 21–32)
CREAT SERPL-MCNC: 0.9 MG/DL (ref 0.6–1.1)
EOSINOPHILS ABSOLUTE: 0 K/UL (ref 0–0.6)
EOSINOPHILS RELATIVE PERCENT: 0 %
GFR AFRICAN AMERICAN: >60
GFR NON-AFRICAN AMERICAN: >60
GLUCOSE BLD-MCNC: 126 MG/DL (ref 70–99)
GLUCOSE BLD-MCNC: 154 MG/DL (ref 70–99)
GLUCOSE BLD-MCNC: 172 MG/DL (ref 70–99)
GLUCOSE BLD-MCNC: 206 MG/DL (ref 70–99)
GLUCOSE BLD-MCNC: 211 MG/DL (ref 70–99)
GLUCOSE BLD-MCNC: 228 MG/DL (ref 70–99)
HCO3 ARTERIAL: 37.7 MMOL/L (ref 21–29)
HCT VFR BLD CALC: 39.8 % (ref 36–48)
HEMOGLOBIN: 12.9 G/DL (ref 12–16)
LYMPHOCYTES ABSOLUTE: 1.5 K/UL (ref 1–5.1)
LYMPHOCYTES RELATIVE PERCENT: 14.7 %
MCH RBC QN AUTO: 28 PG (ref 26–34)
MCHC RBC AUTO-ENTMCNC: 32.5 G/DL (ref 31–36)
MCV RBC AUTO: 86.3 FL (ref 80–100)
MONOCYTES ABSOLUTE: 0.7 K/UL (ref 0–1.3)
MONOCYTES RELATIVE PERCENT: 7.1 %
NEUTROPHILS ABSOLUTE: 7.9 K/UL (ref 1.7–7.7)
NEUTROPHILS RELATIVE PERCENT: 78.1 %
O2 SAT, ARTERIAL: 97 % (ref 93–100)
PCO2 ARTERIAL: 69.2 MM HG (ref 35–45)
PDW BLD-RTO: 15.2 % (ref 12.4–15.4)
PERFORMED ON: ABNORMAL
PH ARTERIAL: 7.34 (ref 7.35–7.45)
PLATELET # BLD: 114 K/UL (ref 135–450)
PMV BLD AUTO: 10.3 FL (ref 5–10.5)
PO2 ARTERIAL: 98.3 MM HG (ref 75–108)
POC SAMPLE TYPE: ABNORMAL
POTASSIUM REFLEX MAGNESIUM: 3.7 MMOL/L (ref 3.5–5.1)
RAPID INFLUENZA  B AGN: NEGATIVE
RAPID INFLUENZA A AGN: NEGATIVE
RBC # BLD: 4.62 M/UL (ref 4–5.2)
REPORT: NORMAL
RESPIRATORY PANEL PCR: NORMAL
SODIUM BLD-SCNC: 140 MMOL/L (ref 136–145)
TCO2 ARTERIAL: 40 MMOL/L
WBC # BLD: 10.1 K/UL (ref 4–11)

## 2021-01-07 PROCEDURE — 6370000000 HC RX 637 (ALT 250 FOR IP): Performed by: PHYSICIAN ASSISTANT

## 2021-01-07 PROCEDURE — 6360000002 HC RX W HCPCS: Performed by: PHYSICIAN ASSISTANT

## 2021-01-07 PROCEDURE — 87186 SC STD MICRODIL/AGAR DIL: CPT

## 2021-01-07 PROCEDURE — 80048 BASIC METABOLIC PNL TOTAL CA: CPT

## 2021-01-07 PROCEDURE — 36415 COLL VENOUS BLD VENIPUNCTURE: CPT

## 2021-01-07 PROCEDURE — 6370000000 HC RX 637 (ALT 250 FOR IP): Performed by: STUDENT IN AN ORGANIZED HEALTH CARE EDUCATION/TRAINING PROGRAM

## 2021-01-07 PROCEDURE — 85025 COMPLETE CBC W/AUTO DIFF WBC: CPT

## 2021-01-07 PROCEDURE — 97116 GAIT TRAINING THERAPY: CPT

## 2021-01-07 PROCEDURE — 82803 BLOOD GASES ANY COMBINATION: CPT

## 2021-01-07 PROCEDURE — 2700000000 HC OXYGEN THERAPY PER DAY

## 2021-01-07 PROCEDURE — 94761 N-INVAS EAR/PLS OXIMETRY MLT: CPT

## 2021-01-07 PROCEDURE — 87804 INFLUENZA ASSAY W/OPTIC: CPT

## 2021-01-07 PROCEDURE — 2000000000 HC ICU R&B

## 2021-01-07 PROCEDURE — 87077 CULTURE AEROBIC IDENTIFY: CPT

## 2021-01-07 PROCEDURE — 94640 AIRWAY INHALATION TREATMENT: CPT

## 2021-01-07 PROCEDURE — 87205 SMEAR GRAM STAIN: CPT

## 2021-01-07 PROCEDURE — 94660 CPAP INITIATION&MGMT: CPT

## 2021-01-07 PROCEDURE — 87070 CULTURE OTHR SPECIMN AEROBIC: CPT

## 2021-01-07 PROCEDURE — 6360000002 HC RX W HCPCS: Performed by: STUDENT IN AN ORGANIZED HEALTH CARE EDUCATION/TRAINING PROGRAM

## 2021-01-07 PROCEDURE — 99233 SBSQ HOSP IP/OBS HIGH 50: CPT | Performed by: INTERNAL MEDICINE

## 2021-01-07 PROCEDURE — 97530 THERAPEUTIC ACTIVITIES: CPT

## 2021-01-07 PROCEDURE — 0202U NFCT DS 22 TRGT SARS-COV-2: CPT

## 2021-01-07 PROCEDURE — 2580000003 HC RX 258: Performed by: PHYSICIAN ASSISTANT

## 2021-01-07 PROCEDURE — 87449 NOS EACH ORGANISM AG IA: CPT

## 2021-01-07 RX ORDER — AZITHROMYCIN 250 MG/1
500 TABLET, FILM COATED ORAL ONCE
Status: COMPLETED | OUTPATIENT
Start: 2021-01-07 | End: 2021-01-07

## 2021-01-07 RX ORDER — GUAIFENESIN/DEXTROMETHORPHAN 100-10MG/5
5 SYRUP ORAL EVERY 4 HOURS PRN
Status: DISCONTINUED | OUTPATIENT
Start: 2021-01-07 | End: 2021-01-08 | Stop reason: HOSPADM

## 2021-01-07 RX ORDER — IPRATROPIUM BROMIDE AND ALBUTEROL SULFATE 2.5; .5 MG/3ML; MG/3ML
3 SOLUTION RESPIRATORY (INHALATION) EVERY 4 HOURS PRN
Status: DISCONTINUED | OUTPATIENT
Start: 2021-01-07 | End: 2021-01-08 | Stop reason: HOSPADM

## 2021-01-07 RX ORDER — AZITHROMYCIN 250 MG/1
250 TABLET, FILM COATED ORAL DAILY
Status: DISCONTINUED | OUTPATIENT
Start: 2021-01-08 | End: 2021-01-08 | Stop reason: HOSPADM

## 2021-01-07 RX ORDER — POTASSIUM CHLORIDE 20 MEQ/1
20 TABLET, EXTENDED RELEASE ORAL ONCE
Status: COMPLETED | OUTPATIENT
Start: 2021-01-07 | End: 2021-01-07

## 2021-01-07 RX ADMIN — IPRATROPIUM BROMIDE AND ALBUTEROL SULFATE 3 ML: .5; 3 SOLUTION RESPIRATORY (INHALATION) at 08:04

## 2021-01-07 RX ADMIN — BUDESONIDE 250 MCG: 0.25 INHALANT RESPIRATORY (INHALATION) at 21:29

## 2021-01-07 RX ADMIN — AZITHROMYCIN MONOHYDRATE 500 MG: 250 TABLET ORAL at 09:06

## 2021-01-07 RX ADMIN — Medication 10 ML: at 20:09

## 2021-01-07 RX ADMIN — ARFORMOTEROL TARTRATE 15 MCG: 15 SOLUTION RESPIRATORY (INHALATION) at 21:28

## 2021-01-07 RX ADMIN — INSULIN LISPRO 1 UNITS: 100 INJECTION, SOLUTION INTRAVENOUS; SUBCUTANEOUS at 08:54

## 2021-01-07 RX ADMIN — POTASSIUM CHLORIDE 20 MEQ: 1500 TABLET, EXTENDED RELEASE ORAL at 08:49

## 2021-01-07 RX ADMIN — GABAPENTIN 600 MG: 600 TABLET, FILM COATED ORAL at 14:17

## 2021-01-07 RX ADMIN — ACETAMINOPHEN 650 MG: 325 TABLET ORAL at 14:17

## 2021-01-07 RX ADMIN — ARFORMOTEROL TARTRATE 15 MCG: 15 SOLUTION RESPIRATORY (INHALATION) at 08:03

## 2021-01-07 RX ADMIN — PREDNISONE 40 MG: 20 TABLET ORAL at 08:49

## 2021-01-07 RX ADMIN — ENOXAPARIN SODIUM 40 MG: 40 INJECTION SUBCUTANEOUS at 08:48

## 2021-01-07 RX ADMIN — INSULIN LISPRO 2 UNITS: 100 INJECTION, SOLUTION INTRAVENOUS; SUBCUTANEOUS at 18:10

## 2021-01-07 RX ADMIN — GABAPENTIN 600 MG: 600 TABLET, FILM COATED ORAL at 08:49

## 2021-01-07 RX ADMIN — GUAIFENESIN AND DEXTROMETHORPHAN 5 ML: 100; 10 SYRUP ORAL at 14:17

## 2021-01-07 RX ADMIN — LISINOPRIL 40 MG: 40 TABLET ORAL at 08:49

## 2021-01-07 RX ADMIN — GABAPENTIN 600 MG: 600 TABLET, FILM COATED ORAL at 20:02

## 2021-01-07 RX ADMIN — INSULIN LISPRO 1 UNITS: 100 INJECTION, SOLUTION INTRAVENOUS; SUBCUTANEOUS at 12:23

## 2021-01-07 RX ADMIN — Medication 10 ML: at 08:52

## 2021-01-07 RX ADMIN — FUROSEMIDE 60 MG: 40 TABLET ORAL at 08:49

## 2021-01-07 RX ADMIN — INSULIN LISPRO 1 UNITS: 100 INJECTION, SOLUTION INTRAVENOUS; SUBCUTANEOUS at 20:02

## 2021-01-07 ASSESSMENT — PAIN DESCRIPTION - ONSET
ONSET: GRADUAL
ONSET: ON-GOING

## 2021-01-07 ASSESSMENT — PAIN DESCRIPTION - PROGRESSION
CLINICAL_PROGRESSION: NOT CHANGED

## 2021-01-07 ASSESSMENT — ENCOUNTER SYMPTOMS
SHORTNESS OF BREATH: 0
DIARRHEA: 0
COUGH: 1
NAUSEA: 0
VOMITING: 0
ABDOMINAL PAIN: 0
CONSTIPATION: 0
CHOKING: 0

## 2021-01-07 ASSESSMENT — PAIN SCALES - GENERAL
PAINLEVEL_OUTOF10: 4
PAINLEVEL_OUTOF10: 9
PAINLEVEL_OUTOF10: 3
PAINLEVEL_OUTOF10: 0
PAINLEVEL_OUTOF10: 0

## 2021-01-07 ASSESSMENT — PAIN DESCRIPTION - PAIN TYPE: TYPE: ACUTE PAIN

## 2021-01-07 ASSESSMENT — PAIN DESCRIPTION - FREQUENCY
FREQUENCY: INTERMITTENT

## 2021-01-07 ASSESSMENT — PAIN DESCRIPTION - LOCATION: LOCATION: CHEST

## 2021-01-07 ASSESSMENT — PAIN DESCRIPTION - ORIENTATION: ORIENTATION: OUTER

## 2021-01-07 ASSESSMENT — PAIN - FUNCTIONAL ASSESSMENT: PAIN_FUNCTIONAL_ASSESSMENT: ACTIVITIES ARE NOT PREVENTED

## 2021-01-07 NOTE — PROGRESS NOTES
Physical Therapy  Pt declined PT this am due to pain in LEs. Reports RN just gave her meds for this. Will try back later today as schedule permits.   Eladio Steiner, [address]

## 2021-01-07 NOTE — CARE COORDINATION
Case Management Assessment           Daily Note                 Date/ Time of Note: 1/7/2021 11:01 AM         Note completed by: Evan Burnett    Patient Name: Shanda Crawford  YOB: 1975    Diagnosis:Drug overdose, accidental or unintentional, initial encounter [T50.212H]  Accidental drug overdose, initial encounter [T50.691Y]  Patient Admission Status: Inpatient    Date of Admission:1/5/2021 11:48 AM Length of Stay: 2 GLOS:      Current Plan of Care: continue to monitor ABG  ________________________________________________________________________________________  PT AM-PAC: 18 / 24 per last evaluation on: 01/06    OT AM-PAC: 21 / 24 per last evaluation on: 01/06    DME Needs for discharge: no needs  ________________________________________________________________________________________  Discharge Plan: Home    Tentative discharge date: TBD    Current barriers to discharge: continue to monitor ABG    Referrals completed: Not Applicable    Resources/ information provided: Not indicated at this time  ________________________________________________________________________________________  Case Management Notes:From home and lives with her brother. Her daughter may be able to borrow a car to pick her up at d/c and to bring clothes and a portable O2 tank. Elke Howell and her family were provided with choice of provider; she and her family are in agreement with the discharge plan.     Care Transition Patient: Laura Burnett RN  [facility]  Case Management Department  Ph: [phone]  Fax: [phone]

## 2021-01-07 NOTE — PROGRESS NOTES
ICU Progress Note    Admit Date: 1/5/2021  Day: 2  Vent Day: None  IV Access:Peripheral  IV Fluids:None  Vasopressors:None                Antibiotics: azithromycin  Diet: DIET CARB CONTROL; Carb Control: 4 carb choices (60 gms)/meal    CC: drug overdose    Interval history:     CAROLE overnight, was able to sleep on BiPAP and was switched to nasal cannula this morning.     Saw patient this morning, currently on NC- 4L, not in acute distress. Patient is complaining of chest pain in the sternal region that worsens with cough or deep inspiration. She also has a non-productive cough that seems to be worsening since yesterday according to the patient. Originally she thought it was a \"smoker's cough' yet this seems different in her opinion. She no longer has a headache but point tenderness from where shell on the floor. Patient denies fever, chills, nausea, vomiting, SOB, Abd pain, dysuria, difficulty passing stool, changes in stool and urine color. Patient's condition is improving and being transferred out of the ICU.      Medications:     Scheduled Meds:   [START ON 1/8/2021] azithromycin  250 mg Oral Daily    Arformoterol Tartrate  15 mcg Nebulization BID    budesonide  250 mcg Nebulization BID    predniSONE  40 mg Oral Daily    sodium chloride flush  10 mL Intravenous 2 times per day    enoxaparin  40 mg Subcutaneous Daily    insulin lispro  0-6 Units Subcutaneous TID WC    insulin lispro  0-3 Units Subcutaneous Nightly    furosemide  60 mg Oral Daily    gabapentin  600 mg Oral TID    lisinopril  40 mg Oral Daily    nicotine  1 patch Transdermal Daily     Continuous Infusions:   dextrose       PRN Meds:guaiFENesin-dextromethorphan, ipratropium-albuterol, sodium chloride flush, promethazine **OR** ondansetron, polyethylene glycol, acetaminophen **OR** acetaminophen, glucose, dextrose, glucagon (rDNA), dextrose    Objective:   Vitals:   T-max:  Patient Vitals for the past 8 hrs:   BP Temp Temp src Pulse Resp SpO2   01/07/21 1200 116/63 98.2 °F (36.8 °C) Oral 100 23 94 %   01/07/21 1100 116/63 -- -- 108 22 96 %   01/07/21 1000 (!) 115/46 -- -- 100 22 93 %   01/07/21 0900 128/63 -- -- 98 20 91 %   01/07/21 0805 -- -- -- -- 18 95 %   01/07/21 0800 131/68 98 °F (36.7 °C) Oral 83 20 95 %       Intake/Output Summary (Last 24 hours) at 1/7/2021 1456  Last data filed at 1/7/2021 1200  Gross per 24 hour   Intake 980 ml   Output 0 ml   Net 980 ml       Access:   -Peripheral Access Day#:2    Physical Exam    Constitutional:       General: She is not in acute distress. Appearance: She is obese. HENT:      Head: Normocephalic and atraumatic. Mouth/Throat:      Mouth: Mucous membranes are moist.      Pharynx: Oropharynx is clear. Cardiovascular:      Rate and Rhythm: Regular rhythm. Tachycardia present. Heart sounds: Normal heart sounds. No murmur. No friction rub. No gallop. Pulmonary:      Effort: Pulmonary effort is normal. Tachypnea present. Breath sounds: Wheezing present. No rhonchi. Comments: Patient has B/L expiratory wheezes  Chest:      Chest wall: Tenderness present. Comments: Patient has sharp pain in her sternal area, Non-radiating, 9/10 when coughing or upon deep inspiration  Abdominal:      General: Abdomen is flat. Bowel sounds are normal.      Palpations: Abdomen is soft. Tenderness: There is no abdominal tenderness. Musculoskeletal:      Right lower leg: No edema. Left lower leg: No edema. Skin:     General: Skin is warm and dry. Neurological:      General: No focal deficit present. Mental Status: She is alert and oriented to person, place, and time. Mental status is at baseline. Psychiatric:         Mood and Affect: Mood normal.         Thought Content:  Thought content normal.     LABS:    CBC:   Recent Labs     01/05/21  1211 01/06/21  0612 01/07/21  0424   WBC 17.3* 10.6 10.1   HGB 14.4 13.4 12.9   HCT 45.5 41.0 39.8    120* 114*   MCV 86.9 87.2 86.3     Renal:    Recent Labs     01/05/21  1210 01/06/21  0612 01/06/21  1417 01/07/21  0424    137  --  140   K 4.1 4.8  --  3.7   CL 98* 100  --  99   CO2 28 27  --  34*   BUN 23* 25*  --  26*   CREATININE 1.1 1.0  --  0.9   GLUCOSE 106* 168*  --  126*   CALCIUM 9.1 8.9  --  8.9   MG 2.10  --  2.10  --    ANIONGAP 14 10  --  7     Hepatic:   Recent Labs     01/05/21  1210   *   ALT 75*   BILITOT 0.5   PROT 7.7   LABALBU 4.2   ALKPHOS 66     Troponin:   Recent Labs     01/05/21  1210   TROPONINI <0.01     BNP: No results for input(s): BNP in the last 72 hours. Lipids: No results for input(s): CHOL, HDL in the last 72 hours. Invalid input(s): LDLCALCU, TRIGLYCERIDE  ABGs:    Recent Labs     01/06/21  0633 01/06/21  0917 01/07/21  0419   PHART 7.258* 7.255* 7.344*   OMT8JEK 70.1* 70.5* 69.2*   PO2ART 50.9* 79.8 98.3   END6YKV 31.3* 31.3* 37.7*   BEART 4* 4* 12*   P1RXRTVN 78* 93 97   WWR4ONX 34 34 40       INR: No results for input(s): INR in the last 72 hours. Lactate: No results for input(s): LACTATE in the last 72 hours. Cultures:  -----------------------------------------------------------------  RAD:   CT HEAD WO CONTRAST   Final Result      No acute intracranial abnormality. Left frontal cephalohematoma. CT CERVICAL SPINE WO CONTRAST   Final Result   Impression:      Negative for fracture or static subluxation of the cervical spine. Visualized bilateral cervical lymph nodes, which are favored to be reactive. Correlate clinically. CT CHEST WO CONTRAST   Final Result      No acute findings in the chest.      Prominent central pulmonary arterial trunk and main pulmonary arteries, similar to the prior study.             Assessment/Plan:     Patient is a 50 y.o. female with PMHx significant for CHF, COPD, DMII, Hepatitis C, HTN, PAPITO who was transported to [facility] ED after being found unresponsive due to suspected drug overdose.     Type 2 Respiratory failure 2/2 COPD exacerbation   History of COPD, multiple intubations, wasn't using her BiPAP for several nights before her fall. Also had not been using her home oxygen. ABG: pH- 7.119, pCO2- 91.2, pO2- 72.0, HCO3- 28.9 on admission. Today, after night on BiPAP, ABG: pH 7.344, pCO2 69.2, pO2 98.3, HCO3 37.7, O2 sat 93%  - Respiratory Acidosis with Renal Compensation  - CT Thorax with no acute findings  - on NC 4L O2   - duobebs q4h, prednisone 40 PO daily, budesonide 250 mcg + arformoterol 15 mcg BID nebs. - started on azithromycin 250 mg PO daily.      Diabetes mellitus  - HgbA1c in 9/2020 was 6.2  - hold home Metformin  - Last POC- 126  - LDSSI  - Carb control diet  - on Gabapentin for Diabetic Neuropathy     Diastolic CHF not in exacerbation  - Last ECHO 11/2019 showed EF >65% with grade II DD  - resume home Lasix 60 mg po     Tobacco abuse  - nicotine patch     Hypertension  - Holding home metoprolol, UDS + for cocaine, amphetamines  - restart home lisinopril 40 mg po & lasix 60 mg po     Code Status:Full Code  FEN: DIET CARB CONTROL; Carb Control: 4 carb choices (60 gms)/meal  PPX:  Lovenox  DISPO: ICU, transferring to [facility]. Joi Hathaway DO, PGY-1  01/07/21  2:56 PM    This patient has been staffed and discussed with Dr. Carlos Alberto Colón MD.    Patient examined, findings as discussed with Dr Gris Diaz. Agree with assessment and plan as edited above. Hypercapnic respiratory failure is nearly compensated, clinical status much improved. Continue nocturnal BiPAP and treatment of COPD exacerbation.

## 2021-01-07 NOTE — PROGRESS NOTES
Results for Bernadette King (MRN [id number]) as of 1/7/2021 04:29   Ref. Range 1/7/2021 04:19   pH, Arterial Latest Ref Range: 7.350 - 7.450  7.344 (L)   pCO2, Arterial Latest Ref Range: 35.0 - 45.0 mm Hg 69.2 (H)   pO2, Arterial Latest Ref Range: 75.0 - 108.0 mm Hg 98.3   HCO3, Arterial Latest Ref Range: 21.0 - 29.0 mmol/L 37.7 (H)   TCO2 (calc), Art Latest Ref Range: Not Established mmol/L 40   Base Excess, Arterial Latest Ref Range: -3 - 3  12 (H)   O2 Sat, Arterial Latest Ref Range: 93 - 100 % 97   Sample Type Unknown ART     Pt wore BIPAP overnight from about 2541-9253. Pt now on 4 L NC Satting 99%. Will continue to monitor.

## 2021-01-07 NOTE — PROGRESS NOTES
Bipap put back on this evening per RT. Pt is A&Ox4. Satting 96%. VSS at this time. Will continue to monitor. no weight-bearing restrictions

## 2021-01-07 NOTE — PROGRESS NOTES
Pt is in bed. She consumed 100% of breakfast. AM meds given, see MAR. She c/o chest discomfort d/t chest congestion. Resident team aware. Respiratory labs ordered. Robutussin ordered. The rest of the pts assessment remains unchanged. Safety precautions in place. Will continue to monitor.

## 2021-01-07 NOTE — PROGRESS NOTES
of kidney and ureter, Pneumonia, and Smoking history. has a past surgical history that includes Tubal ligation; tracheostomy closure; Cystoscopy (1/5/15); Ureter stent placement; Cystocopy (10/6/15); bronchoscopy (12/21/2017); Cystocopy (11/09/2019); CYSTOSCOPY INSERTION / REMOVAL STENT / STONE (Left, 11/9/2019); Cystoscopy (Left, 11/13/2019); and CYSTOSCOPY INSERTION / REMOVAL STENT / STONE (Left, 11/13/2019). Restrictions  Position Activity Restriction  Other position/activity restrictions: activity as tolerated  Subjective   General  Chart Reviewed: Yes  Additional Pertinent Hx: Admit 1/5 after being found unresponsive; c-spine CT: (-) fx; head CT: (+) Left frontal cephalohematoma; chest CT: (-) acute; PMHx: CHF, COPD, HTN, DM, IV drug abuse, neuropathy, (+) smoker, h/o trach  Family / Caregiver Present: No  Referring Practitioner: Dilcia Roach MD  Subjective  Subjective: Pt found supine in bed. Agreeable to PT. Reports up in chair most of day & wants to get back to bed after therapy. 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 Pain Screening  Patient Currently in Pain: Denies  Vital Signs  Patient Currently in Pain: Denies       Orientation  Orientation  Overall Orientation Status: Within Functional Limits       Objective   Bed mobility  Supine to Sit: Independent  Sit to Supine: Independent  Scooting: Independent  Transfers  Sit to Stand: Independent(from bed & toilet)  Stand to sit: Independent  Ambulation  Ambulation?: Yes  Ambulation 1  Device: No Device  Other Apparatus: O2(4L)  Assistance: Supervision  Quality of Gait: steady, confident gait, no LOB  Gait Deviations: Slow [place]ana  Distance: 50', 20'  Stairs/Curb  Stairs?: No(reports no stairs at home)   Declined further gait due to not wanting to. Reports limited ambulation distance normally due to COPD. Balance  Sitting - Static: Good   Stood at sink to wash hands S without LOB. G-Code     OutComes Score                                                     AM-PAC Score  AM-PAC Inpatient Mobility Raw Score : 21 (01/07/21 1544)  AM-PAC Inpatient T-Scale Score : 50.25 (01/07/21 1544)  Mobility Inpatient CMS 0-100% Score: 28.97 (01/07/21 1544)  Mobility Inpatient CMS G-Code Modifier : CJ (01/07/21 1544)          Goals  Short term goals  Time Frame for Short term goals: discharge  Short term goal 1: Pt will transfer sit <--> stand with supervision- MET 1/7  Short term goal 2: Pt will amb 50 ft with LRAD and supervision- MET 1/7  Patient Goals   Patient goals : return home    Plan    Plan- sign off today    Safety Devices  Type of devices: Call light within reach, Bed alarm in place, Left in bed, Nurse notified     Therapy Time   Individual Concurrent Group Co-treatment   Time In [address] [place] [address]         Time Out 1535         Minutes 27              Timed Code Treatment Minutes:   27    Total Treatment Minutes:  [facility], [address]

## 2021-01-07 NOTE — CONSULTS
ICU HISTORY AND PHYSICAL       Hospital Day: 3  ICU Day: 3                                                         Code:Full Code  Admit Date: 1/5/2021  PCP: SABAS Ku - HUMBERTO                                  CC: COPD Exacerbation    HISTORY OF PRESENT ILLNESS:   Patient is a 50 y.o. female with PMHx significant for CHF, COPD, DMII, Hepatitis C, HTN, PAPITO who was transported to [facility] ED after being found unresponsive due to suspected drug overdose. Received 4 mg IM Narcan with improvement. She apparently took her oxygen off for about an hour and a half whe she took a shower and some cleanig around her house. Took some Perceocet of which she was not prescribed for body aches over the last 2-3 days. She is unable to recall what happened after that but next remembers she was waking up on the floor. Of note she was not wearing her oxygen on EMS arrival to residence. In the ED CBC showed leukocytosis (17.3) with predominant neutrophils and decreased lymphocytes. EBMP unremarkable. Pro-. Troponin <0.01. Ethanol not detected. Hepatic panel with elevated AST (215) and ALT (75). Lactic acid 5.6. ABG showed pH 7.166, pCO2 82.8, pO2 48.8, HCO3 29.3, O2 sat 81%. CT Thorax with no acute findings. CT Cervical soine without fracture and bilateral cervical lymph nodes favored to be reactive. Head CT negative for acute intracranial abnormality. EKG with sinus tachycardia. Patient was transferred to [facility] ICU for further evaluation. On exam when she reached ICU she is on home 4L and able to speak without difficulty. No in creased work of breathing. She states she was recently admitted to hospital in [place] and did require intubation. She reports nonspecific body aches and some discomfort to forehead. She denies productive cough, fever, chills, abdominal pain, nausea, vomiting, HA. Interval History: CAROLE overnight, was able to sleep on BiPAP and was switched to nasal cannula this morning. Saw patient this morning, currently on NC- 4L, not in acute distress. Patient is complaining of chest pain in the sternal region that worsens with cough or deep inspiration. She also has a non-productive cough that seems to be worsening since yesterday according to the patient. Originally she thought it was a \"smoker's cough' yet this seems different in her opinion. She no longer has a headache but point tenderness from where shell on the floor. Patient denies fever, chills, nausea, vomiting, SOB, Abd pain, dysuria, difficulty passing stool, changes in stool and urine colour .      PAST HISTORY:     Past Medical History:   Diagnosis Date    Acute cystitis with hematuria     Bacteremia 08/23/2017    staph aureus    [facility]     Colonization status 7/26/12    DNA probe negative for MRSA    COPD (chronic obstructive pulmonary disease) [facility]     Diabetes mellitus ([facility]     FOR ABOUT 4 YEARS    DM (diabetes mellitus) ([facility]     Drug abuse, IV ([facility]     Hepatitis     Hepatitis C antibody positive in blood 08/24/2017    Hepatitis C AB positive     Heroin overdose ([facility]     Hypertension     MRSA infection     LUNGS    Neuropathy     legs with pain    Other disorders of kidney and ureter     KIDNEY FAILURE, ACUTE    Pneumonia     Smoking history        Past Surgical History:   Procedure Laterality Date    BRONCHOSCOPY  12/21/2017    BAL    CYSTOSCOPY  1/5/15    cysto with left stent placement    CYSTOSCOPY  10/6/15    stent removal    CYSTOSCOPY  11/09/2019    left uretroscopy with stent placement    CYSTOSCOPY Left 11/13/2019    LEFT URETEROSCOPY WITH HOLMIUM LASER LITHOTRIPSY, STENT REMOVAL,  STONE EXTRACTION     CYSTOSCOPY INSERTION / REMOVAL STENT / STONE Left 11/9/2019    CYSTOSCOPY, URETEROSCOPY, STENT PLACEMENT performed by Navya Jimenez at [address] / [address] / STONE Left 11/13/2019 CYSTOSCOPY, LEFT URETEROSCOPY WITH HOLMIUM LASER LITHOTRIPSY, STENT REMOVAL,  STONE EXTRACTION performed by Dereck Edouard MD at [address] STENT PLACEMENT         SocialHistory:     Alcohol:  Illicit drugs: no use  Tobacco:  reports that she has been smoking e-cigarettes and cigarettes. She has a 33.00 pack-year smoking history. She has never used smokeless tobacco.    Family History:  Family History   Problem Relation Age of Onset    Heart Disease Mother     No Known Problems Father     Heart Disease Sister     No Known Problems Brother     No Known Problems Maternal Grandmother     No Known Problems Maternal Grandfather     No Known Problems Paternal Grandmother     No Known Problems Paternal Grandfather     No Known Problems Other        MEDICATIONS:     No current facility-administered medications on file prior to encounter. Current Outpatient Medications on File Prior to Encounter   Medication Sig Dispense Refill    furosemide (LASIX) 20 MG tablet TAKE 3 TABLETS BY MOUTH EVERY DAY 90 tablet 1    metoprolol succinate (TOPROL XL) 25 MG extended release tablet Take 1 tablet by mouth daily 30 tablet 1    gabapentin (NEURONTIN) 600 MG tablet Take 1 tablet by mouth 3 times daily for 30 days.  90 tablet 1    metFORMIN (GLUCOPHAGE) 500 MG tablet Take 1 tablet by mouth 2 times daily (with meals) 60 tablet 1    OXYGEN Inhale 4 L into the lungs continuous  0    Respiratory Therapy Supplies (NEBULIZER/TUBING/MOUTHPIECE) KIT 1 kit by Does not apply route daily as needed (wheezing) 1 kit 0    Respiratory Therapy Supplies (NEBULIZER/TUBING/MOUTHPIECE) KIT 1 kit by Does not apply route daily as needed (wheezing) 1 kit 0    glucose monitoring kit (FREESTYLE) monitoring kit 1 kit by Does not apply route daily E11.9 1 kit 0    blood glucose test strips (ACURA BLOOD GLUCOSE TEST) strip Test once daily E11.9 50 strip 5  [employer] Lancets Ultra Thin MISC 1 each by Does not apply route daily E11.9 100 each 0    albuterol sulfate HFA (PROVENTIL HFA) 108 (90 Base) MCG/ACT inhaler Inhale 2 puffs into the lungs every 6 hours as needed for Wheezing 1 Inhaler 1    budesonide-formoterol (SYMBICORT) 80-4.5 MCG/ACT AERO Inhale 2 puffs into the lungs 2 times daily 1 Inhaler 3    ipratropium-albuterol (DUONEB) 0.5-2.5 (3) MG/3ML SOLN nebulizer solution Inhale 3 mLs into the lungs 4 times daily 360 mL 1    lisinopril (PRINIVIL;ZESTRIL) 40 MG tablet Take 1 tablet by mouth daily (Patient taking differently: Take 40 mg by mouth 2 times daily ) 30 tablet 1    nicotine (NICODERM CQ) 14 MG/24HR Place 1 patch onto the skin daily 42 patch 0    tamsulosin (FLOMAX) 0.4 MG capsule Take 1 capsule by mouth daily 30 capsule 0    albuterol (PROVENTIL) (5 MG/ML) 0.5% nebulizer solution Take 0.5 mLs by nebulization 2 times daily 30 mL 0         Scheduled Meds:   [START ON 1/8/2021] azithromycin  250 mg Oral Daily    ipratropium-albuterol  3 mL Inhalation 4x Daily    Arformoterol Tartrate  15 mcg Nebulization BID    budesonide  250 mcg Nebulization BID    predniSONE  40 mg Oral Daily    sodium chloride flush  10 mL Intravenous 2 times per day    enoxaparin  40 mg Subcutaneous Daily    insulin lispro  0-6 Units Subcutaneous TID WC    insulin lispro  0-3 Units Subcutaneous Nightly    furosemide  60 mg Oral Daily    gabapentin  600 mg Oral TID    lisinopril  40 mg Oral Daily    nicotine  1 patch Transdermal Daily      Continuous Infusions:   dextrose       PRN Meds:guaiFENesin-dextromethorphan, sodium chloride flush, promethazine **OR** ondansetron, polyethylene glycol, acetaminophen **OR** acetaminophen, glucose, dextrose, glucagon (rDNA), dextrose    Allergies: Allergies   Allergen Reactions    Pcn [Penicillins] Shortness Of Breath and Swelling    Aspirin Other (See Comments)     Cause GI upset.  But takes 81 mg aspirin at home  Pcn [Penicillins]      swelling    Sulfamethoxazole-Trimethoprim Hives       REVIEW OF SYSTEMS:       History obtained from the patient    Review of Systems   Constitutional: Negative for chills and fever. Respiratory: Positive for cough. Negative for choking and shortness of breath. Cough is non-productive, began yesterday and \"getting worse\" according to the pt. Cardiovascular: Positive for chest pain. Pt pointed to her sternal area   Gastrointestinal: Negative for abdominal pain, constipation, diarrhea, nausea and vomiting. Genitourinary: Negative for difficulty urinating and dysuria. PHYSICAL EXAM:       Vitals: /68   Pulse 83   Temp 98 °F (36.7 °C) (Oral)   Resp 18   Ht 5' 3\" (1.6 m)   Wt 242 lb 1 oz (109.8 kg)   LMP 12/05/2020   SpO2 95%   BMI 42.88 kg/m²     I/O:      Intake/Output Summary (Last 24 hours) at 1/7/2021 0939  Last data filed at 1/7/2021 0900  Gross per 24 hour   Intake 1230 ml   Output 0 ml   Net 1230 ml     I/O this shift: In: 56 [P.O.:480; I.V.:10]  Out: -   I/O last 3 completed shifts: In: 740 [P.O.:720; I.V.:20]  Out: 0     Physical Examination:     Physical Exam  Constitutional:       General: She is not in acute distress. Appearance: She is obese. HENT:      Head: Normocephalic and atraumatic. Mouth/Throat:      Mouth: Mucous membranes are moist.      Pharynx: Oropharynx is clear. Cardiovascular:      Rate and Rhythm: Regular rhythm. Tachycardia present. Heart sounds: Normal heart sounds. No murmur. No friction rub. No gallop. Pulmonary:      Effort: Pulmonary effort is normal. Tachypnea present. Breath sounds: Wheezing present. No rhonchi. Comments: Patient has B/L expiratory wheezes  Chest:      Chest wall: Tenderness present.       Comments: Patient has sharp pain in her sternal area, Non-radiating, 9/10 when coughing or upon deep inspiration  Abdominal: General: Abdomen is flat. Bowel sounds are normal.      Palpations: Abdomen is soft. Tenderness: There is no abdominal tenderness. Musculoskeletal:      Right lower leg: No edema. Left lower leg: No edema. Skin:     General: Skin is warm and dry. Neurological:      General: No focal deficit present. Mental Status: She is alert and oriented to person, place, and time. Mental status is at baseline. Psychiatric:         Mood and Affect: Mood normal.         Thought Content: Thought content normal.       Access:                         -Peripheral Access Day#:1                            Recent Labs     01/06/21  0917 01/07/21  0419   PHART 7.255* 7.344*   ALY7ZXT 70.5* 69.2*   PO2ART 79.8 98.3       DATA:       Labs:  CBC:   Recent Labs     01/05/21  1211 01/06/21  0612 01/07/21  0424   WBC 17.3* 10.6 10.1   HGB 14.4 13.4 12.9   HCT 45.5 41.0 39.8    120* 114*       BMP:   Recent Labs     01/05/21  1210 01/06/21  0612 01/07/21  0424    137 140   K 4.1 4.8 3.7   CL 98* 100 99   CO2 28 27 34*   BUN 23* 25* 26*   CREATININE 1.1 1.0 0.9   GLUCOSE 106* 168* 126*     LFT's:   Recent Labs     01/05/21  1210   *   ALT 75*   BILITOT 0.5   ALKPHOS 66     Troponin:   Recent Labs     01/05/21  1210   TROPONINI <0.01     BNP:No results for input(s): BNP in the last 72 hours. ABGs:   Recent Labs     01/06/21  0917 01/07/21  0419   PHART 7.255* 7.344*   JTA3VTG 70.5* 69.2*   PO2ART 79.8 98.3     INR: No results for input(s): INR in the last 72 hours. U/A:  Recent Labs     01/06/21  0456   COLORU Yellow   PHUR 6.0  6.0   WBCUA 6-9*   RBCUA 0-2   YEAST Present*   BACTERIA 2+*   CLARITYU Clear   SPECGRAV >=1.030   LEUKOCYTESUR Negative   UROBILINOGEN 0.2   BILIRUBINUR SMALL*   BLOODU SMALL*   GLUCOSEU Negative   AMORPHOUS 2+       CT HEAD WO CONTRAST   Final Result      No acute intracranial abnormality. Left frontal cephalohematoma.       CT CERVICAL SPINE WO CONTRAST Final Result   Impression:      Negative for fracture or static subluxation of the cervical spine. Visualized bilateral cervical lymph nodes, which are favored to be reactive. Correlate clinically. CT CHEST WO CONTRAST   Final Result      No acute findings in the chest.      Prominent central pulmonary arterial trunk and main pulmonary arteries, similar to the prior study. EKG:   Echo:  Micro:     ASSESSMENT AND PLAN:   Patient is a 50 y.o. female with PMHx significant for CHF, COPD, DMII, Hepatitis C, HTN, PAPITO who was transported to hospitals ED after being found unresponsive due to suspected drug overdose.     Type 2 Respiratory failure 2/2 COPD exacerbation   History of COPD, multiple intubations, wasn't using her BiPAP for several nights before her fall. Also had not been using her home oxygen. ABG: pH- 7.119, pCO2- 91.2, pO2- 72.0, HCO3- 28.9  - ABG: pH 7.344, pCO2 69.2, pO2 98.3, HCO3 37.7, O2 sat 93%  - Respiratory Acidosis with Renal Compensation  - CT Thorax with no acute findings  - on NC 4L O2   - duobebs q4h, prednisone 40 PO daily, budesonide 250 mcg + arformoterol 15 mcg BID nebs. -trend ABG     Diabetes mellitus  - HgbA1c in 9/2020 was 6.2  - hold home Metformin with elevated AST/ALT  - Last POC- 154, 177   - LDSSI  - KCl 20 mEq to compensate for insulin  - Carb control diet  -on Gabapentin for Diabetic Neuropathy     Diastolic CHF not in exacerbation  - Last ECHO 11/2019 showed EF >65% with grade II DD  - resume home Lasix 60 mg po     Tobacco abuse  - nicotine patch     Hypertension  - Holding home metoprolol, UDS + for cocaine, amphetamines  - restart home lisinopril 40 mg po & lasix 60 mg po    Pneumonia 2/2 COPD? Patient has a Hx of COPD, afebrile, complaining of chest pain in the sternal region with a worsening non-productive cough with expiratory wheezes. Pt admits to numerous episodes of PNA in the past. Procal- 01/06/2021- 0.16, WBC on admission 17.3, AST/ALT- 215/75. -possible CXR if cough continues to worsen  - AZT- 250 mg PO  - Robitussin for cough cessation and chest decongestion    Code Status:Full Code  FEN: NPO  PPX: Lovenox  DISPO:     This patient will be discussed with Dr. Edilberto Goodwin MD.  -----------------------------  Alesia Rivera, MS4  1/7/2021  9:39 AM

## 2021-01-07 NOTE — PROGRESS NOTES
Hospitalist Progress Note      PCP: SABAS Guzman CNP    Date of Admission: 1/5/2021    Angela [address] course  Patient is a 66-year-old female with history of severe pulmonary hypertension, COPD on 4 L home oxygen, PAPITO on nocturnal BiPAP, diabetes mellitus type 2 CHF with diastolic dysfunction hepatitis C presented to [place] ER for unconsciousness. Patient reported that she was not using her oxygen and BiPAP because she was cleaning her house and she thinks that her BiPAP is not working fine. She was admitted to hospital in [facility] 2 months ago with a similar situation. She never followed up with her pulmonary after discharge. In ER patient received Narcan she was noted to be in hypoxic hypercapnic respiratory failure requiring BiPAP. Urine drug screen positive for cocaine and amphetamine. Subjective  Patient seen and examined today. Still feeling congested with some conversational dyspnea. Complaining of cough with phlegm production. Was on BiPAP overnight currently on 4 L of oxygen. Discussed with patient that her urine drug screen is positive for cocaine. She keeps refusing that she did not do any cocaine.     Medications:  Reviewed    Infusion Medications    dextrose       Scheduled Medications    [START ON 1/8/2021] azithromycin  250 mg Oral Daily    Arformoterol Tartrate  15 mcg Nebulization BID    budesonide  250 mcg Nebulization BID    predniSONE  40 mg Oral Daily    sodium chloride flush  10 mL Intravenous 2 times per day    enoxaparin  40 mg Subcutaneous Daily    insulin lispro  0-6 Units Subcutaneous TID WC    insulin lispro  0-3 Units Subcutaneous Nightly    furosemide  60 mg Oral Daily    gabapentin  600 mg Oral TID    lisinopril  40 mg Oral Daily    nicotine  1 patch Transdermal Daily     PRN Meds: guaiFENesin-dextromethorphan, ipratropium-albuterol, sodium chloride flush, promethazine **OR** ondansetron, polyethylene glycol, acetaminophen **OR** BACTERIA 2+ 01/06/2021    RBCUA 0-2 01/06/2021    BLOODU SMALL 01/06/2021    SPECGRAV >=1.030 01/06/2021    GLUCOSEU Negative 01/06/2021    GLUCOSEU NEGATIVE 01/02/2012       Radiology:  CT HEAD WO CONTRAST   Final Result      No acute intracranial abnormality. Left frontal cephalohematoma. CT CERVICAL SPINE WO CONTRAST   Final Result   Impression:      Negative for fracture or static subluxation of the cervical spine. Visualized bilateral cervical lymph nodes, which are favored to be reactive. Correlate clinically. CT CHEST WO CONTRAST   Final Result      No acute findings in the chest.      Prominent central pulmonary arterial trunk and main pulmonary arteries, similar to the prior study. Assessment/Plan:    Active Hospital Problems    Diagnosis Date Noted    COPD exacerbation ([facility] [J44.1] 11/03/2015     Priority: High    Drug overdose, accidental or unintentional, initial encounter Hany Woody 01/05/2021    Acute on chronic respiratory failure ([facility] [J96.20] 12/05/2020    Opiate overdose ([facility] Lucille Cowan 08/23/2017    Smoker [F17.200] 01/19/2012     Assessment and plan  #Acute toxic metabolic encephalopathy suspected secondary to urine drug screen positive for amphetamine and cocaine  Patient denied use of cocaine she admits that she took her old Percocet. #Acute on chronic hypoxic hypercapnic respiratory failure  On 4 L home oxygen  2/2 to BiPAP non compliance. #PAIPTO on nocturnal BiPAP  Patient reported that her BiPAP is not functioning. Advised patient to call BiPAP company for placement. #Suspected COPD exacerbation  Continue breathing treatment with albuterol, Brovana and Pulmicort. Prednisone 40 mg daily for 5 days. Z-[place]. Follow-up on strep, Legionella, respiratory cultures. #Severe pulmonary hypertension    #Heart failure with preserved EF not in exacerbation  EF 08%, grade 2 diastolic dysfunction  Continue Lasix 60 mg daily, lisinopril.     #Active tobacco abuse  Counseled regarding cessation. #Drug abuse  UDS positive for cocaine. #Diabetes mellitus type 2  Hemoglobin A1c 6.2 9/2020  Continue insulin sliding scale for now. #Obesity (Body mass index is 42.88 kg/m². ) - Complicating assessment and treatment. Placing patient at risk for multiple co-morbidities as well as early death and contributing to the patient's presentation. Counseled on weight loss. DVT Prophylaxis: lovenox  Diet: DIET CARB CONTROL; Carb Control: 4 carb choices (60 gms)/meal  Code Status: Full Code    PT/OT Eval Status: consulted. Dispo - inpatient. Likely d/c in 1-2 days pending clinical course. Follow-up on cultures.   This chart was likely completed using voice recognition technology and may contain unintended grammatical , phraseology,and/or punctuation errors      Krish Schwarz MD

## 2021-01-08 ENCOUNTER — TELEPHONE (OUTPATIENT)
Dept: FAMILY MEDICINE CLINIC | Age: 46
End: 2021-01-08

## 2021-01-08 VITALS
SYSTOLIC BLOOD PRESSURE: 158 MMHG | RESPIRATION RATE: 16 BRPM | HEIGHT: 63 IN | DIASTOLIC BLOOD PRESSURE: 96 MMHG | HEART RATE: 69 BPM | BODY MASS INDEX: 43.24 KG/M2 | WEIGHT: 244.05 LBS | OXYGEN SATURATION: 96 % | TEMPERATURE: 97.7 F

## 2021-01-08 DIAGNOSIS — G62.9 NEUROPATHY: Chronic | ICD-10-CM

## 2021-01-08 LAB
ANION GAP SERPL CALCULATED.3IONS-SCNC: 10 MMOL/L (ref 3–16)
BASOPHILS ABSOLUTE: 0 K/UL (ref 0–0.2)
BASOPHILS RELATIVE PERCENT: 0.1 %
BUN BLDV-MCNC: 23 MG/DL (ref 7–20)
CALCIUM SERPL-MCNC: 8.8 MG/DL (ref 8.3–10.6)
CHLORIDE BLD-SCNC: 96 MMOL/L (ref 99–110)
CO2: 36 MMOL/L (ref 21–32)
CREAT SERPL-MCNC: 0.7 MG/DL (ref 0.6–1.1)
EKG ATRIAL RATE: 63 BPM
EKG DIAGNOSIS: NORMAL
EKG P AXIS: 64 DEGREES
EKG P-R INTERVAL: 144 MS
EKG Q-T INTERVAL: 408 MS
EKG QRS DURATION: 70 MS
EKG QTC CALCULATION (BAZETT): 417 MS
EKG R AXIS: 67 DEGREES
EKG T AXIS: 41 DEGREES
EKG VENTRICULAR RATE: 63 BPM
EOSINOPHILS ABSOLUTE: 0 K/UL (ref 0–0.6)
EOSINOPHILS RELATIVE PERCENT: 0.1 %
GFR AFRICAN AMERICAN: >60
GFR NON-AFRICAN AMERICAN: >60
GLUCOSE BLD-MCNC: 107 MG/DL (ref 70–99)
HCT VFR BLD CALC: 40.8 % (ref 36–48)
HEMOGLOBIN: 13.3 G/DL (ref 12–16)
L. PNEUMOPHILA SEROGP 1 UR AG: NORMAL
LYMPHOCYTES ABSOLUTE: 2.7 K/UL (ref 1–5.1)
LYMPHOCYTES RELATIVE PERCENT: 28.5 %
MAGNESIUM: 1.5 MG/DL (ref 1.8–2.4)
MAGNESIUM: 1.5 MG/DL (ref 1.8–2.4)
MCH RBC QN AUTO: 28 PG (ref 26–34)
MCHC RBC AUTO-ENTMCNC: 32.6 G/DL (ref 31–36)
MCV RBC AUTO: 86.1 FL (ref 80–100)
MONOCYTES ABSOLUTE: 0.7 K/UL (ref 0–1.3)
MONOCYTES RELATIVE PERCENT: 7 %
NEUTROPHILS ABSOLUTE: 6 K/UL (ref 1.7–7.7)
NEUTROPHILS RELATIVE PERCENT: 64.3 %
PDW BLD-RTO: 15.3 % (ref 12.4–15.4)
PLATELET # BLD: 123 K/UL (ref 135–450)
PMV BLD AUTO: 9.8 FL (ref 5–10.5)
POTASSIUM REFLEX MAGNESIUM: 3.3 MMOL/L (ref 3.5–5.1)
RBC # BLD: 4.74 M/UL (ref 4–5.2)
SODIUM BLD-SCNC: 142 MMOL/L (ref 136–145)
STREP PNEUMONIAE ANTIGEN, URINE: NORMAL
TROPONIN: <0.01 NG/ML
TROPONIN: <0.01 NG/ML
WBC # BLD: 9.4 K/UL (ref 4–11)

## 2021-01-08 PROCEDURE — 6370000000 HC RX 637 (ALT 250 FOR IP): Performed by: STUDENT IN AN ORGANIZED HEALTH CARE EDUCATION/TRAINING PROGRAM

## 2021-01-08 PROCEDURE — 93010 ELECTROCARDIOGRAM REPORT: CPT | Performed by: INTERNAL MEDICINE

## 2021-01-08 PROCEDURE — 94640 AIRWAY INHALATION TREATMENT: CPT

## 2021-01-08 PROCEDURE — 2580000003 HC RX 258: Performed by: PHYSICIAN ASSISTANT

## 2021-01-08 PROCEDURE — 93005 ELECTROCARDIOGRAM TRACING: CPT | Performed by: INTERNAL MEDICINE

## 2021-01-08 PROCEDURE — 6370000000 HC RX 637 (ALT 250 FOR IP): Performed by: PHYSICIAN ASSISTANT

## 2021-01-08 PROCEDURE — 6360000002 HC RX W HCPCS: Performed by: STUDENT IN AN ORGANIZED HEALTH CARE EDUCATION/TRAINING PROGRAM

## 2021-01-08 PROCEDURE — 94660 CPAP INITIATION&MGMT: CPT

## 2021-01-08 PROCEDURE — 84484 ASSAY OF TROPONIN QUANT: CPT

## 2021-01-08 PROCEDURE — 85025 COMPLETE CBC W/AUTO DIFF WBC: CPT

## 2021-01-08 PROCEDURE — 6370000000 HC RX 637 (ALT 250 FOR IP): Performed by: INTERNAL MEDICINE

## 2021-01-08 PROCEDURE — 80048 BASIC METABOLIC PNL TOTAL CA: CPT

## 2021-01-08 PROCEDURE — 36415 COLL VENOUS BLD VENIPUNCTURE: CPT

## 2021-01-08 PROCEDURE — 6360000002 HC RX W HCPCS: Performed by: INTERNAL MEDICINE

## 2021-01-08 PROCEDURE — 83735 ASSAY OF MAGNESIUM: CPT

## 2021-01-08 PROCEDURE — 94761 N-INVAS EAR/PLS OXIMETRY MLT: CPT

## 2021-01-08 PROCEDURE — 2700000000 HC OXYGEN THERAPY PER DAY

## 2021-01-08 PROCEDURE — 6360000002 HC RX W HCPCS: Performed by: PHYSICIAN ASSISTANT

## 2021-01-08 RX ORDER — LISINOPRIL 40 MG/1
40 TABLET ORAL DAILY
Qty: 30 TABLET | Refills: 0 | Status: SHIPPED | OUTPATIENT
Start: 2021-01-08 | End: 2021-04-05

## 2021-01-08 RX ORDER — GABAPENTIN 600 MG/1
600 TABLET ORAL 3 TIMES DAILY
Qty: 45 TABLET | Refills: 0 | Status: SHIPPED | OUTPATIENT
Start: 2021-01-08 | End: 2021-04-05

## 2021-01-08 RX ORDER — GABAPENTIN 600 MG/1
600 TABLET ORAL 3 TIMES DAILY
Qty: 45 TABLET | Refills: 0 | OUTPATIENT
Start: 2021-01-08 | End: 2021-01-23

## 2021-01-08 RX ORDER — ALBUTEROL SULFATE 90 UG/1
2 AEROSOL, METERED RESPIRATORY (INHALATION) EVERY 6 HOURS PRN
Qty: 1 INHALER | Refills: 0 | Status: SHIPPED | OUTPATIENT
Start: 2021-01-08 | End: 2021-04-05 | Stop reason: SDUPTHER

## 2021-01-08 RX ORDER — METHOCARBAMOL 500 MG/1
500 TABLET, FILM COATED ORAL ONCE
Status: COMPLETED | OUTPATIENT
Start: 2021-01-08 | End: 2021-01-08

## 2021-01-08 RX ORDER — GUAIFENESIN/DEXTROMETHORPHAN 100-10MG/5
5 SYRUP ORAL EVERY 4 HOURS PRN
Qty: 120 ML | Refills: 0 | Status: SHIPPED | OUTPATIENT
Start: 2021-01-08 | End: 2021-01-18

## 2021-01-08 RX ORDER — FUROSEMIDE 20 MG/1
TABLET ORAL
Qty: 90 TABLET | Refills: 0 | Status: SHIPPED | OUTPATIENT
Start: 2021-01-08 | End: 2021-04-05

## 2021-01-08 RX ORDER — METOPROLOL SUCCINATE 25 MG/1
25 TABLET, EXTENDED RELEASE ORAL DAILY
Qty: 30 TABLET | Refills: 0 | Status: SHIPPED | OUTPATIENT
Start: 2021-01-08 | End: 2021-04-05

## 2021-01-08 RX ORDER — MAGNESIUM SULFATE IN WATER 40 MG/ML
4 INJECTION, SOLUTION INTRAVENOUS ONCE
Status: COMPLETED | OUTPATIENT
Start: 2021-01-08 | End: 2021-01-08

## 2021-01-08 RX ORDER — AZITHROMYCIN 250 MG/1
250 TABLET, FILM COATED ORAL DAILY
Qty: 3 TABLET | Refills: 0 | Status: SHIPPED | OUTPATIENT
Start: 2021-01-09 | End: 2021-01-12

## 2021-01-08 RX ORDER — PREDNISONE 20 MG/1
40 TABLET ORAL DAILY
Qty: 6 TABLET | Refills: 0 | Status: SHIPPED | OUTPATIENT
Start: 2021-01-09 | End: 2021-01-12

## 2021-01-08 RX ORDER — BUDESONIDE AND FORMOTEROL FUMARATE DIHYDRATE 80; 4.5 UG/1; UG/1
2 AEROSOL RESPIRATORY (INHALATION) 2 TIMES DAILY
Qty: 1 INHALER | Refills: 0 | Status: SHIPPED | OUTPATIENT
Start: 2021-01-08 | End: 2021-04-05 | Stop reason: SDUPTHER

## 2021-01-08 RX ADMIN — METHOCARBAMOL 500 MG: 500 TABLET ORAL at 03:51

## 2021-01-08 RX ADMIN — PREDNISONE 40 MG: 20 TABLET ORAL at 10:00

## 2021-01-08 RX ADMIN — FUROSEMIDE 60 MG: 40 TABLET ORAL at 10:00

## 2021-01-08 RX ADMIN — LISINOPRIL 40 MG: 40 TABLET ORAL at 07:20

## 2021-01-08 RX ADMIN — IPRATROPIUM BROMIDE AND ALBUTEROL SULFATE 3 ML: .5; 3 SOLUTION RESPIRATORY (INHALATION) at 09:03

## 2021-01-08 RX ADMIN — GABAPENTIN 600 MG: 600 TABLET, FILM COATED ORAL at 10:01

## 2021-01-08 RX ADMIN — ACETAMINOPHEN 650 MG: 325 TABLET ORAL at 06:43

## 2021-01-08 RX ADMIN — ENOXAPARIN SODIUM 40 MG: 40 INJECTION SUBCUTANEOUS at 10:00

## 2021-01-08 RX ADMIN — ACETAMINOPHEN 650 MG: 325 TABLET ORAL at 00:12

## 2021-01-08 RX ADMIN — BUDESONIDE 250 MCG: 0.25 INHALANT RESPIRATORY (INHALATION) at 09:03

## 2021-01-08 RX ADMIN — POTASSIUM BICARBONATE 20 MEQ: 782 TABLET, EFFERVESCENT ORAL at 10:02

## 2021-01-08 RX ADMIN — ARFORMOTEROL TARTRATE 15 MCG: 15 SOLUTION RESPIRATORY (INHALATION) at 09:03

## 2021-01-08 RX ADMIN — Medication 10 ML: at 10:02

## 2021-01-08 RX ADMIN — MAGNESIUM SULFATE HEPTAHYDRATE 4 G: 40 INJECTION, SOLUTION INTRAVENOUS at 10:17

## 2021-01-08 RX ADMIN — AZITHROMYCIN MONOHYDRATE 250 MG: 250 TABLET ORAL at 10:01

## 2021-01-08 ASSESSMENT — PAIN DESCRIPTION - FREQUENCY
FREQUENCY: INTERMITTENT
FREQUENCY: CONTINUOUS

## 2021-01-08 ASSESSMENT — PAIN DESCRIPTION - PAIN TYPE
TYPE: ACUTE PAIN
TYPE: ACUTE PAIN

## 2021-01-08 ASSESSMENT — PAIN DESCRIPTION - ORIENTATION: ORIENTATION: MID

## 2021-01-08 ASSESSMENT — PAIN SCALES - GENERAL
PAINLEVEL_OUTOF10: 3
PAINLEVEL_OUTOF10: 10

## 2021-01-08 ASSESSMENT — PAIN DESCRIPTION - ONSET
ONSET: ON-GOING
ONSET: ON-GOING

## 2021-01-08 ASSESSMENT — PAIN DESCRIPTION - LOCATION
LOCATION: GENERALIZED
LOCATION: CHEST

## 2021-01-08 ASSESSMENT — PAIN DESCRIPTION - PROGRESSION
CLINICAL_PROGRESSION: NOT CHANGED
CLINICAL_PROGRESSION: GRADUALLY WORSENING

## 2021-01-08 NOTE — CARE COORDINATION
21 /24  Current OT AM-PAC Score: 21 /24      DISCHARGE Disposition: Home- No Services Needed    LOC at discharge: Not Applicable  TAMIKO Completed: Not Indicated    Notification completed in HENS/PAS?:  Not Applicable    IMM Completed:   Not Indicated    Transportation:  Transportation PLAN for discharge: Lyft   Mode of Transport: [place]:  Home Care ordered at discharge: Not [address]: Not Applicable  Orders faxed: No    Durable Medical Equipment:  DME Provider: none  Equipment obtained during hospitalization:     Home Oxygen and Respiratory Equipment:  Oxygen needed at discharge?: yes  [address]: [facility]  Portable tank available for discharge?: Yes Jose delivered port tank to room for pt to use at Dc    Dialysis:  Dialysis patient: No    Dialysis Center:  Not Applicable      Additional CM Notes: Pt from home no need f    The Plan for Transition of Care is related to the following treatment goals of Drug overdose, accidental or unintentional, initial encounter [T50909R]  Accidental drug overdose, initial encounter Hany Woody    The Patient and/or patient representative Matthew Sparks and her family were provided with a choice of provider and agrees with the discharge plan Yes    Freedom of choice list was provided with basic dialogue that supports the patient's individualized plan of care/goals and shares the quality data associated with the providers.  Not Indicated    Care Transitions patient: No    Tennille Chin RN  [facility]  Case Management Department  Ph: [phone]  Fax: [phone]

## 2021-01-08 NOTE — PROGRESS NOTES
Pt resting with no compaints at this time. RR easy and unlabored. Bed locked and in lowest position. Pt updated on care plan/status. Will continue to monitor.

## 2021-01-08 NOTE — DISCHARGE SUMMARY
Hospital Medicine Discharge Summary    Patient ID: Gabe Ken      Patient's PCP: Kaycee Salter, APRN - CNP    Admit Date: 1/5/2021     Discharge Date:   1/8/2021    Admitting Physician: Davey Weber MD     Discharge Physician: Kadeem Cruz MD     Discharge Diagnoses: Active Hospital Problems    Diagnosis Date Noted    COPD exacerbation ([facility] [J44.1] 11/03/2015     Priority: High    Drug overdose, accidental or unintentional, initial encounter Judy Weinberg 01/05/2021    Acute on chronic respiratory failure ([facility] [J96.20] 12/05/2020    Opiate overdose ([facility] Alessandra Jernigan 08/23/2017    Smoker [F17.200] 01/19/2012       The patient was seen and examined on day of discharge and this discharge summary is in conjunction with any daily progress note from day of discharge. Hospital Course: Patient is a 42-year-old female with history of severe pulmonary hypertension, COPD on 4 L home oxygen, PAPITO on nocturnal BiPAP, diabetes mellitus type 2 CHF with diastolic dysfunction hepatitis C presented to [place] ER for unconsciousness. Patient reported that she was not using her oxygen and BiPAP because she was cleaning her house and she thinks that her BiPAP is not working fine. She was admitted to hospital in [place] 2 months ago with a similar situation. She never followed up with her pulmonary after discharge.     In ER patient received Narcan she was noted to be in hypoxic hypercapnic respiratory failure requiring BiPAP.     Urine drug screen positive for cocaine and amphetamine. Patient was admitted for acute on chronic hypoxic hypercapnic respiratory failure, COPD exacerbation. Patient was seen and examined on day of discharge complaining of chest pain feels like my muscles are hurting. EKG showed normal sinus rhythm. Troponin negative. Patient is having cough which is at baseline. Denies fever, chills abdominal no nausea, vomiting.   Discussed with patient regarding the importance of smoking cessation and compliance with her oxygen BiPAP to avoid to hospitalization. She was advised to follow-up with her pulmonologist in 2 weeks. Plan  #Acute toxic metabolic encephalopathy suspected secondary to cocaine use. Resolved  #Acute on chronic hypoxic hypercapnic respiratory failure resolved being discharged on 4 L of oxygen, discussed with patient regarding BiPAP compliance  #PAPITO on nocturnal BiPAP. #Suspected COPD exacerbation  Prednisone to complete total 5 days, Z-[place]. Continue bronchodilators. #Severe pulmonary hypertension  #Heart failure with preserved EF not in exacerbation. #Tobacco abuse  Counseled regarding cessation. #Drug abuse counseled on cessation. #Diabetes mellitus type 2  #Obesity. Patient requested 1 month medication supplies as she cannot go to [facility] to  her pharmacist.  Medication list verified by pharmacy. Drug report reviewed. Physical Exam Performed:     BP (!) 158/96   Pulse 69   Temp 97.7 °F (36.5 °C) (Oral)   Resp 16   Ht 5' 3\" (1.6 m)   Wt 244 lb 0.8 oz (110.7 kg)   LMP 12/05/2020   SpO2 96%   BMI 43.23 kg/m²       General appearance:  No apparent distress, anxious   HEENT:  Normal cephalic, atraumatic without obvious deformity. Pupils equal, round, and reactive to light. Extra ocular muscles intact. Conjunctivae/corneas clear. Neck: Supple, with full range of motion. No jugular venous distention. Trachea midline. Respiratory:  Normal respiratory effort. Bilateral mild expiratory and inspiratory wheeze. With some rhonchi. Cardiovascular:  Regular rate and rhythm with normal S1/S2 without murmurs, rubs or gallops. Abdomen: Soft, non-tender, non-distended with normal bowel sounds. Musculoskeletal:  No clubbing, cyanosis or edema bilaterally. Skin: Skin color, texture, turgor normal.  No rashes or lesions. Neurologic:  Neurovascularly intact without any focal sensory/motor deficits.  Cranial nerves: II-XII intact, grossly non-focal.  Psychiatric:  Alert and oriented,  Peripheral Pulses: +2 palpable, equal bilaterally       Labs: For convenience and continuity at follow-up the following most recent labs are provided:      CBC:    Lab Results   Component Value Date    WBC 9.4 01/08/2021    HGB 13.3 01/08/2021    HCT 40.8 01/08/2021     01/08/2021       Renal:    Lab Results   Component Value Date     01/08/2021    K 3.3 01/08/2021    CL 96 01/08/2021    CO2 36 01/08/2021    BUN 23 01/08/2021    CREATININE 0.7 01/08/2021    CALCIUM 8.8 01/08/2021    PHOS 3.9 11/18/2018         Significant Diagnostic Studies    Radiology:   CT HEAD WO CONTRAST   Final Result      No acute intracranial abnormality. Left frontal cephalohematoma. CT CERVICAL SPINE WO CONTRAST   Final Result   Impression:      Negative for fracture or static subluxation of the cervical spine. Visualized bilateral cervical lymph nodes, which are favored to be reactive. Correlate clinically. CT CHEST WO CONTRAST   Final Result      No acute findings in the chest.      Prominent central pulmonary arterial trunk and main pulmonary arteries, similar to the prior study.              Consults:     IP CONSULT TO CRITICAL CARE  IP CONSULT TO SOCIAL WORK  IP CONSULT TO PHARMACY    Disposition:  home     Condition at Discharge: Stable    Discharge Instructions/Follow-up:  PCP and Pulmonary    Code Status:  Full Code     Activity: activity as tolerated    Diet: cardiac diet      Discharge Medications:     Current Discharge Medication List           Details   predniSONE (DELTASONE) 20 MG tablet Take 2 tablets by mouth daily for 3 days  Qty: 6 tablet, Refills: 0      guaiFENesin-dextromethorphan (ROBITUSSIN DM) 100-10 MG/5ML syrup Take 5 mLs by mouth every 4 hours as needed for Cough  Qty: 120 mL, Refills: 0      azithromycin (ZITHROMAX) 250 MG tablet Take 1 tablet by mouth daily for 3 days  Qty: 3 tablet, Refills: 0    Associated Diagnoses: COPD daily E11.9  Qty: 1 kit, Refills: 0    Associated Diagnoses: Type 2 diabetes mellitus without complication, without long-term current use of insulin (HCC)      blood glucose test strips (ACURA BLOOD GLUCOSE TEST) strip Test once daily E11.9  Qty: 50 strip, Refills: 5    Associated Diagnoses: Type 2 diabetes mellitus without complication, without long-term current use of insulin (HCC)      CVS Lancets Ultra Thin MISC 1 each by Does not apply route daily E11.9  Qty: 100 each, Refills: 0    Associated Diagnoses: Type 2 diabetes mellitus without complication, without long-term current use of insulin ([facility]       ! ! - Potential duplicate medications found. Please discuss with provider. Time Spent on discharge is more than 30 minutes in the examination, evaluation, counseling and review of medications and discharge plan. Signed:    Rosanne Nicole MD   1/8/2021      Thank you SABAS Munoz - HUMBERTO for the opportunity to be involved in this patient's care. If you have any questions or concerns please feel free to contact me at [phone].

## 2021-01-08 NOTE — PROGRESS NOTES
Pt arrived to room 5312. Vitals stable with BP slightly elevated. C/o headache but Tylenol was given about an hour before transferred. Pt has laceration on L side of head with staples. Respiratory came to set up pt BiPap. Pt oriented to room, given a drink and snack. Verbalizes understanding she will call if she needs to get up. All needs met at this time. Will continue to monitor.

## 2021-01-08 NOTE — TELEPHONE ENCOUNTER
Patient called about getting Gabapentin refilled. I seen she was calling from the hospital and ask if she was in the hospital. She said yes. I ask her why she was in and she said for carbon monoxide posioning  She was actually in for a drug OD. I advise her that her gabapentin could not be refilled until she made an appt because she was due. I told her that a Dr. Savanah Sinha had given her a 15 day supply at the [address] She decided she will not be coming back here again because she moved to [facility]. Beau Reid

## 2021-01-08 NOTE — PROGRESS NOTES
4 Eyes Admission Assessment     I agree as the admission nurse that 2 RN's have performed a thorough Head to Toe Skin Assessment on the patient. ALL assessment sites listed below have been assessed on admission. Areas assessed by both nurses:   [x]   Head, Face, and Ears  Laceration L side head with staples  [x]   Shoulders, Back, and Chest  [x]   Arms, Elbows, and Hands   [x]   Coccyx, Sacrum, and Ischium  [x]   Legs, Feet, and Heels         Does the Patient have Skin Breakdown?   No         Andres Prevention initiated:  NA   Wound Care Orders initiated:  Yes       [address]  nurse consulted for Pressure Injury (Stage 3,4, Unstageable, DTI, NWPT, and Complex wounds) or Andres score 18 or lower:  NA      Nurse 1 eSignature: Electronically signed by Delonte Mcgill RN on 1/8/21 at 1:38 AM EST    **SHARE this note so that the co-signing nurse is able to place an eSignature**    Nurse 2 eSignature: Electronically signed by Yanni Templeton RN on 1/8/21 at 3:15 AM EST

## 2021-01-11 ENCOUNTER — TELEPHONE (OUTPATIENT)
Dept: FAMILY MEDICINE CLINIC | Age: 46
End: 2021-01-11

## 2021-01-11 LAB
CULTURE, RESPIRATORY: ABNORMAL
CULTURE, RESPIRATORY: ABNORMAL
GRAM STAIN RESULT: ABNORMAL
ORGANISM: ABNORMAL

## 2021-01-12 DIAGNOSIS — G62.9 NEUROPATHY: Chronic | ICD-10-CM

## 2021-01-12 RX ORDER — GABAPENTIN 600 MG/1
600 TABLET ORAL 3 TIMES DAILY
Qty: 45 TABLET | Refills: 0 | OUTPATIENT
Start: 2021-01-12 | End: 2021-01-27

## 2021-02-22 ENCOUNTER — APPOINTMENT (OUTPATIENT)
Dept: CT IMAGING | Age: 46
End: 2021-02-22
Payer: MEDICARE

## 2021-02-22 ENCOUNTER — HOSPITAL ENCOUNTER (EMERGENCY)
Age: 46
Discharge: HOME | End: 2021-02-22
Attending: EMERGENCY MEDICINE
Payer: MEDICARE

## 2021-02-22 VITALS
HEART RATE: 100 BPM | OXYGEN SATURATION: 100 % | RESPIRATION RATE: 14 BRPM | TEMPERATURE: 98.8 F | SYSTOLIC BLOOD PRESSURE: 164 MMHG | WEIGHT: 226 LBS | DIASTOLIC BLOOD PRESSURE: 94 MMHG | BODY MASS INDEX: 40.04 KG/M2 | HEIGHT: 63 IN

## 2021-02-22 DIAGNOSIS — S39.012A STRAIN OF LUMBAR REGION, INITIAL ENCOUNTER: ICD-10-CM

## 2021-02-22 DIAGNOSIS — S16.1XXA STRAIN OF NECK MUSCLE, INITIAL ENCOUNTER: Primary | ICD-10-CM
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
## 2021-02-22 PROCEDURE — 6360000002 HC RX W HCPCS: Performed by: EMERGENCY MEDICINE

## 2021-02-22 PROCEDURE — 72131 CT LUMBAR SPINE W/O DYE: CPT

## 2021-02-22 PROCEDURE — 6370000000 HC RX 637 (ALT 250 FOR IP): Performed by: EMERGENCY MEDICINE

## 2021-02-22 PROCEDURE — 96372 THER/PROPH/DIAG INJ SC/IM: CPT

## 2021-02-22 PROCEDURE — 99285 EMERGENCY DEPT VISIT HI MDM: CPT

## 2021-02-22 PROCEDURE — 2700000000 HC OXYGEN THERAPY PER DAY

## 2021-02-22 PROCEDURE — 72125 CT NECK SPINE W/O DYE: CPT

## 2021-02-22 RX ORDER — DEXTROMETHORPHAN HBR, GUAIFENESIN 20; 200 MG/10ML; MG/10ML
SOLUTION ORAL
COMMUNITY
Start: 2021-01-08

## 2021-02-22 RX ORDER — ACETAMINOPHEN 500 MG
500 TABLET ORAL 4 TIMES DAILY PRN
Qty: 30 TABLET | Refills: 0 | Status: SHIPPED | OUTPATIENT
Start: 2021-02-22 | End: 2021-04-05

## 2021-02-22 RX ORDER — ACETAMINOPHEN 325 MG/1
650 TABLET ORAL ONCE
Status: COMPLETED | OUTPATIENT
Start: 2021-02-22 | End: 2021-02-22

## 2021-02-22 RX ORDER — KETOROLAC TROMETHAMINE 30 MG/ML
30 INJECTION, SOLUTION INTRAMUSCULAR; INTRAVENOUS ONCE
Status: COMPLETED | OUTPATIENT
Start: 2021-02-22 | End: 2021-02-22

## 2021-02-22 RX ORDER — CYCLOBENZAPRINE HCL 5 MG
5 TABLET ORAL 2 TIMES DAILY PRN
Qty: 10 TABLET | Refills: 0 | Status: SHIPPED | OUTPATIENT
Start: 2021-02-22 | End: 2021-03-04

## 2021-02-22 RX ORDER — ORPHENADRINE CITRATE 30 MG/ML
60 INJECTION INTRAMUSCULAR; INTRAVENOUS ONCE
Status: COMPLETED | OUTPATIENT
Start: 2021-02-22 | End: 2021-02-22

## 2021-02-22 RX ADMIN — ORPHENADRINE CITRATE 60 MG: 30 INJECTION INTRAMUSCULAR; INTRAVENOUS at 01:44

## 2021-02-22 RX ADMIN — KETOROLAC TROMETHAMINE 30 MG: 30 INJECTION, SOLUTION INTRAMUSCULAR at 01:44

## 2021-02-22 RX ADMIN — ACETAMINOPHEN 650 MG: 325 TABLET ORAL at 01:44

## 2021-02-22 ASSESSMENT — PAIN DESCRIPTION - PAIN TYPE
TYPE_3: ACUTE PAIN
TYPE: ACUTE PAIN

## 2021-02-22 ASSESSMENT — PAIN DESCRIPTION - LOCATION
LOCATION_2: NECK
LOCATION_3: ARM

## 2021-02-22 ASSESSMENT — PAIN SCALES - GENERAL
PAINLEVEL_OUTOF10: 9
PAINLEVEL_OUTOF10: 9

## 2021-02-22 ASSESSMENT — PAIN DESCRIPTION - INTENSITY: RATING_3: 9

## 2021-02-22 ASSESSMENT — PAIN DESCRIPTION - FREQUENCY: FREQUENCY: CONTINUOUS

## 2021-02-22 ASSESSMENT — PAIN DESCRIPTION - DESCRIPTORS
DESCRIPTORS_3: ACHING
DESCRIPTORS_2: ACHING
DESCRIPTORS: THROBBING

## 2021-02-22 ASSESSMENT — PAIN DESCRIPTION - ORIENTATION
ORIENTATION: UPPER
ORIENTATION_2: POSTERIOR

## 2021-02-22 ASSESSMENT — PAIN DESCRIPTION - ONSET: ONSET_2: SUDDEN
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
## 2021-04-05 ENCOUNTER — OFFICE VISIT (OUTPATIENT)
Dept: INTERNAL MEDICINE CLINIC | Age: 46
End: 2021-04-05
Payer: MEDICARE

## 2021-04-05 VITALS
RESPIRATION RATE: 20 BRPM | WEIGHT: 215 LBS | TEMPERATURE: 98.6 F | BODY MASS INDEX: 36.7 KG/M2 | SYSTOLIC BLOOD PRESSURE: 128 MMHG | OXYGEN SATURATION: 98 % | HEIGHT: 64 IN | DIASTOLIC BLOOD PRESSURE: 72 MMHG | HEART RATE: 90 BPM

## 2021-04-05 DIAGNOSIS — J96.12 CHRONIC RESPIRATORY FAILURE WITH HYPOXIA AND HYPERCAPNIA (HCC): Chronic | ICD-10-CM

## 2021-04-05 DIAGNOSIS — I50.32 CHRONIC DIASTOLIC CONGESTIVE HEART FAILURE (HCC): ICD-10-CM

## 2021-04-05 DIAGNOSIS — E11.9 TYPE 2 DIABETES MELLITUS WITHOUT COMPLICATION, WITHOUT LONG-TERM CURRENT USE OF INSULIN (HCC): ICD-10-CM

## 2021-04-05 DIAGNOSIS — E66.01 CLASS 2 SEVERE OBESITY DUE TO EXCESS CALORIES WITH SERIOUS COMORBIDITY AND BODY MASS INDEX (BMI) OF 36.0 TO 36.9 IN ADULT (HCC): ICD-10-CM

## 2021-04-05 DIAGNOSIS — J44.9 CHRONIC OBSTRUCTIVE PULMONARY DISEASE, UNSPECIFIED COPD TYPE (HCC): Chronic | ICD-10-CM

## 2021-04-05 DIAGNOSIS — J96.11 CHRONIC RESPIRATORY FAILURE WITH HYPOXIA AND HYPERCAPNIA (HCC): Chronic | ICD-10-CM

## 2021-04-05 DIAGNOSIS — I10 ESSENTIAL HYPERTENSION: ICD-10-CM

## 2021-04-05 DIAGNOSIS — R56.9 SEIZURE (HCC): Primary | ICD-10-CM

## 2021-04-05 PROCEDURE — G8417 CALC BMI ABV UP PARAM F/U: HCPCS | Performed by: INTERNAL MEDICINE

## 2021-04-05 PROCEDURE — 4004F PT TOBACCO SCREEN RCVD TLK: CPT | Performed by: INTERNAL MEDICINE

## 2021-04-05 PROCEDURE — 3023F SPIROM DOC REV: CPT | Performed by: INTERNAL MEDICINE

## 2021-04-05 PROCEDURE — G8926 SPIRO NO PERF OR DOC: HCPCS | Performed by: INTERNAL MEDICINE

## 2021-04-05 PROCEDURE — 99204 OFFICE O/P NEW MOD 45 MIN: CPT | Performed by: INTERNAL MEDICINE

## 2021-04-05 PROCEDURE — 3044F HG A1C LEVEL LT 7.0%: CPT | Performed by: INTERNAL MEDICINE

## 2021-04-05 PROCEDURE — G8428 CUR MEDS NOT DOCUMENT: HCPCS | Performed by: INTERNAL MEDICINE

## 2021-04-05 PROCEDURE — 2022F DILAT RTA XM EVC RTNOPTHY: CPT | Performed by: INTERNAL MEDICINE

## 2021-04-05 RX ORDER — ALBUTEROL SULFATE 90 UG/1
2 AEROSOL, METERED RESPIRATORY (INHALATION) EVERY 6 HOURS PRN
Qty: 1 INHALER | Refills: 0 | Status: SHIPPED | OUTPATIENT
Start: 2021-04-05 | End: 2021-06-10 | Stop reason: SDUPTHER

## 2021-04-05 RX ORDER — BLOOD-GLUCOSE METER
1 KIT MISCELLANEOUS DAILY
Qty: 1 KIT | Refills: 0 | Status: SHIPPED | OUTPATIENT
Start: 2021-04-05

## 2021-04-05 RX ORDER — BUDESONIDE AND FORMOTEROL FUMARATE DIHYDRATE 80; 4.5 UG/1; UG/1
2 AEROSOL RESPIRATORY (INHALATION) 2 TIMES DAILY
Qty: 1 INHALER | Refills: 0 | Status: SHIPPED | OUTPATIENT
Start: 2021-04-05 | End: 2021-06-10 | Stop reason: SDUPTHER

## 2021-04-05 RX ORDER — LANCETS
1 EACH MISCELLANEOUS DAILY
Qty: 100 EACH | Refills: 0 | Status: SHIPPED | OUTPATIENT
Start: 2021-04-05 | End: 2021-05-11

## 2021-04-05 ASSESSMENT — PATIENT HEALTH QUESTIONNAIRE - PHQ9
SUM OF ALL RESPONSES TO PHQ QUESTIONS 1-9: 9
10. IF YOU CHECKED OFF ANY PROBLEMS, HOW DIFFICULT HAVE THESE PROBLEMS MADE IT FOR YOU TO DO YOUR WORK, TAKE CARE OF THINGS AT HOME, OR GET ALONG WITH OTHER PEOPLE: 0
5. POOR APPETITE OR OVEREATING: 2
SUM OF ALL RESPONSES TO PHQ QUESTIONS 1-9: 9
8. MOVING OR SPEAKING SO SLOWLY THAT OTHER PEOPLE COULD HAVE NOTICED. OR THE OPPOSITE, BEING SO FIGETY OR RESTLESS THAT YOU HAVE BEEN MOVING AROUND A LOT MORE THAN USUAL: 0
SUM OF ALL RESPONSES TO PHQ QUESTIONS 1-9: 9
4. FEELING TIRED OR HAVING LITTLE ENERGY: 0
9. THOUGHTS THAT YOU WOULD BE BETTER OFF DEAD, OR OF HURTING YOURSELF: 0
6. FEELING BAD ABOUT YOURSELF - OR THAT YOU ARE A FAILURE OR HAVE LET YOURSELF OR YOUR FAMILY DOWN: 0

## 2021-04-05 NOTE — PATIENT INSTRUCTIONS
Go to the ER if you have have a seizure.  medications    Let me know the blood pressure medication    Start checking blood sugar 3 times a day    Make an appointment with the cardiologist and lung doctors.     I will help you get in with neurology
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
## 2021-04-06 ENCOUNTER — TELEPHONE (OUTPATIENT)
Dept: INTERNAL MEDICINE CLINIC | Age: 46
End: 2021-04-06

## 2021-04-06 NOTE — TELEPHONE ENCOUNTER
Called patient to discuss lab work. First called mobile number, there was no answer. Message was left to call back. Home number in the chart is not a working number. HbA1c came back normal, patient has been off metformin. I would like her to stay off of it at this time since her HbA1c is not even in the pre-DM range. I had resent this to her pharmacy yesterday since this was one of her medications, however she should not take it. The rest of her labs are okay. Sodium is slightly low at 132, will recheck at follow up visit in 2 weeks. WBC was also very slightly elevated at 11. 2. will also plan to recheck this.      The DO Kathy

## 2021-05-03 ENCOUNTER — CLINICAL DOCUMENTATION (OUTPATIENT)
Dept: INTERNAL MEDICINE CLINIC | Age: 46
End: 2021-05-03

## 2021-05-11 DIAGNOSIS — E11.9 TYPE 2 DIABETES MELLITUS WITHOUT COMPLICATION, WITHOUT LONG-TERM CURRENT USE OF INSULIN (HCC): ICD-10-CM

## 2021-05-11 RX ORDER — LANCETS
EACH MISCELLANEOUS
Qty: 100 EACH | Refills: 11 | Status: SHIPPED | OUTPATIENT
Start: 2021-05-11 | End: 2022-03-09 | Stop reason: SDUPTHER

## 2021-06-07 PROCEDURE — 99285 EMERGENCY DEPT VISIT HI MDM: CPT

## 2021-06-07 ASSESSMENT — PAIN SCALES - GENERAL: PAINLEVEL_OUTOF10: 8

## 2021-06-07 ASSESSMENT — PAIN DESCRIPTION - DESCRIPTORS: DESCRIPTORS: BURNING

## 2021-06-07 ASSESSMENT — PAIN DESCRIPTION - PAIN TYPE: TYPE: ACUTE PAIN

## 2021-06-08 ENCOUNTER — HOSPITAL ENCOUNTER (EMERGENCY)
Age: 46
Discharge: HOME | End: 2021-06-08
Attending: EMERGENCY MEDICINE
Payer: MEDICARE

## 2021-06-08 VITALS
TEMPERATURE: 97.7 F | WEIGHT: 180 LBS | DIASTOLIC BLOOD PRESSURE: 89 MMHG | HEIGHT: 63 IN | BODY MASS INDEX: 31.89 KG/M2 | SYSTOLIC BLOOD PRESSURE: 170 MMHG | RESPIRATION RATE: 17 BRPM | OXYGEN SATURATION: 96 % | HEART RATE: 104 BPM

## 2021-06-08 DIAGNOSIS — T20.20XA PARTIAL THICKNESS BURN OF FACE, INITIAL ENCOUNTER: Primary | ICD-10-CM

## 2021-06-08 PROCEDURE — 6370000000 HC RX 637 (ALT 250 FOR IP): Performed by: EMERGENCY MEDICINE

## 2021-06-08 PROCEDURE — 6360000002 HC RX W HCPCS: Performed by: EMERGENCY MEDICINE

## 2021-06-08 PROCEDURE — 90471 IMMUNIZATION ADMIN: CPT | Performed by: EMERGENCY MEDICINE

## 2021-06-08 PROCEDURE — 90715 TDAP VACCINE 7 YRS/> IM: CPT | Performed by: EMERGENCY MEDICINE

## 2021-06-08 RX ORDER — BACITRACIN ZINC AND POLYMYXIN B SULFATE 500; 1000 [USP'U]/G; [USP'U]/G
OINTMENT TOPICAL
Qty: 1 TUBE | Refills: 1 | Status: SHIPPED | OUTPATIENT
Start: 2021-06-08 | End: 2021-06-14 | Stop reason: SDUPTHER

## 2021-06-08 RX ORDER — BACITRACIN ZINC AND POLYMYXIN B SULFATE 500; 1000 [USP'U]/G; [USP'U]/G
OINTMENT TOPICAL ONCE
Status: COMPLETED | OUTPATIENT
Start: 2021-06-08 | End: 2021-06-08

## 2021-06-08 RX ORDER — ACETAMINOPHEN 500 MG
1000 TABLET ORAL ONCE
Status: COMPLETED | OUTPATIENT
Start: 2021-06-08 | End: 2021-06-08

## 2021-06-08 RX ADMIN — TETANUS TOXOID, REDUCED DIPHTHERIA TOXOID AND ACELLULAR PERTUSSIS VACCINE, ADSORBED 0.5 ML: 5; 2.5; 8; 8; 2.5 SUSPENSION INTRAMUSCULAR at 02:09

## 2021-06-08 RX ADMIN — BACITRACIN ZINC AND POLYMYXIN B SULFATE 14.2 G: at 02:04

## 2021-06-08 RX ADMIN — ACETAMINOPHEN 1000 MG: 500 TABLET ORAL at 02:09

## 2021-06-08 ASSESSMENT — PAIN SCALES - GENERAL
PAINLEVEL_OUTOF10: 8
PAINLEVEL_OUTOF10: 8
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
## 2021-06-10 DIAGNOSIS — J44.9 CHRONIC OBSTRUCTIVE PULMONARY DISEASE, UNSPECIFIED COPD TYPE (HCC): Chronic | ICD-10-CM

## 2021-06-10 DIAGNOSIS — E11.9 TYPE 2 DIABETES MELLITUS WITHOUT COMPLICATION, WITHOUT LONG-TERM CURRENT USE OF INSULIN (HCC): ICD-10-CM

## 2021-06-10 RX ORDER — GABAPENTIN 600 MG/1
600 TABLET ORAL 3 TIMES DAILY
Qty: 15 TABLET | Refills: 0 | Status: SHIPPED | OUTPATIENT
Start: 2021-06-10 | End: 2021-06-14 | Stop reason: SDUPTHER

## 2021-06-10 RX ORDER — BUDESONIDE AND FORMOTEROL FUMARATE DIHYDRATE 80; 4.5 UG/1; UG/1
2 AEROSOL RESPIRATORY (INHALATION) 2 TIMES DAILY
Qty: 1 INHALER | Refills: 0 | Status: SHIPPED | OUTPATIENT
Start: 2021-06-10 | End: 2021-06-14 | Stop reason: SDUPTHER

## 2021-06-10 RX ORDER — GABAPENTIN 600 MG/1
600 TABLET ORAL 3 TIMES DAILY
COMMUNITY
End: 2021-06-10 | Stop reason: SDUPTHER

## 2021-06-10 RX ORDER — LISINOPRIL 30 MG/1
30 TABLET ORAL DAILY
COMMUNITY
End: 2021-06-10 | Stop reason: SDUPTHER

## 2021-06-10 RX ORDER — ALBUTEROL SULFATE 90 UG/1
2 AEROSOL, METERED RESPIRATORY (INHALATION) EVERY 6 HOURS PRN
Qty: 1 INHALER | Refills: 0 | Status: SHIPPED | OUTPATIENT
Start: 2021-06-10 | End: 2021-06-14 | Stop reason: SDUPTHER

## 2021-06-10 RX ORDER — LISINOPRIL 30 MG/1
30 TABLET ORAL DAILY
Qty: 30 TABLET | Refills: 1 | Status: SHIPPED | OUTPATIENT
Start: 2021-06-10 | End: 2021-06-14 | Stop reason: SDUPTHER

## 2021-06-10 NOTE — TELEPHONE ENCOUNTER
----- Message from Maurilio Santoro DO sent at 6/10/2021  8:27 AM EDT -----  Regarding: ER visit  Patient was recently in hospital and ER. Could we please call and set up an appointment?      Thank you,AM

## 2021-06-11 ENCOUNTER — TELEPHONE (OUTPATIENT)
Dept: ADMINISTRATIVE | Age: 46
End: 2021-06-11

## 2021-06-11 NOTE — TELEPHONE ENCOUNTER
Submitted PA for Albuterol Sulfate  (90 Base)MCG/ACT aerosol  Key: [id number] - PA Case ID: [id number] - Rx #: [id number]   Via CMM STATUS: Approved  6/11/21-6/11/22    . Please notify patient, thank you.

## 2021-06-14 ENCOUNTER — OFFICE VISIT (OUTPATIENT)
Dept: INTERNAL MEDICINE CLINIC | Age: 46
End: 2021-06-14
Payer: MEDICARE

## 2021-06-14 VITALS
RESPIRATION RATE: 18 BRPM | WEIGHT: 210 LBS | HEART RATE: 107 BPM | BODY MASS INDEX: 37.21 KG/M2 | SYSTOLIC BLOOD PRESSURE: 120 MMHG | HEIGHT: 63 IN | OXYGEN SATURATION: 98 % | DIASTOLIC BLOOD PRESSURE: 84 MMHG

## 2021-06-14 DIAGNOSIS — J44.1 COPD EXACERBATION (HCC): ICD-10-CM

## 2021-06-14 DIAGNOSIS — T20.00XD: ICD-10-CM

## 2021-06-14 DIAGNOSIS — E11.9 TYPE 2 DIABETES MELLITUS WITHOUT COMPLICATION, WITHOUT LONG-TERM CURRENT USE OF INSULIN (HCC): ICD-10-CM

## 2021-06-14 DIAGNOSIS — I10 HYPERTENSION, UNSPECIFIED TYPE: Primary | ICD-10-CM

## 2021-06-14 DIAGNOSIS — I50.9 CHRONIC HEART FAILURE, UNSPECIFIED HEART FAILURE TYPE (HCC): ICD-10-CM

## 2021-06-14 DIAGNOSIS — G62.9 NEUROPATHY: ICD-10-CM

## 2021-06-14 DIAGNOSIS — J44.9 CHRONIC OBSTRUCTIVE PULMONARY DISEASE, UNSPECIFIED COPD TYPE (HCC): Chronic | ICD-10-CM

## 2021-06-14 PROCEDURE — 4004F PT TOBACCO SCREEN RCVD TLK: CPT | Performed by: INTERNAL MEDICINE

## 2021-06-14 PROCEDURE — 99214 OFFICE O/P EST MOD 30 MIN: CPT | Performed by: INTERNAL MEDICINE

## 2021-06-14 PROCEDURE — G8926 SPIRO NO PERF OR DOC: HCPCS | Performed by: INTERNAL MEDICINE

## 2021-06-14 PROCEDURE — G8427 DOCREV CUR MEDS BY ELIG CLIN: HCPCS | Performed by: INTERNAL MEDICINE

## 2021-06-14 PROCEDURE — 2022F DILAT RTA XM EVC RTNOPTHY: CPT | Performed by: INTERNAL MEDICINE

## 2021-06-14 PROCEDURE — 3023F SPIROM DOC REV: CPT | Performed by: INTERNAL MEDICINE

## 2021-06-14 PROCEDURE — 3044F HG A1C LEVEL LT 7.0%: CPT | Performed by: INTERNAL MEDICINE

## 2021-06-14 PROCEDURE — G8417 CALC BMI ABV UP PARAM F/U: HCPCS | Performed by: INTERNAL MEDICINE

## 2021-06-14 RX ORDER — ALBUTEROL SULFATE 90 UG/1
2 AEROSOL, METERED RESPIRATORY (INHALATION) EVERY 6 HOURS PRN
Qty: 1 INHALER | Refills: 0 | Status: SHIPPED | OUTPATIENT
Start: 2021-06-14 | End: 2021-09-08 | Stop reason: SDUPTHER

## 2021-06-14 RX ORDER — LISINOPRIL 30 MG/1
30 TABLET ORAL DAILY
Qty: 30 TABLET | Refills: 1 | Status: SHIPPED | OUTPATIENT
Start: 2021-06-14 | End: 2021-08-05 | Stop reason: SDUPTHER

## 2021-06-14 RX ORDER — BACITRACIN ZINC AND POLYMYXIN B SULFATE 500; 1000 [USP'U]/G; [USP'U]/G
OINTMENT TOPICAL
Qty: 1 TUBE | Refills: 1 | Status: SHIPPED | OUTPATIENT
Start: 2021-06-14 | End: 2021-06-21

## 2021-06-14 RX ORDER — GABAPENTIN 600 MG/1
600 TABLET ORAL 3 TIMES DAILY
Qty: 90 TABLET | Refills: 0 | Status: SHIPPED | OUTPATIENT
Start: 2021-06-14 | End: 2021-06-30

## 2021-06-14 RX ORDER — ATORVASTATIN CALCIUM 10 MG/1
10 TABLET, FILM COATED ORAL DAILY
Qty: 30 TABLET | Refills: 3 | Status: SHIPPED | OUTPATIENT
Start: 2021-06-14 | End: 2021-09-08 | Stop reason: SDUPTHER

## 2021-06-14 RX ORDER — BUDESONIDE AND FORMOTEROL FUMARATE DIHYDRATE 80; 4.5 UG/1; UG/1
2 AEROSOL RESPIRATORY (INHALATION) 2 TIMES DAILY
Qty: 1 INHALER | Refills: 0 | Status: SHIPPED | OUTPATIENT
Start: 2021-06-14 | End: 2021-08-04

## 2021-06-14 SDOH — ECONOMIC STABILITY: HOUSING INSECURITY
IN THE LAST 12 MONTHS, WAS THERE A TIME WHEN YOU DID NOT HAVE A STEADY PLACE TO SLEEP OR SLEPT IN A SHELTER (INCLUDING NOW)?: NO

## 2021-06-14 SDOH — ECONOMIC STABILITY: INCOME INSECURITY: IN THE LAST 12 MONTHS, WAS THERE A TIME WHEN YOU WERE NOT ABLE TO PAY THE MORTGAGE OR RENT ON TIME?: NO

## 2021-06-14 SDOH — ECONOMIC STABILITY: TRANSPORTATION INSECURITY
IN THE PAST 12 MONTHS, HAS THE LACK OF TRANSPORTATION KEPT YOU FROM MEDICAL APPOINTMENTS OR FROM GETTING MEDICATIONS?: NO

## 2021-06-14 SDOH — ECONOMIC STABILITY: TRANSPORTATION INSECURITY
IN THE PAST 12 MONTHS, HAS LACK OF TRANSPORTATION KEPT YOU FROM MEETINGS, WORK, OR FROM GETTING THINGS NEEDED FOR DAILY LIVING?: NO

## 2021-06-14 SDOH — ECONOMIC STABILITY: FOOD INSECURITY: WITHIN THE PAST 12 MONTHS, YOU WORRIED THAT YOUR FOOD WOULD RUN OUT BEFORE YOU GOT MONEY TO BUY MORE.: NEVER TRUE

## 2021-06-14 SDOH — ECONOMIC STABILITY: FOOD INSECURITY: WITHIN THE PAST 12 MONTHS, THE FOOD YOU BOUGHT JUST DIDN'T LAST AND YOU DIDN'T HAVE MONEY TO GET MORE.: NEVER TRUE

## 2021-06-14 ASSESSMENT — SOCIAL DETERMINANTS OF HEALTH (SDOH): HOW HARD IS IT FOR YOU TO PAY FOR THE VERY BASICS LIKE FOOD, HOUSING, MEDICAL CARE, AND HEATING?: NOT HARD AT ALL
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
## 2021-06-15 LAB
CREATININE URINE: 71.6 MG/DL (ref 28–259)
MICROALBUMIN UR-MCNC: 1.3 MG/DL
MICROALBUMIN/CREAT UR-RTO: 18.2 MG/G (ref 0–30)

## 2021-06-22 ENCOUNTER — HOSPITAL ENCOUNTER (EMERGENCY)
Age: 46
Discharge: HOME | End: 2021-06-22
Payer: MEDICARE

## 2021-06-22 VITALS
DIASTOLIC BLOOD PRESSURE: 71 MMHG | OXYGEN SATURATION: 97 % | RESPIRATION RATE: 16 BRPM | TEMPERATURE: 98.5 F | SYSTOLIC BLOOD PRESSURE: 124 MMHG | HEIGHT: 63 IN | HEART RATE: 93 BPM | WEIGHT: 210 LBS | BODY MASS INDEX: 37.21 KG/M2
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
## 2021-06-22 PROCEDURE — 99284 EMERGENCY DEPT VISIT MOD MDM: CPT

## 2021-06-22 RX ORDER — PREDNISONE 20 MG/1
20 TABLET ORAL 2 TIMES DAILY
Qty: 10 TABLET | Refills: 0 | Status: SHIPPED | OUTPATIENT
Start: 2021-06-22 | End: 2021-06-27

## 2021-06-22 RX ORDER — METAXALONE 800 MG/1
800 TABLET ORAL 2 TIMES DAILY
Qty: 10 TABLET | Refills: 0 | Status: SHIPPED | OUTPATIENT
Start: 2021-06-22 | End: 2021-06-27

## 2021-06-22 ASSESSMENT — PAIN DESCRIPTION - LOCATION
LOCATION_2: SHOULDER
LOCATION: BACK

## 2021-06-22 ASSESSMENT — PAIN SCALES - GENERAL
PAINLEVEL_OUTOF10: 8
PAINLEVEL_OUTOF10: 9

## 2021-06-22 ASSESSMENT — PAIN DESCRIPTION - PAIN TYPE: TYPE: ACUTE PAIN

## 2021-06-22 ASSESSMENT — PAIN DESCRIPTION - INTENSITY: RATING_2: 8

## 2021-06-22 ASSESSMENT — PAIN DESCRIPTION - ORIENTATION: ORIENTATION_2: RIGHT
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
## 2021-06-22 NOTE — ED NOTES
Pt scripts x2 instructed to follow up with PCP. Assessed per Gianna MCINTOSH.      Roge Raymundo LPN  57/94/26 [id number]

## 2021-06-29 DIAGNOSIS — G62.9 NEUROPATHY: ICD-10-CM

## 2021-06-30 DIAGNOSIS — E11.9 TYPE 2 DIABETES MELLITUS WITHOUT COMPLICATION, WITHOUT LONG-TERM CURRENT USE OF INSULIN (HCC): ICD-10-CM

## 2021-06-30 RX ORDER — GABAPENTIN 600 MG/1
600 TABLET ORAL 3 TIMES DAILY
Qty: 90 TABLET | Refills: 0 | Status: SHIPPED | OUTPATIENT
Start: 2021-06-30 | End: 2021-08-09 | Stop reason: SDUPTHER

## 2021-07-06 ENCOUNTER — TELEPHONE (OUTPATIENT)
Dept: INTERNAL MEDICINE CLINIC | Age: 46
End: 2021-07-06

## 2021-07-06 NOTE — TELEPHONE ENCOUNTER
Patient thought that she was supposed to have a MRI done due to her memory lost last time she spoke with Dr Janell Oliveira, however she never heard anything else and has come to realize that the order was never placed? Is this something she should be having done if so can we get that ordered?

## 2021-07-06 NOTE — TELEPHONE ENCOUNTER
Disregard refill request this was sent to pharmacy on 6/30/21  Please advise on first part of message  Thank you

## 2021-07-06 NOTE — TELEPHONE ENCOUNTER
Patient would like to also have the following medication refilled:      gabapentin (NEURONTIN) 600 MG tablet TAKE 1 TABLET BY MOUTH 3 TIMES DAILY FOR 30       Please send to the pharmacy below        [address] [place] [address] [phone]

## 2021-07-23 NOTE — ED NOTES
Patient identified as a positive fall risk on the ED triage fall screening. Patient placed in fall precautions which includes:  yellow fall risk bracelet on wrist, yellow socks on feet, \"Be Safe\" sign placed on patient's door, and bed alarm placed under patient/alarm turned on. Patient instructed on importance of not getting out of bed or ambulating without assistance for safety.         Lindsay Tijerina RN  06/08/21 1221
numerical 0-10

## 2021-08-04 DIAGNOSIS — I10 HYPERTENSION, UNSPECIFIED TYPE: ICD-10-CM

## 2021-08-04 DIAGNOSIS — J44.1 COPD EXACERBATION (HCC): ICD-10-CM

## 2021-08-04 DIAGNOSIS — G62.9 NEUROPATHY: ICD-10-CM

## 2021-08-04 DIAGNOSIS — E11.9 TYPE 2 DIABETES MELLITUS WITHOUT COMPLICATION, WITHOUT LONG-TERM CURRENT USE OF INSULIN (HCC): ICD-10-CM

## 2021-08-04 DIAGNOSIS — J44.9 CHRONIC OBSTRUCTIVE PULMONARY DISEASE, UNSPECIFIED COPD TYPE (HCC): Chronic | ICD-10-CM

## 2021-08-04 NOTE — TELEPHONE ENCOUNTER
Patient no-showed to her appointment in July. Can we please reach out to reschedule. I will send once patient is scheduled.

## 2021-08-05 RX ORDER — LISINOPRIL 30 MG/1
30 TABLET ORAL DAILY
Qty: 30 TABLET | Refills: 0 | Status: SHIPPED | OUTPATIENT
Start: 2021-08-05 | End: 2021-09-08 | Stop reason: SDUPTHER

## 2021-08-05 RX ORDER — GABAPENTIN 600 MG/1
600 TABLET ORAL 3 TIMES DAILY
Qty: 21 TABLET | Refills: 0 | OUTPATIENT
Start: 2021-08-05 | End: 2021-09-04

## 2021-08-05 RX ORDER — DILTIAZEM HYDROCHLORIDE 60 MG/1
TABLET, FILM COATED ORAL
Qty: 10.2 INHALER | Refills: 0 | Status: SHIPPED | OUTPATIENT
Start: 2021-08-05 | End: 2021-09-08 | Stop reason: SDUPTHER

## 2021-08-05 NOTE — TELEPHONE ENCOUNTER
I will fill symbicort today and will fill gabapentin on Monday if patient comes to appointment. She has canceled a couple after making them prior.

## 2021-08-09 ENCOUNTER — OFFICE VISIT (OUTPATIENT)
Dept: INTERNAL MEDICINE CLINIC | Age: 46
End: 2021-08-09
Payer: MEDICARE

## 2021-08-09 ENCOUNTER — TELEPHONE (OUTPATIENT)
Dept: PRIMARY CARE CLINIC | Age: 46
End: 2021-08-09

## 2021-08-09 VITALS
HEIGHT: 63 IN | DIASTOLIC BLOOD PRESSURE: 74 MMHG | SYSTOLIC BLOOD PRESSURE: 124 MMHG | OXYGEN SATURATION: 93 % | BODY MASS INDEX: 39.69 KG/M2 | WEIGHT: 224 LBS | HEART RATE: 92 BPM | RESPIRATION RATE: 18 BRPM

## 2021-08-09 DIAGNOSIS — G62.9 NEUROPATHY: ICD-10-CM

## 2021-08-09 DIAGNOSIS — J44.1 COPD EXACERBATION (HCC): ICD-10-CM

## 2021-08-09 DIAGNOSIS — J96.12 CHRONIC RESPIRATORY FAILURE WITH HYPOXIA AND HYPERCAPNIA (HCC): Primary | ICD-10-CM

## 2021-08-09 DIAGNOSIS — J96.11 CHRONIC RESPIRATORY FAILURE WITH HYPOXIA AND HYPERCAPNIA (HCC): Primary | ICD-10-CM

## 2021-08-09 DIAGNOSIS — Z59.41 FOOD INSECURITY: ICD-10-CM

## 2021-08-09 PROCEDURE — G8926 SPIRO NO PERF OR DOC: HCPCS | Performed by: INTERNAL MEDICINE

## 2021-08-09 PROCEDURE — 4004F PT TOBACCO SCREEN RCVD TLK: CPT | Performed by: INTERNAL MEDICINE

## 2021-08-09 PROCEDURE — G8427 DOCREV CUR MEDS BY ELIG CLIN: HCPCS | Performed by: INTERNAL MEDICINE

## 2021-08-09 PROCEDURE — 3023F SPIROM DOC REV: CPT | Performed by: INTERNAL MEDICINE

## 2021-08-09 PROCEDURE — 99214 OFFICE O/P EST MOD 30 MIN: CPT | Performed by: INTERNAL MEDICINE

## 2021-08-09 PROCEDURE — G8417 CALC BMI ABV UP PARAM F/U: HCPCS | Performed by: INTERNAL MEDICINE

## 2021-08-09 RX ORDER — GABAPENTIN 600 MG/1
600 TABLET ORAL 3 TIMES DAILY
Qty: 90 TABLET | Refills: 0 | Status: SHIPPED | OUTPATIENT
Start: 2021-08-09 | End: 2021-09-01

## 2021-08-09 RX ORDER — ESCITALOPRAM OXALATE 5 MG/1
5 TABLET ORAL DAILY
Qty: 30 TABLET | Refills: 0 | Status: SHIPPED | OUTPATIENT
Start: 2021-08-09 | End: 2021-09-01

## 2021-08-09 SDOH — ECONOMIC STABILITY - FOOD INSECURITY: FOOD INSECURITY: Z59.41

## 2021-08-09 NOTE — PROGRESS NOTES
Cristhian Rincon (:  1975) is a 48 y.o. female,Established patient, here for evaluation of the following chief complaint(s):  Hypertension (Follow up symbicort and Gabapentin. Patient says she is doing well)      Vitals:    21 0932   BP: 124/74   Pulse: 92   Resp: 18   SpO2: 93%        ASSESSMENT/PLAN:  1. Chronic respiratory failure with hypoxia and hypercapnia on 4 L home O2  Patient out of o2. Sating 92% on no oxygen but appears short of breath. Placed on 2L in the office and went up to 97% and felt more comfortable. Patient has not seen pulm and has not scheduled her 6 min walk test.   - will try to re-order her o2 for her  - will schedule her 6 min walk test  - referral to pulm will schedule this for her. - continue Symbicort and albuterol  -     Nasal Cannula Oxygen; Future  -     [facility]  2. COPD  ([facility] [address]  See #1 above  -     Nasal Cannula Oxygen; Future  -     [facility]  3. Food insecurity  Patient notes difficulty with food currently. Given information in the office today. Will also have Social work call. Patient also to ask social work about how to go about getting disability started as she has no current income. -     FRANCISCO Campbell, Social Work, [facility]  4. Neuropathy  Continue bilateral lower extermity neuropathy. Improved on her gabapentin. Refill sent.   -     gabapentin (NEURONTIN) 600 MG tablet; Take 1 tablet by mouth 3 times daily for 30 days. , Disp-90 tablet, R-0Normal  5. Hx of seizures  Patient not on medication and still has not seen neurology. Will help schedule this for patient. Return in about 1 month (around 2021) for vv for copd .     SUBJECTIVE/OBJECTIVE:  HPI    Patient is a 38-year-old female with past medical history of type 2 diabetes well-controlled, COPD on oxygen, seizure disorder, hypertension and PAPITO who presents for follow-up for COPD and hypertension. Seizure disorder: Patient was thought to have had seizures during hospitalization. Patient never followed up with neurology after this hospitalization as recommended. Patient still has not followed up with neurology. Patient currently not on any medications. Patient has not had any seizures since last seen. COPD: Patient continues on Symbicort and albuterol as needed. Some days she requires her albuterol more than others. Patient has not followed up with pulmonology as recommended. Patient is she is out of her oxygen at home. She is also not completed walk test to determine how much oxygen she needs. DM: well controlled with hba1c of 5.6 on last labs. Mood: patient feeling very down and depressed. This is very situational with everything going on in her life currently. Review of Systems ROS negative except for those noted in the HPI above. Vitals:    08/09/21 0932   BP: 124/74   Pulse: 92   Resp: 18   SpO2: 93%         Physical Exam  Constitutional:       General: She is not in acute distress. Appearance: Normal appearance. She is not ill-appearing. HENT:      Head: Normocephalic and atraumatic. Right Ear: External ear normal.      Left Ear: External ear normal.      Nose: No congestion or rhinorrhea. Eyes:      General: No scleral icterus. Extraocular Movements: Extraocular movements intact. Conjunctiva/sclera: Conjunctivae normal.   Cardiovascular:      Rate and Rhythm: Normal rate and regular rhythm. Pulses: Normal pulses. Heart sounds: No murmur heard. No friction rub. No gallop. Pulmonary:      Effort: Pulmonary effort is normal. No respiratory distress. Breath sounds: Normal breath sounds. No wheezing, rhonchi or rales. Abdominal:      General: Bowel sounds are normal. There is no distension. Palpations: Abdomen is soft. There is no mass. Tenderness: There is no abdominal tenderness.  There is no guarding or rebound. Musculoskeletal:         General: No swelling. Cervical back: No muscular tenderness. Skin:     General: Skin is warm. Coloration: Skin is not jaundiced. Findings: No bruising, erythema or rash. Neurological:      General: No focal deficit present. Mental Status: She is alert and oriented to person, place, and time. Psychiatric:      Comments: Tearful         An electronic signature was used to authenticate this note.     --Li Cano, DO

## 2021-08-09 NOTE — TELEPHONE ENCOUNTER
SW Contact per referral for food instability. Patient reports she received resources from PCP practice and has no further needs at this time.

## 2021-08-09 NOTE — TELEPHONE ENCOUNTER
Hilda Sullivan,    She told me she also wanted to talk to you about how to go about disability. I am sorry my note wasn't done prior to you reaching out.

## 2021-08-12 DIAGNOSIS — G62.9 NEUROPATHY: ICD-10-CM

## 2021-08-13 RX ORDER — GABAPENTIN 600 MG/1
600 TABLET ORAL 3 TIMES DAILY
Qty: 90 TABLET | Refills: 0 | OUTPATIENT
Start: 2021-08-13 | End: 2021-09-12

## 2021-08-13 RX ORDER — ESCITALOPRAM OXALATE 5 MG/1
TABLET ORAL
Qty: 30 TABLET | Refills: 0 | OUTPATIENT
Start: 2021-08-13

## 2021-08-13 NOTE — TELEPHONE ENCOUNTER
Medication:   Requested Prescriptions     Pending Prescriptions Disp Refills    gabapentin (NEURONTIN) 600 MG tablet [Pharmacy Med Name: GABAPENTIN 600 MG TABLET] 90 tablet 0     Sig: TAKE 1 TABLET BY MOUTH 3 TIMES DAILY FOR 30 DAYS.     escitalopram (LEXAPRO) 5 MG tablet [Pharmacy Med Name: ESCITALOPRAM 5 MG TABLET] 30 tablet 0     Sig: TAKE 1 TABLET BY MOUTH EVERY DAY     Last Filled:    Last appt: 8/9/2021   Next appt: 9/8/2021    Last Lipid:   Lab Results   Component Value Date    CHOL 141 04/05/2021    TRIG 89 04/05/2021    HDL 50 04/05/2021    LDLCALC 73 04/05/2021

## 2021-08-28 DIAGNOSIS — G62.9 NEUROPATHY: ICD-10-CM

## 2021-09-01 RX ORDER — GABAPENTIN 600 MG/1
600 TABLET ORAL 3 TIMES DAILY
Qty: 90 TABLET | Refills: 0 | Status: SHIPPED | OUTPATIENT
Start: 2021-09-01 | End: 2021-09-08 | Stop reason: SDUPTHER

## 2021-09-01 RX ORDER — ESCITALOPRAM OXALATE 5 MG/1
TABLET ORAL
Qty: 30 TABLET | Refills: 0 | Status: SHIPPED | OUTPATIENT
Start: 2021-09-01 | End: 2021-09-08

## 2021-09-08 ENCOUNTER — VIRTUAL VISIT (OUTPATIENT)
Dept: INTERNAL MEDICINE CLINIC | Age: 46
End: 2021-09-08
Payer: MEDICARE

## 2021-09-08 DIAGNOSIS — G62.9 NEUROPATHY: ICD-10-CM

## 2021-09-08 DIAGNOSIS — J44.1 COPD EXACERBATION (HCC): ICD-10-CM

## 2021-09-08 DIAGNOSIS — E11.9 TYPE 2 DIABETES MELLITUS WITHOUT COMPLICATION, WITHOUT LONG-TERM CURRENT USE OF INSULIN (HCC): ICD-10-CM

## 2021-09-08 DIAGNOSIS — M79.89 LEG SWELLING: Primary | ICD-10-CM

## 2021-09-08 DIAGNOSIS — I10 HYPERTENSION, UNSPECIFIED TYPE: ICD-10-CM

## 2021-09-08 PROCEDURE — 3044F HG A1C LEVEL LT 7.0%: CPT | Performed by: INTERNAL MEDICINE

## 2021-09-08 PROCEDURE — 2022F DILAT RTA XM EVC RTNOPTHY: CPT | Performed by: INTERNAL MEDICINE

## 2021-09-08 PROCEDURE — G8427 DOCREV CUR MEDS BY ELIG CLIN: HCPCS | Performed by: INTERNAL MEDICINE

## 2021-09-08 PROCEDURE — 99214 OFFICE O/P EST MOD 30 MIN: CPT | Performed by: INTERNAL MEDICINE

## 2021-09-08 RX ORDER — ATORVASTATIN CALCIUM 10 MG/1
10 TABLET, FILM COATED ORAL DAILY
Qty: 30 TABLET | Refills: 2 | Status: SHIPPED | OUTPATIENT
Start: 2021-09-08 | End: 2021-11-10

## 2021-09-08 RX ORDER — ESCITALOPRAM OXALATE 5 MG/1
TABLET ORAL
Qty: 30 TABLET | Refills: 0 | Status: CANCELLED | OUTPATIENT
Start: 2021-09-08

## 2021-09-08 RX ORDER — BUDESONIDE AND FORMOTEROL FUMARATE DIHYDRATE 80; 4.5 UG/1; UG/1
AEROSOL RESPIRATORY (INHALATION)
Qty: 1 EACH | Refills: 2 | Status: SHIPPED | OUTPATIENT
Start: 2021-09-08 | End: 2021-09-16

## 2021-09-08 RX ORDER — ALBUTEROL SULFATE 90 UG/1
2 AEROSOL, METERED RESPIRATORY (INHALATION) EVERY 6 HOURS PRN
Qty: 1 EACH | Refills: 0 | Status: SHIPPED | OUTPATIENT
Start: 2021-09-08 | End: 2021-11-11

## 2021-09-08 RX ORDER — GLUCOSAMINE HCL/CHONDROITIN SU 500-400 MG
CAPSULE ORAL
Qty: 300 STRIP | Refills: 0 | Status: SHIPPED | OUTPATIENT
Start: 2021-09-08

## 2021-09-08 RX ORDER — ESCITALOPRAM OXALATE 10 MG/1
10 TABLET ORAL DAILY
Qty: 30 TABLET | Refills: 2 | Status: SHIPPED | OUTPATIENT
Start: 2021-09-08 | End: 2021-12-01 | Stop reason: SDUPTHER

## 2021-09-08 RX ORDER — LANCETS 30 GAUGE
1 EACH MISCELLANEOUS 4 TIMES DAILY
Qty: 300 EACH | Refills: 1 | Status: SHIPPED | OUTPATIENT
Start: 2021-09-08

## 2021-09-08 RX ORDER — LISINOPRIL 30 MG/1
30 TABLET ORAL DAILY
Qty: 30 TABLET | Refills: 2 | Status: SHIPPED | OUTPATIENT
Start: 2021-09-08 | End: 2021-11-10

## 2021-09-08 RX ORDER — BLOOD-GLUCOSE METER
1 KIT MISCELLANEOUS DAILY
Qty: 1 KIT | Refills: 0 | Status: SHIPPED | OUTPATIENT
Start: 2021-09-08

## 2021-09-08 RX ORDER — GABAPENTIN 600 MG/1
600 TABLET ORAL 3 TIMES DAILY
Qty: 90 TABLET | Refills: 0 | Status: SHIPPED | OUTPATIENT
Start: 2021-09-08 | End: 2021-11-10

## 2021-09-08 NOTE — PROGRESS NOTES
Maru Klein (:  1975) is a 48 y.o. female,Established patient, here for evaluation of the following chief complaint(s): 1 Month Follow-Up         ASSESSMENT/PLAN:  1. Leg swelling  Patient notes that leg swelling has overall improved. -Echo ordered will help patient set this up  2. COPD exacerbation ([facility] [address]  Patient has intermittent shortness of breath. She is wearing 4 L of oxygen at home. Patient still has not gotten her PFTs, or 6-minute walk test.  She still has also not seen pulmonology. Patient is unsure what inhaler she is using on a regular basis. -We will refill her Symbicort and her albuterol educated patient on how to use these inhalers.  -Call patient set up her PFTs and 6-minute walk test.  -Help patient set up her pulmonology appointment. -     budesonide-formoterol (SYMBICORT) 80-4.5 MCG/ACT AERO; TAKE 2 PUFFS BY MOUTH TWICE A DAY, Disp-1 each, R-2Normal  -     albuterol sulfate HFA (PROVENTIL HFA) 108 (90 Base) MCG/ACT inhaler; Inhale 2 puffs into the lungs every 6 hours as needed for Wheezing, Disp-1 each, R-0Normal  3. Hypertension, unspecified type  Hypertension has been well controlled patient not able to check her blood pressure at home. Continue lisinopril 30 mg  -     lisinopril (PRINIVIL;ZESTRIL) 30 MG tablet; Take 1 tablet by mouth daily, Disp-30 tablet, R-2Normal  4. Neuropathy  Controlled on her current gabapentin dose  -     gabapentin (NEURONTIN) 600 MG tablet; Take 1 tablet by mouth 3 times daily for 30 days. , Disp-90 tablet, R-0Normal  5. Type 2 diabetes mellitus without complication, without long-term current use of insulin ([facility] [address]  Patient not checking her blood sugars at home secondary to her meter not working. Reordered glucometer with all supplies. Continue current medications  -     metFORMIN (GLUCOPHAGE) 500 MG tablet; TAKE 1 TABLET BY MOUTH TWICE A DAY WITH MEALS, Disp-60 tablet, R-2Normal  -     atorvastatin (LIPITOR) 10 MG tablet;  Take 1 tablet by mouth daily, Disp-30 tablet, R-2Normal      No follow-ups on file. SUBJECTIVE/OBJECTIVE:  HPI    Patient is a 57-year-old female with past medical history of hepatitis C, type 2 diabetes, neuropathy, COPD, obstructive sleep apnea, hypertension, and history of IV drug abuse who presents via video secondary to follow-up for chronic diseases. It is of note that we have tried repeatedly to help patient set up appointments with neurology as well as appointments for a 6-minute walk test and echo. Patient has repeatedly not shown up for these appointments. Patient is unaware that she is no-show to these appointments. Patient notes that she is having some shortness of breath some days. She is using 4L of oxygen at all times at home. She notes her breathing is worse when she is out in the heat or up walking around. She notes that overall her neuropathy is better. Gabapentin is working well for her. Also notes that her mood is a lot better. lexapro is working. She feels that she was in a deep depression last month. She feels that things are a lot better this month. Diabetes: She is not testing her blood sugars . She notes her blood sugar monitor is not working anymore. Seizure : Has still not seen neurology despite recommendation over the last few months    She also notes that the swelling in her legs has improved. She has not gotten the echo that was ordered many months ago. Review of Systems ROS negative except for those noted in the HPI above.        Patient-Reported Vitals 9/8/2021   Patient-Reported Weight 210lb   Patient-Reported Height 5'4\"        Physical Exam    [INSTRUCTIONS:  \"[x]\" Indicates a positive item  \"[]\" Indicates a negative item  -- DELETE ALL ITEMS NOT EXAMINED]    Constitutional: [x] Appears well-developed and well-nourished [x] No apparent distress      [] Abnormal -     Mental status: [x] Alert and awake  [x] Oriented to person/place/time [x] Able to follow commands    [] Abnormal -     Eyes:   EOM    [x]  Normal    [] Abnormal -   Sclera  [x]  Normal    [] Abnormal -          Discharge [x]  None visible   [] Abnormal -     HENT: [x] Normocephalic, atraumatic  [] Abnormal -   [x] Mouth/Throat: Mucous membranes are moist    External Ears [x] Normal  [] Abnormal -    Neck: [x] No visualized mass [] Abnormal -     Pulmonary/Chest: [x] Respiratory effort normal   [x] No visualized signs of difficulty breathing or respiratory distress        [] Abnormal -      Musculoskeletal:   [] Normal gait with no signs of ataxia         [x] Normal range of motion of neck        [] Abnormal -     Neurological:        [x] No Facial Asymmetry (Cranial nerve 7 motor function) (limited exam due to video visit)          [x] No gaze palsy        [] Abnormal -          Skin:        [x] No significant exanthematous lesions or discoloration noted on facial skin         [] Abnormal -            Psychiatric:       [x] Normal Affect [] Abnormal -        [x] No Hallucinations    Other pertinent observable physical exam findings:-    Johnnie Funk, was evaluated through a synchronous (real-time) audio-video encounter. The patient (or guardian if applicable) is aware that this is a billable service. Verbal consent to proceed has been obtained within the past 12 months. The visit was conducted pursuant to the emergency declaration under the [address] [place] [address] authority and the "[employer] and [employer] General Act. Patient identification was verified, and a caregiver was present when appropriate. The patient was located in a state where the provider was credentialed to provide care. An electronic signature was used to authenticate this note.     --Hakeem Ramirez DO

## 2021-09-13 ENCOUNTER — TELEPHONE (OUTPATIENT)
Dept: ADMINISTRATIVE | Age: 46
End: 2021-09-13

## 2021-09-13 NOTE — TELEPHONE ENCOUNTER
Submitted PA for Budesonide-Formoterol Fumarate 80-4. 5MCG/ACT aerosol Key:  [id number] - Rx #: [id number] Via [facility] Status  DENIED. See denial letter attached. Please notify patient. Thank you.

## 2021-09-16 DIAGNOSIS — J44.9 CHRONIC OBSTRUCTIVE PULMONARY DISEASE, UNSPECIFIED COPD TYPE (HCC): Primary | ICD-10-CM

## 2021-09-30 RX ORDER — ESCITALOPRAM OXALATE 5 MG/1
TABLET ORAL
Qty: 30 TABLET | Refills: 0 | OUTPATIENT
Start: 2021-09-30

## 2021-11-08 DIAGNOSIS — I10 HYPERTENSION, UNSPECIFIED TYPE: ICD-10-CM

## 2021-11-08 DIAGNOSIS — G62.9 NEUROPATHY: ICD-10-CM

## 2021-11-08 DIAGNOSIS — E11.9 TYPE 2 DIABETES MELLITUS WITHOUT COMPLICATION, WITHOUT LONG-TERM CURRENT USE OF INSULIN (HCC): ICD-10-CM

## 2021-11-10 RX ORDER — LISINOPRIL 30 MG/1
TABLET ORAL
Qty: 30 TABLET | Refills: 0 | Status: SHIPPED | OUTPATIENT
Start: 2021-11-10 | End: 2021-12-01 | Stop reason: SDUPTHER

## 2021-11-10 RX ORDER — GABAPENTIN 600 MG/1
600 TABLET ORAL 3 TIMES DAILY
Qty: 90 TABLET | Refills: 0 | Status: SHIPPED | OUTPATIENT
Start: 2021-11-10 | End: 2021-12-01 | Stop reason: SDUPTHER

## 2021-11-10 RX ORDER — ATORVASTATIN CALCIUM 10 MG/1
TABLET, FILM COATED ORAL
Qty: 30 TABLET | Refills: 0 | Status: SHIPPED | OUTPATIENT
Start: 2021-11-10 | End: 2021-12-01 | Stop reason: SDUPTHER

## 2021-11-26 ENCOUNTER — APPOINTMENT (OUTPATIENT)
Dept: CT IMAGING | Age: 46
DRG: 812 | End: 2021-11-26
Payer: MEDICARE

## 2021-11-26 ENCOUNTER — HOSPITAL ENCOUNTER (INPATIENT)
Age: 46
LOS: 3 days | Discharge: LEFT BEFORE DISCHARGE | DRG: 812 | End: 2021-11-29
Attending: EMERGENCY MEDICINE | Admitting: INTERNAL MEDICINE
Payer: MEDICARE

## 2021-11-26 ENCOUNTER — APPOINTMENT (OUTPATIENT)
Dept: GENERAL RADIOLOGY | Age: 46
DRG: 812 | End: 2021-11-26
Payer: MEDICARE

## 2021-11-26 DIAGNOSIS — J96.02 ACUTE RESPIRATORY FAILURE WITH HYPERCAPNIA (HCC): Primary | ICD-10-CM

## 2021-11-26 PROBLEM — J96.22 ACUTE ON CHRONIC RESPIRATORY FAILURE WITH HYPOXIA AND HYPERCAPNIA (HCC): Status: ACTIVE | Noted: 2021-11-26

## 2021-11-26 PROBLEM — J96.21 ACUTE ON CHRONIC RESPIRATORY FAILURE WITH HYPOXIA AND HYPERCAPNIA (HCC): Status: ACTIVE | Noted: 2021-11-26

## 2021-11-26 LAB
A/G RATIO: 1.1 (ref 1.1–2.2)
ALBUMIN SERPL-MCNC: 4.2 G/DL (ref 3.4–5)
ALP BLD-CCNC: 78 U/L (ref 40–129)
ALT SERPL-CCNC: 23 U/L (ref 10–40)
AMPHETAMINE SCREEN, URINE: ABNORMAL
ANION GAP SERPL CALCULATED.3IONS-SCNC: 3 MMOL/L (ref 3–16)
ANISOCYTOSIS: ABNORMAL
AST SERPL-CCNC: 20 U/L (ref 15–37)
ATYPICAL LYMPHOCYTE RELATIVE PERCENT: 1 % (ref 0–6)
BANDED NEUTROPHILS RELATIVE PERCENT: 12 % (ref 0–7)
BARBITURATE SCREEN URINE: ABNORMAL
BASE EXCESS ARTERIAL: 14 (ref -3–3)
BASE EXCESS ARTERIAL: 6.5 MMOL/L (ref -3–3)
BASE EXCESS VENOUS: 8.6 MMOL/L (ref -3–3)
BASOPHILS ABSOLUTE: 0.1 K/UL (ref 0–0.2)
BASOPHILS RELATIVE PERCENT: 1 %
BENZODIAZEPINE SCREEN, URINE: ABNORMAL
BILIRUB SERPL-MCNC: 0.5 MG/DL (ref 0–1)
BILIRUBIN URINE: NEGATIVE
BLOOD, URINE: ABNORMAL
BUN BLDV-MCNC: 15 MG/DL (ref 7–20)
CALCIUM SERPL-MCNC: 8.9 MG/DL (ref 8.3–10.6)
CANNABINOID SCREEN URINE: ABNORMAL
CARBOXYHEMOGLOBIN ARTERIAL: 8.7 % (ref 0–1.5)
CARBOXYHEMOGLOBIN: 12.6 % (ref 0–1.5)
CHLORIDE BLD-SCNC: 94 MMOL/L (ref 99–110)
CLARITY: CLEAR
CO2: 39 MMOL/L (ref 21–32)
COCAINE METABOLITE SCREEN URINE: ABNORMAL
COLOR: YELLOW
CREAT SERPL-MCNC: 0.7 MG/DL (ref 0.6–1.1)
EOSINOPHILS ABSOLUTE: 0 K/UL (ref 0–0.6)
EOSINOPHILS RELATIVE PERCENT: 0 %
GFR AFRICAN AMERICAN: >60
GFR NON-AFRICAN AMERICAN: >60
GLUCOSE BLD-MCNC: 123 MG/DL (ref 70–99)
GLUCOSE BLD-MCNC: 131 MG/DL (ref 70–99)
GLUCOSE URINE: NEGATIVE MG/DL
HCG QUALITATIVE: NEGATIVE
HCO3 ARTERIAL: 37.8 MMOL/L (ref 21–29)
HCO3 ARTERIAL: 43.9 MMOL/L (ref 21–29)
HCO3 VENOUS: 43.9 MMOL/L (ref 23–29)
HCT VFR BLD CALC: 48.2 % (ref 36–48)
HEMOGLOBIN, ART, EXTENDED: 16.1 G/DL (ref 12–16)
HEMOGLOBIN: 15.4 G/DL (ref 12–16)
KETONES, URINE: NEGATIVE MG/DL
LACTIC ACID: 1.1 MMOL/L (ref 0.4–2)
LEUKOCYTE ESTERASE, URINE: NEGATIVE
LYMPHOCYTES ABSOLUTE: 1.4 K/UL (ref 1–5.1)
LYMPHOCYTES RELATIVE PERCENT: 12 %
Lab: ABNORMAL
MACROCYTES: ABNORMAL
MCH RBC QN AUTO: 27.8 PG (ref 26–34)
MCHC RBC AUTO-ENTMCNC: 32 G/DL (ref 31–36)
MCV RBC AUTO: 86.7 FL (ref 80–100)
METHADONE SCREEN, URINE: ABNORMAL
METHEMOGLOBIN ARTERIAL: 0.4 %
METHEMOGLOBIN VENOUS: 0.4 %
MICROCYTES: ABNORMAL
MICROSCOPIC EXAMINATION: YES
MONOCYTES ABSOLUTE: 0.3 K/UL (ref 0–1.3)
MONOCYTES RELATIVE PERCENT: 3 %
NEUTROPHILS ABSOLUTE: 8.7 K/UL (ref 1.7–7.7)
NEUTROPHILS RELATIVE PERCENT: 71 %
NITRITE, URINE: NEGATIVE
O2 SAT, ARTERIAL: 84 % (ref 93–100)
O2 SAT, ARTERIAL: 99 %
O2 SAT, VEN: 93 %
O2 THERAPY: ABNORMAL
O2 THERAPY: ABNORMAL
OPIATE SCREEN URINE: POSITIVE
OVALOCYTES: ABNORMAL
OXYCODONE URINE: ABNORMAL
PCO2 ARTERIAL: 161.5 MMHG (ref 35–45)
PCO2 ARTERIAL: 56 MM HG (ref 35–45)
PCO2, VEN: 133.6 MMHG (ref 40–50)
PDW BLD-RTO: 15.5 % (ref 12.4–15.4)
PERFORMED ON: ABNORMAL
PERFORMED ON: ABNORMAL
PH ARTERIAL: 7.05 (ref 7.35–7.45)
PH ARTERIAL: 7.44 (ref 7.35–7.45)
PH UA: 6
PH UA: 6 (ref 5–8)
PH VENOUS: 7.13 (ref 7.35–7.45)
PHENCYCLIDINE SCREEN URINE: ABNORMAL
PLATELET # BLD: 146 K/UL (ref 135–450)
PLATELET SLIDE REVIEW: ADEQUATE
PMV BLD AUTO: 9.1 FL (ref 5–10.5)
PO2 ARTERIAL: 313.1 MMHG (ref 75–108)
PO2 ARTERIAL: 48.6 MM HG (ref 75–108)
PO2, VEN: 66.4 MMHG (ref 25–40)
POC SAMPLE TYPE: ABNORMAL
POIKILOCYTES: ABNORMAL
POLYCHROMASIA: ABNORMAL
POTASSIUM SERPL-SCNC: 4.7 MMOL/L (ref 3.5–5.1)
PRO-BNP: 4281 PG/ML (ref 0–124)
PROCALCITONIN: 0.05 NG/ML (ref 0–0.15)
PROPOXYPHENE SCREEN: ABNORMAL
PROTEIN UA: 100 MG/DL
RAPID INFLUENZA  B AGN: NEGATIVE
RAPID INFLUENZA A AGN: NEGATIVE
RBC # BLD: 5.56 M/UL (ref 4–5.2)
RBC UA: ABNORMAL /HPF (ref 0–4)
SARS-COV-2, NAAT: NOT DETECTED
SLIDE REVIEW: ABNORMAL
SODIUM BLD-SCNC: 136 MMOL/L (ref 136–145)
SPECIFIC GRAVITY UA: 1.02 (ref 1–1.03)
TCO2 ARTERIAL: 40 MMOL/L
TCO2 ARTERIAL: 48.8 MMOL/L
TCO2 CALC VENOUS: 48 MMOL/L
TOTAL PROTEIN: 8 G/DL (ref 6.4–8.2)
TROPONIN: <0.01 NG/ML
URINE TYPE: ABNORMAL
UROBILINOGEN, URINE: 0.2 E.U./DL
WBC # BLD: 10.5 K/UL (ref 4–11)
WBC UA: ABNORMAL /HPF (ref 0–5)
YEAST: PRESENT /HPF

## 2021-11-26 PROCEDURE — 99285 EMERGENCY DEPT VISIT HI MDM: CPT

## 2021-11-26 PROCEDURE — 94640 AIRWAY INHALATION TREATMENT: CPT

## 2021-11-26 PROCEDURE — 84703 CHORIONIC GONADOTROPIN ASSAY: CPT

## 2021-11-26 PROCEDURE — 83605 ASSAY OF LACTIC ACID: CPT

## 2021-11-26 PROCEDURE — 83880 ASSAY OF NATRIURETIC PEPTIDE: CPT

## 2021-11-26 PROCEDURE — 6370000000 HC RX 637 (ALT 250 FOR IP): Performed by: EMERGENCY MEDICINE

## 2021-11-26 PROCEDURE — 6360000002 HC RX W HCPCS

## 2021-11-26 PROCEDURE — 83036 HEMOGLOBIN GLYCOSYLATED A1C: CPT

## 2021-11-26 PROCEDURE — 84484 ASSAY OF TROPONIN QUANT: CPT

## 2021-11-26 PROCEDURE — 96375 TX/PRO/DX INJ NEW DRUG ADDON: CPT

## 2021-11-26 PROCEDURE — 2580000003 HC RX 258: Performed by: EMERGENCY MEDICINE

## 2021-11-26 PROCEDURE — 2580000003 HC RX 258: Performed by: PHYSICIAN ASSISTANT

## 2021-11-26 PROCEDURE — 96374 THER/PROPH/DIAG INJ IV PUSH: CPT

## 2021-11-26 PROCEDURE — 80307 DRUG TEST PRSMV CHEM ANLYZR: CPT

## 2021-11-26 PROCEDURE — 87040 BLOOD CULTURE FOR BACTERIA: CPT

## 2021-11-26 PROCEDURE — 99291 CRITICAL CARE FIRST HOUR: CPT

## 2021-11-26 PROCEDURE — 5A1945Z RESPIRATORY VENTILATION, 24-96 CONSECUTIVE HOURS: ICD-10-PCS | Performed by: INTERNAL MEDICINE

## 2021-11-26 PROCEDURE — 84145 PROCALCITONIN (PCT): CPT

## 2021-11-26 PROCEDURE — 2000000000 HC ICU R&B

## 2021-11-26 PROCEDURE — 71045 X-RAY EXAM CHEST 1 VIEW: CPT

## 2021-11-26 PROCEDURE — 6360000002 HC RX W HCPCS: Performed by: INTERNAL MEDICINE

## 2021-11-26 PROCEDURE — 6370000000 HC RX 637 (ALT 250 FOR IP): Performed by: INTERNAL MEDICINE

## 2021-11-26 PROCEDURE — 82803 BLOOD GASES ANY COMBINATION: CPT

## 2021-11-26 PROCEDURE — 94660 CPAP INITIATION&MGMT: CPT

## 2021-11-26 PROCEDURE — 6360000002 HC RX W HCPCS: Performed by: EMERGENCY MEDICINE

## 2021-11-26 PROCEDURE — 6360000002 HC RX W HCPCS: Performed by: PHYSICIAN ASSISTANT

## 2021-11-26 PROCEDURE — 31500 INSERT EMERGENCY AIRWAY: CPT

## 2021-11-26 PROCEDURE — 81001 URINALYSIS AUTO W/SCOPE: CPT

## 2021-11-26 PROCEDURE — 36415 COLL VENOUS BLD VENIPUNCTURE: CPT

## 2021-11-26 PROCEDURE — 80053 COMPREHEN METABOLIC PANEL: CPT

## 2021-11-26 PROCEDURE — 87804 INFLUENZA ASSAY W/OPTIC: CPT

## 2021-11-26 PROCEDURE — 74174 CTA ABD&PLVS W/CONTRAST: CPT

## 2021-11-26 PROCEDURE — 6360000004 HC RX CONTRAST MEDICATION: Performed by: PHYSICIAN ASSISTANT

## 2021-11-26 PROCEDURE — 87635 SARS-COV-2 COVID-19 AMP PRB: CPT

## 2021-11-26 PROCEDURE — 85025 COMPLETE CBC W/AUTO DIFF WBC: CPT

## 2021-11-26 RX ORDER — MORPHINE SULFATE 4 MG/ML
4 INJECTION, SOLUTION INTRAMUSCULAR; INTRAVENOUS ONCE
Status: COMPLETED | OUTPATIENT
Start: 2021-11-26 | End: 2021-11-26

## 2021-11-26 RX ORDER — PROPOFOL 10 MG/ML
INJECTION, EMULSION INTRAVENOUS
Status: COMPLETED
Start: 2021-11-26 | End: 2021-11-26

## 2021-11-26 RX ORDER — DEXTROSE MONOHYDRATE 25 G/50ML
12.5 INJECTION, SOLUTION INTRAVENOUS PRN
Status: DISCONTINUED | OUTPATIENT
Start: 2021-11-26 | End: 2021-11-29 | Stop reason: HOSPADM

## 2021-11-26 RX ORDER — FUROSEMIDE 10 MG/ML
40 INJECTION INTRAMUSCULAR; INTRAVENOUS ONCE
Status: COMPLETED | OUTPATIENT
Start: 2021-11-26 | End: 2021-11-26

## 2021-11-26 RX ORDER — ACETAMINOPHEN 650 MG/1
650 SUPPOSITORY RECTAL EVERY 6 HOURS PRN
Status: DISCONTINUED | OUTPATIENT
Start: 2021-11-26 | End: 2021-11-29 | Stop reason: HOSPADM

## 2021-11-26 RX ORDER — IPRATROPIUM BROMIDE AND ALBUTEROL SULFATE 2.5; .5 MG/3ML; MG/3ML
3 SOLUTION RESPIRATORY (INHALATION) ONCE
Status: COMPLETED | OUTPATIENT
Start: 2021-11-26 | End: 2021-11-26

## 2021-11-26 RX ORDER — PROPOFOL 10 MG/ML
INJECTION, EMULSION INTRAVENOUS
Status: DISPENSED
Start: 2021-11-26 | End: 2021-11-27

## 2021-11-26 RX ORDER — SODIUM CHLORIDE 9 MG/ML
25 INJECTION, SOLUTION INTRAVENOUS PRN
Status: DISCONTINUED | OUTPATIENT
Start: 2021-11-26 | End: 2021-11-29 | Stop reason: HOSPADM

## 2021-11-26 RX ORDER — DEXTROSE MONOHYDRATE 50 MG/ML
100 INJECTION, SOLUTION INTRAVENOUS PRN
Status: DISCONTINUED | OUTPATIENT
Start: 2021-11-26 | End: 2021-11-29 | Stop reason: HOSPADM

## 2021-11-26 RX ORDER — IPRATROPIUM BROMIDE AND ALBUTEROL SULFATE 2.5; .5 MG/3ML; MG/3ML
1 SOLUTION RESPIRATORY (INHALATION) EVERY 4 HOURS
Status: DISCONTINUED | OUTPATIENT
Start: 2021-11-26 | End: 2021-11-27

## 2021-11-26 RX ORDER — SODIUM CHLORIDE 0.9 % (FLUSH) 0.9 %
5-40 SYRINGE (ML) INJECTION PRN
Status: DISCONTINUED | OUTPATIENT
Start: 2021-11-26 | End: 2021-11-29 | Stop reason: HOSPADM

## 2021-11-26 RX ORDER — BUDESONIDE 0.5 MG/2ML
0.5 INHALANT ORAL 2 TIMES DAILY
Status: DISCONTINUED | OUTPATIENT
Start: 2021-11-26 | End: 2021-11-29 | Stop reason: HOSPADM

## 2021-11-26 RX ORDER — ACETAMINOPHEN 325 MG/1
650 TABLET ORAL EVERY 6 HOURS PRN
Status: DISCONTINUED | OUTPATIENT
Start: 2021-11-26 | End: 2021-11-29 | Stop reason: HOSPADM

## 2021-11-26 RX ORDER — NICOTINE POLACRILEX 4 MG
15 LOZENGE BUCCAL PRN
Status: DISCONTINUED | OUTPATIENT
Start: 2021-11-26 | End: 2021-11-29 | Stop reason: HOSPADM

## 2021-11-26 RX ORDER — METHYLPREDNISOLONE SODIUM SUCCINATE 40 MG/ML
40 INJECTION, POWDER, LYOPHILIZED, FOR SOLUTION INTRAMUSCULAR; INTRAVENOUS ONCE
Status: COMPLETED | OUTPATIENT
Start: 2021-11-26 | End: 2021-11-26

## 2021-11-26 RX ORDER — 0.9 % SODIUM CHLORIDE 0.9 %
1000 INTRAVENOUS SOLUTION INTRAVENOUS ONCE
Status: COMPLETED | OUTPATIENT
Start: 2021-11-26 | End: 2021-11-26

## 2021-11-26 RX ORDER — ONDANSETRON 2 MG/ML
4 INJECTION INTRAMUSCULAR; INTRAVENOUS ONCE
Status: COMPLETED | OUTPATIENT
Start: 2021-11-26 | End: 2021-11-26

## 2021-11-26 RX ORDER — SODIUM CHLORIDE 0.9 % (FLUSH) 0.9 %
5-40 SYRINGE (ML) INJECTION EVERY 12 HOURS SCHEDULED
Status: DISCONTINUED | OUTPATIENT
Start: 2021-11-26 | End: 2021-11-29 | Stop reason: HOSPADM

## 2021-11-26 RX ORDER — POLYETHYLENE GLYCOL 3350 17 G/17G
17 POWDER, FOR SOLUTION ORAL DAILY PRN
Status: DISCONTINUED | OUTPATIENT
Start: 2021-11-26 | End: 2021-11-29 | Stop reason: HOSPADM

## 2021-11-26 RX ORDER — LISINOPRIL 20 MG/1
30 TABLET ORAL DAILY
Status: DISCONTINUED | OUTPATIENT
Start: 2021-11-27 | End: 2021-11-29 | Stop reason: HOSPADM

## 2021-11-26 RX ORDER — PROPOFOL 10 MG/ML
5-50 INJECTION, EMULSION INTRAVENOUS ONCE
Status: COMPLETED | OUTPATIENT
Start: 2021-11-26 | End: 2021-11-26

## 2021-11-26 RX ORDER — ONDANSETRON 4 MG/1
4 TABLET, ORALLY DISINTEGRATING ORAL EVERY 8 HOURS PRN
Status: DISCONTINUED | OUTPATIENT
Start: 2021-11-26 | End: 2021-11-29 | Stop reason: HOSPADM

## 2021-11-26 RX ORDER — METHYLPREDNISOLONE SODIUM SUCCINATE 40 MG/ML
40 INJECTION, POWDER, LYOPHILIZED, FOR SOLUTION INTRAMUSCULAR; INTRAVENOUS EVERY 8 HOURS
Status: DISCONTINUED | OUTPATIENT
Start: 2021-11-27 | End: 2021-11-27

## 2021-11-26 RX ORDER — LEVOFLOXACIN 5 MG/ML
750 INJECTION, SOLUTION INTRAVENOUS EVERY 24 HOURS
Status: DISCONTINUED | OUTPATIENT
Start: 2021-11-26 | End: 2021-11-29 | Stop reason: HOSPADM

## 2021-11-26 RX ORDER — ATORVASTATIN CALCIUM 10 MG/1
10 TABLET, FILM COATED ORAL DAILY
Status: DISCONTINUED | OUTPATIENT
Start: 2021-11-27 | End: 2021-11-29 | Stop reason: HOSPADM

## 2021-11-26 RX ORDER — ONDANSETRON 2 MG/ML
4 INJECTION INTRAMUSCULAR; INTRAVENOUS EVERY 6 HOURS PRN
Status: DISCONTINUED | OUTPATIENT
Start: 2021-11-26 | End: 2021-11-29 | Stop reason: HOSPADM

## 2021-11-26 RX ADMIN — IPRATROPIUM BROMIDE AND ALBUTEROL SULFATE 1 AMPULE: .5; 3 SOLUTION RESPIRATORY (INHALATION) at 23:20

## 2021-11-26 RX ADMIN — BUDESONIDE 500 MCG: 0.5 SUSPENSION RESPIRATORY (INHALATION) at 23:21

## 2021-11-26 RX ADMIN — MIDAZOLAM 2 MG/HR: 5 INJECTION INTRAMUSCULAR; INTRAVENOUS at 20:48

## 2021-11-26 RX ADMIN — SODIUM CHLORIDE 1000 ML: 9 INJECTION, SOLUTION INTRAVENOUS at 15:53

## 2021-11-26 RX ADMIN — ONDANSETRON 4 MG: 2 INJECTION INTRAMUSCULAR; INTRAVENOUS at 15:53

## 2021-11-26 RX ADMIN — IPRATROPIUM BROMIDE AND ALBUTEROL SULFATE 3 AMPULE: .5; 3 SOLUTION RESPIRATORY (INHALATION) at 17:15

## 2021-11-26 RX ADMIN — MORPHINE SULFATE 4 MG: 4 INJECTION INTRAVENOUS at 15:53

## 2021-11-26 RX ADMIN — METHYLPREDNISOLONE SODIUM SUCCINATE 40 MG: 40 INJECTION, POWDER, FOR SOLUTION INTRAMUSCULAR; INTRAVENOUS at 17:15

## 2021-11-26 RX ADMIN — IPRATROPIUM BROMIDE AND ALBUTEROL SULFATE 3 AMPULE: .5; 3 SOLUTION RESPIRATORY (INHALATION) at 19:37

## 2021-11-26 RX ADMIN — ENOXAPARIN SODIUM 30 MG: 30 INJECTION SUBCUTANEOUS at 22:13

## 2021-11-26 RX ADMIN — PROPOFOL 10 MCG/KG/MIN: 10 INJECTION, EMULSION INTRAVENOUS at 18:50

## 2021-11-26 RX ADMIN — IOPAMIDOL 75 ML: 755 INJECTION, SOLUTION INTRAVENOUS at 18:12

## 2021-11-26 RX ADMIN — FENTANYL CITRATE 50 MCG/HR: 50 INJECTION, SOLUTION INTRAMUSCULAR; INTRAVENOUS at 20:50

## 2021-11-26 RX ADMIN — FUROSEMIDE 40 MG: 10 INJECTION, SOLUTION INTRAMUSCULAR; INTRAVENOUS at 16:41

## 2021-11-26 RX ADMIN — LEVOFLOXACIN 750 MG: 5 INJECTION, SOLUTION INTRAVENOUS at 22:10

## 2021-11-26 ASSESSMENT — PULMONARY FUNCTION TESTS
PIF_VALUE: 47
PIF_VALUE: 45
PIF_VALUE: 37
PIF_VALUE: 38
PIF_VALUE: 32
PIF_VALUE: 33
PIF_VALUE: 36
PIF_VALUE: 33

## 2021-11-26 ASSESSMENT — PAIN DESCRIPTION - DESCRIPTORS: DESCRIPTORS: CONSTANT;SHARP;TIGHTNESS

## 2021-11-26 ASSESSMENT — PAIN SCALES - GENERAL
PAINLEVEL_OUTOF10: 10
PAINLEVEL_OUTOF10: 10

## 2021-11-26 ASSESSMENT — PAIN DESCRIPTION - ONSET: ONSET: GRADUAL

## 2021-11-26 ASSESSMENT — PAIN DESCRIPTION - PROGRESSION: CLINICAL_PROGRESSION: NOT CHANGED

## 2021-11-26 ASSESSMENT — PAIN DESCRIPTION - LOCATION: LOCATION: HEAD;NECK

## 2021-11-26 ASSESSMENT — PAIN DESCRIPTION - PAIN TYPE: TYPE: ACUTE PAIN

## 2021-11-26 ASSESSMENT — PAIN DESCRIPTION - FREQUENCY: FREQUENCY: CONTINUOUS

## 2021-11-26 NOTE — ED PROVIDER NOTES
[facility] Emergency Department    CHIEF COMPLAINT  Shortness of Breath (Per ems pt's O2 sat was mid [de-identified] on 3L NC, pt reports always being SOB due to COPD, pt reports neck stiffness/ pain with a headache starting yesterday. ), Neck Pain, Headache, and Cough (Productive wet cough starting 3 days ago)      SHARED SERVICE VISIT  I have seen and evaluated this patient with my supervising physician, Dr. Thomas Rice. HISTORY OF PRESENT ILLNESS  Yolis Johnson is a 48 y.o. female who presents to the ED complaining of several day history of worsening chest pain shortness of breath. Patient also complaining of headache and neck pain with back pain. Reports pressure-like sensation in all of these areas. Burning pain in back. Worse with deep breaths. She does wear home oxygen which she feels as if she had to increase recently. Reports feeling feveralthough has not taken her temperature. She reports cough. Occasionally productive. No hemoptysis. Patient denies abdominal pain. Has had some mild nausea. No vomiting. No diarrhea or constipation. She denies any leg pain or swelling. Vaccinated against COVID-19. She denies sick contacts. No recent travel, trauma or surgery. No other complaints, modifying factors or associated symptoms. Nursing notes reviewed.    Past Medical History:   Diagnosis Date    Acute cystitis with hematuria     Bacteremia 08/23/2017    staph aureus    CHF (congestive heart failure) (HCC)     Colonization status 7/26/12    DNA probe negative for MRSA    COPD (chronic obstructive [facility] disease) [facility] [address]     Diabetes mellitus ([facility] [address]     FOR ABOUT 4 YEARS    DM (diabetes mellitus) ([address]     Drug abuse, IV (HCC)     Hepatitis     Hepatitis C antibody positive in blood 08/24/2017    Hepatitis C AB positive     Heroin overdose ([facility] [address]     Hypertension     MRSA infection     LUNGS    Neuropathy     legs with pain    Other disorders of kidney and ureter     KIDNEY FAILURE, ACUTE    Pneumonia     Smoking history      Past Surgical History:   Procedure Laterality Date    BRONCHOSCOPY  12/21/2017    BAL    CYSTOSCOPY  1/5/15    cysto with left stent placement    CYSTOSCOPY  10/6/15    stent removal    CYSTOSCOPY  11/09/2019    left uretroscopy with stent placement    CYSTOSCOPY Left 11/13/2019    LEFT URETEROSCOPY WITH HOLMIUM LASER LITHOTRIPSY, STENT REMOVAL,  STONE EXTRACTION     CYSTOSCOPY INSERTION / REMOVAL STENT / STONE Left 11/9/2019    CYSTOSCOPY, URETEROSCOPY, STENT PLACEMENT performed by Piyush Fuller at [address] / REMOVAL [address] / STONE Left 11/13/2019    CYSTOSCOPY, LEFT URETEROSCOPY WITH HOLMIUM LASER LITHOTRIPSY, STENT REMOVAL,  STONE EXTRACTION performed by Mirian Cisneros MD at [address] [facility]      TUBAL LIGATION      URETER STENT PLACEMENT       Family History   Problem Relation Age of Onset    Heart Disease Mother     No Known Problems Father     Heart Disease Sister     No Known Problems Brother     No Known Problems Maternal Grandmother     No Known Problems Maternal Grandfather     No Known Problems Paternal Grandmother     No Known Problems Paternal Grandfather     No Known Problems Other      Social History     Socioeconomic History    Marital status:       Spouse name: Not on file    Number of children: 2    Years of education: Not on file    Highest education level: Not on file   Occupational History    Not on file   Tobacco Use    Smoking status: Current Every Day Smoker     Packs/day: 0.50     Years: 33.00     Pack years: 16.50     Types: E-Cigarettes    Smokeless tobacco: Never Used   Vaping Use    Vaping Use: Every day    Substances: Always   Substance and Sexual Activity    Alcohol use: No    Drug use: No     Comment: Heroin    Sexual activity: Not Currently     Partners: Male   Other Topics Concern    Not on file   Social History Narrative    ** Merged History Encounter **          Social Determinants of Health     Financial Resource Strain: Low Risk     Difficulty of Paying Living Expenses: Not hard at all   Food Insecurity: No Food Insecurity    Worried About Running Out of Food in the Last Year: Never true    Akil of Food in the Last Year: Never true   Transportation Needs: No Transportation Needs    Lack of Transportation (Medical): No    Lack of Transportation (Non-Medical): No   Physical Activity:     Days of Exercise per Week: Not on file    Minutes of Exercise per Session: Not on file   Stress:     Feeling of Stress : Not on file   Social Connections:     Frequency of Communication with Friends and Family: Not on file    Frequency of Social Gatherings with Friends and Family: Not on file    Attends Taoist Services: Not on file    Active Member of [address] [place] [address] or Organizations: Not on file    Attends Club or Organization Meetings: Not on file    Marital Status: Not on file   Intimate Partner Violence:     Fear of Current or Ex-Partner: Not on file    Emotionally Abused: Not on file    Physically Abused: Not on file    Sexually Abused: Not on file   Housing Stability: Unknown    Unable to Pay for Housing in the Last Year: No    Number of Places Lived in the Last Year: Not on file    Unstable Housing in the Last Year: No     Current Facility-Administered Medications   Medication Dose Route Frequency Provider Last Rate Last Admin    ipratropium-albuterol (DUONEB) nebulizer solution 3 ampule  3 ampule Inhalation Once Azeb Oropeza MD         Current Outpatient Medications   Medication Sig Dispense Refill    albuterol sulfate  (90 Base) MCG/ACT inhaler TAKE 2 PUFFS BY MOUTH EVERY 6 HOURS AS NEEDED FOR WHEEZE 6.7 each 0    gabapentin (NEURONTIN) 600 MG tablet TAKE 1 TABLET BY MOUTH 3 TIMES DAILY FOR 30 DAYS.  90 tablet 0    metFORMIN (GLUCOPHAGE) 500 MG tablet TAKE 1 TABLET BY MOUTH TWICE A DAY WITH MEALS 60 tablet 0    lisinopril (PRINIVIL;ZESTRIL) 30 MG tablet TAKE 1 TABLET BY MOUTH EVERY DAY 30 tablet 0    atorvastatin (LIPITOR) 10 MG tablet TAKE 1 TABLET BY MOUTH EVERY DAY 30 tablet 0    fluticasone-salmeterol (ADVAIR DISKUS) 250-50 MCG/DOSE AEPB Inhale 1 puff into the lungs every 12 hours 60 each 3    glucose monitoring (FREESTYLE FREEDOM) kit 1 kit by Does not apply route daily 1 kit 0    blood glucose monitor strips Test 4 times a day & as needed for symptoms of irregular blood glucose. Dispense sufficient amount for indicated testing frequency plus additional to accommodate PRN testing needs. 300 strip 0    Lancets MISC 1 each by Does not apply route 4 times daily 300 each 1    escitalopram (LEXAPRO) 10 MG tablet Take 1 tablet by mouth daily 30 tablet 2    [employer] Lancets Ultra-Thin 30G MISC USE AS DIRECTED TO TEST BLOOD SUGAR 100 each 11    glucose monitoring kit (FREESTYLE) monitoring kit 1 kit by Does not apply route daily E11.9 1 kit 0    ROBAFEN DM COUGH  MG/5ML syrup       OXYGEN Inhale 4 L into the lungs continuous  0     Allergies   Allergen Reactions    Pcn [Penicillins] Shortness Of Breath and Swelling    Aspirin Other (See Comments)     Cause GI upset. But takes 81 mg aspirin at home    Pcn [Penicillins]      swelling    Sulfamethoxazole-Trimethoprim Hives       REVIEW OF SYSTEMS  10 systems reviewed, pertinent positives per HPI otherwise noted to be negative    PHYSICAL EXAM  BP (!) 184/165   Pulse 97   Temp 97.7 °F (36.5 °C) (Oral)   Resp 15   Ht 5' 3\" (1.6 m)   Wt 225 lb (102.1 kg)   LMP 06/16/2021   SpO2 100%   BMI 39.86 kg/m²   GENERAL APPEARANCE: Awake and alert. Cooperative. No acute distress. HEAD: Normocephalic. Atraumatic. EYES: PERRL. EOM's grossly intact. ENT: Mucous membranes are moist.   NECK: Supple. No meningismus. HEART: RRR. No murmurs. No chest wall tenderness. LUNGS: Respirations unlabored. CTAB. Good air exchange.  Speaking comfortably in full sentences. Occasional expiratory wheezes. No obvious rhonchi or rales. ABDOMEN: Soft. Non-distended. Non-tender. No guarding or rebound. No midline pulsatile mass. EXTREMITIES: No peripheral edema. No unilateral calf pain, redness or swelling. Moves all extremities equally. All extremities neurovascularly intact. SKIN: Warm and dry. No acute rashes. NEUROLOGICAL: Alert and oriented. CN's 2-12 intact. No gross facial drooping. Strength 5/5, sensation intact. PSYCHIATRIC: Normal mood and affect. RADIOLOGY  XR CHEST PORTABLE    Result Date: 11/26/2021  EXAMINATION: ONE XRAY VIEW OF THE CHEST 11/26/2021 6:46 pm COMPARISON: 11/26/2021 HISTORY: ORDERING SYSTEM PROVIDED HISTORY: intubation, OG placement TECHNOLOGIST PROVIDED HISTORY: Reason for exam:->intubation, OG placement Reason for Exam: intubation, et placement, og placement Acuity: Acute Type of Exam: Subsequent/Follow-up FINDINGS: The heart is mildly enlarged. The pulmonary vessels are engorged centrally and less prominent. The lungs are hyperinflated and emphysematous. There are some hazy bibasilar opacities which are less prominent. There is an ET tube in place with the tip approximately 4 cm above the katelyn. There is a gastric tube extending into the distal stomach. No effusion or pneumothorax is seen     Status post ET tube and gastric tube placement in good position. Stable cardiomegaly with slowly resolving central pulmonary congestion.  Chronic obstructive lung changes with slowly resolving bibasilar atelectasis or infiltrates     XR CHEST PORTABLE    Result Date: 11/26/2021  EXAMINATION: ONE XRAY VIEW OF THE CHEST 11/26/2021 3:45 pm COMPARISON: 11/10/2019 HISTORY: ORDERING SYSTEM PROVIDED HISTORY: cp/sob TECHNOLOGIST PROVIDED HISTORY: Reason for exam:->cp/sob Reason for Exam: Shortness of Breath (Per ems pt's O2 sat was mid 80's on 3L NC, pt reports always being SOB due to COPD, pt reports neck stiffness/ pain with a headache starting yesterday. ) FINDINGS: There is bibasilar scarring. Calcified granulomatous disease is noted. The cardiac silhouette is within normal limits. There is no pneumothorax or pleural effusion. 1.  No acute abnormality. CTA CHEST ABDOMEN PELVIS W CONTRAST    Result Date: 11/26/2021  EXAMINATION: CTA OF THE CHEST, ABDOMEN AND PELVIS WITH CONTRAST 11/26/2021 6:13 pm: TECHNIQUE: CTA of the chest, abdomen and pelvis was performed after the administration of intravenous contrast.  Multiplanar reformatted images are provided for review. MIP images are provided for review. Dose modulation, iterative reconstruction, and/or weight based adjustment of the mA/kV was utilized to reduce the radiation dose to as low as reasonably achievable. COMPARISON: Chest x-ray 11/26/2021 HISTORY: ORDERING SYSTEM PROVIDED HISTORY: cp/back pain/sob TECHNOLOGIST PROVIDED HISTORY: Reason for exam:->cp/back pain/sob Decision Support Exception - unselect if not a suspected or confirmed emergency medical condition->Emergency Medical Condition (MA) Reason for Exam: CP, back pain, SOB. Neck pain, headache, Shortness of Breath (Per ems pt's O2 sat was mid 80's on 3L NC, pt reports always being SOB due to COPD, pt reports neck stiffness/ pain with a headache starting yesterday. ). Cough (Productive wet cough starting 3 days ago). Acuity: Acute Type of Exam: Initial FINDINGS: CTA CHEST: Thoracic aorta: Aorta is nonaneurysmal with mild scattered atherosclerotic disease. No dissection flap identified. Mediastinum: Cardiomegaly. Main pulmonary artery is enlarged suggesting pulmonary hypertension. No central pulmonary emboli. Few scattered subcentimeter in short axis dimension mediastinal hilar lymph nodes could be reactive. Lungs/Pleura: Left upper lung calcified granuloma. Subtle mosaic attenuation lungs noted. Could suggest areas of air trapping. Right middle lobe and lingular atelectasis and scarring noted.   Parenchymal bands right lower lobe and left lower lobe noted as well also suggestive of subsegmental atelectasis versus scarring. Mild bronchial wall thickening. Mild mucosal thickening and mucosal plugging identified involving the distal bronchi. Lungs otherwise clear. No pleural effusion or pneumothorax. Soft Tissues/Bones: No severe fracture. Degenerate changes thoracic spine. CTA ABDOMEN: Abdominal aorta/Branches: Moderate atherosclerotic disease. The mesenteric arteries and renal arteries appear patent. Organs: There multiple bilateral renal lesions and multiple hepatic lobe lesions greatest involving left hepatic lobe which can be seen with polycystic kidney disease. Left lower pole renal calculus. No hydronephrosis. No obstructive uropathy. Otherwise spleen, and pancreas unremarkable. Right adrenal nodule likely adrenal adenoma. Periaortic lymph node 1 cm short axis dimension. GI/Bowel: Retained stool. No bowel obstruction. Large amount retained material within the stomach. Peritoneum/Retroperitoneum: Periaortic lymph nodes identified 1 cm short axis dimension noted. No free fluid. Bones/Soft Tissues: CTA PELVIS: Aorta/Iliacs: Mild plaque. Otherwise patent. Other:   Mild bladder distention. Uterus unremarkable no suspicious adnexal mass. Bones/Soft Tissues: Degenerate change     Negative for aortic dissection or aneurysm. No central pulmonary emboli identified. Cardiomegaly with mild prominence of the main pulmonary artery. This can be seen with pulmonary arterial hypertension. No acute inflammatory process or bowel obstruction. Left renal calculi. Findings suggestive of polycystic kidney disease. . 1 cm periaortic lymph node in short axis dimension. Please correlate with laboratory testing. The level testing suggests this is benign 3 month follow-up CT or MRI could be considered. If there is other testing suggestive of lymphoproliferative disorder, biopsy or PET-CT may be considered.      ED COURSE  Patient received morphine and Zofran IV for pain and nausea, with good relief. Triage vitals stable. Patient was having episodes hypoxia which appeared to be associated with her falling asleep. Nursing staff reports that he dipped as low as the 60s although, up quickly when aroused. This appeared to have been occurring prior to her morphine administration. CBC is without leukocytosis or anemia. Differential resulted sometime later that does show a 12% bandemia. Will obtain lactic and blood cultures. CMP was unremarkable. Rapid Covid and flu negative. Troponin less than 0.01. BNP of approximately 4200. She was given 40 of Lasix IV. hCG negative. EKG without ischemic change. Stable portable chest x-ray. With increasing somnolence did obtain a VBG which shows a pH of 7.13 and PCO2 of 133. She was placed on BiPAP given continuous duo nebs as well as dose of Solu-Medrol. CTA chest abdomen pelvis without evidence to support patient significant shortness of breath at this time. After an hour patient showing worsening mental status and somnolence. It is at this point we decided to intubate which was done by myself without complication directly supervised by attending physi given additional DuoNeb's. Sedated with propofol.  colleen. Repeat chest x-ray showing successful intubation. Discussed case with hospital medicine regarding admission and orders placed. A discussion was had with [facility] prior to intubation regarding shortness of breath, ED findings and need to intubate and recommendations for admission. Risk management discussed and shared decision making had with patient and/or surrogate. CRITICAL CARE TIME  75 Minutes of critical care time spent not including separately billable procedures.     [facility]  Results for orders placed or performed during the hospital encounter of 11/26/21   COVID-19, Rapid    Specimen: Nasopharyngeal Swab   Result Value Ref Range    SARS-CoV-2, NAAT Not Detected Not Detected   Rapid influenza A/B antigens    Specimen: Nasopharyngeal   Result Value Ref Range    Rapid Influenza A Ag Negative Negative    Rapid Influenza B Ag Negative Negative   CBC auto differential   Result Value Ref Range    WBC 10.5 4.0 - 11.0 K/uL    RBC 5.56 (H) 4.00 - 5.20 M/uL    Hemoglobin 15.4 12.0 - 16.0 g/dL    Hematocrit 48.2 (H) 36.0 - 48.0 %    MCV 86.7 80.0 - 100.0 fL    MCH 27.8 26.0 - 34.0 pg    MCHC 32.0 31.0 - 36.0 g/dL    RDW 15.5 (H) 12.4 - 15.4 %    Platelets 416 560 - 895 K/uL    MPV 9.1 5.0 - 10.5 fL    PLATELET SLIDE REVIEW Adequate     SLIDE REVIEW see below     Neutrophils % 71.0 %    Lymphocytes % 12.0 %    Monocytes % 3.0 %    Eosinophils % 0.0 %    Basophils % 1.0 %    Neutrophils Absolute 8.7 (H) 1.7 - 7.7 K/uL    Lymphocytes Absolute 1.4 1.0 - 5.1 K/uL    Monocytes Absolute 0.3 0.0 - 1.3 K/uL    Eosinophils Absolute 0.0 0.0 - 0.6 K/uL    Basophils Absolute 0.1 0.0 - 0.2 K/uL    Bands Relative 12 (H) 0 - 7 %    Atypical Lymphocytes Relative 1 0 - 6 %    Anisocytosis 2+ (A)     Macrocytes Occasional (A)     Microcytes 1+ (A)     Polychromasia Occasional (A)     Poikilocytes Occasional (A)     Ovalocytes Occasional (A)    Comprehensive metabolic panel   Result Value Ref Range    Sodium 136 136 - 145 mmol/L    Potassium 4.7 3.5 - 5.1 mmol/L    Chloride 94 (L) 99 - 110 mmol/L    CO2 39 (H) 21 - 32 mmol/L    Anion Gap 3 3 - 16    Glucose 131 (H) 70 - 99 mg/dL    BUN 15 7 - 20 mg/dL    CREATININE 0.7 0.6 - 1.1 mg/dL    GFR Non-African American >60 >60    GFR African American >60 >60    Calcium 8.9 8.3 - 10.6 mg/dL    Total Protein 8.0 6.4 - 8.2 g/dL    Albumin 4.2 3.4 - 5.0 g/dL    Albumin/Globulin Ratio 1.1 1.1 - 2.2    Total Bilirubin 0.5 0.0 - 1.0 mg/dL    Alkaline Phosphatase 78 40 - 129 U/L    ALT 23 10 - 40 U/L    AST 20 15 - 37 U/L   Troponin   Result Value Ref Range    Troponin <0.01 <0.01 ng/mL   Brain Natriuretic Peptide   Result Value Ref Range    Pro-BNP 4,281 (H) 0 - 124 pg/mL Blood gas, venous   Result Value Ref Range    pH, Emeka 7.135 (LL) 7.350 - 7.450    pCO2, Emeka 133.6 (H) 40.0 - 50.0 mmHg    pO2, Emeka 66.4 (H) 25.0 - 40.0 mmHg    HCO3, Venous 43.9 (H) 23.0 - 29.0 mmol/L    Base Excess, Emeka 8.6 (H) -3.0 - 3.0 mmol/L    O2 Sat, Emeka 93 Not Established %    Carboxyhemoglobin 12.6 (H) 0.0 - 1.5 %    MetHgb, Emeka 0.4 <1.5 %    TC02 (Calc), Emeka 48 Not Established mmol/L    O2 Therapy Unknown    HCG Qualitative, Serum   Result Value Ref Range    hCG Qual Negative Detects HCG level >10 MIU/mL     I spoke with Prasad Ferro and Stephen haskins. We thoroughly discussed the history, physical exam, laboratory and imaging studies, as well as, emergency department course. Based upon that discussion, we've decided to admit Yolis Johnson to the hospital for further observation, evaluation, and treatment. Final Impression  1. Acute respiratory failure with hypercapnia (HCC)      Blood pressure (!) 184/165, pulse 97, temperature 97.7 °F (36.5 °C), temperature source Oral, resp. rate 15, height 5' 3\" (1.6 m), weight 225 lb (102.1 kg), last menstrual period 06/16/2021, SpO2 100 %, not currently breastfeeding. DISPOSITION  Patient was admitted to the hospital in poor condition.          Bishnu Louisma  11/26/21 1928

## 2021-11-26 NOTE — PROGRESS NOTES
11/26/21 1719   NIV Type   Equipment Type v60   Mode Bilevel   Mask Type Full face mask   Mask Size Medium   Settings/Measurements   IPAP 20 cmH20   CPAP/EPAP 8 cmH2O   Rate Ordered 16   Resp 14   FiO2  100 %   I Time/ I Time % 1 s   Vt Exhaled 213 mL  (MD informed and aware)   Minute Volume 4 Liters   Mask Leak (lpm) 0 lpm   Comfort Level Fair   Using Accessory Muscles No   SpO2 99   Breath Sounds   Right Upper Lobe Inspiratory Wheezes; Crackles   Right Middle Lobe Inspiratory Wheezes; Crackles   Right Lower Lobe Inspiratory Wheezes; Crackles   Left Upper Lobe Inspiratory Wheezes; Crackles   Left Lower Lobe Inspiratory Wheezes; Crackles   Patient Observation   Observations Duo HHN X3 given through bipap.     Alarm Settings   Alarms On Y   Press Low Alarm 6 cmH2O   High Pressure Alarm 30 cmH2O   Apnea (secs) 20 secs   Resp Rate Low Alarm 6   High Respiratory Rate 40 br/min

## 2021-11-26 NOTE — PROGRESS NOTES
11/26/21 1818   Vent Information   SpO2 98 %   Patient Transport   Time spent transporting 1-15   Transport ventillation type Noninvasive   Transport from ER   Transport destination CT scan   Transport destination CT scan   Transport destination ER   Emergency equipment included Yes   Spontaneous Breathing Trial (SBT) RT Doc   Pulse 109   Additional Respiratory  Assessments   Resp 12

## 2021-11-26 NOTE — ED PROVIDER NOTES
I independently performed a history and physical on Mitchell Sierra. All diagnostic, treatment, and disposition decisions were made by myself in conjunction with the advanced practice provider. I have participated in the medical decision making and directed the treatment plan and disposition of the patient. For further details of [facility] emergency department encounter, please see the advanced practice provider's documentation. CHIEF COMPLAINT  Chief Complaint   Patient presents with    Shortness of Breath     Per ems pt's O2 sat was mid 80's on 3L NC, pt reports always being SOB due to COPD, pt reports neck stiffness/ pain with a headache starting yesterday.  Neck Pain    Headache    Cough     Productive wet cough starting 3 days ago       Briefly, Mitchell Sierra is a 48 y.o. female  who presents to the ED complaining of shortness of breath. FOCUSED PHYSICAL EXAMINATION  BP (!) 109/59   Pulse 73   Temp 97.7 °F (36.5 °C) (Oral)   Resp 25   Ht 5' 3\" (1.6 m)   Wt 225 lb (102.1 kg)   LMP 06/16/2021   SpO2 100%   BMI 39.86 kg/m²      Focused physical examination:  General appearance: On BiPAP uncomfortable appearing  Skin:  Warm. Dry. Eye:  Extraocular movements intact. Ears, nose, mouth and throat:  Oral mucosa moist,  Neck:  Trachea midline. Heart: Tachycardic but regular  Perfusion:  intact  Respiratory: Increased work of breathing with very distant breath sounds and prolonged expiratory phase and minimal air movement  Abdominal:   Non distended. Nontender  Neurological:  Alert and oriented x 3. Moves all extremities spontaneously  Musculoskeletal:   Normal ROM, no deformities. No edema          Psychiatric:  Normal mood    MDM: Patient presents to the emergency department today complaining of shortness of breath. Increased work of breathing with very distant breath sounds. Differential diagnosis would include pneumonia, COVID-19 infection, COPD exacerbation, CHF. packet 17 g (has no administration in time range)   acetaminophen (TYLENOL) tablet 650 mg (has no administration in time range)     Or   acetaminophen (TYLENOL) suppository 650 mg (has no administration in time range)   insulin lispro (HUMALOG) injection vial 0-6 Units (has no administration in time range)   midazolam (VERSED) 100 mg in dextrose 5 % 100 mL infusion (2 mg/hr IntraVENous New Bag 11/26/21 2048)   fentaNYL (SUBLIMAZE) 1,000 mcg in sodium chloride 0.9 % 100 mL infusion (50 mcg/hr IntraVENous New Bag 11/26/21 2050)   propofol 1000 MG/100ML injection (has no administration in time range)   ipratropium-albuterol (DUONEB) nebulizer solution 1 ampule (has no administration in time range)   methylPREDNISolone sodium (SOLU-MEDROL) injection 40 mg (has no administration in time range)   budesonide (PULMICORT) nebulizer suspension 500 mcg (has no administration in time range)   levoFLOXacin (LEVAQUIN) 750 MG/150ML infusion 750 mg (has no administration in time range)   atorvastatin (LIPITOR) tablet 10 mg (has no administration in time range)   lisinopril (PRINIVIL;ZESTRIL) tablet 30 mg (has no administration in time range)   morphine sulfate (PF) injection 4 mg (4 mg IntraVENous Given 11/26/21 1553)   ondansetron (ZOFRAN) injection 4 mg (4 mg IntraVENous Given 11/26/21 1553)   0.9 % sodium chloride bolus (0 mLs IntraVENous Stopped 11/26/21 1615)   furosemide (LASIX) injection 40 mg (40 mg IntraVENous Given 11/26/21 1641)   ipratropium-albuterol (DUONEB) nebulizer solution 3 ampule (3 ampules Inhalation Given 11/26/21 1715)   methylPREDNISolone sodium (SOLU-MEDROL) injection 40 mg (40 mg IntraVENous Given 11/26/21 1715)   iopamidol (ISOVUE-370) 76 % injection 75 mL (75 mLs IntraVENous Given 11/26/21 1812)   propofol injection (20 mcg/kg/min × 102.1 kg IntraVENous Rate/Dose Change 11/26/21 1904)   ipratropium-albuterol (DUONEB) nebulizer solution 3 ampule (3 ampules Inhalation Given 11/26/21 1937)        CLINICAL IMPRESSION  1. Acute respiratory failure with hypercapnia ([facility] [address]        DISPOSITION  Admission      This chart was created using Dragon dictation software. Efforts were made by me to ensure accuracy, however some errors may be present due to limitations of this technology.             Azeb Oropeza MD  11/26/21 2100

## 2021-11-27 LAB
ANION GAP SERPL CALCULATED.3IONS-SCNC: 11 MMOL/L (ref 3–16)
BUN BLDV-MCNC: 17 MG/DL (ref 7–20)
CALCIUM SERPL-MCNC: 9.1 MG/DL (ref 8.3–10.6)
CHLORIDE BLD-SCNC: 93 MMOL/L (ref 99–110)
CO2: 33 MMOL/L (ref 21–32)
CREAT SERPL-MCNC: 0.7 MG/DL (ref 0.6–1.1)
GFR AFRICAN AMERICAN: >60
GFR NON-AFRICAN AMERICAN: >60
GLUCOSE BLD-MCNC: 124 MG/DL (ref 70–99)
GLUCOSE BLD-MCNC: 144 MG/DL (ref 70–99)
GLUCOSE BLD-MCNC: 145 MG/DL (ref 70–99)
GLUCOSE BLD-MCNC: 148 MG/DL (ref 70–99)
GLUCOSE BLD-MCNC: 156 MG/DL (ref 70–99)
GLUCOSE BLD-MCNC: 161 MG/DL (ref 70–99)
GLUCOSE BLD-MCNC: 164 MG/DL (ref 70–99)
HCT VFR BLD CALC: 46.9 % (ref 36–48)
HEMOGLOBIN: 14.9 G/DL (ref 12–16)
MCH RBC QN AUTO: 27.4 PG (ref 26–34)
MCHC RBC AUTO-ENTMCNC: 31.9 G/DL (ref 31–36)
MCV RBC AUTO: 85.9 FL (ref 80–100)
PDW BLD-RTO: 15.3 % (ref 12.4–15.4)
PERFORMED ON: ABNORMAL
PLATELET # BLD: 134 K/UL (ref 135–450)
PMV BLD AUTO: 9.3 FL (ref 5–10.5)
POTASSIUM REFLEX MAGNESIUM: 4 MMOL/L (ref 3.5–5.1)
RBC # BLD: 5.46 M/UL (ref 4–5.2)
SODIUM BLD-SCNC: 137 MMOL/L (ref 136–145)
WBC # BLD: 11.7 K/UL (ref 4–11)

## 2021-11-27 PROCEDURE — 94002 VENT MGMT INPAT INIT DAY: CPT

## 2021-11-27 PROCEDURE — 2000000000 HC ICU R&B

## 2021-11-27 PROCEDURE — 6360000002 HC RX W HCPCS: Performed by: INTERNAL MEDICINE

## 2021-11-27 PROCEDURE — 94761 N-INVAS EAR/PLS OXIMETRY MLT: CPT

## 2021-11-27 PROCEDURE — 2500000003 HC RX 250 WO HCPCS: Performed by: INTERNAL MEDICINE

## 2021-11-27 PROCEDURE — 80048 BASIC METABOLIC PNL TOTAL CA: CPT

## 2021-11-27 PROCEDURE — 36415 COLL VENOUS BLD VENIPUNCTURE: CPT

## 2021-11-27 PROCEDURE — 6370000000 HC RX 637 (ALT 250 FOR IP): Performed by: INTERNAL MEDICINE

## 2021-11-27 PROCEDURE — 99291 CRITICAL CARE FIRST HOUR: CPT | Performed by: INTERNAL MEDICINE

## 2021-11-27 PROCEDURE — 85027 COMPLETE CBC AUTOMATED: CPT

## 2021-11-27 PROCEDURE — 94640 AIRWAY INHALATION TREATMENT: CPT

## 2021-11-27 PROCEDURE — 2580000003 HC RX 258: Performed by: EMERGENCY MEDICINE

## 2021-11-27 PROCEDURE — 6360000002 HC RX W HCPCS: Performed by: EMERGENCY MEDICINE

## 2021-11-27 PROCEDURE — 2700000000 HC OXYGEN THERAPY PER DAY

## 2021-11-27 PROCEDURE — 2580000003 HC RX 258: Performed by: INTERNAL MEDICINE

## 2021-11-27 RX ORDER — IPRATROPIUM BROMIDE AND ALBUTEROL SULFATE 2.5; .5 MG/3ML; MG/3ML
1 SOLUTION RESPIRATORY (INHALATION) EVERY 4 HOURS
Status: DISCONTINUED | OUTPATIENT
Start: 2021-11-27 | End: 2021-11-29 | Stop reason: HOSPADM

## 2021-11-27 RX ORDER — MIDAZOLAM HYDROCHLORIDE 1 MG/ML
2 INJECTION INTRAMUSCULAR; INTRAVENOUS
Status: DISCONTINUED | OUTPATIENT
Start: 2021-11-27 | End: 2021-11-29 | Stop reason: HOSPADM

## 2021-11-27 RX ORDER — METHYLPREDNISOLONE SODIUM SUCCINATE 40 MG/ML
40 INJECTION, POWDER, LYOPHILIZED, FOR SOLUTION INTRAMUSCULAR; INTRAVENOUS DAILY
Status: DISCONTINUED | OUTPATIENT
Start: 2021-11-28 | End: 2021-11-28

## 2021-11-27 RX ORDER — ETOMIDATE 2 MG/ML
10 INJECTION INTRAVENOUS ONCE
Status: COMPLETED | OUTPATIENT
Start: 2021-11-27 | End: 2021-11-27

## 2021-11-27 RX ORDER — PROPOFOL 10 MG/ML
5-50 INJECTION, EMULSION INTRAVENOUS
Status: DISCONTINUED | OUTPATIENT
Start: 2021-11-27 | End: 2021-11-29

## 2021-11-27 RX ADMIN — IPRATROPIUM BROMIDE AND ALBUTEROL SULFATE 1 AMPULE: .5; 3 SOLUTION RESPIRATORY (INHALATION) at 12:16

## 2021-11-27 RX ADMIN — PROPOFOL 50 MCG/KG/MIN: 10 INJECTION, EMULSION INTRAVENOUS at 07:01

## 2021-11-27 RX ADMIN — INSULIN LISPRO 1 UNITS: 100 INJECTION, SOLUTION INTRAVENOUS; SUBCUTANEOUS at 12:40

## 2021-11-27 RX ADMIN — IPRATROPIUM BROMIDE AND ALBUTEROL SULFATE 1 AMPULE: .5; 3 SOLUTION RESPIRATORY (INHALATION) at 04:01

## 2021-11-27 RX ADMIN — IPRATROPIUM BROMIDE AND ALBUTEROL SULFATE 1 AMPULE: .5; 3 SOLUTION RESPIRATORY (INHALATION) at 08:32

## 2021-11-27 RX ADMIN — LISINOPRIL 30 MG: 20 TABLET ORAL at 08:46

## 2021-11-27 RX ADMIN — INSULIN LISPRO 1 UNITS: 100 INJECTION, SOLUTION INTRAVENOUS; SUBCUTANEOUS at 00:48

## 2021-11-27 RX ADMIN — ATORVASTATIN CALCIUM 10 MG: 10 TABLET, FILM COATED ORAL at 08:45

## 2021-11-27 RX ADMIN — Medication 10 ML: at 08:47

## 2021-11-27 RX ADMIN — LEVOFLOXACIN 750 MG: 5 INJECTION, SOLUTION INTRAVENOUS at 20:50

## 2021-11-27 RX ADMIN — METHYLPREDNISOLONE SODIUM SUCCINATE 40 MG: 40 INJECTION, POWDER, FOR SOLUTION INTRAMUSCULAR; INTRAVENOUS at 00:53

## 2021-11-27 RX ADMIN — PROPOFOL 50 MCG/KG/MIN: 10 INJECTION, EMULSION INTRAVENOUS at 14:07

## 2021-11-27 RX ADMIN — PROPOFOL 40 MCG/KG/MIN: 10 INJECTION, EMULSION INTRAVENOUS at 03:24

## 2021-11-27 RX ADMIN — PROPOFOL 50 MCG/KG/MIN: 10 INJECTION, EMULSION INTRAVENOUS at 17:20

## 2021-11-27 RX ADMIN — BUDESONIDE 500 MCG: 0.5 SUSPENSION RESPIRATORY (INHALATION) at 08:33

## 2021-11-27 RX ADMIN — FAMOTIDINE 20 MG: 10 INJECTION, SOLUTION INTRAVENOUS at 12:36

## 2021-11-27 RX ADMIN — ENOXAPARIN SODIUM 30 MG: 30 INJECTION SUBCUTANEOUS at 08:45

## 2021-11-27 RX ADMIN — PROPOFOL 15 MCG/KG/MIN: 10 INJECTION, EMULSION INTRAVENOUS at 02:36

## 2021-11-27 RX ADMIN — IPRATROPIUM BROMIDE AND ALBUTEROL SULFATE 1 AMPULE: .5; 3 SOLUTION RESPIRATORY (INHALATION) at 15:45

## 2021-11-27 RX ADMIN — FENTANYL CITRATE 200 MCG/HR: 50 INJECTION, SOLUTION INTRAMUSCULAR; INTRAVENOUS at 08:32

## 2021-11-27 RX ADMIN — INSULIN LISPRO 1 UNITS: 100 INJECTION, SOLUTION INTRAVENOUS; SUBCUTANEOUS at 03:47

## 2021-11-27 RX ADMIN — PROPOFOL 50 MCG/KG/MIN: 10 INJECTION, EMULSION INTRAVENOUS at 10:46

## 2021-11-27 RX ADMIN — ETOMIDATE 10 MG: 20 INJECTION, SOLUTION INTRAVENOUS at 02:46

## 2021-11-27 RX ADMIN — MIDAZOLAM 2 MG: 1 INJECTION INTRAMUSCULAR; INTRAVENOUS at 10:48

## 2021-11-27 RX ADMIN — MIDAZOLAM 2 MG: 1 INJECTION INTRAMUSCULAR; INTRAVENOUS at 20:29

## 2021-11-27 RX ADMIN — FENTANYL CITRATE 200 MCG/HR: 50 INJECTION, SOLUTION INTRAMUSCULAR; INTRAVENOUS at 13:36

## 2021-11-27 RX ADMIN — FAMOTIDINE 20 MG: 10 INJECTION, SOLUTION INTRAVENOUS at 20:23

## 2021-11-27 RX ADMIN — INSULIN LISPRO 1 UNITS: 100 INJECTION, SOLUTION INTRAVENOUS; SUBCUTANEOUS at 16:36

## 2021-11-27 RX ADMIN — FENTANYL CITRATE 200 MCG/HR: 50 INJECTION, SOLUTION INTRAMUSCULAR; INTRAVENOUS at 18:16

## 2021-11-27 RX ADMIN — FENTANYL CITRATE 200 MCG/HR: 50 INJECTION, SOLUTION INTRAMUSCULAR; INTRAVENOUS at 03:12

## 2021-11-27 RX ADMIN — IPRATROPIUM BROMIDE AND ALBUTEROL SULFATE 1 AMPULE: .5; 3 SOLUTION RESPIRATORY (INHALATION) at 19:51

## 2021-11-27 RX ADMIN — PROPOFOL 50 MCG/KG/MIN: 10 INJECTION, EMULSION INTRAVENOUS at 20:25

## 2021-11-27 RX ADMIN — METHYLPREDNISOLONE SODIUM SUCCINATE 40 MG: 40 INJECTION, POWDER, FOR SOLUTION INTRAMUSCULAR; INTRAVENOUS at 08:45

## 2021-11-27 RX ADMIN — MIDAZOLAM 2 MG: 1 INJECTION INTRAMUSCULAR; INTRAVENOUS at 16:42

## 2021-11-27 RX ADMIN — Medication 10 ML: at 20:24

## 2021-11-27 RX ADMIN — MIDAZOLAM 2 MG: 1 INJECTION INTRAMUSCULAR; INTRAVENOUS at 05:08

## 2021-11-27 RX ADMIN — ENOXAPARIN SODIUM 30 MG: 30 INJECTION SUBCUTANEOUS at 20:23

## 2021-11-27 RX ADMIN — INSULIN LISPRO 1 UNITS: 100 INJECTION, SOLUTION INTRAVENOUS; SUBCUTANEOUS at 08:38

## 2021-11-27 RX ADMIN — MIDAZOLAM 2 MG: 1 INJECTION INTRAMUSCULAR; INTRAVENOUS at 07:24

## 2021-11-27 ASSESSMENT — PULMONARY FUNCTION TESTS
PIF_VALUE: 30
PIF_VALUE: 30
PIF_VALUE: 31
PIF_VALUE: 30
PIF_VALUE: 29
PIF_VALUE: 38
PIF_VALUE: 29
PIF_VALUE: 29
PIF_VALUE: 28
PIF_VALUE: 29
PIF_VALUE: 27
PIF_VALUE: 31
PIF_VALUE: 34
PIF_VALUE: 41
PIF_VALUE: 29
PIF_VALUE: 31
PIF_VALUE: 51
PIF_VALUE: 31
PIF_VALUE: 29
PIF_VALUE: 29
PIF_VALUE: 45
PIF_VALUE: 30
PIF_VALUE: 29
PIF_VALUE: 29
PIF_VALUE: 30
PIF_VALUE: 30
PIF_VALUE: 29

## 2021-11-27 ASSESSMENT — PAIN SCALES - GENERAL: PAINLEVEL_OUTOF10: 0

## 2021-11-27 NOTE — PROGRESS NOTES
11/27/21 0830   Vent Information   Skin Assessment Clean, dry, & intact   Vent Type 980   Vent Mode AC/VC   Vt Ordered 500 mL   Rate Set 25 bmp   Peak Flow 70 L/min   Pressure Support 0 cmH20   FiO2  40 %   SpO2 93 %   SpO2/FiO2 ratio 232.5   Sensitivity 3   PEEP/CPAP 5   Humidification Source HME   Vent Patient Data   Peak Inspiratory Pressure 29 cmH2O   Mean Airway Pressure 13 cmH20   Rate Measured 26 br/min   Vt Exhaled 511 mL   Minute Volume 12.9 Liters   I:E Ratio 1:2.10   Spontaneous Breathing Trial (SBT) RT Doc   Pulse 110   Additional Respiratory  Assessments   Resp 25   Position Semi-Hazel's   Alarm Settings   High Pressure Alarm 51 cmH2O   Low Minute Volume Alarm 2 L/min   High Respiratory Rate 40 br/min   Low Exhaled Vt  200 mL   Patient Observation   Observations ambu @ bedside   ETT (adult)   Placement Date/Time: 11/26/21 1835   Preoxygenation: Yes  Technique: Video laryngoscopy  Tube Size: 4 mm  Blade Size: 4  Location: Oral  Insertion attempts: 1  Placement Verified By[de-identified] Colorimetric EtCO2 device  Secured at: 25 cm  Placed By: Licensed p. ..    Secured at 25 cm   Measured From Lips   ET Placement Left   Secured By Commercial tube lugo   Site Condition Dry   Cuff Pressure 30 cm H2O

## 2021-11-27 NOTE — PROGRESS NOTES
Chronic kidney 352 his creatinine is 1.7 palliative care is not new. Lateral chest      Hospitalist Progress Note      PCP: Leelee Levine DO    Date of Admission: 11/26/2021    Chief Complaint: Shortness of breath    Hospital Course:   48 y.o. female who presented to [facility] with shortness of breath  Patient is a 40-year-old female with history of severe pulmonary hypertension, COPD on 4 L home oxygen, PAPITO on nocturnal BiPAP, diabetes mellitus type 2, tobacco abuse, Hx of opiate abuse, CHF with diastolic dysfunction, hepatitis C presented to the ED via EMS for shortness of breath. Per EMS patient's O2 sats was in the mid 80s on 3 L nasal cannula which is her baseline requirement. Patient reported mildly productive cough that started about 3 days ago with subjective fevers and worsening shortness of breath. She also reported a headache and neck stiffness to the ED staff. By the time I evaluated the patient she was already intubated, and on sedation. Patient with history of COPD and multiple intubations in the past, has been noncompliant with BiPAP in the past as well. Patient was found to be in acute respiratory acidosis on arrival, was trialed on BiPAP but failed. She was then intubated and placed on mechanical ventilation.     Subjective: On ventilator      Medications:  Reviewed    Infusion Medications    propofol 50 mcg/kg/min (11/27/21 7601)    dextrose      sodium chloride      fentaNYL (SUBLIMAZE) infusion 200 mcg/hr (11/27/21 5839)     Scheduled Medications    ipratropium-albuterol  1 ampule Inhalation Q4H    sodium chloride flush  5-40 mL IntraVENous 2 times per day    enoxaparin  30 mg SubCUTAneous BID    methylPREDNISolone  40 mg IntraVENous Q8H    budesonide  0.5 mg Nebulization BID    levofloxacin  750 mg IntraVENous Q24H    atorvastatin  10 mg Oral Daily    lisinopril  30 mg Oral Daily    insulin lispro  0-6 Units SubCUTAneous Q4H     PRN Meds: midazolam, glucose, dextrose, seen 11/26/2021    BACTERIA 2+ 01/06/2021    RBCUA 3-4 11/26/2021    BLOODU TRACE-INTACT 11/26/2021    SPECGRAV 1.025 11/26/2021    GLUCOSEU Negative 11/26/2021    GLUCOSEU NEGATIVE 01/02/2012       Radiology:  XR CHEST PORTABLE   Final Result   Status post ET tube and gastric tube placement in good position. Stable cardiomegaly with slowly resolving central pulmonary congestion. Chronic obstructive lung changes with slowly resolving bibasilar atelectasis   or infiltrates         CTA CHEST ABDOMEN PELVIS W CONTRAST   Final Result   Negative for aortic dissection or aneurysm. No central pulmonary emboli identified. Cardiomegaly with mild prominence of the main pulmonary artery. This can be   seen with pulmonary arterial hypertension. No acute inflammatory process or bowel obstruction. Left renal calculi. Findings suggestive of polycystic kidney disease. .      1 cm periaortic lymph node in short axis dimension. Please correlate with   laboratory testing. The level testing suggests this is benign 3 month   follow-up CT or MRI could be considered. If there is other testing   suggestive of lymphoproliferative disorder, biopsy or PET-CT may be   considered. XR CHEST PORTABLE   Final Result   1. No acute abnormality. Assessment/Plan:    Active Hospital Problems    Diagnosis     COPD exacerbation ([facility] [address] [J44.1]      Priority: High    Acute on chronic respiratory failure with hypoxia and hypercapnia (HCC) [J96.21, J96.22]     PAPITO (obstructive sleep apnea) [G47.33]     Chronic respiratory failure with hypoxia and hypercapnia on 4 L home O2 [J96.11, J96.12]     Type 2 diabetes mellitus without complication, without long-term current use of insulin (HCC) [E11.9]     Smoker [F17.200]     HTN (hypertension) [I10]      1.   Admitted with acute on chronic respiratory failure with hypoxia and hypercarbia likely COPD  Exacerbation, CT of the chest negative for pulmonary embolism. Continue with IV Solu-Medrol.,  Levaquin. Pulmonary critical care consulted vent management per intensivist.  2.  Obstructive sleep apnea on nocturnal BiPAP at home. 3.  Diabetes mellitus type 2 new medication on hold continue sliding scale hemoglobin A1c 5.6 on 4/5/2021 check hemoglobin A1c.  4.  Hypertension continue with home medication. 5.  Hyperlipidemia on statin. 6.  History of polysubstance abuse urine drug screen positive for opiates.       DVT Prophylaxis: Lovenox subcu  Diet: Diet NPO  Code Status: Full Code    PT/OT Eval Status:     Dispo -remains in ICU    Julia Bob MD

## 2021-11-27 NOTE — PROGRESS NOTES
11/26/21 2041   Vent Information   Vent Type 980   Vent Mode AC/VC   Vt Ordered 500 mL   Rate Set 25 bmp   Peak Flow 70 L/min   Pressure Support 0 cmH20   FiO2  60 %   SpO2 100 %   SpO2/FiO2 ratio 166.67   Sensitivity 3   PEEP/CPAP 5   Vent Patient Data   Peak Inspiratory Pressure 37 cmH2O   Mean Airway Pressure 14 cmH20   Rate Measured 25 br/min   Vt Exhaled 469 mL   Minute Volume 12.7 Liters   I:E Ratio 1:2.10   Spontaneous Breathing Trial (SBT) RT Doc   Pulse 73   Additional Respiratory  Assessments   Resp 25   End Tidal CO2 48 (%)   Alarm Settings   High Pressure Alarm 51 cmH2O   Low Minute Volume Alarm 2 L/min   Apnea (secs) 20 secs   High Respiratory Rate 40 br/min   Low Exhaled Vt  200 mL   ETT (adult)   Placement Date/Time: 11/26/21 1835   Preoxygenation: Yes  Technique: Video laryngoscopy  Tube Size: 4 mm  Blade Size: 4  Location: Oral  Insertion attempts: 1  Placement Verified By[de-identified] Colorimetric EtCO2 device  Secured at: 25 cm  Placed By: Licensed p. ..    Secured at 25 cm   Measured From [address] [place] [address] 600 By Commercial tube lugo   Site Condition Dry

## 2021-11-27 NOTE — PROGRESS NOTES
Admitted to room 235 from ED. Non-compliant on ventilator despite bolus of propofol per ED provider. Propofol infusing though order discontinued; versed and fentanyl ordered but had yet to be received from pharmacy in ED until handoff. Gtts started and increased per RASS/CPOT with concomitant weaning of propofol. Bilateral restraints were noted on patient. Left in place given agitation and attempts to remove devices. Order placed. Initially patient was not cleaned with CHG wipes, placed in new gown, etc., as her agitation and ventilatory non-compliance yielded a need for reduced interventions and manipulations at that time. However, these interventions were made later on.     UA and UDS sent upon arrival which resulted as positive for opiates. No opiate medications noted in home meds list available to this RN. Prior ABG had reflected a severe respiratory acidosis even in the setting of known COPD requiring home O2, thus RR had been increased to 25/min. Repeat ABG much improved with correction of pH and reduction in pCO2 to upper 50's, a likely tolerable level for someone of this particular respiratory history. Oxygenation appropriate on SpO2 therefore ventilator adjusted further to FiO2 of 40% and PEEP of 5 cm H20. HR remains SR/ST with no elevation beyond 100's. BP markedly increased, likely as result of agitation. Patient otherwise stable at this time. No family with patient in ED, therefore no updates made.      Vitals:    11/27/21 0000   BP: (!) 181/92   Pulse: 98   Resp: 25   Temp: 98.6 °F (37 °C)   SpO2: 98%

## 2021-11-27 NOTE — PROGRESS NOTES
This note also relates to the following rows which could not be included:  SpO2 - Cannot attach notes to unvalidated device data  Pulse - Cannot attach notes to unvalidated device data  Resp - Cannot attach notes to unvalidated device data       11/27/21 1546   Breath Sounds   Right Upper Lobe Expiratory Wheezes; Inspiratory Wheezes   Right Middle Lobe Expiratory Wheezes; Inspiratory Wheezes   Right Lower Lobe Expiratory Wheezes; Inspiratory Wheezes   Left Upper Lobe Expiratory Wheezes; Inspiratory Wheezes   Left Lower Lobe Expiratory Wheezes; Inspiratory Wheezes   Alarm Settings   High Pressure Alarm 40 cmH2O   Low Minute Volume Alarm 2 L/min   High Respiratory Rate 40 br/min   Low Exhaled Vt  200 mL   ETT (adult)   Placement Date/Time: 11/26/21 1835   Preoxygenation: Yes  Technique: Video laryngoscopy  Tube Size: 4 mm  Blade Size: 4  Location: Oral  Insertion attempts: 1  Placement Verified By[de-identified] Colorimetric EtCO2 device  Secured at: 25 cm  Placed By: Licensed p. .Eugene    Secured at 24 cm   Measured From [facility] [address] By Commercial tube lugo

## 2021-11-27 NOTE — ED NOTES
235 @ 4982
Dr Chloe Turpin at bedside elected to intubate to to pt gcs deteriorating . 18:32: Gains/conine/RT at bedside  18:32: Pt preoxygenated via bipap, spo2 100%  18:35: induction medications atomidate: 20mg/succ:120mg pushed via Neftali RN to healthy IV site of left Ac  18:35: Thao at head of bed   18:36: pt remove from bipap and being bagged   18:38: et 7.5, 25 at the teeth, bilateral lungs sounds heard throughout.       [facility]ila [facility]  11/26/21 4205
Dr Geri Ambrosio at bedside for [facility], RN  11/26/21 2021 \[facility]\""  11/26/21 1902
Per pt, wears 4L home O2     Christiano Lebron, RN  11/26/21 8940
Pt started to pull at tube dr Yvonne Burnett notified bolus of 50mcg administered pt responded approprietly      Sameera Anderson, RN  11/26/21 1919
RT at bedside placing Bipap. Purewick placed.       Alisa Cormier RN  11/26/21 0717
RT notified of need for BiPap     Li Burgess RN  11/26/21 4635
Report received at this time, pt is on bipap o2: 99%, pt is to be taken to ct as soon as breathing treatment started. Dr. Elma Carballo notified.      Anika [facility]  11/26/21 1742
States normal O2 sat at home is between 87-88%     Sindhu Ricketts RN  11/26/21 8829
ZORAIDA critical results given to Radha Winn RN  11/26/21 2003
5

## 2021-11-27 NOTE — PROGRESS NOTES
11/26/21 2330   Vent Information   Vent Type 980   Vent Mode AC/VC   Vt Ordered 500 mL   Rate Set 25 bmp   Peak Flow 70 L/min   Pressure Support 0 cmH20   FiO2  40 %   SpO2 100 %   SpO2/FiO2 ratio 250   Sensitivity 3   PEEP/CPAP 5   Humidification Source HME   Vent Patient Data   Peak Inspiratory Pressure 47 cmH2O   Mean Airway Pressure 17 cmH20   Rate Measured 26 br/min   Vt Exhaled 495 mL   Minute Volume 12.9 Liters   I:E Ratio 1:2.10   Cough/Sputum   Sputum How Obtained Endotracheal   Cough Productive   Sputum Amount Moderate   Sputum Color Creamy   Tenacity Thick   Spontaneous Breathing Trial (SBT) RT Doc   Pulse 89   Breath Sounds   Right Upper Lobe Rhonchi   Right Middle Lobe Rhonchi   Right Lower Lobe Diminished   Left Upper Lobe Diminished   Left Lower Lobe Diminished   Additional Respiratory  Assessments   Resp 15   Position Semi-Hazel's   Cuff Pressure (cm H2O) 28 cm H2O   Alarm Settings   High Pressure Alarm 51 cmH2O   Low Minute Volume Alarm 2 L/min   High Respiratory Rate 40 br/min   Low Exhaled Vt  200 mL   ETT (adult)   Placement Date/Time: 11/26/21 1835   Preoxygenation: Yes  Technique: Video laryngoscopy  Tube Size: 4 mm  Blade Size: 4  Location: Oral  Insertion attempts: 1  Placement Verified By[de-identified] Colorimetric EtCO2 device  Secured at: 25 cm  Placed By: Licensed p. ..    Secured at 25 cm   Measured From Lips   ET Placement Right   Secured By Commercial tube lugo   Site Condition Dry

## 2021-11-27 NOTE — PROGRESS NOTES
4 Eyes Skin Assessment     NAME:  Tyson Soulier OF BIRTH:  1975  MEDICAL RECORD NUMBER:  [id number]    The patient is being assess for  Admission    I agree that 2 RN's have performed a thorough Head to Toe Skin Assessment on the patient. ALL assessment sites listed below have been assessed. Areas assessed by both nurses:    Head, Face, Ears, Shoulders, Back, Chest, Arms, Elbows, Hands, Sacrum. Buttock, Coccyx, Ischium and Legs. Feet and Heels        Does the Patient have a Wound? No noted wound(s)     Only areas of integumentary concern are related to scattered bruises and abrasions, all minor in severity.         Andres Prevention initiated:  NA   Wound Care Orders initiated:  NA    Pressure Injury (Stage 3,4, Unstageable, DTI, NWPT, and Complex wounds) if present place consult order under [de-identified] NA    New and Established Ostomies if present place consult order under : NA      Nurse 1 eSignature: Electronically signed by Thierry Curran RN on 11/26/21 at 10:57 PM EST    **SHARE this note so that the co-signing nurse is able to place an eSignature**    Nurse 2 eSignature: Electronically signed by Kristen Middleton RN on 11/27/21 at 3:43 AM EST

## 2021-11-27 NOTE — PROGRESS NOTES
Given PRN Versed d/t chewing on ETT and bucking vent at times. Will continue to monitor.  Wei Patterson RN

## 2021-11-27 NOTE — PLAN OF CARE
Problem: Nutrition  Goal: Optimal nutrition therapy  Outcome: Ongoing  Note: Nutrition Problem #1: Inadequate protein-energy intake  Intervention: Food and/or Nutrient Delivery: Continue NPO (Monitor ability to advance diet vs need for TF)  Nutritional Goals: Pt will tolerate most appropriate form of nutrition this admission

## 2021-11-27 NOTE — CONSULTS
Pulmonary Consult    Patient's PCP: Dread Dos Santos DO  Referred by: Rabia Vieyra MD for respiratory failure     HISTORY OF PRESENT ILLNESS:    This is a  48 y.o. female with PMH o f recurrent hospitalizations presented to the hospital with SOB  She is known to have COPD, pulmonary hypertension, PAPITO not compliant with BiPAP and drug use. She is currently sedated on vent support. History was obtained from chart review. Pt had fever and increased cough with SOB. EMS found her sats in 80s on 3 liters. In the ED she was placed on BiPAP 1st but she became obtunded with worsening blood gas therefore she was intubated and sent to the ICU.       Past Medical / Surgical History:    Past Medical History:   Diagnosis Date    Acute cystitis with hematuria     Bacteremia 08/23/2017    staph aureus    [facility]estive [facility] failure) [facility]     Colonization status 7/26/12    DNA probe negative for MRSA    COPD (chronic obstructive pulmonary disease) ([facility]     Diabetes mellitus ([facility]     FOR ABOUT 4 YEARS    DM (diabetes mellitus) ([address]     Drug abuse, IV ([address]     Hepatitis     Hepatitis C antibody positive in blood 08/24/2017    Hepatitis C AB positive     Heroin overdose ([address]     Hypertension     MRSA infection     LUNGS    Neuropathy     legs with pain    Other disorders of kidney and ureter     KIDNEY FAILURE, ACUTE    Pneumonia     Smoking history      Past Surgical History:   Procedure Laterality Date    BRONCHOSCOPY  12/21/2017    BAL    CYSTOSCOPY  1/5/15    cysto with left stent placement    CYSTOSCOPY  10/6/15    stent removal    CYSTOSCOPY  11/09/2019    left uretroscopy with stent placement    CYSTOSCOPY Left 11/13/2019    LEFT URETEROSCOPY WITH HOLMIUM LASER LITHOTRIPSY, STENT REMOVAL,  STONE EXTRACTION     CYSTOSCOPY INSERTION / REMOVAL STENT / STONE Left 11/9/2019    CYSTOSCOPY, URETEROSCOPY, STENT PLACEMENT performed by Emerita Vallejo at [facility] INSERTION / REMOVAL STENT / STONE Left 11/13/2019    CYSTOSCOPY, LEFT URETEROSCOPY WITH HOLMIUM LASER LITHOTRIPSY, STENT REMOVAL,  STONE EXTRACTION performed by Prema Rubio MD at [facility]       Prior to Admission:    No current facility-administered medications on file prior to encounter. Current Outpatient Medications on File Prior to Encounter   Medication Sig Dispense Refill    albuterol sulfate  (90 Base) MCG/ACT inhaler TAKE 2 PUFFS BY MOUTH EVERY 6 HOURS AS NEEDED FOR WHEEZE 6.7 each 0    gabapentin (NEURONTIN) 600 MG tablet TAKE 1 TABLET BY MOUTH 3 TIMES DAILY FOR 30 DAYS. 90 tablet 0    metFORMIN (GLUCOPHAGE) 500 MG tablet TAKE 1 TABLET BY MOUTH TWICE A DAY WITH MEALS 60 tablet 0    lisinopril (PRINIVIL;ZESTRIL) 30 MG tablet TAKE 1 TABLET BY MOUTH EVERY DAY 30 tablet 0    atorvastatin (LIPITOR) 10 MG tablet TAKE 1 TABLET BY MOUTH EVERY DAY 30 tablet 0    fluticasone-salmeterol (ADVAIR DISKUS) 250-50 MCG/DOSE AEPB Inhale 1 puff into the lungs every 12 hours 60 each 3    glucose monitoring (FREESTYLE FREEDOM) kit 1 kit by Does not apply route daily 1 kit 0    blood glucose monitor strips Test 4 times a day & as needed for symptoms of irregular blood glucose. Dispense sufficient amount for indicated testing frequency plus additional to accommodate PRN testing needs.  300 strip 0    Lancets MISC 1 each by Does not apply route 4 times daily 300 each 1    escitalopram (LEXAPRO) 10 MG tablet Take 1 tablet by mouth daily 30 tablet 2    [employer] Lancets Ultra-Thin 30G MISC USE AS DIRECTED TO TEST BLOOD SUGAR 100 each 11    glucose monitoring kit (FREESTYLE) monitoring kit 1 kit by Does not apply route daily E11.9 1 kit 0    ROBAFEN DM COUGH  MG/5ML syrup       OXYGEN Inhale 4 L into the lungs continuous  0       Allergies:  Pcn [penicillins], Aspirin, Pcn [penicillins], and Sulfamethoxazole-trimethoprim    Social History:   TOBACCO:   reports that she has been smoking e-cigarettes. She has a 16.50 pack-year smoking history. She has never used smokeless tobacco.     ETOH:   reports no history of alcohol use. Family History:       Problem Relation Age of Onset    Heart Disease Mother     No Known Problems Father     Heart Disease Sister     No Known Problems Brother     No Known Problems Maternal Grandmother     No Known Problems Maternal Grandfather     No Known Problems Paternal Grandmother     No Known Problems Paternal Grandfather     No Known Problems Other          Review of Systems   Unable to perform ROS: Intubated       PHYSICAL EXAM:  BP (!) 176/89   Pulse 109   Temp 99 °F (37.2 °C) (Bladder)   Resp 25   Ht 5' 3\" (1.6 m)   Wt 226 lb 3.1 oz (102.6 kg)   LMP 06/16/2021   SpO2 94%   BMI 40.07 kg/m²     Physical Exam  Vitals reviewed. Constitutional:       Appearance: She is well-developed. Comments: sedated   HENT:      Head: Normocephalic and atraumatic. Eyes:      Pupils: Pupils are equal, round, and reactive to light. Neck:      Vascular: No JVD. Cardiovascular:      Rate and Rhythm: Normal rate and regular rhythm. Heart sounds: No murmur heard. Pulmonary:      Breath sounds: No stridor. No wheezing or rales. Comments: On vent support  Diminished air entry bilaterally  Abdominal:      General: Bowel sounds are normal.      Palpations: Abdomen is soft. Musculoskeletal:         General: No deformity. Cervical back: Neck supple. Comments: Trace bilateral edema    Skin:     General: Skin is warm and dry. Neurological:      General: No focal deficit present.          LABS:  Recent Labs     11/26/21  1518 11/27/21  0417   WBC 10.5 11.7*   HGB 15.4 14.9   HCT 48.2* 46.9    134*                                                                  Recent Labs     11/26/21  1518 11/27/21  0417    137   K 4.7 4.0   CL 94* 93*   CO2 39* 33*   BUN 15 17   CREATININE 0.7 0.7   GLUCOSE 131* 161*     Recent Labs     11/26/21  1518   AST 20   ALT 23   BILITOT 0.5   ALKPHOS 78     Recent Labs     11/26/21  1518   TROPONINI <0.01     No results for input(s): BNP in the last 72 hours. Lab Results   Component Value Date    PHART 7.438 11/26/2021    TEM9GXZ 56.0 11/26/2021    PO2ART 48.6 11/26/2021     No results for input(s): INR in the last 72 hours. Recent Labs     11/26/21  2100   COLORU Yellow   PHUR 6.0  6.0   WBCUA None seen   RBCUA 3-4   YEAST Present*   CLARITYU Clear   SPECGRAV 1.025   LEUKOCYTESUR Negative   UROBILINOGEN 0.2   BILIRUBINUR Negative   BLOODU TRACE-INTACT*   GLUCOSEU Negative        DATA:  CT chest and abdomen  Negative for aortic dissection or aneurysm.       No central pulmonary emboli identified.       Cardiomegaly with mild prominence of the main pulmonary artery.  This can be   seen with pulmonary arterial hypertension.       No acute inflammatory process or bowel obstruction.  Left renal calculi.       Findings suggestive of polycystic kidney disease. .       1 cm periaortic lymph node in short axis dimension.  Please correlate with   laboratory testing.  The level testing suggests this is benign 3 month   follow-up CT or MRI could be considered.  If there is other testing   suggestive of lymphoproliferative disorder, biopsy or PET-CT may be   considered.      CXR  Status post ET tube and gastric tube placement in good position.       Stable cardiomegaly with slowly resolving central pulmonary congestion.       Chronic obstructive lung changes with slowly resolving bibasilar atelectasis   or infiltrates           Assessment &Plan:    Patient Active Problem List:     HTN (hypertension)     Smoker     COPD exacerbation ([facility]     Kidney stone     Opiate overdose ([facility] [facility]     Morbid obesity with BMI of 50.0-59.9, adult (HCC)     Chronic respiratory failure with hypoxia and hypercapnia on 4 L home O2     Neuropathy Hx MRSA     Hepatitis C     Pulmonary vascular congestion on CXR     Intertrigo     Type 2 diabetes mellitus without complication, without long-term current use of insulin (HCC)     PAPITO (obstructive sleep apnea)     Nephrolithiasis     Acute on chronic respiratory failure (HCC)     Drug overdose, accidental or unintentional, initial encounter     Accidental drug overdose     Diabetic neuropathy (HCC)     Heroin abuse ([facility]     Acute on chronic respiratory failure with hypoxia and hypercapnia ([facility] [facility]      Acute on chronic respiratory failure type II. Patient is morbidly obese, has PAPITO/obesity hypoventilation and not compliant with BiPAP, has COPD with significant emphysematous changes on CT. She is on chronic O2 therapy at 4 liters at home. She also has hx of drug abuse. Opioids are positive in urine. There is no opioid on her home meds list  AC/VC , RR 25, FiO2 40, PEEP 5  ABG 7.43/56/48. She is not breathing over the vent. RASS -2  Chronic diastolic CHF. Last echo on 11/11/19 with normal EF, grade II diastolic dysfunction and estimated RVSP of 60. This pulmonary hypertension is likely secondary to CHF, COPD, PAPITO and morbid obesity   Renal:  Creatinine is 0.7. ID:  No focus of infection at this time. There is mild leukocytosis today. Tmax 99.4  Blood sugar is acceptable      Decrease RR to 25. It is not clear if this event due to overdose or exacerbation given the hx of increased cough and SOB.     I will keep her on vent support today   Sedation is adequate  Continue breathing treatments and levaquin  Change solu medrol to 40mg daily   Prophylaxis: lovenox / start pepcid     Pt has a high probability of imminent or life-threatening deterioration requiring close monitoring, and highly complex decision-making and/or interventions of high intensity to assess, manipulate, and support his critical organ systems to prevent a likely inevitable decline which could occur if left untreated.      A total critical care time 55 minutes was used. This includes but not limited to examining patient, collaborating with other physicians, monitoring vital signs, telemetry, continuous pulse oximetry, and clinical response to IV medications, documentation time, review and interpretation of laboratory and radiological data, review of nursing notes and old record review. This time excludes any time that may have been spent performing procedures for life threatening organ failure. Thank you Qing Sexton DO for the opportunity to be involved in this patients care.  If you have any questions or concerns please feel free to contact me  Full Code

## 2021-11-27 NOTE — PROGRESS NOTES
11/27/21 1214   Vent Information   Skin Assessment Clean, dry, & intact   Vent Type 980   Vent Mode AC/VC   Vt Ordered 500 mL   Rate Set 20 bmp   Peak Flow 70 L/min   Pressure Support 0 cmH20   FiO2  40 %   SpO2 96 %   SpO2/FiO2 ratio 240   Sensitivity 3   PEEP/CPAP 5   Vent Patient Data   Peak Inspiratory Pressure 28 cmH2O   Mean Airway Pressure 11 cmH20   Rate Measured 20 br/min   Vt Exhaled 508 mL   Minute Volume 10.1 Liters   I:E Ratio 1:2.80   Spontaneous Breathing Trial (SBT) RT Doc   Pulse 106   Breath Sounds   Right Upper Lobe Rhonchi   Right Middle Lobe Rhonchi   Right Lower Lobe Diminished   Left Upper Lobe Rhonchi   Left Lower Lobe Diminished   Additional Respiratory  Assessments   Resp 20   Position Semi-Hazel's   Alarm Settings   High Pressure Alarm 51 cmH2O   Low Minute Volume Alarm 2 L/min   High Respiratory Rate 40 br/min   Low Exhaled Vt  200 mL   Patient Observation   Observations ambu @ bedside   ETT (adult)   Placement Date/Time: 11/26/21 1835   Preoxygenation: Yes  Technique: Video laryngoscopy  Tube Size: 4 mm  Blade Size: 4  Location: Oral  Insertion attempts: 1  Placement Verified By[de-identified] Colorimetric EtCO2 device  Secured at: 25 cm  Placed By: Licensed p. ..    Secured at 25 cm   Measured From [address] By [employer] tube lugo   Site Condition Dry   Cuff Pressure 30 cm H2O

## 2021-11-27 NOTE — RT PROTOCOL NOTE
RT Inhaler-Nebulizer Bronchodilator Protocol Note    There is a bronchodilator order in the chart from a provider indicating to follow the RT Bronchodilator Protocol and there is an Initiate RT Inhaler-Nebulizer Bronchodilator Protocol order as well (see protocol at bottom of note). CXR Findings:  XR CHEST PORTABLE    Result Date: 11/26/2021  Status post ET tube and gastric tube placement in good position. Stable cardiomegaly with slowly resolving central pulmonary congestion. Chronic obstructive lung changes with slowly resolving bibasilar atelectasis or infiltrates     XR CHEST PORTABLE    Result Date: 11/26/2021  1. No acute abnormality. The findings from the last RT Protocol Assessment were as follows:   History Pulmonary Disease: Chronic pulmonary disease  Respiratory Pattern: Dyspnea on exertion or RR 21-25 bpm  Breath Sounds: Inspiratory and expiratory or bilateral wheezing and/or rhonchi  Cough: Strong, productive  Indication for Bronchodilator Therapy: On home bronchodilators  Bronchodilator Assessment Score: 11    Aerosolized bronchodilator medication orders have been revised according to the RT Inhaler-Nebulizer Bronchodilator Protocol below. Respiratory Therapist to perform RT Therapy Protocol Assessment initially then follow the protocol. Repeat RT Therapy Protocol Assessment PRN for score 0-3 or on second treatment, BID, and PRN for scores above 3. No Indications  adjust the frequency to every 6 hours PRN wheezing or bronchospasm, if no treatments needed after 48 hours then discontinue using Per Protocol order mode. If indication present, adjust the RT bronchodilator orders based on the Bronchodilator Assessment Score as indicated below.   Use Inhaler orders unless patient has one or more of the following: on home nebulizer, not able to hold breath for 10 seconds, is not alert and oriented, cannot activate and use MDI correctly, or respiratory rate 25 breaths per minute or more,

## 2021-11-27 NOTE — PROGRESS NOTES
Comprehensive Nutrition Assessment    Type and Reason for Visit:  Initial    Nutrition Recommendations/Plan:   1. Continue NPO   2. Monitor ability to advance diet vs need to initiate TF  3. Please consult RD for TF ordering and management if TF desired. Recommendations below for TF. 4. Monitor nutrition adequacy, clinical progress, weights, pertinent labs, BMs    Nutrition Assessment:  New vent. Pt admitted with SOB. PMHx of COPD, pulmonary hypertension, PAPITO not compliant with BiPAP and drug use. Currently sedated on vent support. Propofol infusing at 30.6 mL/hr (808 kcal from lipids). NPO. No plans to initiate TF today per RN. Will provide TF recommendations in progress note and monitor. Malnutrition Assessment:  Malnutrition Status:  No malnutrition     Estimated Daily Nutrient Needs:  Energy (kcal):  2176-3923; Weight Used for Energy Requirements:  Current (102.6 kg)     Protein (g):  ; Weight Used for Protein Requirements:  Ideal (1.2-2.0 g/kg.  52 kg IBW)   Fluid (ml/day):  or per MD; Method Used for Fluid Requirements:  1 ml/kcal      Nutrition Related Findings:  Obese class III      Wounds:  None       Current Nutrition Therapies:    Diet NPO  Current Tube Feeding (TF) Orders:  · Goal TF & Flush Orders Provides: Initiate Vital High Protein at 15 ml/hr and increase by 10 ml/hr q 4 hr until goal rate of 25 ml/hr to provide 500 ml TV, 500 kcal, 44 gm protein, and 418 ml free water + 60 ml free water flush q 4 hr + 2 proteinex per day to provide an additional 208 kcal and 26 gm protein    Anthropometric Measures:  · Height: 5' 3\" (160 cm)  · Current Body Weight: 226 lb 3.1 oz (102.6 kg)   · Ideal Body Weight: 115 lbs  · BMI: 40.1  · BMI Categories: Obese Class 3 (BMI 40.0 or greater)       Nutrition Diagnosis:   · Inadequate protein-energy intake related to impaired respiratory function as evidenced by NPO or clear liquid status due to medical condition, intubation    Nutrition Interventions: Food and/or Nutrient Delivery:  Continue NPO (Monitor ability to advance diet vs need for TF)  Nutrition Education/Counseling:  No recommendation at this time, Education not appropriate   Coordination of Nutrition Care:  Continue to monitor while inpatient, Interdisciplinary Rounds    Goals:  Pt will tolerate most appropriate form of nutrition this admission       Nutrition Monitoring and Evaluation:   Behavioral-Environmental Outcomes:  None Identified   Food/Nutrient Intake Outcomes:  Diet Advancement/Tolerance  Physical Signs/Symptoms Outcomes:  Biochemical Data, Weight     Discharge Planning:     Too soon to determine     Electronically signed by Cammy Laureano RD, LD on 11/27/21 at 3:43 PM EST    Contact: [phone]

## 2021-11-27 NOTE — PROGRESS NOTES
11/27/21 0408   Vent Information   Vent Type 980   Vent Mode AC/VC   Vt Ordered 500 mL   Rate Set 25 bmp   Peak Flow 70 L/min   Pressure Support 0 cmH20   FiO2  40 %   SpO2 95 %   SpO2/FiO2 ratio 237.5   Sensitivity 3   PEEP/CPAP 5   Humidification Source HME   Vent Patient Data   Peak Inspiratory Pressure 31 cmH2O   Mean Airway Pressure 13 cmH20   Rate Measured 25 br/min   Vt Exhaled 508 mL   Minute Volume 12.7 Liters   I:E Ratio 1:2.10   Cough/Sputum   Sputum How Obtained Endotracheal   Cough Productive   Sputum Amount Small   Sputum Color Cloudy   Tenacity Thick   Spontaneous Breathing Trial (SBT) RT Doc   Pulse 105   Breath Sounds   Right Upper Lobe Rhonchi   Right Middle Lobe Rhonchi   Right Lower Lobe Diminished   Left Upper Lobe Rhonchi   Left Lower Lobe Diminished   Additional Respiratory  Assessments   Resp 25   Position Semi-Hazel's   Cuff Pressure (cm H2O) 28 cm H2O   Alarm Settings   High Pressure Alarm 51 cmH2O   Low Minute Volume Alarm 2 L/min   High Respiratory Rate 40 br/min   Low Exhaled Vt  200 mL   ETT (adult)   Placement Date/Time: 11/26/21 1995   Preoxygenation: Yes  Technique: Video laryngoscopy  Tube Size: 4 mm  Blade Size: 4  Location: Oral  Insertion attempts: 1  Placement Verified By[de-identified] Colorimetric EtCO2 device  Secured at: 25 cm  Placed By: Licensed p. ..    Secured at 25 cm   Measured From Lips   ET Placement Left   Secured By Commercial tube lugo   Site Condition Dry

## 2021-11-27 NOTE — PROGRESS NOTES
11/26/21 2149   Vent Information   Vent Type 980   Vent Mode AC/VC   Vt Ordered 500 mL   Rate Set 25 bmp   Peak Flow 70 L/min   Pressure Support 0 cmH20   FiO2  40 %   SpO2 95 %   SpO2/FiO2 ratio 237.5   Sensitivity 3   PEEP/CPAP 5   Humidification Source HME   Vent Patient Data   Peak Inspiratory Pressure 33 cmH2O   Mean Airway Pressure 13 cmH20   Rate Measured 25 br/min   Vt Exhaled 506 mL   Minute Volume 12.7 Liters   I:E Ratio 1:2.10   Cough/Sputum   Sputum How Obtained Endotracheal   Cough Non-productive   Sputum Amount None   Spontaneous Breathing Trial (SBT) RT Doc   Pulse 82   Breath Sounds   Right Upper Lobe Diminished   Right Middle Lobe Diminished   Right Lower Lobe Diminished   Left Upper Lobe Diminished   Left Lower Lobe Diminished   Additional Respiratory  Assessments   Resp 25   Cuff Pressure (cm H2O) 30 cm H2O   Alarm Settings   High Pressure Alarm 51 cmH2O   Low Minute Volume Alarm 2 L/min   High Respiratory Rate 40 br/min   Low Exhaled Vt  200 mL   ETT (adult)   Placement Date/Time: 11/26/21 1835   Preoxygenation: Yes  Technique: Video laryngoscopy  Tube Size: 4 mm  Blade Size: 4  Location: Oral  Insertion attempts: 1  Placement Verified By[de-identified] Colorimetric EtCO2 device  Secured at: 25 cm  Placed By: Licensed p. ..    Secured at 23 cm   Measured From [address] By [employer] tube lugo   Site Condition Dry

## 2021-11-27 NOTE — H&P
Hospital Medicine History & Physical      PCP: Tyesha Colbert DO    Date of Admission: 11/26/2021    Date of Service: Pt seen/examined on 11/26/2021 and Admitted to Inpatient with expected LOS greater than two midnights due to medical therapy. Chief Complaint: Shortness of breath      History Of Present Illness:    48 y.o. female who presented to [facility] with shortness of breath  Patient is a 78-year-old female with history of severe pulmonary hypertension, COPD on 4 L home oxygen, PAPITO on nocturnal BiPAP, diabetes mellitus type 2, tobacco abuse, Hx of opiate abuse, CHF with diastolic dysfunction, hepatitis C presented to the ED via EMS for shortness of breath. Per EMS patient's O2 sats was in the mid 80s on 3 L nasal cannula which is her baseline requirement. Patient reported mildly productive cough that started about 3 days ago with subjective fevers and worsening shortness of breath. She also reported a headache and neck stiffness to the ED staff. By the time I evaluated the patient she was already intubated, and on sedation. Patient with history of COPD and multiple intubations in the past, has been noncompliant with BiPAP in the past as well. Patient was found to be in acute respiratory acidosis on arrival, was trialed on BiPAP but failed. She was then intubated and placed on mechanical ventilation.     Past Medical History:          Diagnosis Date    Acute cystitis with hematuria     Bacteremia 08/23/2017    staph aureus    CHF (congestive heart failure) (HCC)     Colonization status 7/26/12    DNA probe negative for MRSA    COPD [facility]ronic obstructive [facility] disease) [facility]     Diabetes mellitus ([facility]     FOR ABOUT 4 YEARS    DM (diabetes mellitus) ([facility]     Drug abuse, IV (HCC)     Hepatitis     Hepatitis C antibody positive in blood 08/24/2017    Hepatitis C AB positive     Heroin overdose ([address]     Hypertension     MRSA infection     LUNGS    Neuropathy     legs with pain glucose monitor strips Test 4 times a day & as needed for symptoms of irregular blood glucose. Dispense sufficient amount for indicated testing frequency plus additional to accommodate PRN testing needs. 9/8/21   Madison Jane DO   Lancets MISC 1 each by Does not apply route 4 times daily 9/8/21   Madison Jane DO   escitalopram (LEXAPRO) 10 MG tablet Take 1 tablet by mouth daily 9/8/21   Madison Jane DO   [employer] Lancets Ultra-Thin 30G MISC USE AS DIRECTED TO TEST BLOOD SUGAR 5/11/21   Madison Jane DO   glucose monitoring kit (FREESTYLE) monitoring kit 1 kit by Does not apply route daily E11.9 4/5/21   Madison Jane DO   ROBAFEN DM COUGH  MG/5ML syrup  1/8/21   Historical Provider, MD   OXYGEN Inhale 4 L into the lungs continuous 11/16/17   Sean Garcia MD       Allergies:  Pcn [penicillins], Aspirin, Pcn [penicillins], and Sulfamethoxazole-trimethoprim    Social History:      The patient currently lives at home    TOBACCO:   reports that she has been smoking e-cigarettes. She has a 16.50 pack-year smoking history. She has never used smokeless tobacco.  ETOH:   reports no history of alcohol use.   E-Cigarettes/Vaping Use     Questions Responses    E-Cigarette/Vaping Use Current Every Day User    Start Date     Passive Exposure     Quit Date     Counseling Given     Comments Mid-Size or Vape Pen            Family History:     Positive as follows:        Problem Relation Age of Onset    Heart Disease Mother     No Known Problems Father     Heart Disease Sister     No Known Problems Brother     No Known Problems Maternal Grandmother     No Known Problems Maternal Grandfather     No Known Problems Paternal Grandmother     No Known Problems Paternal Grandfather     No Known Problems Other        REVIEW OF SYSTEMS COMPLETED:   Unable to obtain as the patient is sedated    PHYSICAL EXAM PERFORMED:    BP (!) 109/59   Pulse 71   Temp 97.7 °F (36.5 °C) (Oral)   Resp 18   Ht 5' 3\" (1.6 m)   Wt 225 lb (102.1 kg)   LMP 06/16/2021   SpO2 100%   BMI 39.86 kg/m²     General appearance: Morbidly obese, mild respiratory distress, appears stated age and cooperative. HEENT:  Normal cephalic, atraumatic without obvious deformity. Pupils equal, round, and reactive to light. Conjunctivae/corneas clear. Neck: Supple, with full range of motion. No jugular venous distention. Trachea midline. Respiratory: Tachypneic, normal respiratory effort. Diminished breath sounds bilaterally with occasional scattered wheeze   cardiovascular:  Regular rate and rhythm with normal S1/S2 without murmurs, rubs or gallops. Abdomen: Soft, non-tender, non-distended with normal bowel sounds. Musculoskeletal:  No clubbing, cyanosis or edema bilaterally. Skin: Skin color, texture, turgor normal.  No rashes or lesions. Neurologic: Sedated   psychiatric: Unable to assess  Capillary Refill: Brisk,3 seconds, normal  Peripheral Pulses: +2 palpable, equal bilaterally       Labs:     Recent Labs     11/26/21  1518   WBC 10.5   HGB 15.4   HCT 48.2*        Recent Labs     11/26/21  1518      K 4.7   CL 94*   CO2 39*   BUN 15   CREATININE 0.7   CALCIUM 8.9     Recent Labs     11/26/21  1518   AST 20   ALT 23   BILITOT 0.5   ALKPHOS 78     No results for input(s): INR in the last 72 hours. Recent Labs     11/26/21  1518   TROPONINI <0.01       Radiology:     I personally reviewed the CXR and CTA chest abdomen pelvis and also reviewed all the radiologist reports  CTA chest negative for acute PE    XR CHEST PORTABLE   Final Result   Status post ET tube and gastric tube placement in good position. Stable cardiomegaly with slowly resolving central pulmonary congestion. Chronic obstructive lung changes with slowly resolving bibasilar atelectasis   or infiltrates         CTA CHEST ABDOMEN PELVIS W CONTRAST   Final Result   Negative for aortic dissection or aneurysm. No central pulmonary emboli identified. Cardiomegaly with mild prominence of the main pulmonary artery. This can be   seen with pulmonary arterial hypertension. No acute inflammatory process or bowel obstruction. Left renal calculi. Findings suggestive of polycystic kidney disease. .      1 cm periaortic lymph node in short axis dimension. Please correlate with   laboratory testing. The level testing suggests this is benign 3 month   follow-up CT or MRI could be considered. If there is other testing   suggestive of lymphoproliferative disorder, biopsy or PET-CT may be   considered. XR CHEST PORTABLE   Final Result   1. No acute abnormality. ASSESSMENT:PLAN:    Acute on chronic respiratory failure with hypoxia and hypercapnia  Likely COPD exacerbation  CTA chest negative for acute PE, did show some bronchial wall inflammation with mucous plugging, and bandlike atelectasis in the lower lobes. No gross infiltrate  -Start duo nebs every 4 hours  -IV Solu-Medrol 40 mg every 8 hours  -Budesonide 500 MCG nebulizer twice daily   -IV Levaquin for severe COPD exacerbation mechanically ventilated  -Pulmonary toilet. -Pulmonology consulted, may need therapeutic bronchoscopy  -Check UDS    PAPITO (obstructive sleep apnea)  -On nocturnal BiPAP    DM2-controlled. Last A1c 5.6. Hold Metformin. Placed on SSI, Accu-Cheks and hypoglycemia protocol    HTN-controlled, resume lisinopril from a.m., monitor BP    HLD-statin resumed    Nicotine dependence -patient smokes e-cigarettes. Will need cessation counseling.      Hx of polysubstance abuse -check UDS    DVT Prophylaxis: Lovenox  Diet: No diet orders on file  Code Status: Full code  PT/OT Eval Status: Not consulted    Dispo -IP stay    Total critical care time caring for this patient with life threatening, unstable organ failure, including direct patient contact, management of life support systems, review of data including imaging and labs, discussions with other team members and physicians at least 35 min so far today, excluding procedures. Shashi Starr MD    Thank you Nixon Roman DO for the opportunity to be involved in this patient's care. If you have any questions or concerns please feel free to contact me at [phone].

## 2021-11-28 LAB
ANION GAP SERPL CALCULATED.3IONS-SCNC: 8 MMOL/L (ref 3–16)
BUN BLDV-MCNC: 19 MG/DL (ref 7–20)
CALCIUM SERPL-MCNC: 8.7 MG/DL (ref 8.3–10.6)
CHLORIDE BLD-SCNC: 92 MMOL/L (ref 99–110)
CO2: 32 MMOL/L (ref 21–32)
CREAT SERPL-MCNC: 0.8 MG/DL (ref 0.6–1.1)
ESTIMATED AVERAGE GLUCOSE: 122.6 MG/DL
GFR AFRICAN AMERICAN: >60
GFR NON-AFRICAN AMERICAN: >60
GLUCOSE BLD-MCNC: 100 MG/DL (ref 70–99)
GLUCOSE BLD-MCNC: 104 MG/DL (ref 70–99)
GLUCOSE BLD-MCNC: 133 MG/DL (ref 70–99)
GLUCOSE BLD-MCNC: 147 MG/DL (ref 70–99)
GLUCOSE BLD-MCNC: 148 MG/DL (ref 70–99)
GLUCOSE BLD-MCNC: 90 MG/DL (ref 70–99)
GLUCOSE BLD-MCNC: 94 MG/DL (ref 70–99)
HBA1C MFR BLD: 5.9 %
HCT VFR BLD CALC: 45.6 % (ref 36–48)
HEMOGLOBIN: 14.8 G/DL (ref 12–16)
MCH RBC QN AUTO: 27.2 PG (ref 26–34)
MCHC RBC AUTO-ENTMCNC: 32.5 G/DL (ref 31–36)
MCV RBC AUTO: 83.7 FL (ref 80–100)
PDW BLD-RTO: 15.7 % (ref 12.4–15.4)
PERFORMED ON: ABNORMAL
PERFORMED ON: NORMAL
PERFORMED ON: NORMAL
PLATELET # BLD: 151 K/UL (ref 135–450)
PMV BLD AUTO: 9.5 FL (ref 5–10.5)
POTASSIUM REFLEX MAGNESIUM: 3.7 MMOL/L (ref 3.5–5.1)
RBC # BLD: 5.45 M/UL (ref 4–5.2)
SODIUM BLD-SCNC: 132 MMOL/L (ref 136–145)
WBC # BLD: 17.6 K/UL (ref 4–11)

## 2021-11-28 PROCEDURE — 2580000003 HC RX 258: Performed by: INTERNAL MEDICINE

## 2021-11-28 PROCEDURE — 94761 N-INVAS EAR/PLS OXIMETRY MLT: CPT

## 2021-11-28 PROCEDURE — 6360000002 HC RX W HCPCS: Performed by: EMERGENCY MEDICINE

## 2021-11-28 PROCEDURE — 99291 CRITICAL CARE FIRST HOUR: CPT | Performed by: INTERNAL MEDICINE

## 2021-11-28 PROCEDURE — 2500000003 HC RX 250 WO HCPCS: Performed by: INTERNAL MEDICINE

## 2021-11-28 PROCEDURE — 6360000002 HC RX W HCPCS: Performed by: INTERNAL MEDICINE

## 2021-11-28 PROCEDURE — 94003 VENT MGMT INPAT SUBQ DAY: CPT

## 2021-11-28 PROCEDURE — 6370000000 HC RX 637 (ALT 250 FOR IP): Performed by: INTERNAL MEDICINE

## 2021-11-28 PROCEDURE — 2700000000 HC OXYGEN THERAPY PER DAY

## 2021-11-28 PROCEDURE — 85027 COMPLETE CBC AUTOMATED: CPT

## 2021-11-28 PROCEDURE — 94640 AIRWAY INHALATION TREATMENT: CPT

## 2021-11-28 PROCEDURE — 36415 COLL VENOUS BLD VENIPUNCTURE: CPT

## 2021-11-28 PROCEDURE — 80048 BASIC METABOLIC PNL TOTAL CA: CPT

## 2021-11-28 PROCEDURE — 2580000003 HC RX 258: Performed by: EMERGENCY MEDICINE

## 2021-11-28 PROCEDURE — 2000000000 HC ICU R&B

## 2021-11-28 RX ORDER — DEXMEDETOMIDINE HYDROCHLORIDE 4 UG/ML
.2-1.4 INJECTION, SOLUTION INTRAVENOUS
Status: DISCONTINUED | OUTPATIENT
Start: 2021-11-28 | End: 2021-11-29 | Stop reason: HOSPADM

## 2021-11-28 RX ADMIN — PROPOFOL 30 MCG/KG/MIN: 10 INJECTION, EMULSION INTRAVENOUS at 19:05

## 2021-11-28 RX ADMIN — FAMOTIDINE 20 MG: 10 INJECTION, SOLUTION INTRAVENOUS at 09:55

## 2021-11-28 RX ADMIN — ATORVASTATIN CALCIUM 10 MG: 10 TABLET, FILM COATED ORAL at 09:53

## 2021-11-28 RX ADMIN — FENTANYL CITRATE 200 MCG/HR: 50 INJECTION, SOLUTION INTRAMUSCULAR; INTRAVENOUS at 00:10

## 2021-11-28 RX ADMIN — INSULIN LISPRO 1 UNITS: 100 INJECTION, SOLUTION INTRAVENOUS; SUBCUTANEOUS at 19:57

## 2021-11-28 RX ADMIN — IPRATROPIUM BROMIDE AND ALBUTEROL SULFATE 1 AMPULE: .5; 3 SOLUTION RESPIRATORY (INHALATION) at 20:09

## 2021-11-28 RX ADMIN — PROPOFOL 32.65 MCG/KG/MIN: 10 INJECTION, EMULSION INTRAVENOUS at 10:21

## 2021-11-28 RX ADMIN — MIDAZOLAM 2 MG: 1 INJECTION INTRAMUSCULAR; INTRAVENOUS at 05:50

## 2021-11-28 RX ADMIN — Medication 10 ML: at 20:03

## 2021-11-28 RX ADMIN — Medication 1 MCG/KG/HR: at 21:04

## 2021-11-28 RX ADMIN — IPRATROPIUM BROMIDE AND ALBUTEROL SULFATE 1 AMPULE: .5; 3 SOLUTION RESPIRATORY (INHALATION) at 08:19

## 2021-11-28 RX ADMIN — FENTANYL CITRATE 200 MCG/HR: 50 INJECTION, SOLUTION INTRAMUSCULAR; INTRAVENOUS at 05:23

## 2021-11-28 RX ADMIN — IPRATROPIUM BROMIDE AND ALBUTEROL SULFATE 1 AMPULE: .5; 3 SOLUTION RESPIRATORY (INHALATION) at 15:47

## 2021-11-28 RX ADMIN — PROPOFOL 50 MCG/KG/MIN: 10 INJECTION, EMULSION INTRAVENOUS at 00:12

## 2021-11-28 RX ADMIN — MIDAZOLAM 2 MG: 1 INJECTION INTRAMUSCULAR; INTRAVENOUS at 23:59

## 2021-11-28 RX ADMIN — FENTANYL CITRATE 200 MCG/HR: 50 INJECTION, SOLUTION INTRAMUSCULAR; INTRAVENOUS at 10:19

## 2021-11-28 RX ADMIN — MIDAZOLAM 2 MG: 1 INJECTION INTRAMUSCULAR; INTRAVENOUS at 08:35

## 2021-11-28 RX ADMIN — MIDAZOLAM 2 MG: 1 INJECTION INTRAMUSCULAR; INTRAVENOUS at 03:13

## 2021-11-28 RX ADMIN — FENTANYL CITRATE 200 MCG/HR: 50 INJECTION, SOLUTION INTRAMUSCULAR; INTRAVENOUS at 15:27

## 2021-11-28 RX ADMIN — FENTANYL CITRATE 200 MCG/HR: 50 INJECTION, SOLUTION INTRAMUSCULAR; INTRAVENOUS at 20:42

## 2021-11-28 RX ADMIN — Medication 0.4 MCG/KG/HR: at 15:31

## 2021-11-28 RX ADMIN — ENOXAPARIN SODIUM 30 MG: 30 INJECTION SUBCUTANEOUS at 09:48

## 2021-11-28 RX ADMIN — LISINOPRIL 30 MG: 20 TABLET ORAL at 09:53

## 2021-11-28 RX ADMIN — IPRATROPIUM BROMIDE AND ALBUTEROL SULFATE 1 AMPULE: .5; 3 SOLUTION RESPIRATORY (INHALATION) at 00:20

## 2021-11-28 RX ADMIN — MIDAZOLAM 2 MG: 1 INJECTION INTRAMUSCULAR; INTRAVENOUS at 00:01

## 2021-11-28 RX ADMIN — PROPOFOL 50 MCG/KG/MIN: 10 INJECTION, EMULSION INTRAVENOUS at 14:15

## 2021-11-28 RX ADMIN — IPRATROPIUM BROMIDE AND ALBUTEROL SULFATE 1 AMPULE: .5; 3 SOLUTION RESPIRATORY (INHALATION) at 11:40

## 2021-11-28 RX ADMIN — ENOXAPARIN SODIUM 30 MG: 30 INJECTION SUBCUTANEOUS at 20:02

## 2021-11-28 RX ADMIN — METHYLPREDNISOLONE SODIUM SUCCINATE 40 MG: 40 INJECTION, POWDER, FOR SOLUTION INTRAMUSCULAR; INTRAVENOUS at 09:55

## 2021-11-28 RX ADMIN — FAMOTIDINE 20 MG: 10 INJECTION, SOLUTION INTRAVENOUS at 20:02

## 2021-11-28 RX ADMIN — IPRATROPIUM BROMIDE AND ALBUTEROL SULFATE 1 AMPULE: .5; 3 SOLUTION RESPIRATORY (INHALATION) at 23:44

## 2021-11-28 RX ADMIN — INSULIN LISPRO 1 UNITS: 100 INJECTION, SOLUTION INTRAVENOUS; SUBCUTANEOUS at 16:14

## 2021-11-28 RX ADMIN — IPRATROPIUM BROMIDE AND ALBUTEROL SULFATE 1 AMPULE: .5; 3 SOLUTION RESPIRATORY (INHALATION) at 03:38

## 2021-11-28 RX ADMIN — PROPOFOL 50 MCG/KG/MIN: 10 INJECTION, EMULSION INTRAVENOUS at 02:56

## 2021-11-28 RX ADMIN — LEVOFLOXACIN 750 MG: 5 INJECTION, SOLUTION INTRAVENOUS at 20:46

## 2021-11-28 RX ADMIN — Medication 10 ML: at 10:13

## 2021-11-28 RX ADMIN — BUDESONIDE 500 MCG: 0.5 SUSPENSION RESPIRATORY (INHALATION) at 20:14

## 2021-11-28 RX ADMIN — PROPOFOL 50 MCG/KG/MIN: 10 INJECTION, EMULSION INTRAVENOUS at 06:42

## 2021-11-28 ASSESSMENT — PULMONARY FUNCTION TESTS
PIF_VALUE: 30
PIF_VALUE: 28
PIF_VALUE: 28
PIF_VALUE: 27
PIF_VALUE: 26
PIF_VALUE: 27
PIF_VALUE: 27
PIF_VALUE: 28
PIF_VALUE: 28
PIF_VALUE: 25
PIF_VALUE: 32
PIF_VALUE: 26
PIF_VALUE: 35
PIF_VALUE: 26
PIF_VALUE: 31
PIF_VALUE: 30
PIF_VALUE: 25
PIF_VALUE: 27
PIF_VALUE: 25
PIF_VALUE: 27
PIF_VALUE: 26
PIF_VALUE: 21
PIF_VALUE: 32
PIF_VALUE: 27
PIF_VALUE: 29
PIF_VALUE: 25
PIF_VALUE: 30
PIF_VALUE: 36
PIF_VALUE: 37
PIF_VALUE: 20
PIF_VALUE: 26
PIF_VALUE: 27
PIF_VALUE: 25
PIF_VALUE: 26
PIF_VALUE: 30
PIF_VALUE: 28
PIF_VALUE: 25
PIF_VALUE: 26
PIF_VALUE: 40

## 2021-11-28 NOTE — PROGRESS NOTES
11/28/21 1549   Vent Information   Vent Mode AC/VC   Vt Ordered 500 mL   Rate Set 16 bmp   Peak Flow 50 L/min   Pressure Support 0 cmH20   FiO2  30 %   SpO2 92 %   SpO2/FiO2 ratio 306.67   Sensitivity 3   PEEP/CPAP 5   Humidification Source HME   Vent Patient Data   Peak Inspiratory Pressure 26 cmH2O   Mean Airway Pressure 12 cmH20   Rate Measured 21 br/min   Vt Exhaled 503 mL   Minute Volume 10.5 Liters   I:E Ratio 1:1.50   Cough/Sputum   Sputum How Obtained Endotracheal   Cough None   Spontaneous Breathing Trial (SBT) RT Doc   Pulse 105   Breath Sounds   Right Upper Lobe Diminished   Right Middle Lobe Diminished   Right Lower Lobe Diminished   Left Upper Lobe Diminished   Left Lower Lobe Diminished   Additional Respiratory  Assessments   Resp 21   Position Semi-Hazel's   Alarm Settings   High Pressure Alarm 40 cmH2O   Low Minute Volume Alarm 2 L/min   High Respiratory Rate 40 br/min   ETT (adult)   Placement Date/Time: 11/26/21 1835   Preoxygenation: Yes  Technique: Video laryngoscopy  Tube Size: 4 mm  Blade Size: 4  Location: Oral  Insertion attempts: 1  Placement Verified By[de-identified] Colorimetric EtCO2 device  Secured at: 25 cm  Placed By: Licensed p. ..    Secured at 25 cm   Measured From Lips   ET Placement Right   Secured By Commercial tube lugo   Site Condition Dry

## 2021-11-28 NOTE — PROGRESS NOTES
This note also relates to the following rows which could not be included:  Vt Ordered - Cannot attach notes to unvalidated device data  Rate Set - Cannot attach notes to unvalidated device data  Peak Flow - Cannot attach notes to unvalidated device data  Pressure Support - Cannot attach notes to unvalidated device data  FiO2  - Cannot attach notes to unvalidated device data  SpO2 - Cannot attach notes to unvalidated device data  Sensitivity - Cannot attach notes to unvalidated device data  PEEP/CPAP - Cannot attach notes to unvalidated device data  Peak Inspiratory Pressure - Cannot attach notes to unvalidated device data  Mean Airway Pressure - Cannot attach notes to unvalidated device data  Rate Measured - Cannot attach notes to unvalidated device data  Vt Exhaled - Cannot attach notes to unvalidated device data  Minute Volume - Cannot attach notes to unvalidated device data  I:E Ratio - Cannot attach notes to unvalidated device data  Pulse - Cannot attach notes to unvalidated device data  Resp - Cannot attach notes to unvalidated device data  High Pressure Alarm - Cannot attach notes to unvalidated device data  Low Minute Volume Alarm - Cannot attach notes to unvalidated device data  High Respiratory Rate - Cannot attach notes to unvalidated device data       11/27/21 1951   Vent Information   Vent Type 980   Vent Mode AC/VC   Humidification Source HME   Cough/Sputum   Sputum How Obtained Oral   Alarm Settings   Low Exhaled Vt  200 mL   ETT (adult)   Placement Date/Time: 11/26/21 1835   Preoxygenation: Yes  Technique: Video laryngoscopy  Tube Size: 4 mm  Blade Size: 4  Location: Oral  Insertion attempts: 1  Placement Verified By[de-identified] Colorimetric EtCO2 device  Secured at: 25 cm  Placed By: Licensed p...    ET Placement Left

## 2021-11-28 NOTE — PROGRESS NOTES
Pulmonary & Critical Care Medicine ICU Progress Note    Admit Date: 2021  PCP: Alyn Curling, DO    CC:  Respiratory failure   Events of Last 24 hours:    No major events overnight   She gets agitated without versed pushes, she trashes and kicks but doesn't follow commands  She is on propofol 50 and fentanyl 200    Vitals:  Tmax:  VITALS:  BP (!) 158/91   Pulse 115   Temp 98.9 °F (37.2 °C) (Bladder)   Resp 22   Ht 5' 3\" (1.6 m)   Wt 226 lb 3.1 oz (102.6 kg)   LMP 2021   SpO2 94%   BMI 40.07 kg/m²   24HR INTAKE/OUTPUT:    Intake/Output Summary (Last 24 hours) at 2021 1139  Last data filed at 2021 1013  Gross per 24 hour   Intake 1848.06 ml   Output 1590 ml   Net 258.06 ml     CURRENT PULSE OXIMETRY:  SpO2: 94 %  24HR PULSE OXIMETRY RANGE:  SpO2  Av.6 %  Min: 91 %  Max: 97 %    Vent Settings:  Vent Mode: AC/VC Rate Set: 20 bmp/Vt Ordered: 500 mL/ /FiO2 : 30 %    Recent Labs     21  1900 21  2227   PHART 7.052* 7.438   VAE7CIS 161.5* 56.0*   PO2ART 313.1* 48.6*         EXAM:  General: No distress. Sedated. Eyes: PERRL. No sclera icterus. No conjunctival injection. ENT: ETT in place  Neck: Trachea midline. Normal thyroid. Resp: No accessory muscle use. Good bilateral air entry   CV: Regular rate. Regular rhythm. No mumur or rub. No edema. GI: Non-tender. Non-distended. No masses. No organmegaly. Normal bowel sounds. Skin: Warm and dry. No nodule on exposed extremities. No rash on exposed extremities. .   Neuro:Intubated, sedated         IV:   dexmedetomidine      propofol 32.648 mcg/kg/min (21 1021)    dextrose      sodium chloride      fentaNYL (SUBLIMAZE) infusion 200 mcg/hr (21 1019)         Scheduled Meds:     ipratropium-albuterol  1 ampule Inhalation Q4H    methylPREDNISolone  40 mg IntraVENous Daily    famotidine (PEPCID) injection  20 mg IntraVENous BID    sodium chloride flush  5-40 mL IntraVENous 2 times per day    enoxaparin  30 mg SubCUTAneous BID    budesonide  0.5 mg Nebulization BID    levofloxacin  750 mg IntraVENous Q24H    atorvastatin  10 mg Oral Daily    lisinopril  30 mg Oral Daily    insulin lispro  0-6 Units SubCUTAneous Q4H         Diet: Diet NPO     Results:  CBC:   Recent Labs     11/26/21  1518 11/27/21  0417 11/28/21  0415   WBC 10.5 11.7* 17.6*   HGB 15.4 14.9 14.8   HCT 48.2* 46.9 45.6   MCV 86.7 85.9 83.7    134* 151     BMP:   Recent Labs     11/26/21  1518 11/27/21  0417 11/28/21  0415    137 132*   K 4.7 4.0 3.7   CL 94* 93* 92*   CO2 39* 33* 32   BUN 15 17 19   CREATININE 0.7 0.7 0.8     LIVER PROFILE:   Recent Labs     11/26/21  1518   AST 20   ALT 23   BILITOT 0.5   ALKPHOS 78     PT/INR: No results for input(s): PROTIME, INR in the last 72 hours. APTT: No results for input(s): APTT in the last 72 hours. UA:  Recent Labs     11/26/21  2100   COLORU Yellow   PHUR 6.0  6.0   WBCUA None seen   RBCUA 3-4   YEAST Present*   CLARITYU Clear   SPECGRAV 1.025   LEUKOCYTESUR Negative   UROBILINOGEN 0.2   BILIRUBINUR Negative   BLOODU TRACE-INTACT*   GLUCOSEU Negative         Assessment/Plan:  The patient is a 48 y.o. female with  morbidly obese, has PAPITO/obesity hypoventilation and not compliant with BiPAP, has COPD with significant emphysematous changes on CT. She is on chronic O2 therapy at 4 liters at home. She also has hx of drug abuse    Acute on chronic respiratory failure type II. , RR 20/21, FiO2 50, PEEP 5  Chronic diastolic CHF. Last echo on 11/11/19 with normal EF, grade II diastolic dysfunction and estimated RVSP of 60. This pulmonary hypertension is likely secondary to CHF, COPD, PAPITO and morbid obesity   Renal:  Creatinine is 0.8. ID:  No clear focus of infection. WBC is up to 17.6. She is on steroids.   T max 99.1  Blood sugar is acceptable      Decrease RR on vent to 16  Start precedex  Keep fentanyl for now given the hx of drug use  Wean down propofol then SBT  D/c steroids     Pt has a high probability of imminent or life-threatening deterioration requiring close monitoring, and highly complex decision-making and/or interventions of high intensity to assess, manipulate, and support his critical organ systems to prevent a likely inevitable decline which could occur if left untreated.      A total critical care time 35 minutes was used. This includes but not limited to examining patient, collaborating with other physicians, monitoring vital signs, telemetry, continuous pulse oximetry, and clinical response to IV medications, documentation time, review and interpretation of laboratory and radiological data, review of nursing notes and old record review. This time excludes any time that may have been spent performing procedures for life threatening organ failure.       Claudeen Part, MD, MD

## 2021-11-28 NOTE — PROGRESS NOTES
11/28/21 0823   Vent Information   Vent Type 980   Vent Mode AC/VC   Vt Ordered 500 mL   Rate Set 20 bmp   Peak Flow 50 L/min   Pressure Support 0 cmH20   FiO2  30 %   SpO2 94 %   SpO2/FiO2 ratio 313.33   Sensitivity 3   PEEP/CPAP 5   Humidification Source HME   Vent Patient Data   Peak Inspiratory Pressure 36 cmH2O   Mean Airway Pressure 15 cmH20   Rate Measured 21 br/min   Vt Exhaled 616 mL   Minute Volume 11.1 Liters   I:E Ratio 1:1.70   Cough/Sputum   Sputum How Obtained Endotracheal   Cough Productive   Sputum Amount Moderate   Sputum Color Cloudy   Tenacity Thick   Spontaneous Breathing Trial (SBT) RT Doc   Pulse 115   Breath Sounds   Right Upper Lobe Expiratory Wheezes   Right Middle Lobe Diminished   Right Lower Lobe Expiratory Wheezes   Left Upper Lobe Expiratory Wheezes   Left Lower Lobe Diminished   Additional Respiratory  Assessments   Resp 22   Position Semi-Hazel's   Cuff Pressure (cm H2O) 30 cm H2O   Alarm Settings   High Pressure Alarm 40 cmH2O   Low Minute Volume Alarm 2 L/min   High Respiratory Rate 40 br/min   Patient Observation   Observations [email or web address]   ETT (adult)   Placement Date/Time: 11/26/21 1705   Preoxygenation: Yes  Technique: Video laryngoscopy  Tube Size: 4 mm  Blade Size: 4  Location: Oral  Insertion attempts: 1  Placement Verified By[de-identified] Colorimetric EtCO2 device  Secured at: 25 cm  Placed By: Licensed p. .Eugene    Secured at 25 cm   Measured From [address] [place] [address],[address] By [employer] tube lugo   Site Condition Dry   Cuff Pressure 30 cm H2O

## 2021-11-28 NOTE — PROGRESS NOTES
11/28/21 0023   Vent Information   Vent Type 980   Vent Mode AC/VC   Vt Ordered 500 mL   Rate Set 20 bmp   Peak Flow 70 L/min   Pressure Support 0 cmH20   FiO2  30 %   SpO2 95 %   SpO2/FiO2 ratio 316.67   Sensitivity 3   PEEP/CPAP 5   Humidification Source HME   Vent Patient Data   Peak Inspiratory Pressure 30 cmH2O   Mean Airway Pressure 12 cmH20   Rate Measured 21 br/min   Vt Exhaled 421 mL   Minute Volume 10.7 Liters   I:E Ratio 1:1.90   Spontaneous Breathing Trial (SBT) RT Doc   Pulse 115   Breath Sounds   Right Upper Lobe Expiratory Wheezes   Right Middle Lobe Expiratory Wheezes   Right Lower Lobe Diminished   Left Upper Lobe Expiratory Wheezes   Left Lower Lobe Diminished   Additional Respiratory  Assessments   Resp 16   Alarm Settings   High Pressure Alarm 40 cmH2O   Low Minute Volume Alarm 2 L/min   High Respiratory Rate 40 br/min   Low Exhaled Vt  200 mL   ETT (adult)   Placement Date/Time: 11/26/21 1835   Preoxygenation: Yes  Technique: Video laryngoscopy  Tube Size: 4 mm  Blade Size: 4  Location: Oral  Insertion attempts: 1  Placement Verified By[de-identified] Colorimetric EtCO2 device  Secured at: 25 cm  Placed By: Licensed p. ..    Secured at 24 cm   Measured From Lips   ET Placement Right   Secured By Commercial tube lugo   Site Condition Dry   Cuff Pressure 30 cm H2O

## 2021-11-28 NOTE — PROGRESS NOTES
Hospitalist Progress Note      PCP: Kamilah Kim DO    Date of Admission: 11/26/2021    Chief Complaint: Shortness of breath    Hospital Course: 48 y. o. female who presented to [facility] with shortness of breath  Patient is a 53-year-old female with history of severe pulmonary hypertension, COPD on 4 L home oxygen, PAPITO on nocturnal BiPAP, diabetes mellitus type 2, tobacco abuse, Hx of opiate abuse, CHF with diastolic dysfunction, hepatitis C presented to the ED via EMS for shortness of breath. Per EMS patient's O2 sats was in the mid 80s on 3 L nasal cannula which is her baseline requirement. Patient reported mildly productive cough that started about 3 days ago with subjective fevers and worsening shortness of breath. She also reported a headache and neck stiffness to the ED staff. By the time I evaluated the patient she was already intubated, and on sedation. Patient with history of COPD and multiple intubations in the past, has been noncompliant with BiPAP in the past as well. Patient was found to be in acute respiratory acidosis on arrival, was trialed on BiPAP but failed. She was then intubated and placed on mechanical ventilation.     Subjective: Patient remains sedated on ventilator      Medications:  Reviewed    Infusion Medications    propofol 32.648 mcg/kg/min (11/28/21 1021)    dextrose      sodium chloride      fentaNYL (SUBLIMAZE) infusion 200 mcg/hr (11/28/21 1019)     Scheduled Medications    ipratropium-albuterol  1 ampule Inhalation Q4H    methylPREDNISolone  40 mg IntraVENous Daily    famotidine (PEPCID) injection  20 mg IntraVENous BID    sodium chloride flush  5-40 mL IntraVENous 2 times per day    enoxaparin  30 mg SubCUTAneous BID    budesonide  0.5 mg Nebulization BID    levofloxacin  750 mg IntraVENous Q24H    atorvastatin  10 mg Oral Daily    lisinopril  30 mg Oral Daily    insulin lispro  0-6 Units SubCUTAneous Q4H     PRN Meds: midazolam, glucose, dextrose, glucagon (rDNA), dextrose, sodium chloride flush, sodium chloride, ondansetron **OR** ondansetron, polyethylene glycol, acetaminophen **OR** acetaminophen      Intake/Output Summary (Last 24 hours) at 11/28/2021 1051  Last data filed at 11/28/2021 1013  Gross per 24 hour   Intake 1848.06 ml   Output 1590 ml   Net 258.06 ml       Physical Exam Performed:    BP (!) 158/91   Pulse 115   Temp 98.9 °F (37.2 °C) (Bladder)   Resp 22   Ht 5' 3\" (1.6 m)   Wt 226 lb 3.1 oz (102.6 kg)   LMP 06/16/2021   SpO2 94%   BMI 40.07 kg/m²     General appearance: No apparent distress, appears stated age and  on ventilator. HEENT: Pupils equal, round, and reactive to light. Conjunctivae/corneas clear. Neck: Supple, with full range of motion. No jugular venous distention. Trachea midline. Respiratory:  Normal respiratory effort. Clear to auscultation, bilaterally without Rales/Wheezes/Rhonchi. Cardiovascular: Regular rate and rhythm with normal S1/S2 without murmurs, rubs or gallops. Abdomen: Soft, non-tender, non-distended with normal bowel sounds. Musculoskeletal: No clubbing, cyanosis or edema bilaterally. Full range of motion without deformity. Skin: Skin color, texture, turgor normal.  No rashes or lesions. Neurologic:  On ventilator  Psychiatric: on ventilator  Capillary Refill: Brisk,3 seconds, normal   Peripheral Pulses: +2 palpable, equal bilaterally       Labs:   Recent Labs     11/26/21  1518 11/27/21 0417 11/28/21 0415   WBC 10.5 11.7* 17.6*   HGB 15.4 14.9 14.8   HCT 48.2* 46.9 45.6    134* 151     Recent Labs     11/26/21  1518 11/27/21 0417 11/28/21 0415    137 132*   K 4.7 4.0 3.7   CL 94* 93* 92*   CO2 39* 33* 32   BUN 15 17 19   CREATININE 0.7 0.7 0.8   CALCIUM 8.9 9.1 8.7     Recent Labs     11/26/21  1518   AST 20   ALT 23   BILITOT 0.5   ALKPHOS 78     No results for input(s): INR in the last 72 hours.   Recent Labs     11/26/21  1518   TROPONINI <0.01       Urinalysis:      Lab Results   Component Value Date    NITRU Negative 11/26/2021    WBCUA None seen 11/26/2021    BACTERIA 2+ 01/06/2021    RBCUA 3-4 11/26/2021    BLOODU TRACE-INTACT 11/26/2021    SPECGRAV 1.025 11/26/2021    GLUCOSEU Negative 11/26/2021    GLUCOSEU NEGATIVE 01/02/2012       Radiology:  XR CHEST PORTABLE   Final Result   Status post ET tube and gastric tube placement in good position. Stable cardiomegaly with slowly resolving central pulmonary congestion. Chronic obstructive lung changes with slowly resolving bibasilar atelectasis   or infiltrates         CTA CHEST ABDOMEN PELVIS W CONTRAST   Final Result   Negative for aortic dissection or aneurysm. No central pulmonary emboli identified. Cardiomegaly with mild prominence of the main pulmonary artery. This can be   seen with pulmonary arterial hypertension. No acute inflammatory process or bowel obstruction. Left renal calculi. Findings suggestive of polycystic kidney disease. .      1 cm periaortic lymph node in short axis dimension. Please correlate with   laboratory testing. The level testing suggests this is benign 3 month   follow-up CT or MRI could be considered. If there is other testing   suggestive of lymphoproliferative disorder, biopsy or PET-CT may be   considered. XR CHEST PORTABLE   Final Result   1. No acute abnormality. Assessment/Plan:    Active Hospital Problems    Diagnosis     COPD exacerbation ([facility] [address] [J44.1]      Priority: High    Acute on chronic respiratory failure with hypoxia and hypercapnia (HCC) [J96.21, J96.22]     PAPITO (obstructive sleep apnea) [G47.33]     Chronic respiratory failure with hypoxia and hypercapnia on 4 L home O2 [J96.11, J96.12]     Type 2 diabetes mellitus without complication, without long-term current use of insulin (HCC) [E11.9]     Smoker [F17.200]     HTN (hypertension) [I10]      1.   Admitted with acute on chronic respiratory failure with hypoxia and hypercarbia likely COPD  Exacerbation, CT of the chest negative for pulmonary embolism. Continue with IV Solu-Medrol.,  Levaquin. Leukocytosis due to steroids. Pulmonary critical care consulted vent management per intensivist.  2.  Obstructive sleep apnea on nocturnal BiPAP at home, noncompliant  3. Diabetes mellitus type 2 new medication on hold continue sliding scale hemoglobin A1c 5.6 on 4/5/2021 check hemoglobin A1c.  4.  Hypertension continue with home medication. 5.  Hyperlipidemia on statin.   6.  History of polysubstance abuse urine drug screen positive for opiates.       DVT Prophylaxis: Lovenox subcu  Diet: Diet NPO  Code Status: Full Code    PT/OT Eval Status:     Dispo -remains on ventilator in ICU    Speedy Bustamante MD

## 2021-11-28 NOTE — PROGRESS NOTES
11/28/21 1141   Vent Information   Vent Type 980   Vent Mode AC/VC   Vt Ordered 500 mL   Rate Set 16 bmp   Peak Flow 50 L/min   Pressure Support 0 cmH20   FiO2  30 %   SpO2 92 %   SpO2/FiO2 ratio 306.67   Sensitivity 3   PEEP/CPAP 5   Vent Patient Data   Peak Inspiratory Pressure 28 cmH2O   Mean Airway Pressure 12 cmH20   Rate Measured 18 br/min   Vt Exhaled 582 mL   Minute Volume 9.3 Liters   I:E Ratio 1:2.40   Spontaneous Breathing Trial (SBT) RT Doc   Pulse 103   Breath Sounds   Right Upper Lobe Diminished; Expiratory Wheezes   Right Middle Lobe Diminished   Right Lower Lobe Diminished; Expiratory Wheezes   Left Upper Lobe Diminished   Left Lower Lobe Diminished   Additional Respiratory  Assessments   Resp 19   Position Semi-Hazel's   Alarm Settings   High Pressure Alarm 40 cmH2O   Low Minute Volume Alarm 2 L/min   High Respiratory Rate 40 br/min   ETT (adult)   Placement Date/Time: 11/26/21 1835   Preoxygenation: Yes  Technique: Video laryngoscopy  Tube Size: 4 mm  Blade Size: 4  Location: Oral  Insertion attempts: 1  Placement Verified By[de-identified] Colorimetric EtCO2 device  Secured at: 25 cm  Placed By: Licensed p. ..    Secured at 25 cm   Measured From Lips   ET Placement Left   Secured By Commercial tube lugo   Site Condition Dry

## 2021-11-28 NOTE — PROGRESS NOTES
11/28/21 0339   Vent Information   Vent Type 980   Vent Mode AC/VC   Vt Ordered 500 mL   Rate Set 20 bmp   Peak Flow 70 L/min   Pressure Support 0 cmH20   FiO2  30 %   SpO2 94 %   SpO2/FiO2 ratio 313.33   Sensitivity 3   PEEP/CPAP 5   Humidification Source HME   Vent Patient Data   Peak Inspiratory Pressure 30 cmH2O   Mean Airway Pressure 11 cmH20   Rate Measured 21 br/min   Vt Exhaled 528 mL   Minute Volume 10.7 Liters   I:E Ratio 1:2.80   Spontaneous Breathing Trial (SBT) RT Doc   Pulse 115   Breath Sounds   Right Upper Lobe Expiratory Wheezes   Right Middle Lobe Expiratory Wheezes   Right Lower Lobe Expiratory Wheezes   Left Upper Lobe Expiratory Wheezes   Left Lower Lobe Expiratory Wheezes   Additional Respiratory  Assessments   Resp 26   Alarm Settings   High Pressure Alarm 40 cmH2O   Low Minute Volume Alarm 2 L/min   High Respiratory Rate 40 br/min   Low Exhaled Vt  200 mL   ETT (adult)   Placement Date/Time: 11/26/21 1835   Preoxygenation: Yes  Technique: Video laryngoscopy  Tube Size: 4 mm  Blade Size: 4  Location: Oral  Insertion attempts: 1  Placement Verified By[de-identified] Colorimetric EtCO2 device  Secured at: 25 cm  Placed By: Licensed p. ..    Secured at 24 cm   Measured From Lips   ET Placement Right   Secured By Commercial tube lugo   Site Condition Dry   Cuff Pressure 28 cm H2O

## 2021-11-29 VITALS
HEIGHT: 63 IN | TEMPERATURE: 99.8 F | BODY MASS INDEX: 40.08 KG/M2 | WEIGHT: 226.19 LBS | HEART RATE: 70 BPM | OXYGEN SATURATION: 93 % | DIASTOLIC BLOOD PRESSURE: 90 MMHG | RESPIRATION RATE: 25 BRPM | SYSTOLIC BLOOD PRESSURE: 161 MMHG

## 2021-11-29 PROBLEM — Z91.199 HISTORY OF NONCOMPLIANCE WITH MEDICAL TREATMENT: Status: ACTIVE | Noted: 2021-11-29

## 2021-11-29 PROBLEM — F11.91 HISTORY OF HEROIN USE: Status: ACTIVE | Noted: 2021-11-29

## 2021-11-29 PROBLEM — I27.20 PULMONARY HYPERTENSION (HCC): Status: ACTIVE | Noted: 2021-11-29

## 2021-11-29 PROBLEM — E66.01 MORBID OBESITY WITH BMI OF 40.0-44.9, ADULT (HCC): Status: ACTIVE | Noted: 2017-08-23

## 2021-11-29 PROBLEM — I51.89 GRADE II DIASTOLIC DYSFUNCTION: Status: ACTIVE | Noted: 2021-11-29

## 2021-11-29 PROBLEM — I07.1 TRICUSPID REGURGITATION: Status: ACTIVE | Noted: 2021-11-29

## 2021-11-29 LAB
ANION GAP SERPL CALCULATED.3IONS-SCNC: 9 MMOL/L (ref 3–16)
BASE EXCESS ARTERIAL: 3.8 MMOL/L (ref -3–3)
BUN BLDV-MCNC: 20 MG/DL (ref 7–20)
CALCIUM SERPL-MCNC: 8.9 MG/DL (ref 8.3–10.6)
CARBOXYHEMOGLOBIN ARTERIAL: 0.3 % (ref 0–1.5)
CHLORIDE BLD-SCNC: 99 MMOL/L (ref 99–110)
CO2: 27 MMOL/L (ref 21–32)
CREAT SERPL-MCNC: 0.8 MG/DL (ref 0.6–1.1)
GFR AFRICAN AMERICAN: >60
GFR NON-AFRICAN AMERICAN: >60
GLUCOSE BLD-MCNC: 112 MG/DL (ref 70–99)
GLUCOSE BLD-MCNC: 112 MG/DL (ref 70–99)
GLUCOSE BLD-MCNC: 122 MG/DL (ref 70–99)
GLUCOSE BLD-MCNC: 135 MG/DL (ref 70–99)
HCO3 ARTERIAL: 28.3 MMOL/L (ref 21–29)
HCT VFR BLD CALC: 47.6 % (ref 36–48)
HEMOGLOBIN, ART, EXTENDED: 17.3 G/DL (ref 12–16)
HEMOGLOBIN: 15.4 G/DL (ref 12–16)
MCH RBC QN AUTO: 27.3 PG (ref 26–34)
MCHC RBC AUTO-ENTMCNC: 32.3 G/DL (ref 31–36)
MCV RBC AUTO: 84.3 FL (ref 80–100)
METHEMOGLOBIN ARTERIAL: 0.4 %
O2 SAT, ARTERIAL: 94.5 %
O2 THERAPY: ABNORMAL
PCO2 ARTERIAL: 41.8 MMHG (ref 35–45)
PDW BLD-RTO: 16 % (ref 12.4–15.4)
PERFORMED ON: ABNORMAL
PH ARTERIAL: 7.45 (ref 7.35–7.45)
PLATELET # BLD: 132 K/UL (ref 135–450)
PMV BLD AUTO: 8.6 FL (ref 5–10.5)
PO2 ARTERIAL: 71.5 MMHG (ref 75–108)
POTASSIUM REFLEX MAGNESIUM: 4.4 MMOL/L (ref 3.5–5.1)
RBC # BLD: 5.64 M/UL (ref 4–5.2)
SODIUM BLD-SCNC: 135 MMOL/L (ref 136–145)
TCO2 ARTERIAL: 29.6 MMOL/L
WBC # BLD: 12.2 K/UL (ref 4–11)

## 2021-11-29 PROCEDURE — 2580000003 HC RX 258: Performed by: INTERNAL MEDICINE

## 2021-11-29 PROCEDURE — 99291 CRITICAL CARE FIRST HOUR: CPT | Performed by: INTERNAL MEDICINE

## 2021-11-29 PROCEDURE — 6360000002 HC RX W HCPCS: Performed by: EMERGENCY MEDICINE

## 2021-11-29 PROCEDURE — 6360000002 HC RX W HCPCS: Performed by: INTERNAL MEDICINE

## 2021-11-29 PROCEDURE — 6370000000 HC RX 637 (ALT 250 FOR IP): Performed by: INTERNAL MEDICINE

## 2021-11-29 PROCEDURE — 2500000003 HC RX 250 WO HCPCS: Performed by: INTERNAL MEDICINE

## 2021-11-29 PROCEDURE — 2700000000 HC OXYGEN THERAPY PER DAY

## 2021-11-29 PROCEDURE — 2580000003 HC RX 258: Performed by: EMERGENCY MEDICINE

## 2021-11-29 PROCEDURE — 82803 BLOOD GASES ANY COMBINATION: CPT

## 2021-11-29 PROCEDURE — 94761 N-INVAS EAR/PLS OXIMETRY MLT: CPT

## 2021-11-29 PROCEDURE — 94640 AIRWAY INHALATION TREATMENT: CPT

## 2021-11-29 PROCEDURE — 6360000002 HC RX W HCPCS

## 2021-11-29 PROCEDURE — 85027 COMPLETE CBC AUTOMATED: CPT

## 2021-11-29 PROCEDURE — 36415 COLL VENOUS BLD VENIPUNCTURE: CPT

## 2021-11-29 PROCEDURE — 94003 VENT MGMT INPAT SUBQ DAY: CPT

## 2021-11-29 PROCEDURE — 80048 BASIC METABOLIC PNL TOTAL CA: CPT

## 2021-11-29 RX ORDER — CARBOXYMETHYLCELLULOSE SODIUM 10 MG/ML
1 GEL OPHTHALMIC
Status: DISCONTINUED | OUTPATIENT
Start: 2021-11-29 | End: 2021-11-29

## 2021-11-29 RX ORDER — HYDROCODONE BITARTRATE AND ACETAMINOPHEN 5; 325 MG/1; MG/1
1 TABLET ORAL EVERY 6 HOURS PRN
Status: DISCONTINUED | OUTPATIENT
Start: 2021-11-29 | End: 2021-11-29 | Stop reason: HOSPADM

## 2021-11-29 RX ORDER — FUROSEMIDE 10 MG/ML
INJECTION INTRAMUSCULAR; INTRAVENOUS
Status: COMPLETED
Start: 2021-11-29 | End: 2021-11-29

## 2021-11-29 RX ORDER — CHLORHEXIDINE GLUCONATE 0.12 MG/ML
15 RINSE ORAL 2 TIMES DAILY
Status: DISCONTINUED | OUTPATIENT
Start: 2021-11-29 | End: 2021-11-29

## 2021-11-29 RX ORDER — ENALAPRILAT 2.5 MG/2ML
1.25 INJECTION INTRAVENOUS EVERY 6 HOURS PRN
Status: DISCONTINUED | OUTPATIENT
Start: 2021-11-29 | End: 2021-11-29 | Stop reason: HOSPADM

## 2021-11-29 RX ORDER — FUROSEMIDE 10 MG/ML
60 INJECTION INTRAMUSCULAR; INTRAVENOUS ONCE
Status: COMPLETED | OUTPATIENT
Start: 2021-11-29 | End: 2021-11-29

## 2021-11-29 RX ADMIN — IPRATROPIUM BROMIDE AND ALBUTEROL SULFATE 1 AMPULE: .5; 3 SOLUTION RESPIRATORY (INHALATION) at 11:57

## 2021-11-29 RX ADMIN — PROPOFOL 10 MCG/KG/MIN: 10 INJECTION, EMULSION INTRAVENOUS at 03:31

## 2021-11-29 RX ADMIN — ENOXAPARIN SODIUM 30 MG: 30 INJECTION SUBCUTANEOUS at 08:38

## 2021-11-29 RX ADMIN — Medication 1.4 MCG/KG/HR: at 00:45

## 2021-11-29 RX ADMIN — FENTANYL CITRATE 200 MCG/HR: 50 INJECTION, SOLUTION INTRAMUSCULAR; INTRAVENOUS at 07:08

## 2021-11-29 RX ADMIN — MIDAZOLAM 2 MG: 1 INJECTION INTRAMUSCULAR; INTRAVENOUS at 07:09

## 2021-11-29 RX ADMIN — MUPIROCIN: 20 OINTMENT TOPICAL at 08:38

## 2021-11-29 RX ADMIN — ENALAPRILAT 1.25 MG: 2.5 INJECTION INTRAVENOUS at 06:30

## 2021-11-29 RX ADMIN — Medication 10 ML: at 08:39

## 2021-11-29 RX ADMIN — Medication 1.4 MCG/KG/HR: at 03:31

## 2021-11-29 RX ADMIN — MIDAZOLAM 2 MG: 1 INJECTION INTRAMUSCULAR; INTRAVENOUS at 03:56

## 2021-11-29 RX ADMIN — FAMOTIDINE 20 MG: 10 INJECTION, SOLUTION INTRAVENOUS at 08:39

## 2021-11-29 RX ADMIN — IPRATROPIUM BROMIDE AND ALBUTEROL SULFATE 1 AMPULE: .5; 3 SOLUTION RESPIRATORY (INHALATION) at 04:18

## 2021-11-29 RX ADMIN — FENTANYL CITRATE 200 MCG/HR: 50 INJECTION, SOLUTION INTRAMUSCULAR; INTRAVENOUS at 01:43

## 2021-11-29 RX ADMIN — ATORVASTATIN CALCIUM 10 MG: 10 TABLET, FILM COATED ORAL at 08:39

## 2021-11-29 RX ADMIN — HYDROCODONE BITARTRATE AND ACETAMINOPHEN 1 TABLET: 5; 325 TABLET ORAL at 12:24

## 2021-11-29 RX ADMIN — FUROSEMIDE 60 MG: 10 INJECTION, SOLUTION INTRAMUSCULAR; INTRAVENOUS at 09:31

## 2021-11-29 RX ADMIN — FUROSEMIDE 60 MG: 10 INJECTION INTRAMUSCULAR; INTRAVENOUS at 09:31

## 2021-11-29 RX ADMIN — IPRATROPIUM BROMIDE AND ALBUTEROL SULFATE 1 AMPULE: .5; 3 SOLUTION RESPIRATORY (INHALATION) at 08:26

## 2021-11-29 RX ADMIN — Medication 1.4 MCG/KG/HR: at 09:31

## 2021-11-29 RX ADMIN — BUDESONIDE 500 MCG: 0.5 SUSPENSION RESPIRATORY (INHALATION) at 08:26

## 2021-11-29 RX ADMIN — Medication 1.4 MCG/KG/HR: at 06:40

## 2021-11-29 RX ADMIN — LISINOPRIL 30 MG: 20 TABLET ORAL at 08:39

## 2021-11-29 ASSESSMENT — PULMONARY FUNCTION TESTS
PIF_VALUE: 16
PIF_VALUE: 19
PIF_VALUE: 20
PIF_VALUE: 19
PIF_VALUE: 36
PIF_VALUE: 22
PIF_VALUE: 20
PIF_VALUE: 16
PIF_VALUE: 21
PIF_VALUE: 26
PIF_VALUE: 16
PIF_VALUE: 20
PIF_VALUE: 22
PIF_VALUE: 15
PIF_VALUE: 20

## 2021-11-29 ASSESSMENT — PAIN DESCRIPTION - PROGRESSION: CLINICAL_PROGRESSION: NOT CHANGED

## 2021-11-29 ASSESSMENT — PAIN DESCRIPTION - ONSET: ONSET: GRADUAL

## 2021-11-29 ASSESSMENT — PAIN SCALES - GENERAL
PAINLEVEL_OUTOF10: 7
PAINLEVEL_OUTOF10: 7

## 2021-11-29 ASSESSMENT — PAIN DESCRIPTION - LOCATION: LOCATION: BACK

## 2021-11-29 ASSESSMENT — PAIN DESCRIPTION - DESCRIPTORS: DESCRIPTORS: CONSTANT

## 2021-11-29 ASSESSMENT — PAIN DESCRIPTION - PAIN TYPE: TYPE: ACUTE PAIN

## 2021-11-29 ASSESSMENT — PAIN DESCRIPTION - FREQUENCY: FREQUENCY: CONTINUOUS

## 2021-11-29 NOTE — PROGRESS NOTES
0315: Paged Dr. Abrams Rounds regarding pt's /103. Awaiting response. 2893: PRN order for 1.25mg IV Vasotec was put in for SBP >180. Will administer medication as needed for BP.

## 2021-11-29 NOTE — CARE COORDINATION
Patient just being extubated. No family currently at bedside. Will attempt conversation later today.     Tatiana Rendon RN

## 2021-11-29 NOTE — PLAN OF CARE
Pt stating she needs to leave. Educated pt on importance of staying in the hospital after just being extubated. Pt addiment on leaving and stated she will leave AMA. Notified Dr. Arnaud Boss. Pt escorted to bathroom with walker. Pt unsteady and weak. Educated pt on importance of using walker and safety precautions. Pt on phone with family stating ''they need to pick her up or she is walking. AMA paper work printed. AMA paperwork signed. NELSON Hardy witness.

## 2021-11-29 NOTE — PROGRESS NOTES
Hospitalist Progress Note      PCP: Valarie Oppenheim, DO    Date of Admission: 11/26/2021    Chief Complaint: Shortness of breath    Hospital Course: 48 y. o. female who presented to [facility] with shortness of breath  Patient is a 57-year-old female with history of severe pulmonary hypertension, COPD on 4 L home oxygen, PAPITO on nocturnal BiPAP, diabetes mellitus type 2, tobacco abuse, Hx of opiate abuse, CHF with diastolic dysfunction, hepatitis C presented to the ED via EMS for shortness of breath. Per EMS patient's O2 sats was in the mid 80s on 3 L nasal cannula which is her baseline requirement. Patient reported mildly productive cough that started about 3 days ago with subjective fevers and worsening shortness of breath. She also reported a headache and neck stiffness to the ED staff. By the time I evaluated the patient she was already intubated, and on sedation. Patient with history of COPD and multiple intubations in the past, has been noncompliant with BiPAP in the past as well. Patient was found to be in acute respiratory acidosis on arrival, was trialed on BiPAP but failed. She was then intubated and placed on mechanical ventilation. Subjective: Tolerating vent wean. Extubated to NC. Shortly after extubation, patient became adamant that she needed to leave the hospital urgently. She signed out AMA.      Medications:  Reviewed    Infusion Medications    dexmedetomidine 1.4 mcg/kg/hr (11/29/21 0640)    propofol Stopped (11/29/21 8879)    dextrose      sodium chloride      fentaNYL (SUBLIMAZE) infusion 200 mcg/hr (11/29/21 0708)     Scheduled Medications    mupirocin   Nasal BID    ipratropium-albuterol  1 ampule Inhalation Q4H    famotidine (PEPCID) injection  20 mg IntraVENous BID    sodium chloride flush  5-40 mL IntraVENous 2 times per day    enoxaparin  30 mg SubCUTAneous BID    budesonide  0.5 mg Nebulization BID    levofloxacin  750 mg IntraVENous Q24H    atorvastatin  10 mg Oral Daily    lisinopril  30 mg Oral Daily    insulin lispro  0-6 Units SubCUTAneous Q4H     PRN Meds: enalaprilat, midazolam, glucose, dextrose, glucagon (rDNA), dextrose, sodium chloride flush, sodium chloride, ondansetron **OR** ondansetron, polyethylene glycol, acetaminophen **OR** acetaminophen      Intake/Output Summary (Last 24 hours) at 11/29/2021 0901  Last data filed at 11/29/2021 0800  Gross per 24 hour   Intake 883.66 ml   Output 2620 ml   Net -1736.34 ml       Physical Exam Performed:    BP (!) 183/94   Pulse 70   Temp 99.2 °F (37.3 °C) (Bladder)   Resp 19   Ht 5' 3\" (1.6 m)   Wt 226 lb 3.1 oz (102.6 kg)   LMP 06/16/2021   SpO2 97%   BMI 40.07 kg/m²   Seen while still on Vent -   General appearance: No apparent distress, appears stated age and  on ventilator. HEENT: Pupils equal, round, and reactive to light. Conjunctivae/corneas clear. Neck: Supple, with full range of motion. No jugular venous distention. Trachea midline. Respiratory:  Normal respiratory effort. Clear to auscultation, bilaterally without Rales/Wheezes/Rhonchi. Cardiovascular: Regular rate and rhythm with normal S1/S2 without murmurs, rubs or gallops. Abdomen: Soft, non-tender, non-distended with normal bowel sounds. Musculoskeletal: No clubbing, cyanosis or edema bilaterally. Full range of motion without deformity. Skin: Skin color, texture, turgor normal.  No rashes or lesions. Neurologic:  Follows commands.    Psychiatric: Alert, follows commands  Capillary Refill: Brisk,3 seconds, normal   Peripheral Pulses: +2 palpable, equal bilaterally       Labs:   Recent Labs     11/27/21 0417 11/28/21 0415 11/29/21 0418   WBC 11.7* 17.6* 12.2*   HGB 14.9 14.8 15.4   HCT 46.9 45.6 47.6   * 151 132*     Recent Labs     11/27/21 0417 11/28/21 0415 11/29/21 0418    132* 135*   K 4.0 3.7 4.4   CL 93* 92* 99   CO2 33* 32 27   BUN 17 19 20   CREATININE 0.7 0.8 0.8   CALCIUM 9.1 8.7 8.9     Recent Labs 11/26/21  1518   AST 20   ALT 23   BILITOT 0.5   ALKPHOS 78     No results for input(s): INR in the last 72 hours. Recent Labs     11/26/21  1518   TROPONINI <0.01       Urinalysis:      Lab Results   Component Value Date    NITRU Negative 11/26/2021    WBCUA None seen 11/26/2021    BACTERIA 2+ 01/06/2021    RBCUA 3-4 11/26/2021    BLOODU TRACE-INTACT 11/26/2021    SPECGRAV 1.025 11/26/2021    GLUCOSEU Negative 11/26/2021    GLUCOSEU NEGATIVE 01/02/2012       Radiology:  XR CHEST PORTABLE   Final Result   Status post ET tube and gastric tube placement in good position. Stable cardiomegaly with slowly resolving central pulmonary congestion. Chronic obstructive lung changes with slowly resolving bibasilar atelectasis   or infiltrates         CTA CHEST ABDOMEN PELVIS W CONTRAST   Final Result   Negative for aortic dissection or aneurysm. No central pulmonary emboli identified. Cardiomegaly with mild prominence of the main pulmonary artery. This can be   seen with pulmonary arterial hypertension. No acute inflammatory process or bowel obstruction. Left renal calculi. Findings suggestive of polycystic kidney disease. .      1 cm periaortic lymph node in short axis dimension. Please correlate with   laboratory testing. The level testing suggests this is benign 3 month   follow-up CT or MRI could be considered. If there is other testing   suggestive of lymphoproliferative disorder, biopsy or PET-CT may be   considered. XR CHEST PORTABLE   Final Result   1. No acute abnormality.                  Assessment/Plan:    Active Hospital Problems    Diagnosis     COPD exacerbation ([facility] [address] [J44.1]      Priority: High    Acute on chronic respiratory failure with hypoxia and hypercapnia (HCC) [J96.21, J96.22]     PAPITO (obstructive sleep apnea) [G47.33]     Chronic respiratory failure with hypoxia and hypercapnia on 4 L home O2 [J96.11, J96.12]     Type 2 diabetes mellitus without complication, without long-term current use of insulin (HCC) [E11.9]     Smoker [F17.200]     HTN (hypertension) [I10]      Acute on chronic respiratory failure with hypoxia and hypercarbia likely COPD Exacerbation, CT of the chest negative for pulmonary embolism. Weaned and extubated. Patient signed out AMA. Obstructive sleep apnea on nocturnal BiPAP at home, noncompliant    Diabetes mellitus type 2 new medication on hold continue sliding scale hemoglobin A1c 5.6 on 4/5/2021 check hemoglobin A1c. Hypertension continue with home medication. Hyperlipidemia on statin.     History of polysubstance abuse urine drug screen positive for opiates.       DVT Prophylaxis: Lovenox subcu  Diet: Diet NPO  Code Status: Full Code    PT/OT Eval Status: NA    Dispo -Signed out Tenzin Mccrary MD

## 2021-11-29 NOTE — PROGRESS NOTES
Pulmonary & Critical Care Medicine ICU Progress Note    Admit Date: 2021  PCP: Alyn Curling, DO    CC:  Respiratory failure   Events of Last 24 hours:      Patient when seen this morning continues to be critically ill on mechanical body support, patient was on 30% oxygen and 5 of PEEP to maintain oxygen saturation, patient had modest thick respite secretions in the endotracheal tube, patient was on IV sedation to maintain patient mental synchrony, patient's blood pressure was slightly on the higher side, patient has had T-max of 99.2 °F, patient had sinus rhythm on the monitor, patient has had good urine output overnight with cumulative fluid balance of -6.1 L, patient's glycemic control was acceptable, no other pertinent review of system could be obtained because of patient clinical status which remains precarious and critical and seen      Vitals:  Tmax:  VITALS:  BP (!) 161/90   Pulse 70   Temp 99.8 °F (37.7 °C) (Bladder)   Resp 25   Ht 5' 3\" (1.6 m)   Wt 226 lb 3.1 oz (102.6 kg)   LMP 2021   SpO2 93%   BMI 40.07 kg/m²   24HR INTAKE/OUTPUT:    CURRENT PULSE OXIMETRY:  SpO2: 93 %  24HR PULSE OXIMETRY RANGE:  SpO2  Av.2 %  Min: 93 %  Max: 98 %    Vent Settings:  Vent Mode: AC/VC Rate Set: 16 bmp/Vt Ordered: 500 mL/ /FiO2 : 30 %    Recent Labs     21  2227 21  1038   PHART 7.438 7.448   EPC5BYJ 56.0* 41.8   PO2ART 48.6* 71.5*         EXAM:  General: No distress. Sedated. Eyes: PERRL. No sclera icterus. No conjunctival injection. ENT: ETT in place  Neck: Trachea midline. Normal thyroid. Short enlarged neck  Resp: No accessory muscle use. Decreased breath sound density with scattered crackles  CV: Regular rate. Regular rhythm. No mumur or rub. No edema. GI: Non-tender. Non-distended. No masses. No organmegaly. Normal bowel sounds. Skin: Warm and dry. No nodule on exposed extremities. No rash on exposed extremities. .   Neuro:Intubated, sedated IV:        Scheduled Meds:          Diet: No diet orders on file     Results:  CBC:   Recent Labs     11/27/21 0417 11/28/21 0415 11/29/21 0418   WBC 11.7* 17.6* 12.2*   HGB 14.9 14.8 15.4   HCT 46.9 45.6 47.6   MCV 85.9 83.7 84.3   * 151 132*     BMP:   Recent Labs     11/27/21 0417 11/28/21 0415 11/29/21 0418    132* 135*   K 4.0 3.7 4.4   CL 93* 92* 99   CO2 33* 32 27   BUN 17 19 20   CREATININE 0.7 0.8 0.8     LIVER PROFILE:   No results for input(s): AST, ALT, LIPASE, BILIDIR, BILITOT, ALKPHOS in the last 72 hours. Invalid input(s): AMYLASE,  ALB  PT/INR: No results for input(s): PROTIME, INR in the last 72 hours. APTT: No results for input(s): APTT in the last 72 hours. UA:  No results for input(s): NITRITE, COLORU, PHUR, LABCAST, WBCUA, RBCUA, MUCUS, TRICHOMONAS, YEAST, BACTERIA, CLARITYU, SPECGRAV, LEUKOCYTESUR, UROBILINOGEN, BILIRUBINUR, BLOODU, GLUCOSEU, AMORPHOUS in the last 72 hours. Invalid input(s): [facility] for Cristela Mcburney (MRN [id number]) as of 11/29/2021 21:47   Ref.  Range 1/7/2021 04:19 11/26/2021 16:19 11/26/2021 19:00 11/26/2021 22:27   pH, Arterial Latest Ref Range: 7.350 - 7.450  7.344 (L)  7.052 (LL) 7.438   pCO2, Arterial Latest Ref Range: 35.0 - 45.0 mm Hg 69.2 (H)  161.5 (HH) 56.0 (H)   pO2, Arterial Latest Ref Range: 75.0 - 108.0 mm Hg 98.3  313.1 (H) 48.6 (L)   HCO3, Arterial Latest Ref Range: 21.0 - 29.0 mmol/L 37.7 (H)  43.9 (H) 37.8 (H)   TCO2 (calc), Art Latest Ref Range: Not Established mmol/L 40  48.8 40   Base Excess, Arterial Latest Ref Range: -3 - 3  12 (H)  6.5 (H) 14 (H)   O2 Sat, Arterial Latest Ref Range: 93 - 100 % 97  99.0 84 (L)   Methemoglobin, Arterial Latest Ref Range: <1.5 %   0.4    Carboxyhgb, Arterial Latest Ref Range: 0.0 - 1.5 %   8.7 (H)    pH, Emeka Latest Ref Range: 7.350 - 7.450   7.135 (LL)     pCO2, Emeka Latest Ref Range: 40.0 - 50.0 mmHg  133.6 (H)     pO2, Emeka Latest Ref Range: 25.0 - 40.0 mmHg  66.4 (H) HCO3, Venous Latest Ref Range: 23.0 - 29.0 mmol/L  43.9 (H)     TC02 (Calc), Emeka Latest Ref Range: Not Established mmol/L  48     Base Excess, Emeka Latest Ref Range: -3.0 - 3.0 mmol/L  8.6 (H)     MetHgb, Emeka Latest Ref Range: <1.5 %  0.4     O2 Sat, Emeka Latest Ref Range: Not Established %  93     Sample Type Unknown ART   ART     Results for Sue Read (MRN [id number]) as of 11/29/2021 21:47   Ref. Range 11/26/2021 15:58 11/26/2021 19:57   Rapid Influenza A Ag Latest Ref Range: Negative  Negative    Rapid Influenza B Ag Latest Ref Range: Negative  Negative    RAPID INFLUENZA A/B ANTIGENS Unknown Rpt    SARS-CoV-2, NAAT Latest Ref Range: Not Detected  Not Detected       Results for Sue Read (MRN [id number]) as of 11/29/2021 21:47   Ref. Range 11/26/2021 21:00   Amphetamine Screen, Urine Latest Ref Range: Negative <1000ng/mL  Neg   Benzodiazepine Screen, Urine Latest Ref Range: Negative <200 ng/mL  Neg   Cocaine Metabolite Screen, Urine Latest Ref Range: Negative <300 ng/mL  Neg   Methadone Screen, Urine Latest Ref Range: Negative <300 ng/mL  Neg   Opiate Scrn, Ur Latest Ref Range: Negative <300 ng/mL  POSITIVE (A)   Oxycodone Urine Latest Ref Range: Negative <100 ng/ml  Neg   PCP Screen, Urine Latest Ref Range: Negative <25 ng/mL  Neg   Cannabinoid Scrn, Ur Latest Ref Range: Negative <50 ng/mL  Neg   Drug Screen Comment: Unknown see below        Negative for aortic dissection or aneurysm.       No central pulmonary emboli identified.       Cardiomegaly with mild prominence of the main pulmonary artery.  This can be   seen with pulmonary arterial hypertension.       No acute inflammatory process or bowel obstruction.  Left renal calculi.       Findings suggestive of polycystic kidney disease. .       1 cm periaortic lymph node in short axis dimension.  Please correlate with   laboratory testing.  The level testing suggests this is benign 3 month   follow-up CT or MRI could be considered.  If there is other testing   suggestive of lymphoproliferative disorder, biopsy or PET-CT may be   considered. Summary   Definity contrast administered. Left ventricular systolic function is hyperdynamic with ejection fraction   estimated at greater than 65 %. No regional wall motion abnormalities are noted. There is mild concentric left ventricular hypertrophy. Grade II diastolic dysfunction with elevated filing pressure. The right atrium is mildly dilated. Mild posterior mitral annular calcification is present. Mild Mitral regurgitation is present. Moderate tricuspid regurgitation. Systolic pulmonary artery pressure (SPAP) estimated at 60 mmHg (RA pressure   15 mmHg), consistent with severe pulmonary hypertension. There is a pleural effusion. Assessment/Plan:  The patient is a 48 y.o. female with  morbidly obese, has PAPITO/obesity hypoventilation and not compliant with BiPAP, has COPD with significant emphysematous changes on CT. She is on chronic O2 therapy at 4 liters at home. She also has hx of drug abuse    Acute on chronic respiratory failure type II. , RR 20/21, FiO2 50, PEEP 5  Chronic diastolic CHF. Last echo on 11/11/19 with normal EF, grade II diastolic dysfunction and estimated RVSP of 60. This pulmonary hypertension is likely secondary to CHF, COPD, PAPITO and morbid obesity   Renal:  Creatinine is 0.8. ID:  No clear focus of infection. WBC is up to 17.6. She is on steroids.   T max 99.1  Blood sugar is acceptable      Ventilator support to keep saturation between 90 to 94% only  Ventilator settings and waveforms reviewed  Ventilator changes made  Pulmonary toilet  IV sedation to maintain patient ventilator synchrony  Titration of sedation as per RASS scores  Patient has been started on Precedex infusion  Patient to be given 1 dose of 60 mg Lasix  Monitor input output and BMP  Correct electrolytes on whenever necessary basis  Enteral feeds as per metabolic support  PUD and DVT prophylaxis        Pt has a high probability of imminent or life-threatening deterioration requiring close monitoring, and highly complex decision-making and/or interventions of high intensity to assess, manipulate, and support his critical organ systems to prevent a likely inevitable decline which could occur if left untreated.      A total critical care time 35 minutes was used. This includes but not limited to examining patient, collaborating with other physicians, monitoring vital signs, telemetry, continuous pulse oximetry, and clinical response to IV medications, documentation time, review and interpretation of laboratory and radiological data, review of nursing notes and old record review. This time excludes any time that may have been spent performing procedures for life threatening organ failure.       Juanjo Paulino MD, MD

## 2021-11-29 NOTE — PROGRESS NOTES
11/29/21 0419   Vent Information   Vent Type 980   Vent Mode AC/VC   Vt Ordered 500 mL   Rate Set 16 bmp   Peak Flow 50 L/min   Pressure Support 0 cmH20   FiO2  30 %   SpO2 95 %   SpO2/FiO2 ratio 316.67   Sensitivity 3   PEEP/CPAP 5   Humidification Source HME   Vent Patient Data   Peak Inspiratory Pressure 20 cmH2O   Mean Airway Pressure 10 cmH20   Rate Measured 20 br/min   Vt Exhaled 507 mL   Minute Volume 10.3 Liters   I:E Ratio 1:1.60   Spontaneous Breathing Trial (SBT) RT Doc   Pulse 61   Breath Sounds   Right Upper Lobe Diminished   Right Middle Lobe Diminished   Right Lower Lobe Diminished   Left Upper Lobe Diminished   Left Lower Lobe Diminished   Additional Respiratory  Assessments   Resp 21   Alarm Settings   High Pressure Alarm 40 cmH2O   Low Minute Volume Alarm 2 L/min   High Respiratory Rate 40 br/min   Low Exhaled Vt  200 mL   ETT (adult)   Placement Date/Time: 11/26/21 1835   Preoxygenation: Yes  Technique: Video laryngoscopy  Tube Size: 4 mm  Blade Size: 4  Location: Oral  Insertion attempts: 1  Placement Verified By[de-identified] Colorimetric EtCO2 device  Secured at: 25 cm  Placed By: Licensed p. ..    Secured at 25 cm   Measured From [address] [place], [place] [address] By Commercial tube lugo   Site Condition Dry

## 2021-11-29 NOTE — PROGRESS NOTES
11/28/21 2012   Vent Information   Vent Mode AC/VC   Vt Ordered 500 mL   Rate Set 16 bmp   Peak Flow 50 L/min   Pressure Support 0 cmH20   FiO2  30 %   SpO2 92 %   SpO2/FiO2 ratio 306.67   Sensitivity 3   PEEP/CPAP 5   Humidification Source HME   Vent Patient Data   Peak Inspiratory Pressure 25 cmH2O   Mean Airway Pressure 11 cmH20   Rate Measured 20 br/min   Vt Exhaled 518 mL   Minute Volume 10.19 Liters   I:E Ratio 1:1.80   Spontaneous Breathing Trial (SBT) RT Doc   Pulse 88   Breath Sounds   Right Upper Lobe Expiratory Wheezes   Right Middle Lobe Expiratory Wheezes   Right Lower Lobe Diminished   Left Upper Lobe Expiratory Wheezes   Left Lower Lobe Diminished   Additional Respiratory  Assessments   Resp 20   Alarm Settings   High Pressure Alarm 40 cmH2O   Low Minute Volume Alarm 2 L/min   High Respiratory Rate 40 br/min   Low Exhaled Vt  200 mL   ETT (adult)   Placement Date/Time: 11/26/21 1835   Preoxygenation: Yes  Technique: Video laryngoscopy  Tube Size: 4 mm  Blade Size: 4  Location: Oral  Insertion attempts: 1  Placement Verified By[de-identified] Colorimetric EtCO2 device  Secured at: 25 cm  Placed By: Licensed p. ..    Secured at 25 cm   Measured From Lips   ET Placement Left   Secured By Commercial tube lugo   Site Condition Dry

## 2021-11-30 LAB — BLOOD CULTURE, ROUTINE: NORMAL

## 2021-12-01 DIAGNOSIS — I10 HYPERTENSION, UNSPECIFIED TYPE: ICD-10-CM

## 2021-12-01 DIAGNOSIS — E11.9 TYPE 2 DIABETES MELLITUS WITHOUT COMPLICATION, WITHOUT LONG-TERM CURRENT USE OF INSULIN (HCC): ICD-10-CM

## 2021-12-01 DIAGNOSIS — J44.9 CHRONIC OBSTRUCTIVE PULMONARY DISEASE, UNSPECIFIED COPD TYPE (HCC): ICD-10-CM

## 2021-12-01 DIAGNOSIS — G62.9 NEUROPATHY: ICD-10-CM

## 2021-12-01 RX ORDER — ESCITALOPRAM OXALATE 10 MG/1
10 TABLET ORAL DAILY
Qty: 30 TABLET | Refills: 2 | Status: SHIPPED | OUTPATIENT
Start: 2021-12-01 | End: 2022-02-03 | Stop reason: SDUPTHER

## 2021-12-01 RX ORDER — ATORVASTATIN CALCIUM 10 MG/1
TABLET, FILM COATED ORAL
Qty: 30 TABLET | Refills: 0 | Status: SHIPPED | OUTPATIENT
Start: 2021-12-01 | End: 2022-03-09 | Stop reason: SDUPTHER

## 2021-12-01 RX ORDER — GABAPENTIN 600 MG/1
600 TABLET ORAL 3 TIMES DAILY
Qty: 90 TABLET | Refills: 0 | Status: SHIPPED | OUTPATIENT
Start: 2021-12-01 | End: 2022-02-03 | Stop reason: SDUPTHER

## 2021-12-01 RX ORDER — LISINOPRIL 30 MG/1
TABLET ORAL
Qty: 30 TABLET | Refills: 0 | Status: SHIPPED | OUTPATIENT
Start: 2021-12-01 | End: 2022-02-03 | Stop reason: SDUPTHER

## 2021-12-01 NOTE — TELEPHONE ENCOUNTER
[facility] Transitions Initial Follow Up Call    Outreach made within 2 business days of discharge: Yes    Patient: Yolis Johnson Patient : 1975   MRN: <[id number]>  Reason for Admission: There are no discharge diagnoses documented for the most recent discharge. Discharge Date: 21       Spoke with: Patient    Discharge department/facility: [facility] Interactive Patient Contact:  Was patient able to fill all prescriptions: No:   Was patient instructed to bring all medications to the follow-up visit: Yes  Is patient taking all medications as directed in the discharge summary? Yes  Does patient understand their discharge instructions: Yes  Does patient have questions or concerns that need addressed prior to 7-14 day follow up office visit: no    Scheduled appointment with PCP within 7-14 days    Follow Up  No future appointments.     Gricel Aquino MA

## 2021-12-01 NOTE — TELEPHONE ENCOUNTER
Last appointment: 9/8/2021  Next appointment: 12/6/2021  Last refill: all meds refilled on 11/10/21 for 30 days       Patient is currently on 4 lpm

## 2021-12-07 ENCOUNTER — TELEPHONE (OUTPATIENT)
Dept: INTERNAL MEDICINE CLINIC | Age: 46
End: 2021-12-07

## 2021-12-07 NOTE — TELEPHONE ENCOUNTER
----- Message from Jeremiah Arango MA sent at 12/6/2021  4:50 PM EST -----  Patient is to be dismissed per Dr Bautista Garcia for no shows.   Thanks  Sofia Hinojosa

## 2021-12-08 NOTE — PROGRESS NOTES
Physician Progress Note      PATIENT:               Angélica Manjarrez  CSN #:                  [id number]  :                       1975  ADMIT DATE:       2021 3:17 PM  [address] [place] DATE:        2021 2:20 PM  RESPONDING  PROVIDER #:        Preston Pedro MD          QUERY TEXT:    Pt admitted with acute respiratory failure. Consult by pulm with diagnosis:   \"Drug overdose, accidental or unintentional, initial encounter\", then notes: \"It is not clear if this event due to overdose or exacerbation given the hx of   increased cough and SOB\". If possible, please document in progress notes and   discharge summary:    The medical record reflects the following:  Risk Factors: hx drug abuse, COPD, chronic respiratory failure  Clinical Indicators: pulm notes: \"She also has hx of drug abuse. Opioids are   positive in urine. There is no opioid on her home meds list\"; \" Drug overdose,   accidental or unintentional, initial encounter\"    ER provider note: \" Patient was having episodes hypoxia which appeared to be   associated with her falling asleep. Nursing staff reports that he dipped as   low as the 60s although, up quickly when aroused. This appeared to have been   occurring prior to her morphine administration. \"  Morphine administration   appears on MAR 1553 on ; urine specimen collection time  21:00 per   lab notes. Treatment: respiratory support, imaging, labs, monitoring    Thank you,  Olivia Grullon RN [facility]  [phone]  Options provided:  -- possible drug overdose contributing to acute respiratory failure  -- drug overdose not suspected  -- Other - I will add my own diagnosis  -- Disagree - Not applicable / Not valid  -- Disagree - Clinically unable to determine / Unknown  -- Refer to Clinical Documentation Reviewer    PROVIDER RESPONSE TEXT:    This patient had possible drug overdose contributing to acute respiratory   failure. Query created by:  Jase Persaud on 2021 11:07 AM      Electronically signed by:  Aaron Reich MD 12/8/2021 10:11 AM

## 2021-12-08 NOTE — DISCHARGE SUMMARY
Hospital Medicine Discharge Summary    Patient: Ruel George     Age: 48 y.o. Gender: female  : 1975   MRN: [id number]  Code Status: Full     Admit Date: 2021   Discharge Date: 2021    Disposition:  AMA     Condition at Discharge: Stable    Primary Care Provider: Alexandra Harrison DO    Admitting Physician: Vicky Perez MD  Discharge Physician: Jarad Hammond MD       Discharge Diagnoses: Active Hospital Problems    Diagnosis     COPD exacerbation ([facility] [J44.1]      Priority: High    Grade II diastolic dysfunction [B40.26]     Pulmonary hypertension (HCC) [I27.20]     Tricuspid regurgitation [I07.1]     History of noncompliance with medical treatment [Z91.19]     History of heroin use [Z87.898]     Acute on chronic respiratory failure with hypoxia and hypercapnia (HCC) [J96.21, J96.22]     PAPITO (obstructive sleep apnea) [G47.33]     Chronic respiratory failure with hypoxia and hypercapnia on 4 L home O2 [J96.11, J96.12]     Type 2 diabetes mellitus without complication, without long-term current use of insulin (HCC) [E11.9]     Morbid obesity with BMI of 40.0-44.9, adult (HCC) [E66.01, Z68.41]     Smoker [F17.200]     HTN (hypertension) [I10]        Hospital Course:   48 y. o. female who [facility] to [facility] with shortness of breath  Patient is a 47-year-old female with history of severe pulmonary hypertension, COPD on 4 L home oxygen, PAPITO on nocturnal BiPAP, diabetes mellitus type 2, tobacco abuse, Hx of opiate abuse, CHF with diastolic dysfunction, hepatitis C presented to the ED via EMS for shortness of breath. Per EMS patient's O2 sats was in the mid 80s on 3 L nasal cannula which is her baseline requirement. Patient reported mildly productive cough that started about 3 days ago with subjective fevers and worsening shortness of breath. She also reported a headache and neck stiffness to the ED staff. By the time I evaluated the patient she was already intubated, and on sedation. Patient with history of COPD and multiple intubations in the past, has been noncompliant with BiPAP in the past as well. Patient was found to be in acute respiratory acidosis on arrival, was trialed on BiPAP but failed. She was then intubated and placed on mechanical ventilation. Assessment/Plan:    Acute on chronic respiratory failure with hypoxia and hypercarbia likely COPD Exacerbation, CT of the chest negative for pulmonary embolism. Hx of drug use, with drug screen positive for Opiates. Although not certain, may have had possible drug overdose contributing to acute respiratory failure. Eventually weaned and extubated. Patient signed out AMA. Obstructive sleep apnea on nocturnal BiPAP at home, noncompliant    Diabetes mellitus type 2 new medication on hold continue sliding scale hemoglobin A1c 5.6 on 4/5/2021. Hypertension continue with home medication. Hyperlipidemia on statin. History of polysubstance abuse urine drug screen positive for opiates.       Discharge Medications:   Discharge Medication List as of 11/29/2021  2:20 PM        Discharge Medication List as of 11/29/2021  2:20 PM        Discharge Medication List as of 11/29/2021  2:20 PM      CONTINUE these medications which have NOT CHANGED    Details   albuterol sulfate  (90 Base) MCG/ACT inhaler TAKE 2 PUFFS BY MOUTH EVERY 6 HOURS AS NEEDED FOR WHEEZE, Disp-6.7 each, R-0Normal      gabapentin (NEURONTIN) 600 MG tablet TAKE 1 TABLET BY MOUTH 3 TIMES DAILY FOR 30 DAYS., Disp-90 tablet, R-0Normal      metFORMIN (GLUCOPHAGE) 500 MG tablet TAKE 1 TABLET BY MOUTH TWICE A DAY WITH MEALS, Disp-60 tablet, R-0DX Code Needed  . Normal      lisinopril (PRINIVIL;ZESTRIL) 30 MG tablet TAKE 1 TABLET BY MOUTH EVERY DAY, Disp-30 tablet, R-0DX Code Needed  . Normal      atorvastatin (LIPITOR) 10 MG tablet TAKE 1 TABLET BY MOUTH EVERY DAY, Disp-30 tablet, R-0DX Code Needed  . Normal      fluticasone-salmeterol (ADVAIR gastric tube placement in good position. Stable cardiomegaly with slowly resolving central pulmonary congestion. Chronic obstructive lung changes with slowly resolving bibasilar atelectasis or infiltrates     XR CHEST PORTABLE    Result Date: 11/26/2021  EXAMINATION: ONE XRAY VIEW OF THE CHEST 11/26/2021 3:45 pm COMPARISON: 11/10/2019 HISTORY: ORDERING SYSTEM PROVIDED HISTORY: cp/sob TECHNOLOGIST PROVIDED HISTORY: Reason for exam:->cp/sob Reason for Exam: Shortness of Breath (Per ems pt's O2 sat was mid 80's on 3L NC, pt reports always being SOB due to COPD, pt reports neck stiffness/ pain with a headache starting yesterday. ) FINDINGS: There is bibasilar scarring. Calcified granulomatous disease is noted. The cardiac silhouette is within normal limits. There is no pneumothorax or pleural effusion. 1.  No acute abnormality. CTA CHEST ABDOMEN PELVIS W CONTRAST    Result Date: 11/26/2021  EXAMINATION: CTA OF THE CHEST, ABDOMEN AND PELVIS WITH CONTRAST 11/26/2021 6:13 pm: TECHNIQUE: CTA of the chest, abdomen and pelvis was performed after the administration of intravenous contrast.  Multiplanar reformatted images are provided for review. MIP images are provided for review. Dose modulation, iterative reconstruction, and/or weight based adjustment of the mA/kV was utilized to reduce the radiation dose to as low as reasonably achievable. COMPARISON: Chest x-ray 11/26/2021 HISTORY: ORDERING SYSTEM PROVIDED HISTORY: cp/back pain/sob TECHNOLOGIST PROVIDED HISTORY: Reason for exam:->cp/back pain/sob Decision Support Exception - unselect if not a suspected or confirmed emergency medical condition->Emergency Medical Condition (MA) Reason for Exam: CP, back pain, SOB. Neck pain, headache, Shortness of Breath (Per ems pt's O2 sat was mid 80's on 3L NC, pt reports always being SOB due to COPD, pt reports neck stiffness/ pain with a headache starting yesterday. ). Cough (Productive wet cough starting 3 days ago). dissection or aneurysm. No central pulmonary emboli identified. Cardiomegaly with mild prominence of the main pulmonary artery. This can be seen with pulmonary arterial hypertension. No acute inflammatory process or bowel obstruction. Left renal calculi. Findings suggestive of polycystic kidney disease. . 1 cm periaortic lymph node in short axis dimension. Please correlate with laboratory testing. The level testing suggests this is benign 3 month follow-up CT or MRI could be considered. If there is other testing suggestive of lymphoproliferative disorder, biopsy or PET-CT may be considered. Consults:     IP CONSULT TO CRITICAL CARE    Labs: For convenience and continuity at follow-up the following most recent labs are provided:    Lab Results   Component Value Date    WBC 12.2 11/29/2021    HGB 15.4 11/29/2021    HCT 47.6 11/29/2021    MCV 84.3 11/29/2021     11/29/2021     11/29/2021    K 4.4 11/29/2021    CL 99 11/29/2021    CO2 27 11/29/2021    BUN 20 11/29/2021    CREATININE 0.8 11/29/2021    CALCIUM 8.9 11/29/2021    PHOS 3.9 11/18/2018    .0 11/03/2015    TROPONINI <0.01 11/26/2021    ALKPHOS 78 11/26/2021    ALT 23 11/26/2021    AST 20 11/26/2021    BILITOT 0.5 11/26/2021    BILIDIR 0.5 08/24/2017    LABALBU 4.2 11/26/2021    LDLCALC 73 04/05/2021    TRIG 89 04/05/2021    LABA1C 5.9 11/26/2021     Lab Results   Component Value Date    INR 0.88 06/18/2019    INR 0.95 05/23/2019    INR 1.02 11/17/2018         The patient was seen and examined on day of discharge and this discharge summary is in conjunction with any daily progress note from day of discharge. Time spent on discharge is more than 30 minutes in the examination, evaluation, counseling and review of medications and discharge plan. Signed:    Natalie Perez MD   12/19/2021    Thank you Sylvia Hoff DO for the opportunity to be involved in this patient's care.  If you have any questions or concerns please feel free to contact my office [phone].

## 2021-12-09 DIAGNOSIS — J44.1 COPD EXACERBATION (HCC): ICD-10-CM

## 2021-12-09 RX ORDER — BLOOD SUGAR DIAGNOSTIC
STRIP MISCELLANEOUS
Qty: 50 STRIP | Refills: 5 | OUTPATIENT
Start: 2021-12-09

## 2021-12-09 RX ORDER — ESCITALOPRAM OXALATE 5 MG/1
TABLET ORAL
Qty: 30 TABLET | Refills: 0 | OUTPATIENT
Start: 2021-12-09

## 2021-12-09 RX ORDER — ALBUTEROL SULFATE 90 UG/1
AEROSOL, METERED RESPIRATORY (INHALATION)
Qty: 6.7 EACH | Refills: 0 | OUTPATIENT
Start: 2021-12-09

## 2021-12-20 ENCOUNTER — TELEPHONE (OUTPATIENT)
Dept: OTHER | Facility: CLINIC | Age: 46
End: 2021-12-20

## 2021-12-20 ENCOUNTER — HOSPITAL ENCOUNTER (EMERGENCY)
Age: 46
Discharge: HOME | End: 2021-12-20
Payer: MEDICARE

## 2021-12-20 ENCOUNTER — APPOINTMENT (OUTPATIENT)
Dept: GENERAL RADIOLOGY | Age: 46
End: 2021-12-20
Payer: MEDICARE

## 2021-12-20 VITALS
HEART RATE: 86 BPM | DIASTOLIC BLOOD PRESSURE: 70 MMHG | TEMPERATURE: 100.2 F | OXYGEN SATURATION: 96 % | SYSTOLIC BLOOD PRESSURE: 136 MMHG | RESPIRATION RATE: 19 BRPM

## 2021-12-20 DIAGNOSIS — E87.1 HYPONATREMIA: ICD-10-CM

## 2021-12-20 DIAGNOSIS — U07.1 COVID-19: Primary | ICD-10-CM

## 2021-12-20 LAB
A/G RATIO: 0.7 (ref 1.1–2.2)
ALBUMIN SERPL-MCNC: 3.3 G/DL (ref 3.4–5)
ALP BLD-CCNC: 54 U/L (ref 40–129)
ALT SERPL-CCNC: 20 U/L (ref 10–40)
ANION GAP SERPL CALCULATED.3IONS-SCNC: 8 MMOL/L (ref 3–16)
AST SERPL-CCNC: <5 U/L (ref 15–37)
BASOPHILS ABSOLUTE: 0 K/UL (ref 0–0.2)
BASOPHILS RELATIVE PERCENT: 0.2 %
BILIRUB SERPL-MCNC: 0.4 MG/DL (ref 0–1)
BUN BLDV-MCNC: 13 MG/DL (ref 7–20)
CALCIUM SERPL-MCNC: 8.8 MG/DL (ref 8.3–10.6)
CHLORIDE BLD-SCNC: 90 MMOL/L (ref 99–110)
CO2: 30 MMOL/L (ref 21–32)
CREAT SERPL-MCNC: 0.7 MG/DL (ref 0.6–1.1)
EOSINOPHILS ABSOLUTE: 0 K/UL (ref 0–0.6)
EOSINOPHILS RELATIVE PERCENT: 0 %
GFR AFRICAN AMERICAN: >60
GFR NON-AFRICAN AMERICAN: >60
GLUCOSE BLD-MCNC: 142 MG/DL (ref 70–99)
HCT VFR BLD CALC: 43.2 % (ref 36–48)
HEMOGLOBIN: 14.3 G/DL (ref 12–16)
LYMPHOCYTES ABSOLUTE: 1.5 K/UL (ref 1–5.1)
LYMPHOCYTES RELATIVE PERCENT: 18.7 %
MCH RBC QN AUTO: 27.1 PG (ref 26–34)
MCHC RBC AUTO-ENTMCNC: 33.1 G/DL (ref 31–36)
MCV RBC AUTO: 82 FL (ref 80–100)
MONOCYTES ABSOLUTE: 0.9 K/UL (ref 0–1.3)
MONOCYTES RELATIVE PERCENT: 12.1 %
NEUTROPHILS ABSOLUTE: 5.4 K/UL (ref 1.7–7.7)
NEUTROPHILS RELATIVE PERCENT: 69 %
PDW BLD-RTO: 14.6 % (ref 12.4–15.4)
PLATELET # BLD: 93 K/UL (ref 135–450)
PLATELET SLIDE REVIEW: ABNORMAL
PMV BLD AUTO: 9.8 FL (ref 5–10.5)
POTASSIUM SERPL-SCNC: 5.2 MMOL/L (ref 3.5–5.1)
PRO-BNP: 527 PG/ML (ref 0–124)
RAPID INFLUENZA  B AGN: NEGATIVE
RAPID INFLUENZA A AGN: NEGATIVE
RBC # BLD: 5.27 M/UL (ref 4–5.2)
SARS-COV-2, NAAT: DETECTED
SLIDE REVIEW: ABNORMAL
SODIUM BLD-SCNC: 128 MMOL/L (ref 136–145)
TOTAL PROTEIN: 7.9 G/DL (ref 6.4–8.2)
TROPONIN: <0.01 NG/ML
WBC # BLD: 7.8 K/UL (ref 4–11)

## 2021-12-20 PROCEDURE — 93005 ELECTROCARDIOGRAM TRACING: CPT | Performed by: NURSE PRACTITIONER

## 2021-12-20 PROCEDURE — 71045 X-RAY EXAM CHEST 1 VIEW: CPT

## 2021-12-20 PROCEDURE — 80053 COMPREHEN METABOLIC PANEL: CPT

## 2021-12-20 PROCEDURE — 87804 INFLUENZA ASSAY W/OPTIC: CPT

## 2021-12-20 PROCEDURE — 84484 ASSAY OF TROPONIN QUANT: CPT

## 2021-12-20 PROCEDURE — 83880 ASSAY OF NATRIURETIC PEPTIDE: CPT

## 2021-12-20 PROCEDURE — 2580000003 HC RX 258: Performed by: NURSE PRACTITIONER

## 2021-12-20 PROCEDURE — 96374 THER/PROPH/DIAG INJ IV PUSH: CPT

## 2021-12-20 PROCEDURE — 99285 EMERGENCY DEPT VISIT HI MDM: CPT

## 2021-12-20 PROCEDURE — 85025 COMPLETE CBC W/AUTO DIFF WBC: CPT

## 2021-12-20 PROCEDURE — 6360000002 HC RX W HCPCS: Performed by: NURSE PRACTITIONER

## 2021-12-20 PROCEDURE — 87635 SARS-COV-2 COVID-19 AMP PRB: CPT

## 2021-12-20 RX ORDER — METHYLPREDNISOLONE SODIUM SUCCINATE 40 MG/ML
40 INJECTION, POWDER, LYOPHILIZED, FOR SOLUTION INTRAMUSCULAR; INTRAVENOUS ONCE
Status: COMPLETED | OUTPATIENT
Start: 2021-12-20 | End: 2021-12-20

## 2021-12-20 RX ORDER — 0.9 % SODIUM CHLORIDE 0.9 %
500 INTRAVENOUS SOLUTION INTRAVENOUS ONCE
Status: COMPLETED | OUTPATIENT
Start: 2021-12-20 | End: 2021-12-20

## 2021-12-20 RX ADMIN — METHYLPREDNISOLONE SODIUM SUCCINATE 40 MG: 40 INJECTION, POWDER, FOR SOLUTION INTRAMUSCULAR; INTRAVENOUS at 19:32

## 2021-12-20 RX ADMIN — SODIUM CHLORIDE 500 ML: 9 INJECTION, SOLUTION INTRAVENOUS at 22:40

## 2021-12-20 NOTE — ED NOTES
Bed: 27  Expected date:   Expected time:   Means of arrival:   Comments:  Mark 1822, NELSON  12/20/21 1826

## 2021-12-20 NOTE — ED PROVIDER NOTES
pain    Other disorders of kidney and ureter     KIDNEY FAILURE, ACUTE    Pneumonia     Smoking history          SURGICAL HISTORY:      Past Surgical History:   Procedure Laterality Date    BRONCHOSCOPY  12/21/2017    BAL    CYSTOSCOPY  1/5/15    cysto with left stent placement    CYSTOSCOPY  10/6/15    stent removal    CYSTOSCOPY  11/09/2019    left uretroscopy with stent placement    CYSTOSCOPY Left 11/13/2019    LEFT URETEROSCOPY WITH HOLMIUM LASER LITHOTRIPSY, STENT REMOVAL,  STONE EXTRACTION     CYSTOSCOPY INSERTION / REMOVAL STENT / STONE Left 11/9/2019    CYSTOSCOPY, URETEROSCOPY, STENT PLACEMENT performed by Rik Naranjo at [address] / [address] / [place] Alfonso Left 11/13/2019    CYSTOSCOPY, LEFT URETEROSCOPY WITH HOLMIUM LASER LITHOTRIPSY, STENT REMOVAL,  STONE EXTRACTION performed by Margoth Rivera MD at [place] [address]      URETER STENT PLACEMENT           CURRENT MEDICATIONS:       Discharge Medication List as of 12/20/2021 10:13 PM      CONTINUE these medications which have NOT CHANGED    Details   gabapentin (NEURONTIN) 600 MG tablet Take 1 tablet by mouth 3 times daily for 30 days. , Disp-90 tablet, R-0Normal      metFORMIN (GLUCOPHAGE) 500 MG tablet TAKE 1 TABLET BY MOUTH TWICE A DAY WITH MEALS, Disp-60 tablet, R-1DX Code Needed  . Normal      lisinopril (PRINIVIL;ZESTRIL) 30 MG tablet TAKE 1 TABLET BY MOUTH EVERY DAY, Disp-30 tablet, R-0DX Code Needed  . Normal      atorvastatin (LIPITOR) 10 MG tablet TAKE 1 TABLET BY MOUTH EVERY DAY, Disp-30 tablet, R-0DX Code Needed  . Normal      escitalopram (LEXAPRO) 10 MG tablet Take 1 tablet by mouth daily, Disp-30 tablet, R-2Normal      fluticasone-salmeterol (ADVAIR DISKUS) 250-50 MCG/DOSE AEPB Inhale 1 puff into the lungs every 12 hours, Disp-60 each, R-3Normal      albuterol sulfate  (90 Base) MCG/ACT inhaler TAKE 2 PUFFS BY MOUTH EVERY 6 HOURS AS NEEDED FOR WHEEZE, Disp-6.7 each, R-0Normal      glucose monitoring (FREESTYLE FREEDOM) kit DAILY Starting Wed 9/8/2021, Disp-1 kit, R-0, Normal      blood glucose monitor strips Test 4 times a day & as needed for symptoms of irregular blood glucose. Dispense sufficient amount for indicated testing frequency plus additional to accommodate PRN testing needs. , Disp-300 strip, R-0, Normal      !! Lancets MISC 4 TIMES DAILY Starting Wed 9/8/2021, Disp-300 each, R-1, Normal      !! [facility] Lancets Ultra-Thin 30G MISC USE AS DIRECTED TO TEST BLOOD SUGAR, Disp-100 each, R-11Normal      glucose monitoring kit (FREESTYLE) monitoring kit DAILY Starting Mon 4/5/2021, Disp-1 kit, R-0, [id number]      ROBAFEN DM COUGH  MG/5ML syrup DAWHistorical Med      OXYGEN Inhale 4 L into the lungs continuous, R-0Historical Med       !! - Potential duplicate medications found. Please discuss with provider. ALLERGIES:    Pcn [penicillins], Aspirin, Pcn [penicillins], and Sulfamethoxazole-trimethoprim    FAMILY HISTORY:       Family History   Problem Relation Age of Onset    Heart Disease Mother     No Known Problems Father     Heart Disease Sister     No Known Problems Brother     No Known Problems Maternal Grandmother     No Known Problems Maternal Grandfather     No Known Problems Paternal Grandmother     No Known Problems Paternal Grandfather     No Known Problems Other           SOCIAL HISTORY:     Social History     Socioeconomic History    Marital status:       Spouse name: None    Number of children: 2    Years of education: None    Highest education level: None   Occupational History    None   Tobacco Use    Smoking status: Current Every Day Smoker     Packs/day: 0.50     Years: 33.00     Pack years: 16.50     Types: E-Cigarettes    Smokeless tobacco: Never Used   Vaping Use    Vaping Use: Every day    Substances: Always   Substance and Sexual Activity    Alcohol use: No    Drug use: No     Comment: Heroin    Sexual activity: Not Currently     Partners: Male   Other Topics Concern    None   Social History Narrative    ** Merged History Encounter **          Social Determinants of Health     Financial Resource Strain: Low Risk     Difficulty of Paying Living Expenses: Not hard at all   Food Insecurity: No Food Insecurity    Worried About Running Out of Food in the Last Year: Never true    Akil of Food in the Last Year: Never true   Transportation Needs: No Transportation Needs    Lack of Transportation (Medical): No    Lack of Transportation (Non-Medical): No   Physical Activity:     Days of Exercise per Week: Not on file    Minutes of Exercise per Session: Not on file   Stress:     Feeling of Stress : Not on file   Social Connections:     Frequency of Communication with Friends and Family: Not on file    Frequency of Social Gatherings with Friends and Family: Not on file    Attends Holiness Services: Not on file    Active Member of Clubs or Organizations: Not on file    Attends Club or Organization Meetings: Not on file    Marital Status: Not on file   Intimate Partner Violence:     Fear of Current or Ex-Partner: Not on file    Emotionally Abused: Not on file    Physically Abused: Not on file    Sexually Abused: Not on file   Housing Stability: Unknown    Unable to Pay for Housing in the Last Year: No    Number of [place] in the Last Year: Not on file    Unstable Housing in the Last Year: No       SCREENINGS:    Carlyle Coma Scale  Eye Opening: Spontaneous  Best Verbal Response: Oriented  Best Motor Response: Obeys commands  Harvey Coma Scale Score: 15        PHYSICAL EXAM:       ED Triage Vitals [12/20/21 1817]   BP Temp Temp Source Pulse Resp SpO2 Height Weight   113/81 100.2 °F (37.9 °C) Oral 86 20 91 % -- --       Physical Exam    CONSTITUTIONAL: Awake and alert. Cooperative. Well-developed. Well-nourished. Non-toxic. No acute distress.   Vitals:    12/20/21 2049 12/20/21 2119 12/20/21 2219 12/20/21 2249   BP: 109/65 125/73 (!) 161/82 136/70   Pulse: 72 72 77 86   Resp: 25  22 19   Temp:       TempSrc:       SpO2: 94% 94% 96% 96%     HENT: Normocephalic. Atraumatic. External ears normal, without discharge. TMs clear bilaterally. Nonasal discharge. Oropharynx clear, no erythema. Mucous membranes moist.  EYES: Conjunctiva non-injected, nolid abnormalities noted. No scleral icterus. PERRL. EOM's grossly intact. Anterior chambers clear. NECK: Supple. Normal ROM. No meningismus. No thyroid tenderness or swelling noted. CARDIOVASCULAR: RRR. No Murmer. No carotid bruits. PULMONARY/CHEST WALL: Effort normal. No tachypnea. Lungs clear to ausculation. ABDOMEN: Normal BS. Soft. Nondistended. No tenderness to palpation. No guarding. No hernias noted. No splenomegaly. Back: Spine is midline. No ecchymosis. No crepituson palpation. No obvious subluxation of vertebral column. No saddle anesthesia or evidence of cauda equina. /ANORECTAL: Not assessed  MUSKULOSKELETAL: Normal ROM. No acute deformities. No edema. No tenderness to palpate. SKIN: Warm and dry. NEUROLOGICAL:  GCS 15. CN II-XII grossly intact. Strength is 5/5 in all extremities and sensation is intact. PSYCHIATRIC: Normal affect, normal insight and judgement. Alert and oriented x 3.         DIAGNOSTIC RESULTS:     LABS:    Results for orders placed or performed during the hospital encounter of 12/20/21   Rapid influenza A/B antigens    Specimen: Nasopharyngeal   Result Value Ref Range    Rapid Influenza A Ag Negative Negative    Rapid Influenza B Ag Negative Negative   SARS-CoV-2 NAAT (Rapid)    Specimen: Nasopharyngeal Swab   Result Value Ref Range    SARS-CoV-2, NAAT DETECTED (AA) Not Detected   CBC auto differential   Result Value Ref Range    WBC 7.8 4.0 - 11.0 K/uL    RBC 5.27 (H) 4.00 - 5.20 M/uL    Hemoglobin 14.3 12.0 - 16.0 g/dL    Hematocrit 43.2 36.0 - 48.0 %    MCV 82.0 80.0 - 100.0 fL    MCH 27.1 26.0 - 34.0 pg    MCHC 33.1 31.0 - 36.0 g/dL    RDW 14.6 12.4 - 15.4 %    Platelets 93 (L) 893 - 450 K/uL    MPV 9.8 5.0 - 10.5 fL    PLATELET SLIDE REVIEW Decreased     SLIDE REVIEW see below     Neutrophils % 69.0 %    Lymphocytes % 18.7 %    Monocytes % 12.1 %    Eosinophils % 0.0 %    Basophils % 0.2 %    Neutrophils Absolute 5.4 1.7 - 7.7 K/uL    Lymphocytes Absolute 1.5 1.0 - 5.1 K/uL    Monocytes Absolute 0.9 0.0 - 1.3 K/uL    Eosinophils Absolute 0.0 0.0 - 0.6 K/uL    Basophils Absolute 0.0 0.0 - 0.2 K/uL   Comprehensive metabolic panel   Result Value Ref Range    Sodium 128 (L) 136 - 145 mmol/L    Potassium 5.2 (H) 3.5 - 5.1 mmol/L    Chloride 90 (L) 99 - 110 mmol/L    CO2 30 21 - 32 mmol/L    Anion Gap 8 3 - 16    Glucose 142 (H) 70 - 99 mg/dL    BUN 13 7 - 20 mg/dL    CREATININE 0.7 0.6 - 1.1 mg/dL    GFR Non-African American >60 >60    GFR African American >60 >60    Calcium 8.8 8.3 - 10.6 mg/dL    Total Protein 7.9 6.4 - 8.2 g/dL    Albumin 3.3 (L) 3.4 - 5.0 g/dL    Albumin/Globulin Ratio 0.7 (L) 1.1 - 2.2    Total Bilirubin 0.4 0.0 - 1.0 mg/dL    Alkaline Phosphatase 54 40 - 129 U/L    ALT 20 10 - 40 U/L    AST <5 (A) 15 - 37 U/L   Troponin   Result Value Ref Range    Troponin <0.01 <0.01 ng/mL   Brain Natriuretic Peptide   Result Value Ref Range    Pro- (H) 0 - 124 pg/mL   EKG 12 Lead   Result Value Ref Range    Ventricular Rate 74 BPM    Atrial Rate 74 BPM    P-R Interval 138 ms    QRS Duration 72 ms    Q-T Interval 348 ms    QTc Calculation (Bazett) 386 ms    P Axis 68 degrees    R Axis 85 degrees    T Axis 47 degrees    Diagnosis       Normal sinus rhythmConfirmed by Thejose alfredo Castillo MD, Paramjit Daigle ([id number]) on 12/21/2021 4:57:54 PM         RADIOLOGY:  All x-ray studies are viewed/reviewed by me. Formal interpretations per the radiologist are as follows:      XR CHEST PORTABLE   Final Result   Airspace opacities are seen in the mid and lower lungs, appearing slightly   more prominent than the prior study.   This could represent atelectasis and/or   infiltrate. EKG:  See EKG interpretation by an attending physician. PROCEDURES:   N/A    CRITICAL CARE TIME:   N/A    CONSULTS:  None      EMERGENCY DEPARTMENT COURSE andDIFFERENTIAL DIAGNOSIS/MDM:   Vitals:    Vitals:    12/20/21 2049 12/20/21 2119 12/20/21 2219 12/20/21 2249   BP: 109/65 125/73 (!) 161/82 136/70   Pulse: 72 72 77 86   Resp: 25 22 19   Temp:       TempSrc:       SpO2: 94% 94% 96% 96%       Patient wasgiven the following medications:  Medications   methylPREDNISolone sodium (SOLU-MEDROL) injection 40 mg (40 mg IntraVENous Given 12/20/21 1932)   0.9 % sodium chloride bolus (0 mLs IntraVENous Stopped 12/20/21 2310)         Patient was evaluated independently by myself with the attending physician available for consultation. Patient presented to the emergency room today with complaints of shortness of breath. Patient was found to be Covid positive here, she was ambulated on her 4 L and did not get hypoxic on her baseline oxygen. She had no chest pain. She was slightly hyponatremic at 128, she was given some fluids for this. Given her ambulatory pulse ox I do feel that she is able to be discharged home with strict return precautions. Patient verbalized understanding of the instructions. She was instructed to use Tylenol Motrin for fevers, stay hydrated, I did give her 40 of Solu-Medrol. She was discharged in good condition    Patient laboratory studies, radiographic imaging, and assessment were all discussed with the patient and/orpatient family. There was shared decision-making between myself as well as the patient and/or their surrogate and we are all in agreement with discharge home. There was an opportunity for questions and all questions were answered tothe best of my ability and to the satisfaction of the patient and/or patient family. FINAL IMPRESSION:      1. COVID-19    2.  Hyponatremia          DISPOSITION/PLAN:   DISPOSITION Decision To Discharge      PATIENT REFERRED TO:  [facility]  ED  [address]  Go to   If symptoms worsen      DISCHARGE MEDICATIONS:  Discharge Medication List as of 12/20/2021 10:13 PM                     (Please note thatportions of this note were completed with a voice recognition program.  Efforts were made to edit the dictations, but occasionally words are mis-transcribed.)    SABAS Martinez - CNP-C (electronicallysigned)        SAABS Martinez CNP  12/23/21 1144

## 2021-12-21 ENCOUNTER — CARE COORDINATION (OUTPATIENT)
Dept: CARE COORDINATION | Age: 46
End: 2021-12-21

## 2021-12-21 LAB
EKG ATRIAL RATE: 74 BPM
EKG DIAGNOSIS: NORMAL
EKG P AXIS: 68 DEGREES
EKG P-R INTERVAL: 138 MS
EKG Q-T INTERVAL: 348 MS
EKG QRS DURATION: 72 MS
EKG QTC CALCULATION (BAZETT): 386 MS
EKG R AXIS: 85 DEGREES
EKG T AXIS: 47 DEGREES
EKG VENTRICULAR RATE: 74 BPM

## 2021-12-21 PROCEDURE — 93010 ELECTROCARDIOGRAM REPORT: CPT | Performed by: INTERNAL MEDICINE

## 2021-12-21 NOTE — ED NOTES
Pt calls out stating that she has found a ride home. This RN to pt's room and provides discharge instructions, pulse oximeter, and oxygen tank. Pt provided with instructions for each. Pt instructed to return oxygen tank as soon as she is able. Pt verbalizes understanding. IV discontinued, catheter intact, minimal bleeding at IV site, 2x2 and tape applied, manual pressure held. Pt ambulatory to [facility].      Jesus Mckeon RN  12/20/21 4091

## 2021-12-21 NOTE — CARE COORDINATION
Patient contacted regarding COVID-19 diagnosis and pulse oximeter ordered at discharge. Discussed COVID-19 related testing which was available at this time. Test results were positive. Patient informed of results, if available? Yes. Ambulatory Care Manager contacted the patient by telephone to perform post discharge assessment. Call within 2 business days of discharge: Yes. Verified name and  with patient as identifiers. Provided introduction to self, and explanation of the CTN/ACM role, and reason for call due to risk factors for infection and/or exposure to COVID-19. Symptoms reviewed with patient who verbalized the following symptoms: no new symptoms and no worsening symptoms. Due to no new or worsening symptoms encounter was not routed to provider for escalation. Discussed follow-up appointments. If no appointment was previously scheduled, appointment scheduling offered: Yes. See below  [address] follow up appointment(s): No future appointments. Non-[facility] follow up appointment(s):     Non-face-to-face services provided:  Obtained and reviewed discharge summary and/or continuity of care documents     Advance Care Planning:   Does patient have an Advance Directive:  not on file; education provided. Educated patient about risk for severe COVID-19 due to risk factors according to CDC guidelines. ACM reviewed discharge instructions, medical action plan and red flag symptoms with the patient who verbalized understanding. Discussed COVID vaccination status: Yes. Education provided on COVID-19 vaccination as appropriate. Discussed exposure protocols and quarantine with CDC Guidelines. Patient was given an opportunity to verbalize any questions and concerns and agrees to contact ACM or health care provider for questions related to their healthcare. Reviewed and educated patient on any new and changed medications related to discharge diagnosis     Was patient discharged with a pulse oximeter?  Yes Discussed and confirmed pt verbalized understanding of discharge instructions, including use of Pulse Oximeter, and when to notify provider or seek emergency care. )2 sats remain at 89%, which pt maintains is baseline  Uses home 02 @ 4L  Denies any concerns beyond baseline status. Reports PCP released her from practice after missed visit which pt reports \"they caused\". Advised on importance to establish with new PCP asap - reviewing necessity to be established with provider for ongoing health maintenance, follow up of this ED visit, & likely soon-approaching need for refill of medications and/or 02 supplier will need new order for oxygen at home at some point in near future. Pointed to pt on DC instructions - number to call if pt elects to establish with PCP in [address] [phone]  Encouraged pt to call to schedule, noting appts scheduled for est care visits are often scheduled out 4-6 weeks in advance, which allows sufficient time for recovery of Covid  Pt verbalized understanding of above and agreement with the following  Plan:  ACM provided contact information. Plan for follow-up call in 5-7 days based on severity of symptoms and risk factors. Pt will schedule est care visit w/PCP - for appt s/p recovery of Covid.

## 2021-12-21 NOTE — ED NOTES
Ambulated patient on 4L of oxygen around Zone 2 with a pulse ox. Patient had a baseline SpO2 of 94% and resting HR of 93 bpm. Throughout ambulation, vitals did not deviate from 94% and 93 bpm. Patient does complain of \"feeling run down,\" however no complaints of increased dyspnea, fatigue, etc. Patient was returned to bed, rails x2 and reconnected to all telemonitors.      [facility]  12/20/21 5602

## 2021-12-22 ENCOUNTER — TELEPHONE (OUTPATIENT)
Dept: ADMINISTRATIVE | Age: 46
End: 2021-12-22

## 2022-01-04 DIAGNOSIS — J44.1 COPD EXACERBATION (HCC): ICD-10-CM

## 2022-01-04 RX ORDER — ALBUTEROL SULFATE 90 UG/1
AEROSOL, METERED RESPIRATORY (INHALATION)
Qty: 8.5 EACH | OUTPATIENT
Start: 2022-01-04

## 2022-01-14 ENCOUNTER — TELEPHONE (OUTPATIENT)
Dept: ADMINISTRATIVE | Age: 47
End: 2022-01-14

## 2022-01-17 NOTE — TELEPHONE ENCOUNTER
Received DENIAL for [place] [employer]. Letter attached. If this requires a response please respond to the pool. [address])    Please advise patient. Thank you.

## 2022-02-03 ENCOUNTER — VIRTUAL VISIT (OUTPATIENT)
Dept: PRIMARY CARE CLINIC | Age: 47
End: 2022-02-03
Payer: MEDICARE

## 2022-02-03 DIAGNOSIS — E66.01 MORBID OBESITY WITH BMI OF 40.0-44.9, ADULT (HCC): ICD-10-CM

## 2022-02-03 DIAGNOSIS — I10 HYPERTENSION, UNSPECIFIED TYPE: ICD-10-CM

## 2022-02-03 DIAGNOSIS — J96.12 CHRONIC RESPIRATORY FAILURE WITH HYPOXIA AND HYPERCAPNIA (HCC): Primary | ICD-10-CM

## 2022-02-03 DIAGNOSIS — J96.11 CHRONIC RESPIRATORY FAILURE WITH HYPOXIA AND HYPERCAPNIA (HCC): Primary | ICD-10-CM

## 2022-02-03 DIAGNOSIS — J44.1 COPD EXACERBATION (HCC): ICD-10-CM

## 2022-02-03 DIAGNOSIS — J44.9 CHRONIC OBSTRUCTIVE PULMONARY DISEASE, UNSPECIFIED COPD TYPE (HCC): ICD-10-CM

## 2022-02-03 DIAGNOSIS — F17.200 SMOKER: ICD-10-CM

## 2022-02-03 DIAGNOSIS — F41.1 GAD (GENERALIZED ANXIETY DISORDER): ICD-10-CM

## 2022-02-03 DIAGNOSIS — G62.9 NEUROPATHY: ICD-10-CM

## 2022-02-03 DIAGNOSIS — E11.9 TYPE 2 DIABETES MELLITUS WITHOUT COMPLICATION, WITHOUT LONG-TERM CURRENT USE OF INSULIN (HCC): ICD-10-CM

## 2022-02-03 PROCEDURE — 96127 BRIEF EMOTIONAL/BEHAV ASSMT: CPT | Performed by: FAMILY MEDICINE

## 2022-02-03 PROCEDURE — 99215 OFFICE O/P EST HI 40 MIN: CPT | Performed by: FAMILY MEDICINE

## 2022-02-03 PROCEDURE — G8484 FLU IMMUNIZE NO ADMIN: HCPCS | Performed by: FAMILY MEDICINE

## 2022-02-03 PROCEDURE — 3046F HEMOGLOBIN A1C LEVEL >9.0%: CPT | Performed by: FAMILY MEDICINE

## 2022-02-03 PROCEDURE — 4004F PT TOBACCO SCREEN RCVD TLK: CPT | Performed by: FAMILY MEDICINE

## 2022-02-03 PROCEDURE — 2022F DILAT RTA XM EVC RTNOPTHY: CPT | Performed by: FAMILY MEDICINE

## 2022-02-03 PROCEDURE — G8427 DOCREV CUR MEDS BY ELIG CLIN: HCPCS | Performed by: FAMILY MEDICINE

## 2022-02-03 PROCEDURE — 3023F SPIROM DOC REV: CPT | Performed by: FAMILY MEDICINE

## 2022-02-03 PROCEDURE — G8417 CALC BMI ABV UP PARAM F/U: HCPCS | Performed by: FAMILY MEDICINE

## 2022-02-03 RX ORDER — LISINOPRIL 30 MG/1
TABLET ORAL
Qty: 30 TABLET | Refills: 0 | Status: SHIPPED | OUTPATIENT
Start: 2022-02-03 | End: 2022-03-09 | Stop reason: SDUPTHER

## 2022-02-03 RX ORDER — BLOOD SUGAR DIAGNOSTIC
STRIP MISCELLANEOUS
Qty: 50 STRIP | Refills: 5 | OUTPATIENT
Start: 2022-02-03

## 2022-02-03 RX ORDER — ESCITALOPRAM OXALATE 10 MG/1
10 TABLET ORAL DAILY
Qty: 30 TABLET | Refills: 2 | Status: SHIPPED | OUTPATIENT
Start: 2022-02-03 | End: 2022-03-09 | Stop reason: SDUPTHER

## 2022-02-03 RX ORDER — GABAPENTIN 600 MG/1
600 TABLET ORAL 3 TIMES DAILY
Qty: 90 TABLET | Refills: 0 | Status: SHIPPED | OUTPATIENT
Start: 2022-02-03 | End: 2022-03-09 | Stop reason: SDUPTHER

## 2022-02-03 RX ORDER — ALBUTEROL SULFATE 90 UG/1
AEROSOL, METERED RESPIRATORY (INHALATION)
Qty: 8.5 EACH | OUTPATIENT
Start: 2022-02-03

## 2022-02-03 RX ORDER — METHYLPREDNISOLONE 4 MG/1
TABLET ORAL
Qty: 1 KIT | Refills: 0 | Status: SHIPPED | OUTPATIENT
Start: 2022-02-03

## 2022-02-03 ASSESSMENT — ENCOUNTER SYMPTOMS
DIARRHEA: 0
SHORTNESS OF BREATH: 1
EYE PAIN: 0
STRIDOR: 0
APNEA: 0
CHOKING: 0
ABDOMINAL PAIN: 0
COUGH: 1
CONSTIPATION: 0
WHEEZING: 0

## 2022-02-03 ASSESSMENT — ANXIETY QUESTIONNAIRES
1. FEELING NERVOUS, ANXIOUS, OR ON EDGE: 1
7. FEELING AFRAID AS IF SOMETHING AWFUL MIGHT HAPPEN: 1
IF YOU CHECKED OFF ANY PROBLEMS ON THIS QUESTIONNAIRE, HOW DIFFICULT HAVE THESE PROBLEMS MADE IT FOR YOU TO DO YOUR WORK, TAKE CARE OF THINGS AT HOME, OR GET ALONG WITH OTHER PEOPLE: SOMEWHAT DIFFICULT
2. NOT BEING ABLE TO STOP OR CONTROL WORRYING: 1
4. TROUBLE RELAXING: 1
3. WORRYING TOO MUCH ABOUT DIFFERENT THINGS: 1
GAD7 TOTAL SCORE: 9
5. BEING SO RESTLESS THAT IT IS HARD TO SIT STILL: 1
6. BECOMING EASILY ANNOYED OR IRRITABLE: 3

## 2022-02-03 NOTE — PROGRESS NOTES
2/3/2022    TELEHEALTH EVALUATION -- Audio/Visual (During QSUIY-15 public health emergency)    HPI:    Osman Antoine (:  1975) has requested an audio/video evaluation for the following concern(s): Cindy Barkley is establishing care with me as her new PCP. She is a smoker with COPD, currently dealing with chronic hypoxic resp failure, 4L home oxygen, continuous via NC. Her main concern today is the fact that she is unable to breath well because her oxygen has ran out. No oxygen since yesterday AM. Still smoking however. Home o2 sat is 91%. Using her alb, out of her advair. Going through med rec, she is out of a lot of her medications. H/o HTN, DM and ZACK. ZACK 7 SCORE 2/3/2022   ZACK-7 Total Score 9     Interpretation of ZACK-7 score: 5-9 = mild anxiety, 10-14 = moderate anxiety, 15+ = severe anxiety. Recommend referral to behavioral health for scores 10 or greater. Currently on lexapro but ran out  Would like to continue  Not interested in [facility]. Due for HM items. Not interested in screening at this time. DMT2  Rx: Metformin 500 mg daily  Pt denied any hypoglycemia episodes nor symptoms suggestive of hypoglycemia which include profuse sweating, anxiousness, light headedness. Check BG at home: no  Pt is on an ACEI/ARB, ASA, Statin. Last Retinopathy screen: unknown  Last Diabetic foot exam: unknown   Vaccinations: due for flu. Lab Results   Component Value Date    LABA1C 5.9 2021     Lab Results   Component Value Date    CREATININE 0.7 2021     Lab Results   Component Value Date    LABMICR YES 2021       HTN  Rx: lisinopril 30 mg daily  Compliant: no- ran out  Does not check BP at home. Lab Results   Component Value Date    CREATININE 0.7 2021     Tobacco use: smokes about one pk per day  Not interested in quitting. Reviewed most recent labs and discussed results and need to update to treat accordingly.      Review of Systems   Constitutional: Positive for fatigue. Negative for appetite change, chills and fever. HENT: Positive for congestion. Eyes: Negative for pain and visual disturbance. Respiratory: Positive for cough and shortness of breath. Negative for apnea, choking, wheezing and stridor. Cardiovascular: Negative for chest pain and palpitations. Gastrointestinal: Negative for abdominal pain, constipation and diarrhea. Genitourinary: Negative for difficulty urinating. Musculoskeletal: Negative for arthralgias. Skin: Negative for rash and wound. Neurological: Negative for dizziness, weakness, light-headedness and headaches. Hematological: Does not bruise/bleed easily. Psychiatric/Behavioral: Negative for behavioral problems. Prior to Visit Medications    Medication Sig Taking? Authorizing Provider   methylPREDNISolone (MEDROL DOSEPACK) 4 MG tablet Take by mouth. Yes Victoria Bardales MD   escitalopram (LEXAPRO) 10 MG tablet Take 1 tablet by mouth daily Yes Victoria Bardales MD   fluticasone-salmeterol (ADVAIR DISKUS) 250-50 MCG/DOSE AEPB Inhale 1 puff into the lungs every 12 hours Yes Victoria Bardales MD   gabapentin (NEURONTIN) 600 MG tablet Take 1 tablet by mouth 3 times daily for 30 days. Yes Victoria Bardales MD   lisinopril (PRINIVIL;ZESTRIL) 30 MG tablet TAKE 1 TABLET BY MOUTH EVERY DAY Yes Victoria Bardales MD   metFORMIN (GLUCOPHAGE) 500 MG tablet TAKE 1 TABLET BY MOUTH TWICE A DAY WITH MEALS Yes Victoria Bardales MD   atorvastatin (LIPITOR) 10 MG tablet TAKE 1 TABLET BY MOUTH EVERY DAY Yes Madison Jane,    albuterol sulfate  (90 Base) MCG/ACT inhaler TAKE 2 PUFFS BY MOUTH EVERY 6 HOURS AS NEEDED FOR WHEEZE Yes Madison Jane DO   glucose monitoring (FREESTYLE FREEDOM) kit 1 kit by Does not apply route daily  Madison Jane, DO   blood glucose monitor strips Test 4 times a day & as needed for symptoms of irregular blood glucose.  Dispense sufficient amount for indicated testing frequency plus additional to accommodate PRN testing needs. Madison Jane DO   Lancets MISC 1 each by Does not apply route 4 times daily  Madison Jane DO   [facility] Lancets Ultra-Thin 30G MISC USE AS DIRECTED TO TEST BLOOD SUGAR  Madison Jane DO   glucose monitoring kit (FREESTYLE) monitoring kit 1 kit by Does not apply route daily E11.9  Madisonarti Jane DO   ROBAFEN DM COUGH  MG/5ML syrup   Historical Provider, MD   OXYGEN Inhale 4 L into the lungs continuous  Mariza Medel MD       Social History     Tobacco Use    Smoking status: Current Every Day Smoker     Packs/day: 0.50     Years: 33.00     Pack years: 16.50     Types: E-Cigarettes    Smokeless tobacco: Never Used   Vaping Use    Vaping Use: Every day    Substances: Always   Substance Use Topics    Alcohol use: No    Drug use: No     Comment: Heroin        Allergies   Allergen Reactions    Pcn [Penicillins] Shortness Of Breath and Swelling    Aspirin Other (See Comments)     Cause GI upset.  But takes 81 mg aspirin at home    Pcn [Penicillins]      swelling    Sulfamethoxazole-Trimethoprim Hives   ,   Past Medical History:   Diagnosis Date    Acute cystitis with hematuria     Bacteremia 08/23/2017    staph aureus    CH[facility]congestive heart failure) ([facility])     Colonization status 7/26/12    DNA probe negative for MRSA    COPD [facility]chronic obstructive [facility] disease) [facility]     Diabetes mellitus ([facility]     FOR ABOUT 4 YEARS    DM (diabetes mellitus) ([facility]     Drug abuse, IV (HCC)     Hepatitis     Hepatitis C antibody positive in blood 08/24/2017    Hepatitis C AB positive     Heroin overdose ([facility]     Hypertension     MRSA infection     LUNGS    Neuropathy     legs with pain    Other disorders of kidney and ureter     KIDNEY FAILURE, ACUTE    Pneumonia     Smoking history    ,   Past Surgical History:   Procedure Laterality Date    BRONCHOSCOPY  12/21/2017    BAL    CYSTOSCOPY  1/5/15    cysto with left stent placement    CYSTOSCOPY  10/6/15    stent removal    CYSTOSCOPY  11/09/2019    left uretroscopy with stent placement    CYSTOSCOPY Left 11/13/2019    LEFT URETEROSCOPY WITH HOLMIUM LASER LITHOTRIPSY, STENT REMOVAL,  STONE EXTRACTION     CYSTOSCOPY INSERTION / REMOVAL STENT / STONE Left 11/9/2019    CYSTOSCOPY, URETEROSCOPY, STENT PLACEMENT performed by Jeff Vásquez at [address] / REMOVAL [address] / STONE Left 11/13/2019    CYSTOSCOPY, LEFT URETEROSCOPY WITH HOLMIUM LASER LITHOTRIPSY, STENT REMOVAL,  STONE EXTRACTION performed by Joni Blank MD at [facility]   Social History     Tobacco Use    Smoking status: Current Every Day Smoker     Packs/day: 0.50     Years: 33.00     Pack years: 16.50     Types: E-Cigarettes    Smokeless tobacco: Never Used   Vaping Use    Vaping Use: Every day    Substances: Always   Substance Use Topics    Alcohol use: No    Drug use: No     Comment: Heroin   ,   Family History   Problem Relation Age of Onset    Heart Disease Mother     No Known Problems Father     Heart Disease Sister     No Known Problems Brother     No Known Problems Maternal Grandmother     No Known Problems Maternal Grandfather     No Known Problems Paternal Grandmother     No Known Problems Paternal Grandfather     No Known Problems Other    ,   Immunization History   Administered Date(s) Administered    Hepatitis B 08/08/2019    Hepatitis B Adult (Engerix-B) 08/08/2019    Influenza Vaccine, unspecified formulation 10/03/2015, 10/04/2019    Influenza Whole 12/28/2012    Influenza, MDCK Quadv, IM, PF (Flucelvax 2 yrs and older) 11/15/2017    Influenza, Quadv, IM, (6 mo and older Fluzone, Flulaval, Fluarix and 3 yrs and older Afluria) 10/04/2019    PPD Test 10/05/2012    Pneumococcal Conjugate Vaccine 11/03/2014    Pneumococcal Polysaccharide ([id number]) 12/28/2012    Tdap (Boostrix, Adacel) 10/05/2012, 06/08/2021   ,   Health Maintenance   Topic Date Due    COVID-19 Vaccine (1) Never done    Diabetic retinal exam  Never done    Cervical cancer screen  Never done    Colon cancer screen colonoscopy  Never done    Hepatitis B vaccine (2 of 3 - Risk 3-dose series) 09/05/2019    Diabetic foot exam  01/07/2021    Flu vaccine (1) 09/01/2021    Lipid screen  04/05/2022    Depression Screen  04/05/2022    Diabetic microalbuminuria test  06/14/2022    A1C test (Diabetic or Prediabetic)  11/26/2022    Potassium monitoring  12/20/2022    Creatinine monitoring  12/20/2022    DTaP/Tdap/Td vaccine (3 - Td or Tdap) 06/08/2031    Pneumococcal 0-64 years Vaccine (2 of 2 - PPSV23) 03/17/2038    HIV screen  Completed    Hepatitis A vaccine  Aged Out    Hib vaccine  Aged Out    Meningococcal (ACWY) vaccine  Aged Out       PHYSICAL EXAMINATION:  [ INSTRUCTIONS:  \"[x]\" Indicates a positive item  \"[]\" Indicates a negative item  -- DELETE ALL ITEMS NOT EXAMINED]  [x] Alert  [x] Oriented to person/place/time    [x] No apparent distress  [] Toxic appearing    [] Face flushed appearing [x] Sclera clear  [] Lips are cyanotic      [x] Breathing appears normal, just coughing a lot  [] Appears tachypneic      [] Rash on visible skin    [x] Cranial Nerves II-XII grossly intact    [] Motor grossly intact in visible upper extremities    [] Motor grossly intact in visible lower extremities    [x] Normal Mood  [] Anxious appearing    [] Depressed appearing  [] Confused appearing      Due to this being a TeleHealth encounter, evaluation of the following organ systems is limited: Vitals/Constitutional/EENT/Resp/CV/GI//MS/Neuro/Skin/Heme-Lymph-Imm. ASSESSMENT/PLAN:  1. Chronic respiratory failure with hypoxia and hypercapnia on 4 L home O2  Patient is out of oxygen and her main concern is getting more delivered.    Home o2 is 91% today  Continues to smoke  Using her albuterol  Out of her advair  Will give a steroid in the meantime.   - methylPREDNISolone (MEDROL DOSEPACK) 4 MG tablet; Take by mouth. Dispense: 1 kit; Refill: 0    2. Chronic obstructive pulmonary disease, unspecified COPD type ([facility]  Patient is out of oxygen and her main concern is getting more delivered. Home o2 is 91% today  Continues to smoke  Using her albuterol  Out of her advair  Will give a steroid in the meantime.   - methylPREDNISolone (MEDROL DOSEPACK) 4 MG tablet; Take by mouth. Dispense: 1 kit; Refill: 0  - fluticasone-salmeterol (ADVAIR DISKUS) 250-50 MCG/DOSE AEPB; Inhale 1 puff into the lungs every 12 hours  Dispense: 60 each; Refill: 3    3. COPD exacerbation ([facility]  Patient is out of oxygen and her main concern is getting more delivered. Home o2 is 91% today  Continues to smoke  Using her albuterol  Out of her advair  Will give a steroid in the meantime. Discussed cherie for urgent care if she cannot breath or is in distress--> low threshold to call 911  - methylPREDNISolone (MEDROL DOSEPACK) 4 MG tablet; Take by mouth. Dispense: 1 kit; Refill: 0    4. Neuropathy  Needs refill.   - gabapentin (NEURONTIN) 600 MG tablet; Take 1 tablet by mouth 3 times daily for 30 days. Dispense: 90 tablet; Refill: 0    5. Smoker  Discussed how smoking is causing her more problems   Explained benefits of quitting   Discussed for greater than 3 minutes  - CO TOBACCO USE CESSATION INTERMEDIATE 3-10 MINUTES    6. Hypertension, unspecified type  Refilled  Doesn't check BP at home  Update labs  - lisinopril (PRINIVIL;ZESTRIL) 30 MG tablet; TAKE 1 TABLET BY MOUTH EVERY DAY  Dispense: 30 tablet; Refill: 0    7. Type 2 diabetes mellitus without complication, without long-term current use of insulin (formerly [facility])  Refill for patient  Update hgbA1c and other labs  Doesn't check sugars at home consistently.   Lab Results   Component Value Date    LABA1C 5.9 11/26/2021   - metFORMIN (GLUCOPHAGE) 500 MG tablet; TAKE 1 TABLET BY MOUTH TWICE A DAY WITH MEALS  Dispense: 60 tablet; Refill: 1    8. ZACK (generalized anxiety disorder)  Refilled   Updated ZACK and interpreted. - escitalopram (LEXAPRO) 10 MG tablet; Take 1 tablet by mouth daily  Dispense: 30 tablet; Refill: 2  - OR BEHAV ASSMT W/SCORE & DOCD/STAND INSTRUMENT    9. Morbid obesity with BMI of 40.0-44.9, adult [facility])  Complicating all aspects of patient's care. No follow-ups on file. No future appointments. An  electronic signature was used to authenticate this note. --Mikel Mae MD on 2/3/2022 at 11:58 AM    {Coding Help -- Use CPT 87513-98201 with EM elements or Time rules for Office Visits. :    >20 minutes were spent on the digital evaluation and management of this patient. Pursuant to the emergency declaration under the [facility], [facility] waiver authority and the [employer] Act, this Virtual  Visit was conducted, with patient's consent, to reduce the patient's risk of exposure to COVID-19 and provide continuity of care for an established patient. Services were provided through a video synchronous discussion virtually to substitute for in-person clinic visit.

## 2022-02-07 ENCOUNTER — TELEPHONE (OUTPATIENT)
Dept: PRIMARY CARE CLINIC | Age: 47
End: 2022-02-07

## 2022-02-07 NOTE — TELEPHONE ENCOUNTER
Kaylynn Mckeon her to follow up with pulmonology for the proper testing.      Thank you,  Dr. Bella Estrada

## 2022-02-07 NOTE — TELEPHONE ENCOUNTER
Called [facility]. This pt would be restarted as a new patient in there system. They had tried to reach out to pt for equipment eval and changes. She would need a new oxygen evaluation, orders and supporting notes from the doc. Weather this be pcp or Pulmonary. Pt started this process back in 2020 but never followed up with them.

## 2022-02-22 DIAGNOSIS — J44.1 COPD EXACERBATION (HCC): ICD-10-CM

## 2022-02-22 RX ORDER — ALBUTEROL SULFATE 90 UG/1
AEROSOL, METERED RESPIRATORY (INHALATION)
Qty: 8.5 EACH | OUTPATIENT
Start: 2022-02-22

## 2022-02-22 RX ORDER — ESCITALOPRAM OXALATE 5 MG/1
TABLET ORAL
Qty: 30 TABLET | Refills: 0 | OUTPATIENT
Start: 2022-02-22

## 2022-02-22 RX ORDER — BLOOD SUGAR DIAGNOSTIC
STRIP MISCELLANEOUS
Qty: 50 STRIP | Refills: 5 | OUTPATIENT
Start: 2022-02-22

## 2022-02-28 ENCOUNTER — TELEPHONE (OUTPATIENT)
Dept: PRIMARY CARE CLINIC | Age: 47
End: 2022-02-28

## 2022-02-28 DIAGNOSIS — J44.1 COPD EXACERBATION (HCC): ICD-10-CM

## 2022-02-28 DIAGNOSIS — J44.9 CHRONIC OBSTRUCTIVE PULMONARY DISEASE, UNSPECIFIED COPD TYPE (HCC): ICD-10-CM

## 2022-02-28 DIAGNOSIS — J96.11 CHRONIC RESPIRATORY FAILURE WITH HYPOXIA AND HYPERCAPNIA (HCC): Primary | ICD-10-CM

## 2022-02-28 DIAGNOSIS — J96.12 CHRONIC RESPIRATORY FAILURE WITH HYPOXIA AND HYPERCAPNIA (HCC): Primary | ICD-10-CM

## 2022-02-28 NOTE — TELEPHONE ENCOUNTER
----- Message from Rosanne Simpson sent at 2/26/2022 10:04 AM EST -----  Subject: Refill Request    QUESTIONS  Name of Medication? Other - Oxygen tank has been broken, cpap machiene  Patient-reported dosage and instructions? needs to breathe, she is nodding   off from lack of oxygen  How many days do you have left? 0  Preferred Pharmacy? [facility]/PHARMACY #[id number]  Pharmacy phone number (if available)? [phone]  Additional Information for Provider? it needs to be sent to her house, not   the pharmacy, please call [phone] because they said they need approval.  ---------------------------------------------------------------------------  --------------  CALL BACK INFO  What is the best way for the office to contact you? OK to leave message on   voicemail  Preferred Call Back Phone Number?  [phone]

## 2022-02-28 NOTE — TELEPHONE ENCOUNTER
Medication:   Requested Prescriptions     Pending Prescriptions Disp Refills    OXYGEN  0     Sig: Inhale 4 L into the lungs continuous        Last Filled:      Patient Phone Number: [phone] (home)     Last appt: 2/3/2022   Next appt: Visit date not found    Last OARRS:   RX Monitoring 4/1/2020   Periodic Controlled Substance Monitoring No signs of potential drug abuse or diversion identified.

## 2022-03-01 NOTE — TELEPHONE ENCOUNTER
Spoke with [facility]. They are still waiting on the re qualification from Pulmonary. They are going to reach out to pt to see if they can help.  But they cant send any more equipment

## 2022-03-04 ENCOUNTER — NURSE TRIAGE (OUTPATIENT)
Dept: OTHER | Facility: CLINIC | Age: 47
End: 2022-03-04

## 2022-03-04 NOTE — TELEPHONE ENCOUNTER
Patient called stating that her oxygen concentrator is malfunctiong and [facility] ([phone]) needs a new prescription to supply a new one. CPAP machine is not working correctly either. Patient is very anxious. Call to [facility] who stated she would contact [facility] and call pt back.      Reason for Disposition   Nursing judgment    Protocols used: INFORMATION ONLY CALL - NO TRIAGE-ADULT-[place]

## 2022-03-07 ENCOUNTER — TELEPHONE (OUTPATIENT)
Dept: PRIMARY CARE CLINIC | Age: 47
End: 2022-03-07

## 2022-03-07 NOTE — TELEPHONE ENCOUNTER
Faxed.   Orders have been placed and faxed multiple times. See 2 other encounters from 2/7/22 and 2/28/22. NEED a new oxygen evaluation, orders and supporting notes from Pulmonary.

## 2022-03-07 NOTE — TELEPHONE ENCOUNTER
Needs chart notes, as well as testing; the wording n-c and bleedin added to the prescription         OXYGEN [[id number]]     Order Details  Dose: 4 L Route: Inhalation Frequency: CONTINUOUS   Dispense Quantity: 1 Units Refills: 0          Sig: Inhale 4 L into the lungs continuous         Start Date: 02/28/22 End Date: --   Written Date: 02/28/22 Expiration Date: 02/28/23       Diagnosis Association: Chronic respiratory failure with hypoxia and hypercapnia ([facility] (J96.11 , J96.12);  Chronic obstructive pulmonary disease, unspecified COPD type ([facility] (J44.9); COPD exacerbation ([facility] (J44.1)   Original Order:  OXYGEN [[id number]]

## 2022-03-09 ENCOUNTER — NURSE TRIAGE (OUTPATIENT)
Dept: OTHER | Facility: CLINIC | Age: 47
End: 2022-03-09

## 2022-03-09 ENCOUNTER — TELEPHONE (OUTPATIENT)
Dept: PRIMARY CARE CLINIC | Age: 47
End: 2022-03-09

## 2022-03-09 DIAGNOSIS — F41.1 GAD (GENERALIZED ANXIETY DISORDER): ICD-10-CM

## 2022-03-09 DIAGNOSIS — E11.9 TYPE 2 DIABETES MELLITUS WITHOUT COMPLICATION, WITHOUT LONG-TERM CURRENT USE OF INSULIN (HCC): ICD-10-CM

## 2022-03-09 DIAGNOSIS — J96.12 CHRONIC RESPIRATORY FAILURE WITH HYPOXIA AND HYPERCAPNIA (HCC): ICD-10-CM

## 2022-03-09 DIAGNOSIS — J96.11 CHRONIC RESPIRATORY FAILURE WITH HYPOXIA AND HYPERCAPNIA (HCC): ICD-10-CM

## 2022-03-09 DIAGNOSIS — J44.1 COPD EXACERBATION (HCC): ICD-10-CM

## 2022-03-09 DIAGNOSIS — I10 HYPERTENSION, UNSPECIFIED TYPE: ICD-10-CM

## 2022-03-09 DIAGNOSIS — J44.9 CHRONIC OBSTRUCTIVE PULMONARY DISEASE, UNSPECIFIED COPD TYPE (HCC): ICD-10-CM

## 2022-03-09 DIAGNOSIS — G62.9 NEUROPATHY: ICD-10-CM

## 2022-03-09 RX ORDER — ATORVASTATIN CALCIUM 10 MG/1
TABLET, FILM COATED ORAL
Qty: 30 TABLET | Refills: 0 | Status: SHIPPED | OUTPATIENT
Start: 2022-03-09

## 2022-03-09 RX ORDER — LANCETS
EACH MISCELLANEOUS
Qty: 100 EACH | Refills: 11 | Status: SHIPPED | OUTPATIENT
Start: 2022-03-09

## 2022-03-09 RX ORDER — ESCITALOPRAM OXALATE 10 MG/1
10 TABLET ORAL DAILY
Qty: 30 TABLET | Refills: 2 | Status: SHIPPED | OUTPATIENT
Start: 2022-03-09

## 2022-03-09 RX ORDER — ALBUTEROL SULFATE 90 UG/1
AEROSOL, METERED RESPIRATORY (INHALATION)
Qty: 6.7 EACH | Refills: 0 | Status: SHIPPED | OUTPATIENT
Start: 2022-03-09 | End: 2022-04-08

## 2022-03-09 RX ORDER — GABAPENTIN 600 MG/1
600 TABLET ORAL 3 TIMES DAILY
Qty: 90 TABLET | Refills: 1 | Status: SHIPPED | OUTPATIENT
Start: 2022-03-09 | End: 2022-04-08

## 2022-03-09 RX ORDER — LISINOPRIL 30 MG/1
TABLET ORAL
Qty: 30 TABLET | Refills: 3 | Status: SHIPPED | OUTPATIENT
Start: 2022-03-09

## 2022-03-09 NOTE — TELEPHONE ENCOUNTER
Received call from [facility] at [facility], caller not on line. Complaint: 02 Sats 76%, without oxygen x 1 week, confusion, shaky    Practice Name: [employer]    Caller's telephone number verified as [phone]    Connected with caller via phone, please see below triage     Subjective: Caller states \"I have no oxygen, my tanks are empty. My Cpap machine is broke. My oxygen level is running really low, right now it is 74%. \"     Current Symptoms: difficulty breathing, 02 74%, difficulty holding on to things- hands jerking, headaches    Hx of COPD    Onset: 2 weeks ago; worsening    Associated Symptoms: reduced activity, increased wakefulness    Pain Severity: NA    Temperature: NA    What has been tried: Nothing    LMP: NA Pregnant: NA    Recommended disposition: Call  Now d/t low oxygen and difficulty breathing. Patient agreeable and states she will hang up and call 911. Care advice provided, patient verbalizes understanding; denies any other questions or concerns; instructed to call back for any new or worsening symptoms. Patient/caller agrees to calling 911. Attention Provider: Thank you for allowing me to participate in the care of your patient. The patient was connected to triage in response to information provided to the ECC/PSC. Please do not respond through this encounter as the response is not directed to a shared pool.     Reason for Disposition   Sounds like a life-threatening emergency to the triager    Protocols used: COPD OXYGEN MONITORING AND HYPOXIA-ADULT-AH

## 2022-03-09 NOTE — TELEPHONE ENCOUNTER
----- Message from Perla Philip sent at 3/9/2022 10:20 AM EST -----  Subject: Refill Request    QUESTIONS  Name of Medication? gabapentin (NEURONTIN) 600 MG tablet  Patient-reported dosage and instructions? Take 1 tablet by mouth 3 times   daily for 30 days. How many days do you have left? 0  Preferred Pharmacy? [facility]/PHARMACY #[id number]  Pharmacy phone number (if available)? [phone]  ---------------------------------------------------------------------------  --------------,  Name of Medication? methylPREDNISolone (MEDROL DOSEPACK) 4 MG tablet  Patient-reported dosage and instructions? Take by mouth. How many days do you have left? 0  Preferred Pharmacy? [facility]/PHARMACY #[id number]  Pharmacy phone number (if available)? [phone]  ---------------------------------------------------------------------------  --------------,  Name of Medication? escitalopram (LEXAPRO) 10 MG tablet  Patient-reported dosage and instructions? Take 1 tablet by mouth daily  How many days do you have left? 0  Preferred Pharmacy? [facility]/PHARMACY #[id number]  Pharmacy phone number (if available)? [phone]  ---------------------------------------------------------------------------  --------------,  Name of Medication? fluticasone-salmeterol (ADVAIR DISKUS) 250-50 MCG/DOSE   AEPB  Patient-reported dosage and instructions? Inhale 1 puff into the lungs   every 12 hours  How many days do you have left? 0  Preferred Pharmacy? [facility]/PHARMACY #[id number]  Pharmacy phone number (if available)? [phone]  ---------------------------------------------------------------------------  --------------,  Name of Medication? lisinopril (PRINIVIL;ZESTRIL) 30 MG tablet  Patient-reported dosage and instructions? TAKE 1 TABLET BY MOUTH EVERY DAY  How many days do you have left? 0  Preferred Pharmacy? [facility]/PHARMACY #[id number]  Pharmacy phone number (if available)?  [phone]  ---------------------------------------------------------------------------  --------------,  Name of Medication? metFORMIN (GLUCOPHAGE) 500 MG tablet  Patient-reported dosage and instructions? TAKE 1 TABLET BY MOUTH TWICE A   DAY WITH MEALS  How many days do you have left? 0  Preferred Pharmacy? [facility]/PHARMACY #[id number]  Pharmacy phone number (if available)? [phone]  ---------------------------------------------------------------------------  --------------,  Name of Medication? OXYGEN  Patient-reported dosage and instructions? Inhale 4 L into the lungs   continuous  How many days do you have left? 0  [facility]erred [facility]? [id number]  Pharmacy phone number (if available)? [phone]  ---------------------------------------------------------------------------  --------------,  Name of Medication? [facility] Lancets Ultra-Thin 30G MISC  Patient-reported dosage and instructions? USE AS DIRECTED TO TEST BLOOD   SUGAR  How many days do you have left? 0  Preferred Pharmacy? [facility]/PHARMACY #[id number]  Pharmacy phone number (if available)? [phone]  ---------------------------------------------------------------------------  --------------,  Name of Medication? albuterol sulfate  (90 Base) MCG/ACT inhaler  Patient-reported dosage and instructions? TAKE 2 PUFFS BY MOUTH EVERY 6   HOURS AS NEEDED FOR WHEEZE  How many days do you have left? 0  [facility]? [id number]  Pharmacy phone number (if available)? [phone]  ---------------------------------------------------------------------------  --------------,  Name of Medication? atorvastatin (LIPITOR) 10 MG tablet  Patient-reported dosage and instructions? TAKE 1 TABLET BY MOUTH EVERY DAY  How many days do you have left? 0  [facility]? [id number]  Pharmacy phone number (if available)? [phone]  Additional Information for Provider? Please call when sent to the pharmacy  ---------------------------------------------------------------------------  --------------  [address]  What is the best way for the office to contact you?  OK to leave message on   voicemail  Preferred Call Back Phone Number?  [phone]

## 2022-03-09 NOTE — TELEPHONE ENCOUNTER
Pt refill of oxygen was sent to [facility]; they can not fill this prescription; it can be filled at [facility]. . Prescription can be sent there.

## 2022-03-09 NOTE — TELEPHONE ENCOUNTER
Medication:   Requested Prescriptions     Pending Prescriptions Disp Refills    albuterol sulfate  (90 Base) MCG/ACT inhaler 6.7 each 0     Sig: TAKE 2 PUFFS BY MOUTH EVERY 6 HOURS AS NEEDED FOR WHEEZE    atorvastatin (LIPITOR) 10 MG tablet 30 tablet 0     Sig: TAKE 1 TABLET BY MOUTH EVERY DAY    OXYGEN 1 Units 0     Sig: Inhale 4 L into the lungs continuous    [facility] Lancets Ultra-Thin 30G MISC 100 each 11     Sig: USE AS DIRECTED TO TEST BLOOD SUGAR    metFORMIN (GLUCOPHAGE) 500 MG tablet 60 tablet 1     Sig: TAKE 1 TABLET BY MOUTH TWICE A DAY WITH MEALS    lisinopril (PRINIVIL;ZESTRIL) 30 MG tablet 30 tablet 0     Sig: TAKE 1 TABLET BY MOUTH EVERY DAY    escitalopram (LEXAPRO) 10 MG tablet 30 tablet 2     Sig: Take 1 tablet by mouth daily    fluticasone-salmeterol (ADVAIR DISKUS) 250-50 MCG/DOSE AEPB 60 each 3     Sig: Inhale 1 puff into the lungs every 12 hours    gabapentin (NEURONTIN) 600 MG tablet 90 tablet 0     Sig: Take 1 tablet by mouth 3 times daily for 30 days. Last Filled:      Patient Phone Number: [phone] (home)     Last appt: 2/3/2022   Next appt: Visit date not found    Last OARRS:   RX Monitoring 4/1/2020   Periodic Controlled Substance Monitoring No signs of potential drug abuse or diversion identified.

## 2022-03-10 ENCOUNTER — TELEPHONE (OUTPATIENT)
Dept: PRIMARY CARE CLINIC | Age: 47
End: 2022-03-10

## 2022-03-10 DIAGNOSIS — J44.1 COPD EXACERBATION (HCC): ICD-10-CM

## 2022-03-10 DIAGNOSIS — J44.9 CHRONIC OBSTRUCTIVE PULMONARY DISEASE, UNSPECIFIED COPD TYPE (HCC): ICD-10-CM

## 2022-03-10 DIAGNOSIS — J96.11 CHRONIC RESPIRATORY FAILURE WITH HYPOXIA AND HYPERCAPNIA (HCC): ICD-10-CM

## 2022-03-10 DIAGNOSIS — J96.12 CHRONIC RESPIRATORY FAILURE WITH HYPOXIA AND HYPERCAPNIA (HCC): ICD-10-CM

## 2022-03-15 ENCOUNTER — APPOINTMENT (OUTPATIENT)
Dept: GENERAL RADIOLOGY | Age: 47
DRG: 133 | End: 2022-03-15
Payer: MEDICARE

## 2022-03-15 ENCOUNTER — APPOINTMENT (OUTPATIENT)
Dept: CT IMAGING | Age: 47
DRG: 133 | End: 2022-03-15
Payer: MEDICARE

## 2022-03-15 ENCOUNTER — HOSPITAL ENCOUNTER (INPATIENT)
Age: 47
LOS: 2 days | Discharge: LEFT BEFORE DISCHARGE | DRG: 133 | End: 2022-03-17
Attending: EMERGENCY MEDICINE | Admitting: INTERNAL MEDICINE
Payer: MEDICARE

## 2022-03-15 DIAGNOSIS — J96.02 ACUTE RESPIRATORY FAILURE WITH HYPERCAPNIA (HCC): Primary | ICD-10-CM

## 2022-03-15 PROBLEM — E78.5 HYPERLIPIDEMIA: Status: ACTIVE | Noted: 2022-03-15

## 2022-03-15 PROBLEM — G93.41 ACUTE METABOLIC ENCEPHALOPATHY: Status: ACTIVE | Noted: 2022-03-15

## 2022-03-15 PROBLEM — I50.33 ACUTE ON CHRONIC DIASTOLIC (CONGESTIVE) HEART FAILURE (HCC): Status: ACTIVE | Noted: 2022-03-15

## 2022-03-15 PROBLEM — M25.511 ACUTE PAIN OF RIGHT SHOULDER: Status: ACTIVE | Noted: 2022-03-15

## 2022-03-15 LAB
A/G RATIO: 1.5 (ref 1.1–2.2)
ALBUMIN SERPL-MCNC: 4.3 G/DL (ref 3.4–5)
ALP BLD-CCNC: 84 U/L (ref 40–129)
ALT SERPL-CCNC: 31 U/L (ref 10–40)
ANION GAP SERPL CALCULATED.3IONS-SCNC: 9 MMOL/L (ref 3–16)
AST SERPL-CCNC: 24 U/L (ref 15–37)
BASE EXCESS VENOUS: 2.6 MMOL/L (ref -3–3)
BASOPHILS ABSOLUTE: 0.1 K/UL (ref 0–0.2)
BASOPHILS RELATIVE PERCENT: 0.6 %
BILIRUB SERPL-MCNC: 0.5 MG/DL (ref 0–1)
BUN BLDV-MCNC: 23 MG/DL (ref 7–20)
CALCIUM SERPL-MCNC: 9 MG/DL (ref 8.3–10.6)
CARBOXYHEMOGLOBIN: 10.4 % (ref 0–1.5)
CHLORIDE BLD-SCNC: 89 MMOL/L (ref 99–110)
CO2: 34 MMOL/L (ref 21–32)
CREAT SERPL-MCNC: 0.8 MG/DL (ref 0.6–1.1)
EOSINOPHILS ABSOLUTE: 0 K/UL (ref 0–0.6)
EOSINOPHILS RELATIVE PERCENT: 0.2 %
GFR AFRICAN AMERICAN: >60
GFR NON-AFRICAN AMERICAN: >60
GLUCOSE BLD-MCNC: 96 MG/DL (ref 70–99)
HCO3 VENOUS: 32.5 MMOL/L (ref 23–29)
HCT VFR BLD CALC: 47 % (ref 36–48)
HEMOGLOBIN: 14.9 G/DL (ref 12–16)
LACTIC ACID: 0.5 MMOL/L (ref 0.4–2)
LYMPHOCYTES ABSOLUTE: 1.9 K/UL (ref 1–5.1)
LYMPHOCYTES RELATIVE PERCENT: 20.9 %
MCH RBC QN AUTO: 26.1 PG (ref 26–34)
MCHC RBC AUTO-ENTMCNC: 31.6 G/DL (ref 31–36)
MCV RBC AUTO: 82.4 FL (ref 80–100)
METHEMOGLOBIN VENOUS: 0.4 %
MONOCYTES ABSOLUTE: 0.9 K/UL (ref 0–1.3)
MONOCYTES RELATIVE PERCENT: 9.7 %
NEUTROPHILS ABSOLUTE: 6.3 K/UL (ref 1.7–7.7)
NEUTROPHILS RELATIVE PERCENT: 68.6 %
O2 SAT, VEN: 96 %
O2 THERAPY: ABNORMAL
PCO2, VEN: 74 MMHG (ref 40–50)
PDW BLD-RTO: 16.3 % (ref 12.4–15.4)
PH VENOUS: 7.26 (ref 7.35–7.45)
PLATELET # BLD: 142 K/UL (ref 135–450)
PMV BLD AUTO: 9.3 FL (ref 5–10.5)
PO2, VEN: 88.8 MMHG (ref 25–40)
POTASSIUM SERPL-SCNC: 5.2 MMOL/L (ref 3.5–5.1)
PRO-BNP: 4745 PG/ML (ref 0–124)
RBC # BLD: 5.7 M/UL (ref 4–5.2)
SARS-COV-2, NAAT: NOT DETECTED
SODIUM BLD-SCNC: 132 MMOL/L (ref 136–145)
TCO2 CALC VENOUS: 35 MMOL/L
TOTAL PROTEIN: 7.1 G/DL (ref 6.4–8.2)
TROPONIN: <0.01 NG/ML
WBC # BLD: 9.1 K/UL (ref 4–11)

## 2022-03-15 PROCEDURE — 83880 ASSAY OF NATRIURETIC PEPTIDE: CPT

## 2022-03-15 PROCEDURE — 71045 X-RAY EXAM CHEST 1 VIEW: CPT

## 2022-03-15 PROCEDURE — 83605 ASSAY OF LACTIC ACID: CPT

## 2022-03-15 PROCEDURE — 2700000000 HC OXYGEN THERAPY PER DAY

## 2022-03-15 PROCEDURE — 71260 CT THORAX DX C+: CPT

## 2022-03-15 PROCEDURE — 85025 COMPLETE CBC W/AUTO DIFF WBC: CPT

## 2022-03-15 PROCEDURE — 82803 BLOOD GASES ANY COMBINATION: CPT

## 2022-03-15 PROCEDURE — 94660 CPAP INITIATION&MGMT: CPT

## 2022-03-15 PROCEDURE — 93005 ELECTROCARDIOGRAM TRACING: CPT | Performed by: PHYSICIAN ASSISTANT

## 2022-03-15 PROCEDURE — 99285 EMERGENCY DEPT VISIT HI MDM: CPT

## 2022-03-15 PROCEDURE — 70450 CT HEAD/BRAIN W/O DYE: CPT

## 2022-03-15 PROCEDURE — 2060000000 HC ICU INTERMEDIATE R&B

## 2022-03-15 PROCEDURE — 80053 COMPREHEN METABOLIC PANEL: CPT

## 2022-03-15 PROCEDURE — 6360000004 HC RX CONTRAST MEDICATION: Performed by: PHYSICIAN ASSISTANT

## 2022-03-15 PROCEDURE — 6360000002 HC RX W HCPCS: Performed by: PHYSICIAN ASSISTANT

## 2022-03-15 PROCEDURE — 96375 TX/PRO/DX INJ NEW DRUG ADDON: CPT

## 2022-03-15 PROCEDURE — 6370000000 HC RX 637 (ALT 250 FOR IP): Performed by: INTERNAL MEDICINE

## 2022-03-15 PROCEDURE — 87635 SARS-COV-2 COVID-19 AMP PRB: CPT

## 2022-03-15 PROCEDURE — 96374 THER/PROPH/DIAG INJ IV PUSH: CPT

## 2022-03-15 PROCEDURE — 84484 ASSAY OF TROPONIN QUANT: CPT

## 2022-03-15 PROCEDURE — 6360000002 HC RX W HCPCS

## 2022-03-15 RX ORDER — MORPHINE SULFATE 4 MG/ML
4 INJECTION, SOLUTION INTRAMUSCULAR; INTRAVENOUS ONCE
Status: COMPLETED | OUTPATIENT
Start: 2022-03-15 | End: 2022-03-15

## 2022-03-15 RX ORDER — IPRATROPIUM BROMIDE AND ALBUTEROL SULFATE 2.5; .5 MG/3ML; MG/3ML
1 SOLUTION RESPIRATORY (INHALATION)
Status: DISCONTINUED | OUTPATIENT
Start: 2022-03-16 | End: 2022-03-16

## 2022-03-15 RX ORDER — BENZONATATE 100 MG/1
200 CAPSULE ORAL 3 TIMES DAILY PRN
Status: DISCONTINUED | OUTPATIENT
Start: 2022-03-15 | End: 2022-03-17 | Stop reason: HOSPADM

## 2022-03-15 RX ORDER — SODIUM CHLORIDE 9 MG/ML
25 INJECTION, SOLUTION INTRAVENOUS PRN
Status: DISCONTINUED | OUTPATIENT
Start: 2022-03-15 | End: 2022-03-17 | Stop reason: HOSPADM

## 2022-03-15 RX ORDER — DEXTROSE MONOHYDRATE 25 G/50ML
12.5 INJECTION, SOLUTION INTRAVENOUS PRN
Status: DISCONTINUED | OUTPATIENT
Start: 2022-03-15 | End: 2022-03-16

## 2022-03-15 RX ORDER — NALOXONE HYDROCHLORIDE 1 MG/ML
0.4 INJECTION INTRAMUSCULAR; INTRAVENOUS; SUBCUTANEOUS ONCE
Status: COMPLETED | OUTPATIENT
Start: 2022-03-15 | End: 2022-03-15

## 2022-03-15 RX ORDER — POLYETHYLENE GLYCOL 3350 17 G/17G
17 POWDER, FOR SOLUTION ORAL DAILY PRN
Status: DISCONTINUED | OUTPATIENT
Start: 2022-03-15 | End: 2022-03-17 | Stop reason: HOSPADM

## 2022-03-15 RX ORDER — FUROSEMIDE 10 MG/ML
20 INJECTION INTRAMUSCULAR; INTRAVENOUS ONCE
Status: COMPLETED | OUTPATIENT
Start: 2022-03-15 | End: 2022-03-15

## 2022-03-15 RX ORDER — ONDANSETRON 2 MG/ML
4 INJECTION INTRAMUSCULAR; INTRAVENOUS EVERY 4 HOURS PRN
Status: DISCONTINUED | OUTPATIENT
Start: 2022-03-15 | End: 2022-03-17 | Stop reason: HOSPADM

## 2022-03-15 RX ORDER — ACETAMINOPHEN 325 MG/1
650 TABLET ORAL EVERY 4 HOURS PRN
Status: DISCONTINUED | OUTPATIENT
Start: 2022-03-15 | End: 2022-03-17 | Stop reason: HOSPADM

## 2022-03-15 RX ORDER — IPRATROPIUM BROMIDE AND ALBUTEROL SULFATE 2.5; .5 MG/3ML; MG/3ML
1 SOLUTION RESPIRATORY (INHALATION)
Status: DISCONTINUED | OUTPATIENT
Start: 2022-03-15 | End: 2022-03-16

## 2022-03-15 RX ORDER — DEXTROSE MONOHYDRATE 50 MG/ML
100 INJECTION, SOLUTION INTRAVENOUS PRN
Status: DISCONTINUED | OUTPATIENT
Start: 2022-03-15 | End: 2022-03-17 | Stop reason: HOSPADM

## 2022-03-15 RX ORDER — SODIUM CHLORIDE 0.9 % (FLUSH) 0.9 %
10 SYRINGE (ML) INJECTION EVERY 12 HOURS SCHEDULED
Status: DISCONTINUED | OUTPATIENT
Start: 2022-03-15 | End: 2022-03-17 | Stop reason: HOSPADM

## 2022-03-15 RX ORDER — SODIUM CHLORIDE 0.9 % (FLUSH) 0.9 %
10 SYRINGE (ML) INJECTION PRN
Status: DISCONTINUED | OUTPATIENT
Start: 2022-03-15 | End: 2022-03-17 | Stop reason: HOSPADM

## 2022-03-15 RX ORDER — NICOTINE POLACRILEX 4 MG
15 LOZENGE BUCCAL PRN
Status: DISCONTINUED | OUTPATIENT
Start: 2022-03-15 | End: 2022-03-17 | Stop reason: HOSPADM

## 2022-03-15 RX ORDER — FUROSEMIDE 10 MG/ML
40 INJECTION INTRAMUSCULAR; INTRAVENOUS ONCE
Status: COMPLETED | OUTPATIENT
Start: 2022-03-15 | End: 2022-03-16

## 2022-03-15 RX ORDER — ONDANSETRON 2 MG/ML
4 INJECTION INTRAMUSCULAR; INTRAVENOUS ONCE
Status: COMPLETED | OUTPATIENT
Start: 2022-03-15 | End: 2022-03-15

## 2022-03-15 RX ORDER — NALOXONE HYDROCHLORIDE 1 MG/ML
INJECTION INTRAMUSCULAR; INTRAVENOUS; SUBCUTANEOUS
Status: COMPLETED
Start: 2022-03-15 | End: 2022-03-15

## 2022-03-15 RX ORDER — ATORVASTATIN CALCIUM 10 MG/1
10 TABLET, FILM COATED ORAL DAILY
Status: DISCONTINUED | OUTPATIENT
Start: 2022-03-16 | End: 2022-03-17 | Stop reason: HOSPADM

## 2022-03-15 RX ORDER — ACETAMINOPHEN 650 MG/1
650 SUPPOSITORY RECTAL EVERY 4 HOURS PRN
Status: DISCONTINUED | OUTPATIENT
Start: 2022-03-15 | End: 2022-03-17 | Stop reason: HOSPADM

## 2022-03-15 RX ORDER — LISINOPRIL 10 MG/1
30 TABLET ORAL DAILY
Status: DISCONTINUED | OUTPATIENT
Start: 2022-03-16 | End: 2022-03-17 | Stop reason: HOSPADM

## 2022-03-15 RX ORDER — BUDESONIDE AND FORMOTEROL FUMARATE DIHYDRATE 160; 4.5 UG/1; UG/1
2 AEROSOL RESPIRATORY (INHALATION) 2 TIMES DAILY
Status: DISCONTINUED | OUTPATIENT
Start: 2022-03-15 | End: 2022-03-17 | Stop reason: HOSPADM

## 2022-03-15 RX ADMIN — NALOXONE HYDROCHLORIDE 0.4 MG: 1 INJECTION PARENTERAL at 23:49

## 2022-03-15 RX ADMIN — MORPHINE SULFATE 4 MG: 4 INJECTION, SOLUTION INTRAMUSCULAR; INTRAVENOUS at 15:23

## 2022-03-15 RX ADMIN — NALOXONE HYDROCHLORIDE 0.4 MG: 1 INJECTION INTRAMUSCULAR; INTRAVENOUS; SUBCUTANEOUS at 23:49

## 2022-03-15 RX ADMIN — FUROSEMIDE 20 MG: 10 INJECTION, SOLUTION INTRAMUSCULAR; INTRAVENOUS at 18:26

## 2022-03-15 RX ADMIN — ONDANSETRON 4 MG: 2 INJECTION INTRAMUSCULAR; INTRAVENOUS at 15:22

## 2022-03-15 RX ADMIN — IOPAMIDOL 75 ML: 755 INJECTION, SOLUTION INTRAVENOUS at 17:33

## 2022-03-15 ASSESSMENT — PAIN SCALES - GENERAL
PAINLEVEL_OUTOF10: 10
PAINLEVEL_OUTOF10: 7
PAINLEVEL_OUTOF10: 10

## 2022-03-15 ASSESSMENT — PAIN DESCRIPTION - LOCATION
LOCATION: SHOULDER
LOCATION: SHOULDER

## 2022-03-15 ASSESSMENT — PAIN DESCRIPTION - PAIN TYPE
TYPE: ACUTE PAIN
TYPE: ACUTE PAIN

## 2022-03-15 ASSESSMENT — PAIN - FUNCTIONAL ASSESSMENT: PAIN_FUNCTIONAL_ASSESSMENT: 0-10

## 2022-03-15 NOTE — ED NOTES
Pt too and from CT scan. Pt back to room, bed alarm in place and active.      Elvis Collins RN  03/15/22 0918

## 2022-03-15 NOTE — ED NOTES
Purewick applied and suction active. Pt remains lethargic and twitching, stating \"You guys are controlling me. \"  Pt verbalizes need to urinate stating, \"I need to pee pee. \"     Darleen Mohs, RN  03/15/22 2049

## 2022-03-15 NOTE — ED NOTES
Pt calls out stating that she lost her ear ring. This RN to room to find pt leaning forward in bed, pulse oximeter off finger, and gown falling down pt's arms. This RN instructs pt to lean back in bed to a high fowlers position for respiratory effort assistance. Pt assisted with placing gown back into place and with placing pulse oximeter onto pt's finger. Pt's earring located in pt's bed in front of her leg. Pt encouraged to focus on breathing and finding a position of comfort. Pt verbalizes understanding and visibly relaxes slightly into bed. Call light remains in reach and pt encouraged to call out if any needs arise.      June Perera RN  03/15/22 2132

## 2022-03-15 NOTE — ED NOTES
Pt calls out yelling, \"Gaurang! Come in here! I'm dying!!\"  This RN immediately to pt's room, finds pt leaning forward in bed stating, \"I'm dying\" and \"I feel like I'm being pulled out of my body\" and \"I can feel you all working on me and controlling me! \"  This RN informs pt that other than her tachycardia, all VS remain stable. This RN informs pt that he is the only staff member in the room and reassures her that no one is controlling the pt. Marcie MCINTOSH notified and to room to assess pt. Pt continues to jerk in bed and state that \"You all are controlling me. \"  Marcie MCINTOSH and this RN reassure pt that no one is controlling her. Pt lethargic, falling asleep easily in between outbursts.        Micaela Brock RN  03/15/22 6779

## 2022-03-16 ENCOUNTER — APPOINTMENT (OUTPATIENT)
Dept: GENERAL RADIOLOGY | Age: 47
DRG: 133 | End: 2022-03-16
Payer: MEDICARE

## 2022-03-16 LAB
AMPHETAMINE SCREEN, URINE: ABNORMAL
ANION GAP SERPL CALCULATED.3IONS-SCNC: 8 MMOL/L (ref 3–16)
BARBITURATE SCREEN URINE: ABNORMAL
BASE EXCESS ARTERIAL: 10.6 MMOL/L (ref -3–3)
BASE EXCESS ARTERIAL: 15 (ref -3–3)
BASE EXCESS ARTERIAL: 17 (ref -3–3)
BASE EXCESS ARTERIAL: 8.3 MMOL/L (ref -3–3)
BASOPHILS ABSOLUTE: 0 K/UL (ref 0–0.2)
BASOPHILS RELATIVE PERCENT: 0.2 %
BENZODIAZEPINE SCREEN, URINE: ABNORMAL
BUN BLDV-MCNC: 20 MG/DL (ref 7–20)
CALCIUM IONIZED: 1.13 MMOL/L (ref 1.12–1.32)
CALCIUM IONIZED: 1.23 MMOL/L (ref 1.12–1.32)
CALCIUM SERPL-MCNC: 8.7 MG/DL (ref 8.3–10.6)
CANNABINOID SCREEN URINE: ABNORMAL
CARBOXYHEMOGLOBIN ARTERIAL: 3.2 % (ref 0–1.5)
CARBOXYHEMOGLOBIN ARTERIAL: 4.1 % (ref 0–1.5)
CHLORIDE BLD-SCNC: 93 MMOL/L (ref 99–110)
CO2: 37 MMOL/L (ref 21–32)
COCAINE METABOLITE SCREEN URINE: ABNORMAL
CREAT SERPL-MCNC: 1 MG/DL (ref 0.6–1.1)
EKG ATRIAL RATE: 112 BPM
EKG DIAGNOSIS: NORMAL
EKG P AXIS: 63 DEGREES
EKG P-R INTERVAL: 130 MS
EKG Q-T INTERVAL: 320 MS
EKG QRS DURATION: 72 MS
EKG QTC CALCULATION (BAZETT): 436 MS
EKG R AXIS: 95 DEGREES
EKG T AXIS: 40 DEGREES
EKG VENTRICULAR RATE: 112 BPM
EOSINOPHILS ABSOLUTE: 0 K/UL (ref 0–0.6)
EOSINOPHILS RELATIVE PERCENT: 0 %
GFR AFRICAN AMERICAN: >60
GFR NON-AFRICAN AMERICAN: 59
GLUCOSE BLD-MCNC: 124 MG/DL (ref 70–99)
GLUCOSE BLD-MCNC: 124 MG/DL (ref 70–99)
GLUCOSE BLD-MCNC: 128 MG/DL (ref 70–99)
GLUCOSE BLD-MCNC: 131 MG/DL (ref 70–99)
GLUCOSE BLD-MCNC: 143 MG/DL (ref 70–99)
GLUCOSE BLD-MCNC: 146 MG/DL (ref 70–99)
GLUCOSE BLD-MCNC: 172 MG/DL (ref 70–99)
GLUCOSE BLD-MCNC: 178 MG/DL (ref 70–99)
GLUCOSE BLD-MCNC: 97 MG/DL (ref 70–99)
HCO3 ARTERIAL: 40.8 MMOL/L (ref 21–29)
HCO3 ARTERIAL: 42.8 MMOL/L (ref 21–29)
HCO3 ARTERIAL: 43.1 MMOL/L (ref 21–29)
HCO3 ARTERIAL: 44 MMOL/L (ref 21–29)
HCT VFR BLD CALC: 49.7 % (ref 36–48)
HEMOGLOBIN, ART, EXTENDED: 16.7 G/DL (ref 12–16)
HEMOGLOBIN, ART, EXTENDED: 17.2 G/DL (ref 12–16)
HEMOGLOBIN: 15.4 G/DL (ref 12–16)
HEMOGLOBIN: 19.2 GM/DL (ref 12–16)
HEMOGLOBIN: 19.7 GM/DL (ref 12–16)
LACTATE: 0.56 MMOL/L (ref 0.4–2)
LACTATE: 0.72 MMOL/L (ref 0.4–2)
LYMPHOCYTES ABSOLUTE: 0.5 K/UL (ref 1–5.1)
LYMPHOCYTES RELATIVE PERCENT: 4.5 %
Lab: ABNORMAL
MCH RBC QN AUTO: 26.4 PG (ref 26–34)
MCHC RBC AUTO-ENTMCNC: 31.1 G/DL (ref 31–36)
MCV RBC AUTO: 84.9 FL (ref 80–100)
METHADONE SCREEN, URINE: ABNORMAL
METHEMOGLOBIN ARTERIAL: 0.7 %
METHEMOGLOBIN ARTERIAL: 0.7 %
MONOCYTES ABSOLUTE: 0.4 K/UL (ref 0–1.3)
MONOCYTES RELATIVE PERCENT: 3.1 %
NEUTROPHILS ABSOLUTE: 10.4 K/UL (ref 1.7–7.7)
NEUTROPHILS RELATIVE PERCENT: 92.2 %
O2 SAT, ARTERIAL: 81.6 %
O2 SAT, ARTERIAL: 89 % (ref 93–100)
O2 SAT, ARTERIAL: 94.1 %
O2 SAT, ARTERIAL: 99 % (ref 93–100)
O2 THERAPY: ABNORMAL
O2 THERAPY: ABNORMAL
OPIATE SCREEN URINE: POSITIVE
OXYCODONE URINE: POSITIVE
PCO2 ARTERIAL: 101.5 MM HG (ref 35–45)
PCO2 ARTERIAL: 96.9 MMHG (ref 35–45)
PCO2 ARTERIAL: 97.5 MMHG (ref 35–45)
PCO2 ARTERIAL: 99.8 MM HG (ref 35–45)
PDW BLD-RTO: 16.6 % (ref 12.4–15.4)
PERFORMED ON: ABNORMAL
PH ARTERIAL: 7.23 (ref 7.35–7.45)
PH ARTERIAL: 7.24 (ref 7.35–7.45)
PH ARTERIAL: 7.25 (ref 7.35–7.45)
PH ARTERIAL: 7.27 (ref 7.35–7.45)
PH UA: 6
PHENCYCLIDINE SCREEN URINE: ABNORMAL
PLATELET # BLD: 114 K/UL (ref 135–450)
PMV BLD AUTO: 8.7 FL (ref 5–10.5)
PO2 ARTERIAL: 181.4 MM HG (ref 75–108)
PO2 ARTERIAL: 47.5 MMHG (ref 75–108)
PO2 ARTERIAL: 70.7 MM HG (ref 75–108)
PO2 ARTERIAL: 75.3 MMHG (ref 75–108)
POC HEMATOCRIT: 56 % (ref 36–48)
POC HEMATOCRIT: 58 % (ref 36–48)
POC POTASSIUM: 4.1 MMOL/L (ref 3.5–5.1)
POC POTASSIUM: 4.8 MMOL/L (ref 3.5–5.1)
POC SAMPLE TYPE: ABNORMAL
POC SAMPLE TYPE: ABNORMAL
POC SODIUM: 143 MMOL/L (ref 136–145)
POC SODIUM: 144 MMOL/L (ref 136–145)
POTASSIUM REFLEX MAGNESIUM: 4.9 MMOL/L (ref 3.5–5.1)
PROPOXYPHENE SCREEN: ABNORMAL
RBC # BLD: 5.85 M/UL (ref 4–5.2)
SODIUM BLD-SCNC: 138 MMOL/L (ref 136–145)
TCO2 ARTERIAL: 43.7 MMOL/L
TCO2 ARTERIAL: 46 MMOL/L
TCO2 ARTERIAL: 46.1 MMOL/L
TCO2 ARTERIAL: 47 MMOL/L
TROPONIN: <0.01 NG/ML
WBC # BLD: 11.3 K/UL (ref 4–11)

## 2022-03-16 PROCEDURE — 51702 INSERT TEMP BLADDER CATH: CPT

## 2022-03-16 PROCEDURE — 36415 COLL VENOUS BLD VENIPUNCTURE: CPT

## 2022-03-16 PROCEDURE — 94660 CPAP INITIATION&MGMT: CPT

## 2022-03-16 PROCEDURE — 80307 DRUG TEST PRSMV CHEM ANLYZR: CPT

## 2022-03-16 PROCEDURE — 2580000003 HC RX 258: Performed by: INTERNAL MEDICINE

## 2022-03-16 PROCEDURE — 99291 CRITICAL CARE FIRST HOUR: CPT | Performed by: INTERNAL MEDICINE

## 2022-03-16 PROCEDURE — 2000000000 HC ICU R&B

## 2022-03-16 PROCEDURE — 93010 ELECTROCARDIOGRAM REPORT: CPT | Performed by: INTERNAL MEDICINE

## 2022-03-16 PROCEDURE — 6360000002 HC RX W HCPCS: Performed by: NURSE PRACTITIONER

## 2022-03-16 PROCEDURE — 82947 ASSAY GLUCOSE BLOOD QUANT: CPT

## 2022-03-16 PROCEDURE — 82330 ASSAY OF CALCIUM: CPT

## 2022-03-16 PROCEDURE — 80048 BASIC METABOLIC PNL TOTAL CA: CPT

## 2022-03-16 PROCEDURE — 85025 COMPLETE CBC W/AUTO DIFF WBC: CPT

## 2022-03-16 PROCEDURE — 6360000002 HC RX W HCPCS

## 2022-03-16 PROCEDURE — 74022 RADEX COMPL AQT ABD SERIES: CPT

## 2022-03-16 PROCEDURE — 74018 RADEX ABDOMEN 1 VIEW: CPT

## 2022-03-16 PROCEDURE — 6370000000 HC RX 637 (ALT 250 FOR IP): Performed by: INTERNAL MEDICINE

## 2022-03-16 PROCEDURE — 36600 WITHDRAWAL OF ARTERIAL BLOOD: CPT

## 2022-03-16 PROCEDURE — 84132 ASSAY OF SERUM POTASSIUM: CPT

## 2022-03-16 PROCEDURE — 85014 HEMATOCRIT: CPT

## 2022-03-16 PROCEDURE — 83605 ASSAY OF LACTIC ACID: CPT

## 2022-03-16 PROCEDURE — 82803 BLOOD GASES ANY COMBINATION: CPT

## 2022-03-16 PROCEDURE — 2700000000 HC OXYGEN THERAPY PER DAY

## 2022-03-16 PROCEDURE — 6360000002 HC RX W HCPCS: Performed by: INTERNAL MEDICINE

## 2022-03-16 PROCEDURE — 84484 ASSAY OF TROPONIN QUANT: CPT

## 2022-03-16 PROCEDURE — 84295 ASSAY OF SERUM SODIUM: CPT

## 2022-03-16 PROCEDURE — 94640 AIRWAY INHALATION TREATMENT: CPT

## 2022-03-16 RX ORDER — GABAPENTIN 300 MG/1
600 CAPSULE ORAL 3 TIMES DAILY
Status: DISCONTINUED | OUTPATIENT
Start: 2022-03-16 | End: 2022-03-17 | Stop reason: HOSPADM

## 2022-03-16 RX ORDER — PROCHLORPERAZINE EDISYLATE 5 MG/ML
INJECTION INTRAMUSCULAR; INTRAVENOUS
Status: COMPLETED
Start: 2022-03-16 | End: 2022-03-16

## 2022-03-16 RX ORDER — PROCHLORPERAZINE EDISYLATE 5 MG/ML
5 INJECTION INTRAMUSCULAR; INTRAVENOUS ONCE
Status: COMPLETED | OUTPATIENT
Start: 2022-03-16 | End: 2022-03-16

## 2022-03-16 RX ORDER — NALOXONE HYDROCHLORIDE 1 MG/ML
1.2 INJECTION INTRAMUSCULAR; INTRAVENOUS; SUBCUTANEOUS PRN
Status: DISCONTINUED | OUTPATIENT
Start: 2022-03-16 | End: 2022-03-17 | Stop reason: HOSPADM

## 2022-03-16 RX ORDER — NALOXONE HYDROCHLORIDE 0.4 MG/ML
INJECTION, SOLUTION INTRAMUSCULAR; INTRAVENOUS; SUBCUTANEOUS
Status: COMPLETED
Start: 2022-03-16 | End: 2022-03-16

## 2022-03-16 RX ORDER — GUAIFENESIN/DEXTROMETHORPHAN 100-10MG/5
10 SYRUP ORAL EVERY 4 HOURS PRN
Status: DISCONTINUED | OUTPATIENT
Start: 2022-03-16 | End: 2022-03-17 | Stop reason: HOSPADM

## 2022-03-16 RX ORDER — ESCITALOPRAM OXALATE 10 MG/1
10 TABLET ORAL DAILY
Status: DISCONTINUED | OUTPATIENT
Start: 2022-03-16 | End: 2022-03-17 | Stop reason: HOSPADM

## 2022-03-16 RX ORDER — PROCHLORPERAZINE EDISYLATE 5 MG/ML
10 INJECTION INTRAMUSCULAR; INTRAVENOUS EVERY 6 HOURS PRN
Status: DISCONTINUED | OUTPATIENT
Start: 2022-03-16 | End: 2022-03-17 | Stop reason: HOSPADM

## 2022-03-16 RX ORDER — IPRATROPIUM BROMIDE AND ALBUTEROL SULFATE 2.5; .5 MG/3ML; MG/3ML
1 SOLUTION RESPIRATORY (INHALATION) EVERY 4 HOURS PRN
Status: DISCONTINUED | OUTPATIENT
Start: 2022-03-16 | End: 2022-03-17 | Stop reason: HOSPADM

## 2022-03-16 RX ORDER — METHYLPREDNISOLONE SODIUM SUCCINATE 40 MG/ML
40 INJECTION, POWDER, LYOPHILIZED, FOR SOLUTION INTRAMUSCULAR; INTRAVENOUS EVERY 6 HOURS
Status: DISCONTINUED | OUTPATIENT
Start: 2022-03-16 | End: 2022-03-17

## 2022-03-16 RX ADMIN — GABAPENTIN 600 MG: 300 CAPSULE ORAL at 21:52

## 2022-03-16 RX ADMIN — ONDANSETRON 4 MG: 2 INJECTION INTRAMUSCULAR; INTRAVENOUS at 00:41

## 2022-03-16 RX ADMIN — SODIUM CHLORIDE, PRESERVATIVE FREE 10 ML: 5 INJECTION INTRAVENOUS at 08:20

## 2022-03-16 RX ADMIN — METHYLPREDNISOLONE SODIUM SUCCINATE 40 MG: 40 INJECTION, POWDER, FOR SOLUTION INTRAMUSCULAR; INTRAVENOUS at 21:53

## 2022-03-16 RX ADMIN — METHYLPREDNISOLONE SODIUM SUCCINATE 40 MG: 40 INJECTION, POWDER, FOR SOLUTION INTRAMUSCULAR; INTRAVENOUS at 02:14

## 2022-03-16 RX ADMIN — AZITHROMYCIN MONOHYDRATE 500 MG: 500 INJECTION, POWDER, LYOPHILIZED, FOR SOLUTION INTRAVENOUS at 02:29

## 2022-03-16 RX ADMIN — PROCHLORPERAZINE EDISYLATE 10 MG: 5 INJECTION INTRAMUSCULAR; INTRAVENOUS at 12:49

## 2022-03-16 RX ADMIN — SODIUM CHLORIDE, PRESERVATIVE FREE 10 ML: 5 INJECTION INTRAVENOUS at 21:53

## 2022-03-16 RX ADMIN — LISINOPRIL 30 MG: 10 TABLET ORAL at 18:29

## 2022-03-16 RX ADMIN — METHYLPREDNISOLONE SODIUM SUCCINATE 40 MG: 40 INJECTION, POWDER, FOR SOLUTION INTRAMUSCULAR; INTRAVENOUS at 08:20

## 2022-03-16 RX ADMIN — SODIUM CHLORIDE, PRESERVATIVE FREE 10 ML: 5 INJECTION INTRAVENOUS at 02:29

## 2022-03-16 RX ADMIN — PROCHLORPERAZINE EDISYLATE 5 MG: 5 INJECTION INTRAMUSCULAR; INTRAVENOUS at 00:52

## 2022-03-16 RX ADMIN — INSULIN LISPRO 1 UNITS: 100 INJECTION, SOLUTION INTRAVENOUS; SUBCUTANEOUS at 21:53

## 2022-03-16 RX ADMIN — METHYLPREDNISOLONE SODIUM SUCCINATE 40 MG: 40 INJECTION, POWDER, FOR SOLUTION INTRAMUSCULAR; INTRAVENOUS at 15:42

## 2022-03-16 RX ADMIN — Medication 2 PUFF: at 19:41

## 2022-03-16 RX ADMIN — ENOXAPARIN SODIUM 40 MG: 40 INJECTION SUBCUTANEOUS at 08:20

## 2022-03-16 RX ADMIN — ACETAMINOPHEN 650 MG: 325 TABLET ORAL at 21:52

## 2022-03-16 RX ADMIN — NALOXONE HYDROCHLORIDE 1.2 MG: 0.4 INJECTION, SOLUTION INTRAMUSCULAR; INTRAVENOUS; SUBCUTANEOUS at 00:24

## 2022-03-16 RX ADMIN — FUROSEMIDE 40 MG: 10 INJECTION, SOLUTION INTRAMUSCULAR; INTRAVENOUS at 02:13

## 2022-03-16 ASSESSMENT — PAIN SCALES - GENERAL
PAINLEVEL_OUTOF10: 2
PAINLEVEL_OUTOF10: 0
PAINLEVEL_OUTOF10: 8

## 2022-03-16 ASSESSMENT — ENCOUNTER SYMPTOMS
ALLERGIC/IMMUNOLOGIC NEGATIVE: 1
SHORTNESS OF BREATH: 1
EYES NEGATIVE: 1
COUGH: 1
GASTROINTESTINAL NEGATIVE: 1

## 2022-03-16 NOTE — PROGRESS NOTES
This note also relates to the following rows which could not be included:  Resp - Cannot attach notes to unvalidated device data       03/16/22 0790   NIV Type   Skin Assessment Clean, dry, & intact   Mode Bilevel   Mask Type Nasal mask   Settings/Measurements   IPAP 18 cmH20   CPAP/EPAP 6 cmH2O   Rate Ordered 20   FiO2  60 %   I Time/ I Time % 1 s   Vt Exhaled 458 mL   Minute Volume 8.7 Liters   Mask Leak (lpm) 68 lpm   Comfort Level Good   Using Accessory Muscles No

## 2022-03-16 NOTE — PROGRESS NOTES
03/16/22 0010   NIV Type   $NIV $Daily Charge   NIV Started/Stopped On   Equipment Type v60   Mode Bilevel   Mask Type Full face mask   Mask Size Medium   Settings/Measurements   IPAP 18 cmH20   CPAP/EPAP 6 cmH2O   Rate Ordered 20   Resp 22   FiO2  40 %   Vt Exhaled 479 mL   Mask Leak (lpm) 17 lpm   Patient Observation   Observations mepilex placed on nose   Alarm Settings   Alarms On Y   Press Low Alarm 6 cmH2O   High Pressure Alarm 30 cmH2O   Resp Rate Low Alarm 6   High Respiratory Rate 30 br/min

## 2022-03-16 NOTE — PROGRESS NOTES
03/16/22 1626   NIV Type   NIV Started/Stopped On   Equipment Type v60   Mode Bilevel   Mask Type Nasal mask   Mask Size Small   Settings/Measurements   IPAP 18 cmH20   CPAP/EPAP 6 cmH2O   Rate Ordered 20   Resp 20   FiO2  60 %   Vt Exhaled 396 mL   Minute Volume 10.5 Liters   Mask Leak (lpm) 76 lpm   Comfort Level Good   Using Accessory Muscles No   SpO2 93   Alarm Settings   Alarms On Y   Press Low Alarm 6 cmH2O   High Pressure Alarm 30 cmH2O   Resp Rate Low Alarm 6   High Respiratory Rate 45 br/min

## 2022-03-16 NOTE — PROGRESS NOTES
03/16/22 0750   NIV Type   Skin Assessment Clean, dry, & intact   NIV Started/Stopped On   Equipment Type v60   Mode Bilevel   Mask Type Full face mask   Mask Size Medium   Settings/Measurements   IPAP 18 cmH20   CPAP/EPAP 6 cmH2O   Rate Ordered 20   Resp 22   Insp Rise Time (%) 1 %   FiO2  60 %   I Time/ I Time % 1 s   Vt Exhaled 301 mL   Minute Volume 5.7 Liters   Mask Leak (lpm) 35 lpm   Comfort Level Good   Using Accessory Muscles No   SpO2 94   Patient Observation   Observations pt asleep   Alarm Settings   Alarms On Y   Press Low Alarm 6 cmH2O   High Pressure Alarm 30 cmH2O   Delay Alarm 20 sec(s)   Apnea (secs) 20 secs   Resp Rate Low Alarm 6   High Respiratory Rate 45 br/min

## 2022-03-16 NOTE — ED NOTES
At time of inpatient transfer, pt noted to demonstrate an increasingly decreased response to stimuli compared to earlier in pt's care, including sternal rub and verbal stimulation. Pinpoint pupils also noted. VSS at this time and pt protecting airway. Hospitalist perfectserved to request narcan. Hospitalist orders narcan and narcan administered per order (see MAR). Pt exhibits positive response including, breathing over biPAP, coughing, and voluntary controlled movements.      Pelon Leiva RN  03/15/22 4647

## 2022-03-16 NOTE — CONSULTS
Nutrition Education    RD received consult for HF nutrition therapy education. Pt not appropriate for education at this time. Will continue to monitor per nutrition standards of care.      Julian Goldstein RD on 3/16/2022 at 1:06 PM  Office: [phone]

## 2022-03-16 NOTE — PROGRESS NOTES
03/16/22 1349   NIV Type   NIV Started/Stopped On   Equipment Type v60   Mode Bilevel   Mask Type Nasal mask   Mask Size Small   Settings/Measurements   IPAP 18 cmH20   CPAP/EPAP 6 cmH2O   Rate Ordered 20   Resp 23   FiO2  60 %   Vt Exhaled 346 mL   Minute Volume 8.7 Liters   Mask Leak (lpm) 19 lpm   Comfort Level Good   Using Accessory Muscles No   SpO2 95   Alarm Settings   Alarms On Y   Press Low Alarm 6 cmH2O   High Pressure Alarm 30 cmH2O   Resp Rate Low Alarm 6   High Respiratory Rate 45 br/min

## 2022-03-16 NOTE — ED PROVIDER NOTES
[facility] Emergency Department    CHIEF COMPLAINT  Shortness of Breath (Pt has hx of COPD and emphysema. States she is on 4L home O2 via [place]. States that she has been out of oxygen for the past 3 weeks, states that CPAP machine broke approximately 2.5 weeks ago. States that she has been experiencing worsening SOB. States that her physician cant write a new prescription for O2 until she is seen by her pulmonologist.) and Shoulder Pain (R shoulder pain starting yesterday radiating up to posterior R neck. Denies injury.)      SHARED SERVICE VISIT  I have seen and evaluated this patient with my supervising physician, Dr. Juvenal Oswald. HISTORY OF PRESENT ILLNESS  Chadd Martell is a 48 y.o. female who presents to the ED complaining of worsening shortness of breath with some chest discomfort. Patient with history of CHF, COPD on home oxygen. States that her CPAP has been broken and she has been out of her home oxygen for most 3 weeks. Today shortness of breath became acutely worse. No pain with deep inspiration. She is also complaining of right shoulder pain. Pain down right arm as well. Nonproductive cough. No hemoptysis. Headache without dizziness confusion. No fevers or chills. She has had no abdominal pain. No nausea vomiting. No change in appetite. No urinary symptoms. No leg pain or swelling. She denies recent travel, trauma or surgery. States that she has been taking medications as prescribed. No other complaints, modifying factors or associated symptoms. Nursing notes reviewed.    Past Medical History:   Diagnosis Date    Acute cystitis with hematuria     Bacteremia 08/23/2017    staph aureus    CHF (congestive heart failure) (HCC)     Colonization status 7/26/12    DNA probe negative for MRSA    [facility]     Diabetes mellitus ([facility]     FOR ABOUT 4 YEARS    DM (diabetes mellitus) [facility]     Drug abuse[facility]     Hepatitis     Hepatitis C antibody positive in blood 08/24/2017    Hepatitis C AB positive     Heroin overdose ([facility]     Hypertension     MRSA infection     LUNGS    Neuropathy     legs with pain    Other disorders of kidney and ureter     KIDNEY FAILURE, ACUTE    Pneumonia     Smoking history      Past Surgical History:   Procedure Laterality Date    BRONCHOSCOPY  12/21/2017    BAL    CYSTOSCOPY  1/5/15    cysto with left stent placement    CYSTOSCOPY  10/6/15    stent removal    CYSTOSCOPY  11/09/2019    left uretroscopy with stent placement    CYSTOSCOPY Left 11/13/2019    LEFT URETEROSCOPY WITH HOLMIUM LASER LITHOTRIPSY, STENT REMOVAL,  STONE EXTRACTION     CYSTOSCOPY INSERTION / REMOVAL STENT / STONE Left 11/9/2019    CYSTOSCOPY, URETEROSCOPY, STENT PLACEMENT performed by Son Bowman at [address] / STONE Left 11/13/2019    CYSTOSCOPY, LEFT URETEROSCOPY WITH HOLMIUM LASER LITHOTRIPSY, STENT REMOVAL,  STONE EXTRACTION performed by Jony Garrett MD at [address] [facility]      TUBAL LIGATION      URETER STENT PLACEMENT       Family History   Problem Relation Age of Onset    Heart Disease Mother     No Known Problems Father     Heart Disease Sister     No Known Problems Brother     No Known Problems Maternal Grandmother     No Known Problems Maternal Grandfather     No Known Problems Paternal Grandmother     No Known Problems Paternal Grandfather     No Known Problems Other      Social History     Socioeconomic History    Marital status:       Spouse name: Not on file    Number of children: 2    Years of education: Not on file    Highest education level: Not on file   Occupational History    Not on file   Tobacco Use    Smoking status: Current Every Day Smoker     Packs/day: 0.50     Years: 33.00     Pack years: 16.50     Types: E-Cigarettes    Smokeless tobacco: Never Used   Vaping Use    Vaping Use: Every day    Substances: Always   Substance and Sexual Activity    Alcohol use: No    Drug use: No     Comment: Heroin    Sexual activity: Not Currently     Partners: Male   Other Topics Concern    Not on file   Social History Narrative    ** Merged History Encounter **          Social Determinants of Health     Financial Resource Strain: Low Risk     Difficulty of Paying Living Expenses: Not hard at all   Food Insecurity: No Food Insecurity    Worried About Running Out of Food in the Last Year: Never true    Akil of Food in the Last Year: Never true   Transportation Needs: No Transportation Needs    Lack of Transportation (Medical): No    Lack of Transportation (Non-Medical):  No   Physical Activity:     Days of Exercise per Week: Not on file    Minutes of Exercise per Session: Not on file   Stress:     Feeling of Stress : Not on file   Social Connections:     Frequency of Communication with Friends and Family: Not on file    Frequency of Social Gatherings with Friends and Family: Not on file    Attends Caodaism Services: Not on file    Active Member of [address] [place] [address] or Organizations: Not on file    Attends Club or Organization Meetings: Not on file    Marital Status: Not on file   Intimate Partner Violence:     Fear of Current or Ex-Partner: Not on file    Emotionally Abused: Not on file    Physically Abused: Not on file    Sexually Abused: Not on file   Housing Stability: Unknown    Unable to Pay for Housing in the Last Year: No    Number of [place] in the Last Year: Not on file    Unstable Housing in the Last Year: No     Current Facility-Administered Medications   Medication Dose Route Frequency Provider Last Rate Last Admin    furosemide (LASIX) injection 40 mg  40 mg IntraVENous Once Marilyn Collins MD         Current Outpatient Medications   Medication Sig Dispense Refill    OXYGEN Inhale 4 L into the lungs continuous via nasal canula 1 Units 0    albuterol sulfate  (90 Base) MCG/ACT inhaler TAKE 2 PUFFS BY MOUTH EVERY 6 HOURS AS NEEDED FOR WHEEZE 6.7 each 0    atorvastatin (LIPITOR) 10 MG tablet TAKE 1 TABLET BY MOUTH EVERY DAY 30 tablet 0    [employer] Lancets Ultra-Thin 30G MISC USE AS DIRECTED TO TEST BLOOD SUGAR 100 each 11    metFORMIN (GLUCOPHAGE) 500 MG tablet TAKE 1 TABLET BY MOUTH TWICE A DAY WITH MEALS 60 tablet 1    lisinopril (PRINIVIL;ZESTRIL) 30 MG tablet TAKE 1 TABLET BY MOUTH EVERY DAY 30 tablet 3    escitalopram (LEXAPRO) 10 MG tablet Take 1 tablet by mouth daily 30 tablet 2    fluticasone-salmeterol (ADVAIR DISKUS) 250-50 MCG/DOSE AEPB Inhale 1 puff into the lungs every 12 hours 60 each 3    gabapentin (NEURONTIN) 600 MG tablet Take 1 tablet by mouth 3 times daily for 30 days. 90 tablet 1    methylPREDNISolone (MEDROL DOSEPACK) 4 MG tablet Take by mouth. 1 kit 0    glucose monitoring (FREESTYLE FREEDOM) kit 1 kit by Does not apply route daily 1 kit 0    blood glucose monitor strips Test 4 times a day & as needed for symptoms of irregular blood glucose. Dispense sufficient amount for indicated testing frequency plus additional to accommodate PRN testing needs. 300 strip 0    Lancets MISC 1 each by Does not apply route 4 times daily 300 each 1    glucose monitoring kit (FREESTYLE) monitoring kit 1 kit by Does not apply route daily E11.9 1 kit 0    ROBAFEN DM COUGH  MG/5ML syrup        Allergies   Allergen Reactions    Pcn [Penicillins] Shortness Of Breath and Swelling    Aspirin Other (See Comments)     Cause GI upset.  But takes 81 mg aspirin at home    Pcn [Penicillins]      swelling    Sulfamethoxazole-Trimethoprim Hives       REVIEW OF SYSTEMS  10 systems reviewed, pertinent positives per HPI otherwise noted to be negative    PHYSICAL EXAM  /68   Pulse 105   Temp 98.4 °F (36.9 °C) (Oral)   Resp 17   Ht 5' 3\" (1.6 m)   Wt 248 lb (112.5 kg)   LMP 06/16/2021   SpO2 96%   BMI 43.93 kg/m²   GENERAL APPEARANCE: Awake and alert. Cooperative. Moderate respiratory distress. HEAD: Normocephalic. Atraumatic. EYES: PERRL. EOM's grossly intact. ENT: Mucous membranes are dry. NECK: Supple. No JVD. No tracheal tenderness or deviation. No crepitus. HEART: RRR. No murmurs. LUNGS: Respirations unlabored. CTAB. Good air exchange. Speaking comfortably in full sentences. ABDOMEN: Soft. Non-distended. Non-tender. No guarding or rebound. No masses. No organomegaly. EXTREMITIES: No peripheral edema. Moves all extremities equally. All extremities neurovascularly intact. SKIN: Warm and dry. No acute rashes. NEUROLOGICAL: Alert and oriented. CN's 2-12 intact. No gross facial drooping. Strength 5/5, sensation intact. PSYCHIATRIC: Normal mood and affect. RADIOLOGY  CT HEAD WO CONTRAST    Result Date: 3/15/2022  EXAMINATION: CT OF THE HEAD WITHOUT CONTRAST  3/15/2022 3:52 pm TECHNIQUE: CT of the head was performed without the administration of intravenous contrast. Dose modulation, iterative reconstruction, and/or weight based adjustment of the mA/kV was utilized to reduce the radiation dose to as low as reasonably achievable. COMPARISON: CT head December 21, 2017. HISTORY: ORDERING SYSTEM PROVIDED HISTORY: headache TECHNOLOGIST PROVIDED HISTORY: Reason for exam:->headache Has a \"code stroke\" or \"stroke alert\" been called? ->No Decision Support Exception - unselect if not a suspected or confirmed emergency medical condition->Emergency Medical Condition (MA) Is the patient pregnant?->No Reason for Exam: headache starting today, worsening; c/o right shoulder pain FINDINGS: BRAIN/VENTRICLES: Study is limited due to thick calvarium and mottle artifact. There is streak artifact from the skull base, limiting evaluation of the posterior fossa and brainstem.   There appear to be areas of low attenuation involving both cerebellum which are symmetric in appearance and unchanged compared to prior, possibly root congenital.  There is no acute intracranial hemorrhage, mass effect or midline shift. No abnormal extra-axial fluid collection. The gray-white differentiation is maintained without evidence of an acute infarct. There is no evidence of hydrocephalus. ORBITS: The visualized portion of the orbits demonstrate no acute abnormality. SINUSES: The visualized paranasal sinuses and mastoid air cells demonstrate no acute abnormality. SOFT TISSUES/SKULL:  No acute abnormality of the visualized skull or soft tissues. No acute intracranial abnormality. XR CHEST PORTABLE    Result Date: 3/15/2022  EXAMINATION: ONE XRAY VIEW OF THE CHEST 3/15/2022 2:55 pm COMPARISON: Abdomen radiograph 12/20/2021 HISTORY: ORDERING SYSTEM PROVIDED HISTORY: sob TECHNOLOGIST PROVIDED HISTORY: Reason for exam:->sob Reason for Exam: sob FINDINGS: Patchy bibasilar pulmonary opacities are noted. No discrete pleural fluid. No discrete pneumothorax. Cardiomediastinal silhouette is stable with enlargement of the cardiac silhouette. 1. Bibasilar pulmonary opacities are seen, which could represent atelectasis or multifocal pneumonia. 2. Stable cardiomegaly. CT CHEST PULMONARY EMBOLISM W CONTRAST    Result Date: 3/15/2022  EXAMINATION: CTA OF THE CHEST 3/15/2022 5:22 pm TECHNIQUE: CTA of the chest was performed after the administration of intravenous contrast.  Multiplanar reformatted images are provided for review. MIP images are provided for review. Dose modulation, iterative reconstruction, and/or weight based adjustment of the mA/kV was utilized to reduce the radiation dose to as low as reasonably achievable. COMPARISON: 01/05/2021.  HISTORY: ORDERING SYSTEM PROVIDED HISTORY: cp/sob TECHNOLOGIST PROVIDED HISTORY: Reason for exam:->cp/sob Decision Support Exception - unselect if not a suspected or confirmed emergency medical condition->Emergency Medical Condition (MA) Reason for Exam: right chest and shoulder pain, unknown how long, chronic SOB FINDINGS: Pulmonary Arteries: Pulmonary arteries are adequately opacified for evaluation. No evidence of intraluminal filling defect to suggest pulmonary embolism. The main pulmonary artery is mildly prominent at 3.4 cm. Mediastinum: The thoracic aorta is normal in caliber with mild calcified plaque. Mild cardiomegaly with no pericardial effusion. No pathologic mediastinal adenopathy. The largest precarinal node has a short axis of 12 mm. Calcified granulomatous nodes in the mediastinum and left hilum. The thyroid gland and esophagus are unremarkable. Lungs/pleura: No acute pulmonary infiltrate, consolidation or edema. Calcified granuloma in the left lung apex is stable. Dependent atelectatic changes in the lower lung fields bilaterally. There is no pleural fluid or pneumothorax. The central airways are patent. Upper Abdomen: Multiple low-attenuation hepatic lesions consistent with cysts. There are cysts in the upper pole of the kidneys bilaterally. Gastric distension with retained fluid is noted. Soft Tissues/Bones: No acute bone or soft tissue abnormality. 1. No evidence of pulmonary embolic disease. 2. No acute pulmonary findings. Bibasilar atelectasis. 3. Mild cardiomegaly. Mildly enlarged main pulmonary artery which can be seen with pulmonary hypertension. ED COURSE  Patient received morphine and Zofran IV for pain, with good relief. Triage vitals stable. 95% on baseline 4 L nasal cannula. CBC without leukocytosis or anemia. CMP fairly unremarkable. CO2 of 35. Rapid Covid was negative. Lactic acid 0.5. BNP was approximately 4700. She was given dose of Lasix. VBG with pH 7.26 and PCO2 of 74. Chest x-ray with bibasilar airspace disease atelectasis versus multifocal pneumonia. CT PE study without evidence to suggest acute pulmonary embolus or other acute intrathoracic abnormalities. Patient at this time does appear consistent with acute respiratory failure.   We are at this time recommending admission. Did obtain head CT secondary to headache which was unremarkable. Placed by nursing staff as patient beginning to [address] when sleeping. Had also recently discussed case with hospital medicine and we did agree that we would attempt patient on BiPAP with repeat VBG. A discussion was had with Ms. Rafaela Sigala regarding shortness of breath, ED findings and recommendations for admission. Risk management discussed and shared decision making had with patient and/or surrogate. All questions were answered. Patient in agreement. CRITICAL CARE TIME  30 Minutes of critical care time spent not including separately billable procedures.     MDM  Results for orders placed or performed during the hospital encounter of 03/15/22   COVID-19, Rapid    Specimen: Nasopharyngeal Swab   Result Value Ref Range    SARS-CoV-2, NAAT Not Detected Not Detected   CBC with Auto Differential   Result Value Ref Range    WBC 9.1 4.0 - 11.0 K/uL    RBC 5.70 (H) 4.00 - 5.20 M/uL    Hemoglobin 14.9 12.0 - 16.0 g/dL    Hematocrit 47.0 36.0 - 48.0 %    MCV 82.4 80.0 - 100.0 fL    MCH 26.1 26.0 - 34.0 pg    MCHC 31.6 31.0 - 36.0 g/dL    RDW 16.3 (H) 12.4 - 15.4 %    Platelets 391 492 - 650 K/uL    MPV 9.3 5.0 - 10.5 fL    Neutrophils % 68.6 %    Lymphocytes % 20.9 %    Monocytes % 9.7 %    Eosinophils % 0.2 %    Basophils % 0.6 %    Neutrophils Absolute 6.3 1.7 - 7.7 K/uL    Lymphocytes Absolute 1.9 1.0 - 5.1 K/uL    Monocytes Absolute 0.9 0.0 - 1.3 K/uL    Eosinophils Absolute 0.0 0.0 - 0.6 K/uL    Basophils Absolute 0.1 0.0 - 0.2 K/uL   Comprehensive Metabolic Panel   Result Value Ref Range    Sodium 132 (L) 136 - 145 mmol/L    Potassium 5.2 (H) 3.5 - 5.1 mmol/L    Chloride 89 (L) 99 - 110 mmol/L    CO2 34 (H) 21 - 32 mmol/L    Anion Gap 9 3 - 16    Glucose 96 70 - 99 mg/dL    BUN 23 (H) 7 - 20 mg/dL    CREATININE 0.8 0.6 - 1.1 mg/dL    GFR Non-African American >60 >60    GFR African American >60 >60    Calcium 9.0 8.3 - 10.6 mg/dL    Total Protein 7.1 6.4 - 8.2 g/dL    Albumin 4.3 3.4 - 5.0 g/dL    Albumin/Globulin Ratio 1.5 1.1 - 2.2    Total Bilirubin 0.5 0.0 - 1.0 mg/dL    Alkaline Phosphatase 84 40 - 129 U/L    ALT 31 10 - 40 U/L    AST 24 15 - 37 U/L   Troponin   Result Value Ref Range    Troponin <0.01 <0.01 ng/mL   Brain Natriuretic Peptide   Result Value Ref Range    Pro-BNP 4,745 (H) 0 - 124 pg/mL   Blood gas, venous   Result Value Ref Range    pH, Emeka 7.260 (L) 7.350 - 7.450    pCO2, Emeka 74.0 (H) 40.0 - 50.0 mmHg    pO2, Emeka 88.8 (H) 25.0 - 40.0 mmHg    HCO3, Venous 32.5 (H) 23.0 - 29.0 mmol/L    Base Excess, Emeka 2.6 -3.0 - 3.0 mmol/L    O2 Sat, Emeka 96 Not Established %    Carboxyhemoglobin 10.4 (H) 0.0 - 1.5 %    MetHgb, Emeka 0.4 <1.5 %    TC02 (Calc), Emeka 35 Not Established mmol/L    O2 Therapy Unknown    Lactic Acid   Result Value Ref Range    Lactic Acid 0.5 0.4 - 2.0 mmol/L     I spoke with Prasad Fabian and Hansel Cash. We thoroughly discussed the history, physical exam, laboratory and imaging studies, as well as, emergency department course. Based upon that discussion, we've decided to admit Diya Pratt to the hospital for further observation, evaluation and treatment. Final Impression  1. Acute respiratory failure with hypercapnia (HCC)      Blood pressure 126/68, pulse 105, temperature 98.4 °F (36.9 °C), temperature source Oral, resp. rate 17, height 5' 3\" (1.6 m), weight 248 lb (112.5 kg), last menstrual period 06/16/2021, SpO2 96 %, not currently breastfeeding. DISPOSITION  Patient was admitted to the hospital in stable condition.          [place], [place]  03/15/22 3696

## 2022-03-16 NOTE — PROGRESS NOTES
Message sent to hospitalist for critical care consult for intubation per daughter request, I received in report from night shift from the daughter that this happens and typically she will get multiple doses of narcan but it's usually because she needs intubated. Hospitalist ordered critical care consult. Call light in reach.

## 2022-03-16 NOTE — PROGRESS NOTES
Cn to bedside for pt ankur appiah in bipap mask, Kristen Nance in ICU made aware. Pt to transfer to ICU for intubation d.t not able to tolerate bipap mask.

## 2022-03-16 NOTE — PROGRESS NOTES
03/16/22 0131   RT Protocol   History Pulmonary Disease 1   Respiratory pattern 0   Breath sounds 2   Cough 0   Indications for Bronchodilator Therapy On home bronchodilators   Bronchodilator Assessment Score 3

## 2022-03-16 NOTE — ED NOTES
Pt placed on bed pan. Pt urinates, and some of urine misses bed pan and urine onto sheets. Pt's sheets changed, purewick put into place, pt provided with blankets pillow, and food for comfort. VS remain stable.      Anastasia Green RN  03/15/22 2049

## 2022-03-16 NOTE — CONSULTS
Consult Note            Date:3/16/2022        Patient Name:Rachel Johnson     YOB: 1975     Age:46 y.o. Consult to Pulmonology  Consult performed by: Jorge Peña MD  Consult ordered by: Nancy Newsome MD  Reason for consult: Acute respiratory failure, on chronic respiratory failure          Chief Complaint     Chief Complaint   Patient presents with    Shortness of Breath     Pt has hx of COPD and emphysema. States she is on 4L home O2 via [place]. States that she has been out of oxygen for the past 3 weeks, states that CPAP machine broke approximately 2.5 weeks ago. States that she has been experiencing worsening SOB. States that her physician cant write a new prescription for O2 until she is seen by her pulmonologist.   Donnel Macias Shoulder Pain     R shoulder pain starting yesterday radiating up to posterior R neck. Denies injury. Acute respiratory failure, on chronic respiratory failure    History Obtained From   patient, electronic medical record    History of Present Illness   Is a 49-year-old female with a history of hypertension, hyperlipidemia, diabetes and COPD with pulmonary hypertension and chronic respiratory failure with hypercapnia, chronic diastolic dysfunction, hepatitis C, history of heroin use and morbid obesity. Patient was transferred from the floor to the ICU patient was using BiPAP machine with a full facemask and had vomited into the mask. Despite this the mask was kept on. Patient was taken off of the BiPAP mask and sent to the ICU for possible intubation. Patient had NG tube placed. Once the patient was in the ICU the patient refused to have anybody put a tube down her for intubation. Patient refused NG tube. Patient did not want to have BiPAP placed back on without having a smaller mask. Patient denies any chest pains or palpitations. She states that she is normally this short of breath.   She did not want to have any but he restrict her access to food and drink. Past Medical History     Past Medical History:   Diagnosis Date    Acute cystitis with hematuria     Bacteremia 08/23/2017    staph aureus    [facility]est[facility]     Colonization status 7/26/12    DNA probe negative for MRSA    [facility]     Diabetes mellitus ([facility]     FOR ABOUT 4 YEARS    DM (diabetes mellitus) ([facility]     Drug abuse, IV ([facility]     Hepatitis     Hepatitis C antibody positive in blood 08/24/2017    Hepatitis C AB positive     Heroin overdose ([facility]     Hyperlipidemia 3/15/2022    Hypertension     MRSA infection     LUNGS    Neuropathy     legs with pain    Other disorders of kidney and ureter     KIDNEY FAILURE, ACUTE    Pneumonia     Smoking history         Past Surgical History     Past Surgical History:   Procedure Laterality Date    BRONCHOSCOPY  12/21/2017    BAL    CYSTOSCOPY  1/5/15    cysto with left stent placement    CYSTOSCOPY  10/6/15    stent removal    CYSTOSCOPY  11/09/2019    left uretroscopy with stent placement    CYSTOSCOPY Left 11/13/2019    LEFT URETEROSCOPY WITH HOLMIUM LASER LITHOTRIPSY, STENT REMOVAL,  STONE EXTRACTION     CYSTOSCOPY INSERTION / REMOVAL STENT / STONE Left 11/9/2019    CYSTOSCOPY, URETEROSCOPY, STENT PLACEMENT performed by Barbra Go at [address] / REMOVAL Evonnie Ken / STONE Left 11/13/2019    CYSTOSCOPY, LEFT URETEROSCOPY WITH HOLMIUM LASER LITHOTRIPSY, STENT REMOVAL,  STONE EXTRACTION performed by Meseret Reis MD at [place] [address]      URETER STENT PLACEMENT          Medications     Prior to Admission medications    Medication Sig Start Date End Date Taking?  Authorizing Provider   OXYGEN Inhale 4 L into the lungs continuous via nasal canula 3/10/22   Bryant Lozano MD   albuterol sulfate  (90 Base) MCG/ACT inhaler TAKE 2 PUFFS BY MOUTH EVERY 6 HOURS AS NEEDED FOR WHEEZE 3/9/22 Renny Cisse MD   atorvastatin (LIPITOR) 10 MG tablet TAKE 1 TABLET BY MOUTH EVERY DAY 3/9/22   Renny Cisse MD   [facility] Lancets Ultra-Thin 30G MISC USE AS DIRECTED TO TEST BLOOD SUGAR 3/9/22   Renny Cisse MD   metFORMIN (GLUCOPHAGE) 500 MG tablet TAKE 1 TABLET BY MOUTH TWICE A DAY WITH MEALS 3/9/22   Renny Cisse MD   lisinopril (PRINIVIL;ZESTRIL) 30 MG tablet TAKE 1 TABLET BY MOUTH EVERY DAY 3/9/22   Renny Cisse MD   escitalopram (LEXAPRO) 10 MG tablet Take 1 tablet by mouth daily 3/9/22   Renny Cisse MD   fluticasone-salmeterol (ADVAIR DISKUS) 250-50 MCG/DOSE AEPB Inhale 1 puff into the lungs every 12 hours 3/9/22   Renny Cisse MD   gabapentin (NEURONTIN) 600 MG tablet Take 1 tablet by mouth 3 times daily for 30 days. 3/9/22 4/8/22  Renny Cisse MD   methylPREDNISolone (MEDROL DOSEPACK) 4 MG tablet Take by mouth. 2/3/22   Renny Cisse MD   glucose monitoring (FREESTYLE FREEDOM) kit 1 kit by Does not apply route daily 9/8/21   Madison Jane,    blood glucose monitor strips Test 4 times a day & as needed for symptoms of irregular blood glucose. Dispense sufficient amount for indicated testing frequency plus additional to accommodate PRN testing needs.  9/8/21   Madison Jane DO   Lancets MISC 1 each by Does not apply route 4 times daily 9/8/21   Madison Jane DO   glucose monitoring kit (FREESTYLE) monitoring kit 1 kit by Does not apply route daily E11.9 4/5/21   Madison Jane DO   ROBAFEN DM COUGH  MG/5ML syrup  1/8/21   Historical Provider, MD        gabapentin (NEURONTIN) capsule 600 mg, TID  escitalopram (LEXAPRO) tablet 10 mg, Daily  azithromycin (ZITHROMAX) 500 mg in D5W 250ml addavial, Q24H  methylPREDNISolone sodium (SOLU-MEDROL) injection 40 mg, Q6H  guaiFENesin-dextromethorphan (ROBITUSSIN DM) 100-10 MG/5ML syrup 10 mL, Q4H PRN  naloxone (NARCAN) injection 1.2 mg, PRN  dextrose bolus (hypoglycemia) 10% 125 mL, PRN Or  dextrose bolus (hypoglycemia) 10% 250 mL, PRN  ipratropium-albuterol (DUONEB) nebulizer solution 1 ampule, Q4H PRN  prochlorperazine (COMPAZINE) injection 10 mg, Q6H PRN  budesonide-formoterol (SYMBICORT) 160-4.5 MCG/ACT inhaler 2 puff, BID  atorvastatin (LIPITOR) tablet 10 mg, Daily  benzonatate (TESSALON) capsule 200 mg, TID PRN  lisinopril (PRINIVIL;ZESTRIL) tablet 30 mg, Daily  insulin lispro (HUMALOG) injection vial 0-12 Units, TID WC  insulin lispro (HUMALOG) injection vial 0-6 Units, Nightly  glucose (GLUTOSE) 40 % oral gel 15 g, PRN  glucagon (rDNA) injection 1 mg, PRN  dextrose 5 % solution, PRN  sodium chloride flush 0.9 % injection 10 mL, 2 times per day  sodium chloride flush 0.9 % injection 10 mL, PRN  0.9 % sodium chloride infusion, PRN  ondansetron (ZOFRAN) injection 4 mg, Q4H PRN  polyethylene glycol (GLYCOLAX) packet 17 g, Daily PRN  acetaminophen (TYLENOL) tablet 650 mg, Q4H PRN   Or  acetaminophen (TYLENOL) suppository 650 mg, Q4H PRN  enoxaparin (LOVENOX) injection 40 mg, Daily        Allergies   Pcn [penicillins], Aspirin, Pcn [penicillins], and Sulfamethoxazole-trimethoprim    Social History     Social History     Tobacco History     Smoking Status  Current Every Day Smoker Smoking Frequency  0.5 packs/day for 33 years (16.5 pk yrs) Smoking Tobacco Type  E-Cigarettes    Smokeless Tobacco Use  Never Used          Alcohol History     Alcohol Use Status  No          Drug Use     Drug Use Status  No Comment  Heroin          Sexual Activity     Sexually Active  Not Currently Partners  Male                Family History     Family History   Problem Relation Age of Onset    Heart Disease Mother     No Known Problems Father     Heart Disease Sister     No Known Problems Brother     No Known Problems Maternal Grandmother     No Known Problems Maternal Grandfather     No Known Problems Paternal Grandmother     No Known Problems Paternal Grandfather     No Known Problems Other        Review of Systems   Review of Systems   Constitutional: Negative. HENT: Negative. Eyes: Negative. Respiratory: Positive for cough and shortness of breath. Cardiovascular: Negative. Gastrointestinal: Negative. Endocrine: Negative. Genitourinary: Negative. Musculoskeletal: Negative. Skin: Negative. Allergic/Immunologic: Negative. Neurological: Negative. Hematological: Negative. Psychiatric/Behavioral: Negative. Physical Exam   BP (!) 163/90   Pulse 99   Temp 97.7 °F (36.5 °C) (Axillary)   Resp 23   Ht 5' 3\" (1.6 m)   Wt 222 lb 10.6 oz (101 kg)   LMP 06/16/2021   SpO2 98%   BMI 39.44 kg/m²      Physical Exam  Vitals and nursing note reviewed. Constitutional:       General: She is not in acute distress. Appearance: Normal appearance. She is obese. She is not ill-appearing. HENT:      Head: Normocephalic and atraumatic. Right Ear: External ear normal.      Left Ear: External ear normal.      Nose: Nose normal.      Mouth/Throat:      Mouth: Mucous membranes are moist.      Pharynx: Oropharynx is clear. Comments: Mallampati 3  Eyes:      General: No scleral icterus. Extraocular Movements: Extraocular movements intact. Conjunctiva/sclera: Conjunctivae normal.      Pupils: Pupils are equal, round, and reactive to light. Cardiovascular:      Rate and Rhythm: Normal rate and regular rhythm. Pulses: Normal pulses. Heart sounds: Normal heart sounds. No murmur heard. No friction rub. Pulmonary:      Effort: No respiratory distress. Breath sounds: No stridor. No wheezing, rhonchi or rales. Chest:      Chest wall: No tenderness. Abdominal:      General: Abdomen is flat. Bowel sounds are normal. There is no distension. Tenderness: There is no abdominal tenderness. There is no guarding. Musculoskeletal:         General: No swelling or tenderness. Normal range of motion. Cervical back: Normal range of motion and neck supple. No rigidity. Skin:     General: Skin is warm and dry. Coloration: Skin is not jaundiced. Neurological:      General: No focal deficit present. Mental Status: She is alert and oriented to person, place, and time. Mental status is at baseline. Cranial Nerves: No cranial nerve deficit. Sensory: No sensory deficit. Motor: No weakness. Gait: Gait normal.   Psychiatric:         Mood and Affect: Mood normal.         Thought Content: Thought content normal.         Judgment: Judgment normal.         Labs    CBC:  Recent Labs     03/15/22  1455 03/15/22  1455 03/16/22  0043 03/16/22  0448 03/16/22  1222   WBC 9.1  --   --  11.3*  --    RBC 5.70*  --   --  5.85*  --    HGB 14.9   < > 19.7* 15.4 19.2*   HCT 47.0  --   --  49.7*  --    MCV 82.4  --   --  84.9  --    RDW 16.3*  --   --  16.6*  --      --   --  114*  --     < > = values in this interval not displayed. CHEMISTRIES:  Recent Labs     03/15/22  1455 03/16/22  0448   * 138   K 5.2* 4.9   CL 89* 93*   CO2 34* 37*   BUN 23* 20   CREATININE 0.8 1.0   GLUCOSE 96 172*     PT/INR:No results for input(s): PROTIME, INR in the last 72 hours. APTT:No results for input(s): APTT in the last 72 hours. LIVER PROFILE:  Recent Labs     03/15/22  1455   AST 24   ALT 31   BILITOT 0.5   ALKPHOS 84       Imaging/Diagnostics   XR ABDOMEN (KUB) (SINGLE AP VIEW)    Result Date: 3/16/2022  1. The NG tube tip and side port are noted in the gastric antrum. XR ACUTE ABD SERIES CHEST 1 VW    Result Date: 3/16/2022  Increasing airspace disease at the lung bases, left greater than right, either atelectasis or pneumonia Nonobstructive bowel gas pattern. Moderate stool load is seen in colon     CT HEAD WO CONTRAST    Result Date: 3/15/2022  No acute intracranial abnormality. XR CHEST PORTABLE    Result Date: 3/15/2022  1. Bibasilar pulmonary opacities are seen, which could represent atelectasis or multifocal pneumonia.  2. Stable cardiomegaly. CT CHEST PULMONARY EMBOLISM W CONTRAST    Result Date: 3/16/2022  1. No evidence of pulmonary embolic disease. 2. No acute pulmonary findings. Bibasilar atelectasis. 3. Mild cardiomegaly. Mildly enlarged main pulmonary artery which can be seen with pulmonary hypertension.        Assessment      Hospital Problems           Last Modified POA    * (Principal) COPD exacerbation ([address] 3/15/2022 Yes    Essential hypertension 3/15/2022 Yes    Morbid obesity due to excess calories ([address] 3/15/2022 Yes    Hepatitis C (Chronic) 3/15/2022 Yes    Pulmonary vascular congestion on CXR 3/15/2022 Yes    Type 2 diabetes mellitus without complication, without long-term current use of insulin ([address] 3/15/2022 Yes    Acute on chronic respiratory failure with hypoxia and hypercapnia ([address] 3/15/2022 Yes    Pulmonary hypertension ([address] 3/15/2022 Yes    History of heroin use 3/15/2022 Yes    Acute on chronic diastolic (congestive) heart failure ([address] 3/15/2022 Yes    Acute metabolic encephalopathy 7/53/2207 Yes    Hyperlipidemia 3/15/2022 Yes    Acute pain of right shoulder 3/15/2022 Yes          Plan   Neurological    Depression  Lexapro 10 mg daily  Neurontin 6 mg 3 times daily      Cardiovascular  CHF, hypertension    Elevated BNP of greater than 4000  Continue with diuresis      Continue with  Lipitor 10 mg daily  Lovenox 40 subcu daily  Lisinopril 30 mg daily      Pulmonary  Acute on chronic respiratory failure  , Acute aspiration possibly  COPD      Patient's BiPAP mask was changed to a nasal mask, patient was able to tolerated  Initial PCO2 was 99  Patient's O2 sat remained well  Once the patient was placed to a nasal mask for BiPAP      Continue with  Symbicort 160/4.5 taken 2 puffs twice daily  Solu-Medrol 40 IV every 6 hours      Gastrointestinal  History of hepatitis C              Endocrine  Diabetes  Sliding scale insulin          Renal  Making urine  Creatinine is normal    Urine drug screen positive for opioids and oxycodone        Prophylaxis  Lovenox 40 mg IV daily      Electrolytes  Stable      CODE STATUS full      Thank you for the consult      Discussed with nursing and respiratory therapy    Discussed with patient      Okay to move out of the ICU      Critical Care Services Provided: This patient had a critical illness or injury that acutely impaired one or more vital organ systems such that there was a high probability of imminent or life-threatening deterioration in the patient's condition. This required high complexity decision-making to assess, manipulate, and support vital system function(s) to treat single or multiple vital organ system failure and/or to prevent further life-threatening deterioration of the patient's condition.  Total attending physician time, excluding unbundled procedures, spent at the bedside, and away from the bedside but on the unit or floor, reviewing laboratory test results, discussing care with medical staff, and discussing care with family when the patient was unable or incompetent to participate:   Critical Care Time (Minutes) # 35   (Discontinuous)             Electronically signed by Emily Lange MD on 3/16/22 at 1:58 PM EDT

## 2022-03-16 NOTE — ED PROVIDER NOTES
Twelve-lead EKG as read and interpreted by myself shows sinus tachycardia at a rate of 112 bpm, CT interval QRS and QTc normal.  Right axis deviation. No acute ischemic changes. Axis deviation and rate is new when compared to prior EKG December 2021. Otherwise I did not participate in the care of this patient.       Loreta Perkins MD  03/16/22 1596

## 2022-03-16 NOTE — PROGRESS NOTES
Hospitalist Progress Note      PCP: Gricelda Kaiser MD    Date of Admission: 3/15/2022    Chief Complaint: SOB/Confusion    Hospital Course: Pt is an 48y.o. year-old female with a history of hypertension, hyperlipidemia, diabetes mellitus, COPD, pulmonary hypertension, chronic respiratory failure with hypoxia and hypercapnia, chronic diastolic congestive heart failure, hepatitis C, a history of heroin use and morbid obesity. At baseline she requires 4 L of oxygen via nasal cannula continuously. She presents to the emergency room for evaluation of increasing shortness of breath and lethargy. She states that she has been out of oxygen for the past 3 weeks and that her CPAP machine broke approximately 2.5 weeks ago. She reports worsening shortness of breath which is now severe. She states that her shortness of breath is worse with minimal exertion and she can only speak 4-5 words before she has to stop and catch her breath. She also complains of right shoulder pain for the past day which radiates up her posterior right neck. She denies any injuries. She is being admitted for further evaluation and treatment. Associated signs and symptoms do not include chest pain, lightheaded, dizziness, diaphoresis, orthopnea, paroxysmal nocturnal dyspnea, fever or chills. No recent medication changes.       Subjective: On Bipap, NGT in place. Patient opens eyes to verb stim, follows commands.        Medications:  Reviewed    Infusion Medications    dextrose      sodium chloride       Scheduled Medications    gabapentin  600 mg Oral TID    escitalopram  10 mg Oral Daily    azithromycin  500 mg IntraVENous Q24H    methylPREDNISolone  40 mg IntraVENous Q6H    budesonide-formoterol  2 puff Inhalation BID    atorvastatin  10 mg Oral Daily    lisinopril  30 mg Oral Daily    insulin lispro  0-12 Units SubCUTAneous TID     insulin lispro  0-6 Units SubCUTAneous Nightly    sodium chloride flush  10 mL IntraVENous 2 times per day    enoxaparin  40 mg SubCUTAneous Daily     PRN Meds: guaiFENesin-dextromethorphan, naloxone, dextrose bolus (hypoglycemia) **OR** dextrose bolus (hypoglycemia), ipratropium-albuterol, benzonatate, glucose, glucagon (rDNA), dextrose, sodium chloride flush, sodium chloride, ondansetron, polyethylene glycol, acetaminophen **OR** acetaminophen      Intake/Output Summary (Last 24 hours) at 3/16/2022 1132  Last data filed at 3/16/2022 0852  Gross per 24 hour   Intake 0 ml   Output 1450 ml   Net -1450 ml       Physical Exam Performed:    BP (!) 163/90   Pulse 95   Temp 97.9 °F (36.6 °C) (Axillary)   Resp 21   Ht 5' 3\" (1.6 m)   Wt 222 lb 10.6 oz (101 kg)   LMP 06/16/2021   SpO2 95%   BMI 39.44 kg/m²     General appearance: Obese. mild distress, appears stated age and cooperative. HEENT: Pupils equal, round, and reactive to light. Conjunctivae/corneas clear. Neck: Supple, with full range of motion. No jugular venous distention. Trachea midline. Respiratory: Diminished breath sounds  Cardiovascular: Regular rate and rhythm with normal S1/S2 without murmurs, rubs or gallops. Abdomen: Soft, non-tender, non-distended with normal bowel sounds. Musculoskeletal: No clubbing, cyanosis or edema bilaterally. Full range of motion without deformity. Skin: Skin color, texture, turgor normal.  No rashes or lesions. Neurologic:  Neurovascularly intact without any focal sensory/motor deficits.  Cranial nerves: II-XII intact, grossly non-focal.  Psychiatric: Alert and oriented, thought content appropriate, normal insight  Capillary Refill: Brisk,3 seconds, normal   Peripheral Pulses: +2 palpable, equal bilaterally       Labs:   Recent Labs     03/15/22  1455 03/16/22  0043 03/16/22  0448   WBC 9.1  --  11.3*   HGB 14.9 19.7* 15.4   HCT 47.0  --  49.7*     --  114*     Recent Labs     03/15/22  1455 03/16/22  0448   * 138   K 5.2* 4.9   CL 89* 93*   CO2 34* 37*   BUN 23* 20 CREATININE 0.8 1.0   CALCIUM 9.0 8.7     Recent Labs     03/15/22  1455   AST 24   ALT 31   BILITOT 0.5   ALKPHOS 84     No results for input(s): INR in the last 72 hours. Recent Labs     03/15/22  1455 03/16/22  0448   TROPONINI <0.01 <0.01       Urinalysis:      Lab Results   Component Value Date    NITRU Negative 11/26/2021    WBCUA None seen 11/26/2021    BACTERIA 2+ 01/06/2021    RBCUA 3-4 11/26/2021    BLOODU TRACE-INTACT 11/26/2021    SPECGRAV 1.025 11/26/2021    GLUCOSEU Negative 11/26/2021    GLUCOSEU NEGATIVE 01/02/2012       Radiology:  XR ACUTE ABD SERIES CHEST 1 VW   Final Result   Increasing airspace disease at the lung bases, left greater than right,   either atelectasis or pneumonia      Nonobstructive bowel gas pattern. Moderate stool load is seen in colon         XR ABDOMEN (KUB) (SINGLE AP VIEW)   Final Result   1. The NG tube tip and side port are noted in the gastric antrum. CT CHEST PULMONARY EMBOLISM W CONTRAST   Final Result   1. No evidence of pulmonary embolic disease. 2. No acute pulmonary findings. Bibasilar atelectasis. 3. Mild cardiomegaly. Mildly enlarged main pulmonary artery which can be   seen with pulmonary hypertension. CT HEAD WO CONTRAST   Final Result   No acute intracranial abnormality. XR CHEST PORTABLE   Final Result   1. Bibasilar pulmonary opacities are seen, which could represent atelectasis   or multifocal pneumonia. 2. Stable cardiomegaly.                  Assessment/Plan:    Active Hospital Problems    Diagnosis     COPD exacerbation ([facility] [J44.1]      Priority: High    Acute on chronic diastolic (congestive) heart failure (HCC) [I50.33]     Acute metabolic encephalopathy [J19.22]     Hyperlipidemia [E78.5]     Acute pain of right shoulder [M25.511]     History of heroin use [Z87.898]     Pulmonary hypertension ([address] [I27.20]     Acute on chronic respiratory failure with hypoxia and hypercapnia (HCC) [J96.21, J96.22]     Hepatitis C [B19.20]     Type 2 diabetes mellitus without complication, without long-term current use of insulin (HCC) [E11.9]     Pulmonary vascular congestion on CXR [R09.89]     Morbid obesity due to excess calories ([address] [E66.01]     Essential hypertension [I10]      COPD (acute exacerbation) - Patient has been started on Nebulizer treatments to be given on a scheduled basis every 4 hours, and on an as-needed basis every 2 hours based upon needs identified through close monitoring. In addition, Solumedrol and Antibiotics have been prescribed. The Solumedrol will be tapered as the patient improves. Patient will be monitored closely, and deep breathing and coughing will be encouraged while awake.      Acute on chronic diastolic (congestive) heart failure ([address] with pulmonary vascular congestion on CXR -appears to be mild. We will give 1 dose of Lasix and reassess in the morning. Monitor strict I's and O's and daily weights     Pulmonary hypertension ([address] -monitor fluid balance closely     Acute on chronic respiratory failure with hypoxia and hypercapnia (HCC) - Due to above; As evidenced by + respiratory distress, air hunger, + use of accessory muscles, 4-5 word dyspnea. Pt has been started on BiPap and will be monitored closely and transferred to the ICU. If her condition deteriorates she may need to be intubated to facilitate Mechanical Ventilation. Will provide supplemental oxygen as necessary with a goal of maintaining an Oxygen Saturation of 92% or higher. CTPA: No PE, Bibasilar atelectasis, Mild Cardiomegaly  -ABG-resp acidosis     Acute metabolic encephalopathy -patient appears to be having increasing confusion due to CO2 retention. She is still alert enough to remove her BiPAP in case of an emergency. We will place a BiPAP and monitor closely     Acute pain of right shoulder -this appears to be shoulder strain and not a cardiac equivalent.   However we will monitor her on telemetry and follow serial cardiac enzymes     Diabetes mellitus II - SSI and carb control diet     Essential (primary) hypertension - continue home meds and monitor blood pressure     Hyperlipidemia - No current evidence of Rhabdomyolysis or other adverse effects. Continue statin therapy while in the hospital     Morbid obesity due to excess calories (Body mass index is 43.93 kg/m². ) - Complicating assessment and treatment. Placing patient at risk for multiple co-morbidities as well as early death and contributing to the patient's presentation.  on weight loss when appropriate.     Hepatitis C -per outpatient treatment      History of heroin use - no suspicion of acute intoxication    DVT Prophylaxis: Lovenox  Diet: ADULT DIET; Regular; 5 carb choices (75 gm/meal)  Code Status: Full Code    PT/OT Eval Status: consult when stable    Dispo - transfer to ICU for mech ventilation, dc several days.     Tati Thao, APRN - CNP

## 2022-03-16 NOTE — ED NOTES
Pt's oxygen intermittently decreasing to 60s while sleeping. Pt's oxygen returns to >90% with much encouragement from this RN. RT notified of BiPAP order.      Marzena Ansari RN  03/15/22 4595

## 2022-03-16 NOTE — PROGRESS NOTES
Received patient from ED @ 0015- pt unresponsive. Secure chat sent to Reena Sherwood NP- NP came to bedside. Verbal orders given per [facility]. Pt then arousable to painful simuli. Shortly after pt vomited while bipap in place. RN immediately removed bipap and suctioned pt. Pt continued to vomit, SpO2 dropped to 70s. Rapid Called, new orders placed. Will continue with current plan of care.

## 2022-03-16 NOTE — PROGRESS NOTES
Hospitalist Progress Note      PCP: Itzel Hicks MD    Date of Admission: 3/15/2022    Chief Complaint:   Hypoxic Respiratory failure    Hospital Course:      Pt is an 48y.o. year-old female with a history of hypertension, hyperlipidemia, diabetes mellitus, COPD, pulmonary hypertension, chronic respiratory failure with hypoxia and hypercapnia, chronic diastolic congestive heart failure, hepatitis C, a history of heroin use and morbid obesity. At baseline she requires 4 L of oxygen via nasal cannula continuously. She presents to the emergency room for evaluation of increasing shortness of breath and lethargy. She states that she has been out of oxygen for the past 3 weeks and that her CPAP machine broke approximately 2.5 weeks ago. She reports worsening shortness of breath which is now severe. She states that her shortness of breath is worse with minimal exertion and she can only speak 4-5 words before she has to stop and catch her breath. She also complains of right shoulder pain for the past day which radiates up her posterior right neck. She denies any injuries. She is being admitted for further evaluation and treatment. Associated signs and symptoms do not include chest pain, lightheaded, dizziness, diaphoresis, orthopnea, paroxysmal nocturnal dyspnea, fever or chills. No recent medication changes.     Patient with episode of emesis. She has been on bipap. ABG not significantly improved. Critical care team consulted for possible intubation. Patient seen and examined. She opens eyes and answered some yes/no questions. Mely Maria NP note reviewed. Agree with plan. Dr Manjit Caballero to assume care when patient transferred to ICU.     Lis Blakely MD  3/16/2022  1:11 PM

## 2022-03-16 NOTE — H&P
Hospital Medicine  History and Physical    PCP: Angel Bull MD  Patient Name: Kelley Espino    Date of Service: Pt seen/examined on 3/15/22 and admitted to Inpatient with expected LOS greater than two midnights due to medical therapy    CHIEF COMPLAINT:  Pt c/o shortness of breath, increasing confusion  HISTORY OF PRESENT ILLNESS: Pt is an 48y.o. year-old female with a history of hypertension, hyperlipidemia, diabetes mellitus, COPD, pulmonary hypertension, chronic respiratory failure with hypoxia and hypercapnia, chronic diastolic congestive heart failure, hepatitis C, a history of heroin use and morbid obesity. At baseline she requires 4 L of oxygen via nasal cannula continuously. She presents to the emergency room for evaluation of increasing shortness of breath and lethargy. She states that she has been out of oxygen for the past 3 weeks and that her CPAP machine broke approximately 2.5 weeks ago. She reports worsening shortness of breath which is now severe. She states that her shortness of breath is worse with minimal exertion and she can only speak 4-5 words before she has to stop and catch her breath. She also complains of right shoulder pain for the past day which radiates up her posterior right neck. She denies any injuries. She is being admitted for further evaluation and treatment. Associated signs and symptoms do not include chest pain, lightheaded, dizziness, diaphoresis, orthopnea, paroxysmal nocturnal dyspnea, fever or chills. No recent medication changes.       Past Medical History:        Diagnosis Date    Acute cystitis with hematuria     Bacteremia 08/23/2017    staph aureus    CHF (congestive heart failure) (HCC)     Colonization status 7/26/12    DNA probe negative for MRSA    COPD [facility]chronic obstructive pulmonary disease) [facility] [address]     Diabetes mellitus ([facility] [address]     FOR ABOUT 4 YEARS    DM (diabetes mellitus) (HCC)     Drug abuse, IV (HCC)     Hepatitis     Hepatitis C TAKE 1 TABLET BY MOUTH EVERY DAY 3/9/22   Nelli Beckman MD   escitalopram (LEXAPRO) 10 MG tablet Take 1 tablet by mouth daily 3/9/22   Nelli Beckman MD   fluticasone-salmeterol (ADVAIR DISKUS) 250-50 MCG/DOSE AEPB Inhale 1 puff into the lungs every 12 hours 3/9/22   Nelli Beckman MD   gabapentin (NEURONTIN) 600 MG tablet Take 1 tablet by mouth 3 times daily for 30 days. 3/9/22 4/8/22  Nelli Beckman MD   methylPREDNISolone (MEDROL DOSEPACK) 4 MG tablet Take by mouth. 2/3/22   Nelli Beckman MD   glucose monitoring (FREESTYLE FREEDOM) kit 1 kit by Does not apply route daily 9/8/21   Madison Jane DO   blood glucose monitor strips Test 4 times a day & as needed for symptoms of irregular blood glucose. Dispense sufficient amount for indicated testing frequency plus additional to accommodate PRN testing needs. 9/8/21   Madison Jane DO   Lancets MISC 1 each by Does not apply route 4 times daily 9/8/21   Madison Jane DO   glucose monitoring kit (FREESTYLE) monitoring kit 1 kit by Does not apply route daily E11.9 4/5/21   Madison Jane DO   ROBAFEN DM COUGH  MG/5ML syrup  1/8/21   Historical Provider, MD       Family History:       Problem Relation Age of Onset    Heart Disease Mother     No Known Problems Father     Heart Disease Sister     No Known Problems Brother     No Known Problems Maternal Grandmother     No Known Problems Maternal Grandfather     No Known Problems Paternal Grandmother     No Known Problems Paternal Grandfather     No Known Problems Other      Social History:   TOBACCO:   reports that she has been smoking e-cigarettes. She has a 16.50 pack-year smoking history. She has never used smokeless tobacco.  ETOH:   reports no history of alcohol use.   OCCUPATION:      REVIEW OF SYSTEMS:  A full review of systems was performed and is negative except for that which appears in the HPI    Physical Exam:    Vitals: /68   Pulse 105   Temp 98.4 °F (36.9 °C) (Oral)   Resp 17   Ht 5' 3\" (1.6 m)   Wt 248 lb (112.5 kg)   LMP 06/16/2021   SpO2 96%   BMI 43.93 kg/m²   General appearance: Morbidly Obese, chronically ill appearing 48y.o. year-old female who is lethargic (but would be able to take her BiPAP off in an emergency), appears stated age and is cooperative  HEENT: Head: Normocephalic, no lesions, without obvious abnormality. Eye: Normal external eye, conjunctiva, lids cornea, PEERL. Ears: Normal external ears. Non-tender. Nose: Normal external nose, mucus membranes and septum. Pharynx: Dental Hygiene adequate. Normal buccal mucosa. Normal pharynx. Neck: no adenopathy, no carotid bruit, no JVD, supple, symmetrical, trachea midline and thyroid not enlarged, symmetric, no tenderness/mass/nodules  Lungs: restricted air movement on auscultation bilaterally. + respiratory distress, air hunger, + use of accessory muscles, 4-5 word dyspnea,  Heart: regular rate and rhythm, S1, S2 normal, no murmur, click, rub or gallop and normal apical impulse  Abdomen: soft, non-tender; bowel sounds normal; no masses, no organomegaly  Extremities: extremities atraumatic, no cyanosis, trace edema bilateral lower extremities and Homans sign is negative, no sign of DVT. Capillary Refill: Acceptable < 3 seconds   Peripheral Pulses: +3 easily felt, not easily obliterated with pressures   Skin: Skin color, texture, turgor normal. No rashes or lesions on exposed skin  Neurologic: Neurovascularly intact without any focal sensory/motor deficits. Cranial nerves: II-XII intact, grossly non-focal. Gait was not tested.   Mental Status: Alert and oriented, thought content appropriate, normal insight    CBC:   Recent Labs     03/15/22  1455   WBC 9.1   HGB 14.9        BMP:    Recent Labs     03/15/22  1455   *   K 5.2*   CL 89*   CO2 34*   BUN 23*   CREATININE 0.8   GLUCOSE 96     Troponin:   Recent Labs     03/15/22  1455   TROPONINI <0.01     PT/INR:  No results found for: PTINR  U/A:    Lab Results   Component Value Date    LEUKOCYTESUR Negative 11/26/2021    NITRITE NEG 07/26/2012    RBCUA 3-4 11/26/2021    SPECGRAV 1.025 11/26/2021    UROBILINOGEN 0.2 11/26/2021    BILIRUBINUR Negative 11/26/2021    BILIRUBINUR LARGE 07/26/2012    BILIRUBINUR NEGATIVE 01/02/2012    BLOODU TRACE-INTACT 11/26/2021    GLUCOSEU Negative 11/26/2021    GLUCOSEU NEGATIVE 01/02/2012    PROTEINU 100 11/26/2021         RAD:   I have independently reviewed and interpreted the imaging studies below and based my recommendations to the patient on those findings. CT HEAD WO CONTRAST    Result Date: 3/15/2022  EXAMINATION: CT OF THE HEAD WITHOUT CONTRAST  3/15/2022 3:52 pm TECHNIQUE: CT of the head was performed without the administration of intravenous contrast. Dose modulation, iterative reconstruction, and/or weight based adjustment of the mA/kV was utilized to reduce the radiation dose to as low as reasonably achievable. COMPARISON: CT head December 21, 2017. HISTORY: ORDERING SYSTEM PROVIDED HISTORY: headache TECHNOLOGIST PROVIDED HISTORY: Reason for exam:->headache Has a \"code stroke\" or \"stroke alert\" been called? ->No Decision Support Exception - unselect if not a suspected or confirmed emergency medical condition->Emergency Medical Condition ([place]) Is the patient pregnant?->No Reason for Exam: headache starting today, worsening; c/o right shoulder pain FINDINGS: BRAIN/VENTRICLES: Study is limited due to thick calvarium and mottle artifact. There is streak artifact from the skull base, limiting evaluation of the posterior fossa and brainstem. There appear to be areas of low attenuation involving both cerebellum which are symmetric in appearance and unchanged compared to prior, possibly root congenital.  There is no acute intracranial hemorrhage, mass effect or midline shift. No abnormal extra-axial fluid collection.   The gray-white differentiation is maintained without evidence of an acute infarct. There is no evidence of hydrocephalus. ORBITS: The visualized portion of the orbits demonstrate no acute abnormality. SINUSES: The visualized paranasal sinuses and mastoid air cells demonstrate no acute abnormality. SOFT TISSUES/SKULL:  No acute abnormality of the visualized skull or soft tissues. No acute intracranial abnormality. XR CHEST PORTABLE    Result Date: 3/15/2022  EXAMINATION: ONE XRAY VIEW OF THE CHEST 3/15/2022 2:55 pm COMPARISON: Abdomen radiograph 12/20/2021 HISTORY: ORDERING SYSTEM PROVIDED HISTORY: sob TECHNOLOGIST PROVIDED HISTORY: Reason for exam:->sob Reason for Exam: sob FINDINGS: Patchy bibasilar pulmonary opacities are noted. No discrete pleural fluid. No discrete pneumothorax. Cardiomediastinal silhouette is stable with enlargement of the cardiac silhouette. 1. Bibasilar pulmonary opacities are seen, which could represent atelectasis or multifocal pneumonia. 2. Stable cardiomegaly. CT CHEST PULMONARY EMBOLISM W CONTRAST    Result Date: 3/15/2022  EXAMINATION: CTA OF THE CHEST 3/15/2022 5:22 pm TECHNIQUE: CTA of the chest was performed after the administration of intravenous contrast.  Multiplanar reformatted images are provided for review. MIP images are provided for review. Dose modulation, iterative reconstruction, and/or weight based adjustment of the mA/kV was utilized to reduce the radiation dose to as low as reasonably achievable. COMPARISON: 01/05/2021. HISTORY: ORDERING SYSTEM PROVIDED HISTORY: cp/sob TECHNOLOGIST PROVIDED HISTORY: Reason for exam:->cp/sob Decision Support Exception - unselect if not a suspected or confirmed emergency medical condition->Emergency Medical Condition (MA) Reason for Exam: right chest and shoulder pain, unknown how long, chronic SOB FINDINGS: Pulmonary Arteries: Pulmonary arteries are adequately opacified for evaluation. No evidence of intraluminal filling defect to suggest pulmonary embolism.   The main pulmonary artery is mildly prominent at 3.4 cm. Mediastinum: The thoracic aorta is normal in caliber with mild calcified plaque. Mild cardiomegaly with no pericardial effusion. No pathologic mediastinal adenopathy. The largest precarinal node has a short axis of 12 mm. Calcified granulomatous nodes in the mediastinum and left hilum. The thyroid gland and esophagus are unremarkable. Lungs/pleura: No acute pulmonary infiltrate, consolidation or edema. Calcified granuloma in the left lung apex is stable. Dependent atelectatic changes in the lower lung fields bilaterally. There is no pleural fluid or pneumothorax. The central airways are patent. Upper Abdomen: Multiple low-attenuation hepatic lesions consistent with cysts. There are cysts in the upper pole of the kidneys bilaterally. Gastric distension with retained fluid is noted. Soft Tissues/Bones: No acute bone or soft tissue abnormality. 1. No evidence of pulmonary embolic disease. 2. No acute pulmonary findings. Bibasilar atelectasis. 3. Mild cardiomegaly. Mildly enlarged main pulmonary artery which can be seen with pulmonary hypertension. Assessment:   Principal Problem:    COPD exacerbation ([address]  Active Problems:    Essential hypertension    Morbid obesity due to excess calories (HCC)    Hepatitis C    Pulmonary vascular congestion on CXR    Type 2 diabetes mellitus without complication, without long-term current use of insulin (HCC)    Acute on chronic respiratory failure with hypoxia and hypercapnia (HCC)    Pulmonary hypertension (HCC)    History of heroin use    Acute on chronic diastolic (congestive) heart failure (HCC)    Acute metabolic encephalopathy    Hyperlipidemia    Acute pain of right shoulder  Resolved Problems:    * No resolved hospital problems.  *      Plan:       COPD (acute exacerbation) - Patient has been started on Nebulizer treatments to be given on a scheduled basis every 4 hours, and on an as-needed basis every 2 hours based upon needs identified through close monitoring. In addition, Solumedrol and Antibiotics have been prescribed. The Solumedrol will be tapered as the patient improves. Patient will be monitored closely, and deep breathing and coughing will be encouraged while awake. Acute on chronic diastolic (congestive) heart failure (HCC) with pulmonary vascular congestion on CXR -appears to be mild. We will give 1 dose of Lasix and reassess in the morning. Monitor strict I's and O's and daily weights    Pulmonary hypertension ([address] -monitor fluid balance closely    Acute on chronic respiratory failure with hypoxia and hypercapnia (HCC) - Due to above; As evidenced by + respiratory distress, air hunger, + use of accessory muscles, 4-5 word dyspnea. Pt has been started on BiPap and will be monitored closely and transferred to the ICU. If her condition deteriorates she may need to be intubated to facilitate Mechanical Ventilation. Will provide supplemental oxygen as necessary with a goal of maintaining an Oxygen Saturation of 92% or higher. Acute metabolic encephalopathy -patient appears to be having increasing confusion due to CO2 retention. She is still alert enough to remove her BiPAP in case of an emergency. We will place a BiPAP and monitor closely    Acute pain of right shoulder -this appears to be shoulder strain and not a cardiac equivalent. However we will monitor her on telemetry and follow serial cardiac enzymes    Diabetes mellitus II - SSI and carb control diet    Essential (primary) hypertension - continue home meds and monitor blood pressure    Hyperlipidemia - No current evidence of Rhabdomyolysis or other adverse effects. Continue statin therapy while in the hospital    Morbid obesity due to excess calories (Body mass index is 43.93 kg/m². ) - Complicating assessment and treatment. Placing patient at risk for multiple co-morbidities as well as early death and contributing to the patient's presentation.  on weight loss when appropriate. Hepatitis C -per outpatient treatment      History of heroin use - no suspicion of acute intoxication           DVT Prophylaxis: Lovenox  Diet: Regular, carb control  Code Status: Full code  (Advanced care planning has been discussed with patient and/or responsible family member and is reflected in the code status.  Further orders associated with this have been entered if appropriate)    Disposition: Anticipate that patient will remain in the hospital for 2 to 3 days depending on further evaluation and clinical course    Please note that over 50 minutes was spent in evaluating the patient, review of records and results, discussion with staff/family, etc.    Burt Parsons MD

## 2022-03-16 NOTE — PROGRESS NOTES
03/16/22 0334   NIV Type   NIV Started/Stopped On   Equipment Type v60   Mode Bilevel   Mask Type Full face mask   Mask Size Medium   Settings/Measurements   IPAP 18 cmH20   CPAP/EPAP 6 cmH2O   Rate Ordered 20   Resp 22   Insp Rise Time (%) 1 %   FiO2  40 %   I Time/ I Time % 1 s   Vt Exhaled 537 mL   Minute Volume 11.8 Liters   Mask Leak (lpm) 16 lpm   Comfort Level Good   Using Accessory Muscles No   Breath Sounds   Right Upper Lobe Expiratory Wheezes   Right Middle Lobe Expiratory Wheezes   Right Lower Lobe Diminished   Left Upper Lobe Expiratory Wheezes; Diminished   Left Lower Lobe Diminished   Alarm Settings   Alarms On Y   Press Low Alarm 6 cmH2O   High Pressure Alarm 30 cmH2O   Delay Alarm 20 sec(s)   Resp Rate Low Alarm 6   High Respiratory Rate 45 br/min

## 2022-03-16 NOTE — PROGRESS NOTES
03/15/22 2248   NIV Type   $NIV $Daily Charge   NIV Started/Stopped On   Equipment Type v60   Mode Bilevel   Mask Type Full face mask   Mask Size Medium   Settings/Measurements   IPAP 18 cmH20   CPAP/EPAP 6 cmH2O   Rate Ordered 20   Resp 22   FiO2  50 %   Vt Exhaled 390 mL   Mask Leak (lpm) 3 lpm   Using Accessory Muscles No   SpO2 96   Alarm Settings   Alarms On Y   Press Low Alarm 6 cmH2O   High Pressure Alarm 30 cmH2O   Resp Rate Low Alarm 6   High Respiratory Rate 30 br/min

## 2022-03-16 NOTE — PLAN OF CARE
Pt alert and oriented x4. Arrived to room 233 requesting water and for the NG taken out. Ok per Dr. Vaibhav Bradley. NG removed. Antiemetic given. Patient tolerating water and food. Good urine output. Wore BIPAP for 3.5 hours this afternoon. Agreeable to wear bipap overnight. Tolerating 15 L high flow NC when off BIPAP. Problem: Falls - Risk of:  Goal: Will remain free from falls  Description: Will remain free from falls  Outcome: Ongoing  Goal: Absence of physical injury  Description: Absence of physical injury  Outcome: Ongoing     Problem:  Activity:  Goal: Fatigue will decrease  Description: Fatigue will decrease  Outcome: Ongoing     Problem: Cardiac:  Goal: Hemodynamic stability will improve  Description: Hemodynamic stability will improve  Outcome: Ongoing     Problem: Coping:  Goal: Level of anxiety will decrease  Description: Level of anxiety will decrease  Outcome: Ongoing  Goal: Ability to cope will improve  Description: Ability to cope will improve  Outcome: Ongoing  Goal: Ability to establish a method of communication will improve  Description: Ability to establish a method of communication will improve  Outcome: Ongoing     Problem: Nutritional:  Goal: Consumption of the prescribed amount of daily calories will improve  Description: Consumption of the prescribed amount of daily calories will improve  Outcome: Ongoing     Problem: Respiratory:  Goal: Ability to maintain a clear airway will improve  Description: Ability to maintain a clear airway will improve  Outcome: Ongoing  Goal: Ability to maintain adequate ventilation will improve  Description: Ability to maintain adequate ventilation will improve  Outcome: Ongoing  Goal: Complications related to the disease process, condition or treatment will be avoided or minimized  Description: Complications related to the disease process, condition or treatment will be avoided or minimized  Outcome: Ongoing     Problem: Skin Integrity:  Goal: Risk for impaired skin integrity will decrease  Description: Risk for impaired skin integrity will decrease  Outcome: Ongoing

## 2022-03-17 VITALS
HEART RATE: 95 BPM | SYSTOLIC BLOOD PRESSURE: 103 MMHG | OXYGEN SATURATION: 93 % | BODY MASS INDEX: 38.48 KG/M2 | TEMPERATURE: 98.2 F | WEIGHT: 217.15 LBS | HEIGHT: 63 IN | DIASTOLIC BLOOD PRESSURE: 60 MMHG | RESPIRATION RATE: 18 BRPM

## 2022-03-17 LAB
ANION GAP SERPL CALCULATED.3IONS-SCNC: 6 MMOL/L (ref 3–16)
BASE EXCESS ARTERIAL: 14.7 MMOL/L (ref -3–3)
BASOPHILS ABSOLUTE: 0 K/UL (ref 0–0.2)
BASOPHILS RELATIVE PERCENT: 0.2 %
BUN BLDV-MCNC: 19 MG/DL (ref 7–20)
CALCIUM SERPL-MCNC: 8.6 MG/DL (ref 8.3–10.6)
CARBOXYHEMOGLOBIN ARTERIAL: 1.3 % (ref 0–1.5)
CHLORIDE BLD-SCNC: 88 MMOL/L (ref 99–110)
CO2: 41 MMOL/L (ref 21–32)
CREAT SERPL-MCNC: 0.7 MG/DL (ref 0.6–1.1)
EOSINOPHILS ABSOLUTE: 0 K/UL (ref 0–0.6)
EOSINOPHILS RELATIVE PERCENT: 0 %
GFR AFRICAN AMERICAN: >60
GFR NON-AFRICAN AMERICAN: >60
GLUCOSE BLD-MCNC: 136 MG/DL (ref 70–99)
GLUCOSE BLD-MCNC: 165 MG/DL (ref 70–99)
HCO3 ARTERIAL: 43.9 MMOL/L (ref 21–29)
HCT VFR BLD CALC: 49.4 % (ref 36–48)
HEMOGLOBIN, ART, EXTENDED: 16.9 G/DL (ref 12–16)
HEMOGLOBIN: 15.3 G/DL (ref 12–16)
LYMPHOCYTES ABSOLUTE: 0.6 K/UL (ref 1–5.1)
LYMPHOCYTES RELATIVE PERCENT: 3.6 %
MCH RBC QN AUTO: 26 PG (ref 26–34)
MCHC RBC AUTO-ENTMCNC: 30.9 G/DL (ref 31–36)
MCV RBC AUTO: 84 FL (ref 80–100)
METHEMOGLOBIN ARTERIAL: 0.8 %
MONOCYTES ABSOLUTE: 0.5 K/UL (ref 0–1.3)
MONOCYTES RELATIVE PERCENT: 3.1 %
NEUTROPHILS ABSOLUTE: 14.6 K/UL (ref 1.7–7.7)
NEUTROPHILS RELATIVE PERCENT: 93.1 %
O2 SAT, ARTERIAL: 92.3 %
O2 THERAPY: ABNORMAL
PCO2 ARTERIAL: 71.3 MMHG (ref 35–45)
PDW BLD-RTO: 16.3 % (ref 12.4–15.4)
PERFORMED ON: ABNORMAL
PH ARTERIAL: 7.41 (ref 7.35–7.45)
PLATELET # BLD: 121 K/UL (ref 135–450)
PMV BLD AUTO: 9 FL (ref 5–10.5)
PO2 ARTERIAL: 61.5 MMHG (ref 75–108)
POTASSIUM REFLEX MAGNESIUM: 4.2 MMOL/L (ref 3.5–5.1)
RBC # BLD: 5.88 M/UL (ref 4–5.2)
SODIUM BLD-SCNC: 135 MMOL/L (ref 136–145)
TCO2 ARTERIAL: 46.1 MMOL/L
WBC # BLD: 15.7 K/UL (ref 4–11)

## 2022-03-17 PROCEDURE — 99291 CRITICAL CARE FIRST HOUR: CPT | Performed by: INTERNAL MEDICINE

## 2022-03-17 PROCEDURE — 36415 COLL VENOUS BLD VENIPUNCTURE: CPT

## 2022-03-17 PROCEDURE — 6370000000 HC RX 637 (ALT 250 FOR IP): Performed by: INTERNAL MEDICINE

## 2022-03-17 PROCEDURE — 2700000000 HC OXYGEN THERAPY PER DAY

## 2022-03-17 PROCEDURE — 94761 N-INVAS EAR/PLS OXIMETRY MLT: CPT

## 2022-03-17 PROCEDURE — 2580000003 HC RX 258: Performed by: INTERNAL MEDICINE

## 2022-03-17 PROCEDURE — 94660 CPAP INITIATION&MGMT: CPT

## 2022-03-17 PROCEDURE — 82803 BLOOD GASES ANY COMBINATION: CPT

## 2022-03-17 PROCEDURE — 6360000002 HC RX W HCPCS: Performed by: INTERNAL MEDICINE

## 2022-03-17 PROCEDURE — 85025 COMPLETE CBC W/AUTO DIFF WBC: CPT

## 2022-03-17 PROCEDURE — 80048 BASIC METABOLIC PNL TOTAL CA: CPT

## 2022-03-17 RX ORDER — METHYLPREDNISOLONE SODIUM SUCCINATE 40 MG/ML
40 INJECTION, POWDER, LYOPHILIZED, FOR SOLUTION INTRAMUSCULAR; INTRAVENOUS EVERY 12 HOURS
Status: DISCONTINUED | OUTPATIENT
Start: 2022-03-17 | End: 2022-03-17 | Stop reason: HOSPADM

## 2022-03-17 RX ORDER — LIDOCAINE 4 G/G
1 PATCH TOPICAL DAILY
Status: DISCONTINUED | OUTPATIENT
Start: 2022-03-17 | End: 2022-03-17 | Stop reason: HOSPADM

## 2022-03-17 RX ORDER — PANTOPRAZOLE SODIUM 40 MG/1
40 TABLET, DELAYED RELEASE ORAL
Status: DISCONTINUED | OUTPATIENT
Start: 2022-03-17 | End: 2022-03-17 | Stop reason: HOSPADM

## 2022-03-17 RX ORDER — KETOROLAC TROMETHAMINE 30 MG/ML
15 INJECTION, SOLUTION INTRAMUSCULAR; INTRAVENOUS ONCE
Status: COMPLETED | OUTPATIENT
Start: 2022-03-17 | End: 2022-03-17

## 2022-03-17 RX ORDER — PANTOPRAZOLE SODIUM 40 MG/10ML
40 INJECTION, POWDER, LYOPHILIZED, FOR SOLUTION INTRAVENOUS 2 TIMES DAILY
Status: DISCONTINUED | OUTPATIENT
Start: 2022-03-17 | End: 2022-03-17

## 2022-03-17 RX ORDER — CYCLOBENZAPRINE HCL 10 MG
5 TABLET ORAL 3 TIMES DAILY PRN
Status: DISCONTINUED | OUTPATIENT
Start: 2022-03-17 | End: 2022-03-17 | Stop reason: HOSPADM

## 2022-03-17 RX ADMIN — MUPIROCIN: 20 OINTMENT TOPICAL at 10:22

## 2022-03-17 RX ADMIN — ATORVASTATIN CALCIUM 10 MG: 10 TABLET, FILM COATED ORAL at 10:21

## 2022-03-17 RX ADMIN — SODIUM CHLORIDE, PRESERVATIVE FREE 10 ML: 5 INJECTION INTRAVENOUS at 10:22

## 2022-03-17 RX ADMIN — ENOXAPARIN SODIUM 40 MG: 40 INJECTION SUBCUTANEOUS at 10:20

## 2022-03-17 RX ADMIN — GABAPENTIN 600 MG: 300 CAPSULE ORAL at 10:21

## 2022-03-17 RX ADMIN — METHYLPREDNISOLONE SODIUM SUCCINATE 40 MG: 40 INJECTION, POWDER, FOR SOLUTION INTRAMUSCULAR; INTRAVENOUS at 01:28

## 2022-03-17 RX ADMIN — ESCITALOPRAM OXALATE 10 MG: 10 TABLET ORAL at 10:21

## 2022-03-17 RX ADMIN — LISINOPRIL 30 MG: 10 TABLET ORAL at 10:21

## 2022-03-17 RX ADMIN — KETOROLAC TROMETHAMINE 15 MG: 30 INJECTION, SOLUTION INTRAMUSCULAR at 10:20

## 2022-03-17 RX ADMIN — AZITHROMYCIN MONOHYDRATE 500 MG: 500 INJECTION, POWDER, LYOPHILIZED, FOR SOLUTION INTRAVENOUS at 01:31

## 2022-03-17 ASSESSMENT — PAIN DESCRIPTION - PAIN TYPE: TYPE: ACUTE PAIN

## 2022-03-17 ASSESSMENT — PAIN DESCRIPTION - LOCATION: LOCATION: GENERALIZED;BACK

## 2022-03-17 ASSESSMENT — PAIN DESCRIPTION - PROGRESSION
CLINICAL_PROGRESSION: GRADUALLY WORSENING
CLINICAL_PROGRESSION: GRADUALLY WORSENING

## 2022-03-17 ASSESSMENT — PAIN SCALES - GENERAL
PAINLEVEL_OUTOF10: 9
PAINLEVEL_OUTOF10: 8

## 2022-03-17 ASSESSMENT — PAIN DESCRIPTION - DESCRIPTORS: DESCRIPTORS: ACHING

## 2022-03-17 ASSESSMENT — PAIN DESCRIPTION - ONSET: ONSET: ON-GOING

## 2022-03-17 ASSESSMENT — PAIN - FUNCTIONAL ASSESSMENT: PAIN_FUNCTIONAL_ASSESSMENT: ACTIVITIES ARE NOT PREVENTED

## 2022-03-17 ASSESSMENT — PAIN DESCRIPTION - FREQUENCY: FREQUENCY: CONTINUOUS

## 2022-03-17 ASSESSMENT — ENCOUNTER SYMPTOMS: TACHYPNEA: 1

## 2022-03-17 NOTE — PROGRESS NOTES
03/16/22 0857   NIV Type   NIV Started/Stopped On   Equipment Type v60   Mode Bilevel   Mask Type Full face mask   Mask Size Medium   Settings/Measurements   IPAP 18 cmH20   CPAP/EPAP 6 cmH2O   Rate Ordered 20   Resp 20   FiO2  60 %   Vt Exhaled 355 mL   Mask Leak (lpm) 0 lpm   Comfort Level Good   Using Accessory Muscles No   Alarm Settings   Alarms On Y   Press Low Alarm 6 cmH2O   High Pressure Alarm 30 cmH2O   Resp Rate Low Alarm 6   High Respiratory Rate 45 br/min

## 2022-03-17 NOTE — PLAN OF CARE
Problem: Falls - Risk of:  Goal: Will remain free from falls  Description: Will remain free from falls  3/17/2022 0454 by Jeannine Ceballos RN  Outcome: Ongoing  3/16/2022 1912 by Reece Lombard, RN  Outcome: Ongoing  Goal: Absence of physical injury  Description: Absence of physical injury  3/17/2022 0454 by Jeannine Ceballos RN  Outcome: Ongoing  3/16/2022 1912 by Reece Lombard, RN  Outcome: Ongoing     Problem:  Activity:  Goal: Fatigue will decrease  Description: Fatigue will decrease  3/17/2022 0454 by Jeannine Ceballos RN  Outcome: Ongoing  3/16/2022 1912 by Reece Lombard, RN  Outcome: Ongoing     Problem: Cardiac:  Goal: Hemodynamic stability will improve  Description: Hemodynamic stability will improve  3/17/2022 0454 by Jeannien Ceballos RN  Outcome: Ongoing  3/16/2022 1912 by Reece Lombard, RN  Outcome: Ongoing     Problem: Coping:  Goal: Level of anxiety will decrease  Description: Level of anxiety will decrease  3/17/2022 0454 by Jeannine Ceballos RN  Outcome: Ongoing  3/16/2022 1912 by Reece Lombard, RN  Outcome: Ongoing  Goal: Ability to cope will improve  Description: Ability to cope will improve  3/17/2022 0454 by Jeannine Ceballos RN  Outcome: Ongoing  3/16/2022 1912 by Reece Lombard, RN  Outcome: Ongoing  Goal: Ability to establish a method of communication will improve  Description: Ability to establish a method of communication will improve  3/17/2022 0454 by Jeannine Ceballos RN  Outcome: Ongoing  3/16/2022 1912 by Reece Lombard, RN  Outcome: Ongoing     Problem: Nutritional:  Goal: Consumption of the prescribed amount of daily calories will improve  Description: Consumption of the prescribed amount of daily calories will improve  3/17/2022 0454 by Jeannine Ceballos RN  Outcome: Ongoing  3/16/2022 1912 by Reece Lombard, RN  Outcome: Ongoing     Problem: Skin Integrity:  Goal: Risk for impaired skin integrity will decrease  Description: Risk for impaired skin integrity will decrease  3/17/2022 0454 by Jeannine Ceballos RN  Outcome: Ongoing  3/16/2022 1912 by Jean-Claude Sharma RN  Outcome: Ongoing

## 2022-03-17 NOTE — PROGRESS NOTES
03/17/22 0759   NIV Type   Mode Bilevel   Mask Type Full face mask   Mask Size Medium   Settings/Measurements   IPAP 18 cmH20   CPAP/EPAP 6 cmH2O   Resp 21   FiO2  60 %   Vt Exhaled 451 mL   Minute Volume 11.3 Liters   Mask Leak (lpm) 0 lpm   Comfort Level Good   Using Accessory Muscles No   SpO2 93   Alarm Settings   Alarms On Y

## 2022-03-17 NOTE — PROGRESS NOTES
03/16/22 2334   NIV Type   NIV Started/Stopped On   Equipment Type v60   Mode Bilevel   Mask Type Full face mask   Mask Size Medium   Settings/Measurements   IPAP 18 cmH20   CPAP/EPAP 6 cmH2O   Rate Ordered 20   Resp 20   FiO2  60 %   Vt Exhaled 432 mL   Mask Leak (lpm) 0 lpm   Comfort Level Good   Using Accessory Muscles No   SpO2 92   Breath Sounds   Right Upper Lobe Diminished   Right Middle Lobe Diminished   Right Lower Lobe Diminished   Left Upper Lobe Diminished   Left Lower Lobe Diminished   Alarm Settings   Alarms On Y   Press Low Alarm 6 cmH2O   High Pressure Alarm 30 cmH2O   Resp Rate Low Alarm 6   High Respiratory Rate 45 br/min

## 2022-03-17 NOTE — PROGRESS NOTES
03/17/22 0306   NIV Type   NIV Started/Stopped On   Equipment Type v60   Mode Bilevel   Mask Type Full face mask   Mask Size Medium   Settings/Measurements   IPAP 18 cmH20   CPAP/EPAP 6 cmH2O   Rate Ordered 20   Resp 23   FiO2  60 %   Vt Exhaled 501 mL   Mask Leak (lpm) 0 lpm   Alarm Settings   Alarms On Y   Press Low Alarm 6 cmH2O   High Pressure Alarm 30 cmH2O   Resp Rate Low Alarm 6   High Respiratory Rate 45 br/min

## 2022-03-17 NOTE — PROGRESS NOTES
overnight  Patient was placed back on a full facemask overnight despite the patient having issues with tolerating it earlier. She was not able to get tidal volumes and therefore was placed back on the full facemask  No chest pains or palpitations      ROS:A comprehensive review of systems was negative except for: above    Objective: Intake and Output:   Current Shift:   No intake/output data recorded.   Last three shifts:   03/15 1901 - 03/17 0700  In: 650 [P.O.:150]  Out: 3200 [Urine:3200]    Vent settings for last 24 hours:  [unfilled]    Hemodynamic parameters for last 24 hours:  [unfilled]    Physical Exam:   Patient Vitals for the past 24 hrs:   BP Temp Temp src Pulse Resp SpO2 Weight   03/17/22 0801      93 %    03/17/22 0759     21     03/17/22 0730    96 22 93 %    03/17/22 0715    92 18 93 %    03/17/22 0700 (!) 154/85   97 22 92 %    03/17/22 0645    95 18 93 %    03/17/22 0630    92 21 93 %    03/17/22 0615    97 22 93 %    03/17/22 0600 (!) 155/91   92 20 92 %    03/17/22 0549       217 lb 2.5 oz (98.5 kg)   03/17/22 0545    95 20 94 %    03/17/22 0530    97 17 95 %    03/17/22 0515    94 15 96 %    03/17/22 0500 (!) 159/89   98 22 94 %    03/17/22 0445    100 23 94 %    03/17/22 0430    99 19 93 %    03/17/22 0415    96 19 95 %    03/17/22 0400 (!) 152/88  Axillary 95 20 94 %    03/17/22 0345    100 (!) 32 94 %    03/17/22 0330 (!) 157/84   97 22 94 %    03/17/22 0315    103 26 93 %    03/17/22 0306     23     03/17/22 0300    97 22 93 %    03/17/22 0245    96 18 90 %    03/17/22 0230 (!) 157/80   92 19 93 %    03/17/22 0215    91 30 94 %    03/17/22 0200    102 20 93 %    03/17/22 0145    105 23 93 %    03/17/22 0130    98 20 92 %    03/17/22 0115    95 19 93 %    03/17/22 0100 (!) 153/82   95 24 92 %    03/17/22 0045    96 23 93 %    03/17/22 0030    97 23 93 %    03/17/22 0015    95 21 96 %    03/17/22 0008 (!) 156/82   97 20 93 %    03/17/22 0000 (!) 170/96 98.5 °F (36.9 °C) Axillary 96 18 96 %    03/16/22 2345    94 23 93 %    03/16/22 2334     20     03/16/22 2330    106 21 94 %    03/16/22 2315    98 20 93 %    03/16/22 2300 (!) 165/90   99 20 95 %    03/16/22 2245    101 20 94 %    03/16/22 2230    96 16 95 %    03/16/22 2215    113 20 94 %    03/16/22 2200 137/77   108 19 92 %    03/16/22 2145    110 23 91 %    03/16/22 2130    112 21 91 %    03/16/22 2115    107 26 95 %    03/16/22 2100 (!) 162/95   106 (!) 36 94 %    03/16/22 2045    103 24 96 %    03/16/22 2030    102 22 96 %    03/16/22 2015    104 17 96 %    03/16/22 2000 (!) 158/81 97.8 °F (36.6 °C) Oral 105 24 96 %    03/16/22 1941     29 98 %    03/16/22 1900 (!) 160/80   107 18 95 %    03/16/22 1800 (!) 165/82   102 15 95 %    03/16/22 1711  97.5 °F (36.4 °C) Axillary       03/16/22 1626     20     03/16/22 1600 (!) 167/91   101 20 93 %    03/16/22 1500 (!) 154/95   102 17 93 %    03/16/22 1400 (!) 168/83   103 23 92 %    03/16/22 1349     23     03/16/22 1300 (!) 155/80   102 17 96 %    03/16/22 1230  97.7 °F (36.5 °C) Axillary 99 13 98 %    03/16/22 0819 (!) 163/90 97.9 °F (36.6 °C) Axillary 95 21 95 %             Physical Exam  Vitals and nursing note reviewed. Constitutional:       General: She is not in acute distress. Appearance: Normal appearance. She is obese. HENT:      Head: Normocephalic and atraumatic. Right Ear: External ear normal.      Left Ear: External ear normal.      Nose: Nose normal.      Mouth/Throat:      Mouth: Mucous membranes are moist.      Pharynx: Oropharynx is clear. Eyes:      General:         Right eye: No discharge. Left eye: No discharge. Extraocular Movements: Extraocular movements intact.       Conjunctiva/sclera: Conjunctivae normal.      Pupils: Pupils are equal, round, and reactive to light. Cardiovascular:      Rate and Rhythm: Normal rate and regular rhythm. Pulses: Normal pulses. Heart sounds: No murmur heard. Pulmonary:      Effort: No respiratory distress. Breath sounds: No stridor. No wheezing, rhonchi or rales. Chest:      Chest wall: No tenderness. Abdominal:      General: Bowel sounds are normal. There is no distension. Palpations: Abdomen is soft. There is no mass. Tenderness: There is no abdominal tenderness. Hernia: No hernia is present. Musculoskeletal:         General: No swelling. Normal range of motion. Cervical back: Normal range of motion. No rigidity. Skin:     General: Skin is warm and dry. Coloration: Skin is not jaundiced or pale. Neurological:      General: No focal deficit present. Mental Status: She is alert and oriented to person, place, and time. Mental status is at baseline. Cranial Nerves: No cranial nerve deficit. Sensory: No sensory deficit. Psychiatric:         Mood and Affect: Mood normal.         Behavior: Behavior normal.         Thought Content: Thought content normal.           Labs:   Recent Labs     03/15/22  1455 03/16/22  0043 03/16/22  0448 03/16/22  1222 03/17/22  0450   WBC 9.1  --  11.3*  --  15.7*   HGB 14.9   < > 15.4 19.2* 15.3   HCT 47.0  --  49.7*  --  49.4*     --  114*  --  121*    < > = values in this interval not displayed. Recent Labs     03/15/22  1455 03/16/22  0448 03/17/22  0450   * 138 135*   K 5.2* 4.9 4.2   CL 89* 93* 88*   CO2 34* 37* 41*   BUN 23* 20 19   GLUCOSE 96 172* 165*       Additional labs:    Radiology, images personally reviewed. Yes  Impression:        1. No evidence of pulmonary embolic disease. 2. No acute pulmonary findings.  Bibasilar atelectasis. 3. Mild cardiomegaly.  Mildly enlarged main pulmonary artery which can be   seen with pulmonary hypertension. Other imaging: Not indicated      Micro:    Date    Assessment:     Principal Problem:    COPD exacerbation ([facility] [address]  Active Problems:    Essential hypertension    Morbid obesity due to excess calories ([address]    Hepatitis C    Pulmonary vascular congestion on CXR    Type 2 diabetes mellitus without complication, without long-term current use of insulin (HCC)    Acute on chronic respiratory failure with hypoxia and hypercapnia (HCC)    Pulmonary hypertension (HCC)    History of heroin use    Acute on chronic diastolic (congestive) heart failure (HCC)    Acute metabolic encephalopathy    Hyperlipidemia    Acute pain of right shoulder  Resolved Problems:    * No resolved hospital problems.  *      Discussion / Plan:   Neurological     Depression  Lexapro 10 mg daily  Neurontin 600 mg 3 times daily        Cardiovascular  CHF, hypertension     Elevated BNP of greater than 4000  Continue with diuresis        Continue with  Lipitor 10 mg daily  Lovenox 40 subcu daily  Lisinopril 30 mg daily        Pulmonary  Acute on chronic respiratory failure  Acute aspiration possibly  COPD        Patient's BiPAP mask was changed to a nasal mask, patient was able to tolerated  Initial PCO2 was 99  Patient's O2 sat remained well  Once the patient was placed to a nasal mask for BiPAP  Patient was placed back on full face mask.     Bipap was not working at home  Can F/u with Dr. Mike Slaughter for getting home bipap replaced       Continue with  Symbicort 160/4.5 taken 2 puffs twice daily  Solu-Medrol 40 IV every 6 hours, change to BID        Gastrointestinal  History of hepatitis C         allow to the patient to eat           Endocrine  Diabetes  Sliding scale insulin              Renal  Making urine  Creatinine is normal     Urine drug screen positive for opioids and oxycodone           Prophylaxis  Lovenox 40 mg IV daily  Protonix BID     Electrolytes  Stable        CODE STATUS full             Discussed with nursing and respiratory therapy     Discussed with patient        Okay to move out of the ICU        Critical Care Services Provided: This patient had a critical illness or injury that acutely impaired one or more vital organ systems such that there was a high probability of imminent or life-threatening deterioration in the patient's condition. This required high complexity decision-making to assess, manipulate, and support vital system function(s) to treat single or multiple vital organ system failure and/or to prevent further life-threatening deterioration of the patient's condition. Total attending physician time, excluding unbundled procedures, spent at the bedside, and away from the bedside but on the unit or floor, reviewing laboratory test results, discussing care with medical staff, and discussing care with family when the patient was unable or incompetent to participate:   Critical Care Time (Minutes) # 31   (Discontinuous)                                MD Ulisses Amezquita  Pulmonary, Critical Care and Sleep Medicine  Office : [phone]    Please note that some or all of this record was generated using voice recognition software. If there are any questions about the content of this document, please contact the author as some errors in transcription may have occurred.

## 2022-03-18 ENCOUNTER — FOLLOWUP TELEPHONE ENCOUNTER (OUTPATIENT)
Dept: TELEMETRY | Age: 47
End: 2022-03-18

## 2022-03-18 NOTE — TELEPHONE ENCOUNTER
Spoke to patient for hospital follow up. She has a follow up appt with Dr Swapnil Wheeler for oxygen needs on 4/9/22. Denies any other needs.   Js Rivas RN

## 2022-04-01 ENCOUNTER — APPOINTMENT (OUTPATIENT)
Dept: GENERAL RADIOLOGY | Age: 47
DRG: 133 | End: 2022-04-01
Payer: MEDICARE

## 2022-04-01 ENCOUNTER — HOSPITAL ENCOUNTER (INPATIENT)
Age: 47
LOS: 2 days | Discharge: LEFT BEFORE DISCHARGE | DRG: 133 | End: 2022-04-04
Attending: STUDENT IN AN ORGANIZED HEALTH CARE EDUCATION/TRAINING PROGRAM | Admitting: INTERNAL MEDICINE
Payer: MEDICARE

## 2022-04-01 DIAGNOSIS — J96.21 ACUTE ON CHRONIC RESPIRATORY FAILURE WITH HYPOXIA AND HYPERCAPNIA (HCC): Primary | ICD-10-CM

## 2022-04-01 DIAGNOSIS — I50.1 PULMONARY EDEMA CARDIAC CAUSE (HCC): ICD-10-CM

## 2022-04-01 DIAGNOSIS — J44.1 COPD EXACERBATION (HCC): ICD-10-CM

## 2022-04-01 DIAGNOSIS — J96.22 ACUTE ON CHRONIC RESPIRATORY FAILURE WITH HYPOXIA AND HYPERCAPNIA (HCC): Primary | ICD-10-CM

## 2022-04-01 LAB
A/G RATIO: 1.3 (ref 1.1–2.2)
ALBUMIN SERPL-MCNC: 3.7 G/DL (ref 3.4–5)
ALP BLD-CCNC: 76 U/L (ref 40–129)
ALT SERPL-CCNC: 19 U/L (ref 10–40)
ANION GAP SERPL CALCULATED.3IONS-SCNC: 7 MMOL/L (ref 3–16)
AST SERPL-CCNC: 27 U/L (ref 15–37)
BASE EXCESS VENOUS: 4.9 MMOL/L (ref -3–3)
BASOPHILS ABSOLUTE: 0.1 K/UL (ref 0–0.2)
BASOPHILS RELATIVE PERCENT: 1.4 %
BILIRUB SERPL-MCNC: 0.5 MG/DL (ref 0–1)
BUN BLDV-MCNC: 23 MG/DL (ref 7–20)
CALCIUM SERPL-MCNC: 8.4 MG/DL (ref 8.3–10.6)
CARBOXYHEMOGLOBIN: 10.3 % (ref 0–1.5)
CHLORIDE BLD-SCNC: 95 MMOL/L (ref 99–110)
CO2: 32 MMOL/L (ref 21–32)
CREAT SERPL-MCNC: 1.2 MG/DL (ref 0.6–1.1)
EOSINOPHILS ABSOLUTE: 0.1 K/UL (ref 0–0.6)
EOSINOPHILS RELATIVE PERCENT: 1 %
GFR AFRICAN AMERICAN: 58
GFR NON-AFRICAN AMERICAN: 48
GLUCOSE BLD-MCNC: 130 MG/DL (ref 70–99)
HCO3 VENOUS: 34 MMOL/L (ref 23–29)
HCT VFR BLD CALC: 45.9 % (ref 36–48)
HEMOGLOBIN: 14.1 G/DL (ref 12–16)
LACTIC ACID: 0.9 MMOL/L (ref 0.4–2)
LYMPHOCYTES ABSOLUTE: 1.7 K/UL (ref 1–5.1)
LYMPHOCYTES RELATIVE PERCENT: 20.6 %
MCH RBC QN AUTO: 24.9 PG (ref 26–34)
MCHC RBC AUTO-ENTMCNC: 30.7 G/DL (ref 31–36)
MCV RBC AUTO: 81 FL (ref 80–100)
METHEMOGLOBIN VENOUS: 0.4 %
MONOCYTES ABSOLUTE: 0.7 K/UL (ref 0–1.3)
MONOCYTES RELATIVE PERCENT: 8 %
NEUTROPHILS ABSOLUTE: 5.8 K/UL (ref 1.7–7.7)
NEUTROPHILS RELATIVE PERCENT: 69 %
O2 SAT, VEN: 98 %
O2 THERAPY: ABNORMAL
PCO2, VEN: 70.6 MMHG (ref 40–50)
PDW BLD-RTO: 17.5 % (ref 12.4–15.4)
PH VENOUS: 7.3 (ref 7.35–7.45)
PLATELET # BLD: 163 K/UL (ref 135–450)
PMV BLD AUTO: 8.6 FL (ref 5–10.5)
PO2, VEN: 110.5 MMHG (ref 25–40)
POTASSIUM REFLEX MAGNESIUM: 5 MMOL/L (ref 3.5–5.1)
PRO-BNP: 5085 PG/ML (ref 0–124)
RBC # BLD: 5.67 M/UL (ref 4–5.2)
SODIUM BLD-SCNC: 134 MMOL/L (ref 136–145)
TCO2 CALC VENOUS: 36 MMOL/L
TOTAL PROTEIN: 6.6 G/DL (ref 6.4–8.2)
TROPONIN: <0.01 NG/ML
WBC # BLD: 8.4 K/UL (ref 4–11)

## 2022-04-01 PROCEDURE — 6360000002 HC RX W HCPCS: Performed by: STUDENT IN AN ORGANIZED HEALTH CARE EDUCATION/TRAINING PROGRAM

## 2022-04-01 PROCEDURE — 84484 ASSAY OF TROPONIN QUANT: CPT

## 2022-04-01 PROCEDURE — 71045 X-RAY EXAM CHEST 1 VIEW: CPT

## 2022-04-01 PROCEDURE — 80053 COMPREHEN METABOLIC PANEL: CPT

## 2022-04-01 PROCEDURE — 99285 EMERGENCY DEPT VISIT HI MDM: CPT

## 2022-04-01 PROCEDURE — 96375 TX/PRO/DX INJ NEW DRUG ADDON: CPT

## 2022-04-01 PROCEDURE — 83880 ASSAY OF NATRIURETIC PEPTIDE: CPT

## 2022-04-01 PROCEDURE — 2700000000 HC OXYGEN THERAPY PER DAY

## 2022-04-01 PROCEDURE — 6370000000 HC RX 637 (ALT 250 FOR IP): Performed by: STUDENT IN AN ORGANIZED HEALTH CARE EDUCATION/TRAINING PROGRAM

## 2022-04-01 PROCEDURE — 96365 THER/PROPH/DIAG IV INF INIT: CPT

## 2022-04-01 PROCEDURE — 94660 CPAP INITIATION&MGMT: CPT

## 2022-04-01 PROCEDURE — 93005 ELECTROCARDIOGRAM TRACING: CPT | Performed by: STUDENT IN AN ORGANIZED HEALTH CARE EDUCATION/TRAINING PROGRAM

## 2022-04-01 PROCEDURE — 82803 BLOOD GASES ANY COMBINATION: CPT

## 2022-04-01 PROCEDURE — 83605 ASSAY OF LACTIC ACID: CPT

## 2022-04-01 PROCEDURE — 85025 COMPLETE CBC W/AUTO DIFF WBC: CPT

## 2022-04-01 PROCEDURE — 73030 X-RAY EXAM OF SHOULDER: CPT

## 2022-04-01 RX ORDER — METHYLPREDNISOLONE SODIUM SUCCINATE 125 MG/2ML
125 INJECTION, POWDER, LYOPHILIZED, FOR SOLUTION INTRAMUSCULAR; INTRAVENOUS ONCE
Status: COMPLETED | OUTPATIENT
Start: 2022-04-01 | End: 2022-04-01

## 2022-04-01 RX ORDER — IPRATROPIUM BROMIDE AND ALBUTEROL SULFATE 2.5; .5 MG/3ML; MG/3ML
2 SOLUTION RESPIRATORY (INHALATION) ONCE
Status: COMPLETED | OUTPATIENT
Start: 2022-04-01 | End: 2022-04-01

## 2022-04-01 RX ADMIN — METHYLPREDNISOLONE SODIUM SUCCINATE 125 MG: 125 INJECTION, POWDER, FOR SOLUTION INTRAMUSCULAR; INTRAVENOUS at 23:12

## 2022-04-01 RX ADMIN — IPRATROPIUM BROMIDE AND ALBUTEROL SULFATE 2 AMPULE: .5; 3 SOLUTION RESPIRATORY (INHALATION) at 22:43

## 2022-04-01 ASSESSMENT — PAIN - FUNCTIONAL ASSESSMENT: PAIN_FUNCTIONAL_ASSESSMENT: 0-10

## 2022-04-01 ASSESSMENT — PAIN SCALES - GENERAL: PAINLEVEL_OUTOF10: 8

## 2022-04-02 LAB
AMPHETAMINE SCREEN, URINE: NORMAL
ANION GAP SERPL CALCULATED.3IONS-SCNC: 13 MMOL/L (ref 3–16)
ANISOCYTOSIS: ABNORMAL
BANDED NEUTROPHILS RELATIVE PERCENT: 15 % (ref 0–7)
BARBITURATE SCREEN URINE: NORMAL
BASE EXCESS ARTERIAL: 1.8 MMOL/L (ref -3–3)
BASOPHILS ABSOLUTE: 0 K/UL (ref 0–0.2)
BASOPHILS RELATIVE PERCENT: 0 %
BENZODIAZEPINE SCREEN, URINE: NORMAL
BILIRUBIN URINE: NEGATIVE
BLOOD, URINE: NEGATIVE
BUN BLDV-MCNC: 22 MG/DL (ref 7–20)
CALCIUM SERPL-MCNC: 8.5 MG/DL (ref 8.3–10.6)
CANNABINOID SCREEN URINE: NORMAL
CARBOXYHEMOGLOBIN ARTERIAL: 7.1 % (ref 0–1.5)
CHLORIDE BLD-SCNC: 95 MMOL/L (ref 99–110)
CLARITY: CLEAR
CO2: 26 MMOL/L (ref 21–32)
COCAINE METABOLITE SCREEN URINE: NORMAL
COLOR: YELLOW
CREAT SERPL-MCNC: 1.1 MG/DL (ref 0.6–1.1)
EKG ATRIAL RATE: 101 BPM
EKG DIAGNOSIS: NORMAL
EKG P AXIS: 67 DEGREES
EKG P-R INTERVAL: 170 MS
EKG Q-T INTERVAL: 332 MS
EKG QRS DURATION: 62 MS
EKG QTC CALCULATION (BAZETT): 430 MS
EKG R AXIS: 111 DEGREES
EKG T AXIS: 27 DEGREES
EKG VENTRICULAR RATE: 101 BPM
EOSINOPHILS ABSOLUTE: 0 K/UL (ref 0–0.6)
EOSINOPHILS RELATIVE PERCENT: 0 %
GFR AFRICAN AMERICAN: >60
GFR NON-AFRICAN AMERICAN: 53
GLUCOSE BLD-MCNC: 129 MG/DL (ref 70–99)
GLUCOSE BLD-MCNC: 138 MG/DL (ref 70–99)
GLUCOSE BLD-MCNC: 143 MG/DL (ref 70–99)
GLUCOSE BLD-MCNC: 151 MG/DL (ref 70–99)
GLUCOSE BLD-MCNC: 157 MG/DL (ref 70–99)
GLUCOSE URINE: NEGATIVE MG/DL
HCO3 ARTERIAL: 32.7 MMOL/L (ref 21–29)
HCT VFR BLD CALC: 48.2 % (ref 36–48)
HEMOGLOBIN, ART, EXTENDED: 16.1 G/DL (ref 12–16)
HEMOGLOBIN: 14.9 G/DL (ref 12–16)
KETONES, URINE: NEGATIVE MG/DL
LEUKOCYTE ESTERASE, URINE: NEGATIVE
LYMPHOCYTES ABSOLUTE: 0.2 K/UL (ref 1–5.1)
LYMPHOCYTES RELATIVE PERCENT: 2 %
Lab: NORMAL
MAGNESIUM: 1.9 MG/DL (ref 1.8–2.4)
MCH RBC QN AUTO: 24.8 PG (ref 26–34)
MCHC RBC AUTO-ENTMCNC: 30.9 G/DL (ref 31–36)
MCV RBC AUTO: 80.1 FL (ref 80–100)
METHADONE SCREEN, URINE: NORMAL
METHEMOGLOBIN ARTERIAL: 0.7 %
MICROSCOPIC EXAMINATION: NORMAL
MONOCYTES ABSOLUTE: 0.1 K/UL (ref 0–1.3)
MONOCYTES RELATIVE PERCENT: 1 %
NEUTROPHILS ABSOLUTE: 8.1 K/UL (ref 1.7–7.7)
NEUTROPHILS RELATIVE PERCENT: 82 %
NITRITE, URINE: NEGATIVE
O2 SAT, ARTERIAL: 82 %
O2 THERAPY: ABNORMAL
OPIATE SCREEN URINE: NORMAL
OXYCODONE URINE: NORMAL
PCO2 ARTERIAL: 82.4 MMHG (ref 35–45)
PDW BLD-RTO: 17.8 % (ref 12.4–15.4)
PERFORMED ON: ABNORMAL
PH ARTERIAL: 7.22 (ref 7.35–7.45)
PH UA: 6
PH UA: 6 (ref 5–8)
PHENCYCLIDINE SCREEN URINE: NORMAL
PLATELET # BLD: 165 K/UL (ref 135–450)
PLATELET SLIDE REVIEW: ADEQUATE
PMV BLD AUTO: 9.6 FL (ref 5–10.5)
PO2 ARTERIAL: 50.4 MMHG (ref 75–108)
POLYCHROMASIA: ABNORMAL
POTASSIUM SERPL-SCNC: 5.5 MMOL/L (ref 3.5–5.1)
PROCALCITONIN: 0.09 NG/ML (ref 0–0.15)
PROPOXYPHENE SCREEN: NORMAL
PROTEIN UA: NEGATIVE MG/DL
RBC # BLD: 6.02 M/UL (ref 4–5.2)
SLIDE REVIEW: ABNORMAL
SODIUM BLD-SCNC: 134 MMOL/L (ref 136–145)
SPECIFIC GRAVITY UA: 1.01 (ref 1–1.03)
TCO2 ARTERIAL: 35.3 MMOL/L
URINE REFLEX TO CULTURE: NORMAL
URINE TYPE: NORMAL
UROBILINOGEN, URINE: 0.2 E.U./DL
WBC # BLD: 8.4 K/UL (ref 4–11)

## 2022-04-02 PROCEDURE — 84145 PROCALCITONIN (PCT): CPT

## 2022-04-02 PROCEDURE — 2060000000 HC ICU INTERMEDIATE R&B

## 2022-04-02 PROCEDURE — 83735 ASSAY OF MAGNESIUM: CPT

## 2022-04-02 PROCEDURE — 6360000002 HC RX W HCPCS: Performed by: INTERNAL MEDICINE

## 2022-04-02 PROCEDURE — 93010 ELECTROCARDIOGRAM REPORT: CPT | Performed by: INTERNAL MEDICINE

## 2022-04-02 PROCEDURE — 6370000000 HC RX 637 (ALT 250 FOR IP): Performed by: INTERNAL MEDICINE

## 2022-04-02 PROCEDURE — 6370000000 HC RX 637 (ALT 250 FOR IP): Performed by: NURSE PRACTITIONER

## 2022-04-02 PROCEDURE — 94640 AIRWAY INHALATION TREATMENT: CPT

## 2022-04-02 PROCEDURE — 2580000003 HC RX 258: Performed by: INTERNAL MEDICINE

## 2022-04-02 PROCEDURE — 80048 BASIC METABOLIC PNL TOTAL CA: CPT

## 2022-04-02 PROCEDURE — 2580000003 HC RX 258: Performed by: STUDENT IN AN ORGANIZED HEALTH CARE EDUCATION/TRAINING PROGRAM

## 2022-04-02 PROCEDURE — 81003 URINALYSIS AUTO W/O SCOPE: CPT

## 2022-04-02 PROCEDURE — 82803 BLOOD GASES ANY COMBINATION: CPT

## 2022-04-02 PROCEDURE — 80307 DRUG TEST PRSMV CHEM ANLYZR: CPT

## 2022-04-02 PROCEDURE — 2700000000 HC OXYGEN THERAPY PER DAY

## 2022-04-02 PROCEDURE — 94660 CPAP INITIATION&MGMT: CPT

## 2022-04-02 PROCEDURE — 6360000002 HC RX W HCPCS: Performed by: STUDENT IN AN ORGANIZED HEALTH CARE EDUCATION/TRAINING PROGRAM

## 2022-04-02 PROCEDURE — 85025 COMPLETE CBC W/AUTO DIFF WBC: CPT

## 2022-04-02 PROCEDURE — 94761 N-INVAS EAR/PLS OXIMETRY MLT: CPT

## 2022-04-02 PROCEDURE — 36415 COLL VENOUS BLD VENIPUNCTURE: CPT

## 2022-04-02 RX ORDER — GABAPENTIN 300 MG/1
600 CAPSULE ORAL 3 TIMES DAILY
Status: DISCONTINUED | OUTPATIENT
Start: 2022-04-02 | End: 2022-04-04 | Stop reason: HOSPADM

## 2022-04-02 RX ORDER — IPRATROPIUM BROMIDE AND ALBUTEROL SULFATE 2.5; .5 MG/3ML; MG/3ML
1 SOLUTION RESPIRATORY (INHALATION) 4 TIMES DAILY
Status: DISCONTINUED | OUTPATIENT
Start: 2022-04-02 | End: 2022-04-02

## 2022-04-02 RX ORDER — SODIUM CHLORIDE 9 MG/ML
INJECTION, SOLUTION INTRAVENOUS PRN
Status: DISCONTINUED | OUTPATIENT
Start: 2022-04-02 | End: 2022-04-04 | Stop reason: HOSPADM

## 2022-04-02 RX ORDER — LANOLIN ALCOHOL/MO/W.PET/CERES
3 CREAM (GRAM) TOPICAL NIGHTLY PRN
Status: DISCONTINUED | OUTPATIENT
Start: 2022-04-02 | End: 2022-04-04 | Stop reason: HOSPADM

## 2022-04-02 RX ORDER — MAGNESIUM SULFATE 1 G/100ML
1000 INJECTION INTRAVENOUS PRN
Status: DISCONTINUED | OUTPATIENT
Start: 2022-04-02 | End: 2022-04-04 | Stop reason: HOSPADM

## 2022-04-02 RX ORDER — ATORVASTATIN CALCIUM 10 MG/1
10 TABLET, FILM COATED ORAL DAILY
Status: DISCONTINUED | OUTPATIENT
Start: 2022-04-02 | End: 2022-04-04 | Stop reason: HOSPADM

## 2022-04-02 RX ORDER — NICOTINE POLACRILEX 4 MG
15 LOZENGE BUCCAL PRN
Status: DISCONTINUED | OUTPATIENT
Start: 2022-04-02 | End: 2022-04-04 | Stop reason: HOSPADM

## 2022-04-02 RX ORDER — DEXTROSE MONOHYDRATE 25 G/50ML
12.5 INJECTION, SOLUTION INTRAVENOUS PRN
Status: DISCONTINUED | OUTPATIENT
Start: 2022-04-02 | End: 2022-04-02

## 2022-04-02 RX ORDER — MAGNESIUM SULFATE IN WATER 40 MG/ML
2000 INJECTION, SOLUTION INTRAVENOUS ONCE
Status: COMPLETED | OUTPATIENT
Start: 2022-04-02 | End: 2022-04-02

## 2022-04-02 RX ORDER — ACETAMINOPHEN 650 MG/1
650 SUPPOSITORY RECTAL EVERY 6 HOURS PRN
Status: DISCONTINUED | OUTPATIENT
Start: 2022-04-02 | End: 2022-04-04 | Stop reason: HOSPADM

## 2022-04-02 RX ORDER — ALBUTEROL SULFATE 2.5 MG/3ML
2.5 SOLUTION RESPIRATORY (INHALATION) EVERY 4 HOURS PRN
Status: DISCONTINUED | OUTPATIENT
Start: 2022-04-02 | End: 2022-04-04 | Stop reason: HOSPADM

## 2022-04-02 RX ORDER — TRAMADOL HYDROCHLORIDE 50 MG/1
50 TABLET ORAL EVERY 6 HOURS PRN
Status: DISCONTINUED | OUTPATIENT
Start: 2022-04-02 | End: 2022-04-03

## 2022-04-02 RX ORDER — CYCLOBENZAPRINE HCL 10 MG
10 TABLET ORAL 3 TIMES DAILY PRN
Status: DISCONTINUED | OUTPATIENT
Start: 2022-04-02 | End: 2022-04-04 | Stop reason: HOSPADM

## 2022-04-02 RX ORDER — TRAMADOL HYDROCHLORIDE 50 MG/1
100 TABLET ORAL EVERY 6 HOURS PRN
Status: DISCONTINUED | OUTPATIENT
Start: 2022-04-02 | End: 2022-04-03

## 2022-04-02 RX ORDER — ONDANSETRON 2 MG/ML
4 INJECTION INTRAMUSCULAR; INTRAVENOUS EVERY 6 HOURS PRN
Status: DISCONTINUED | OUTPATIENT
Start: 2022-04-02 | End: 2022-04-04 | Stop reason: HOSPADM

## 2022-04-02 RX ORDER — ACETAMINOPHEN 325 MG/1
650 TABLET ORAL EVERY 6 HOURS PRN
Status: DISCONTINUED | OUTPATIENT
Start: 2022-04-02 | End: 2022-04-04 | Stop reason: HOSPADM

## 2022-04-02 RX ORDER — NICOTINE 21 MG/24HR
1 PATCH, TRANSDERMAL 24 HOURS TRANSDERMAL DAILY
Status: DISCONTINUED | OUTPATIENT
Start: 2022-04-02 | End: 2022-04-04 | Stop reason: HOSPADM

## 2022-04-02 RX ORDER — POTASSIUM CHLORIDE 7.45 MG/ML
10 INJECTION INTRAVENOUS PRN
Status: DISCONTINUED | OUTPATIENT
Start: 2022-04-02 | End: 2022-04-04 | Stop reason: HOSPADM

## 2022-04-02 RX ORDER — FUROSEMIDE 10 MG/ML
40 INJECTION INTRAMUSCULAR; INTRAVENOUS 2 TIMES DAILY
Status: DISCONTINUED | OUTPATIENT
Start: 2022-04-02 | End: 2022-04-03

## 2022-04-02 RX ORDER — SODIUM CHLORIDE 0.9 % (FLUSH) 0.9 %
10 SYRINGE (ML) INJECTION PRN
Status: DISCONTINUED | OUTPATIENT
Start: 2022-04-02 | End: 2022-04-04 | Stop reason: HOSPADM

## 2022-04-02 RX ORDER — POTASSIUM CHLORIDE 20 MEQ/1
40 TABLET, EXTENDED RELEASE ORAL PRN
Status: DISCONTINUED | OUTPATIENT
Start: 2022-04-02 | End: 2022-04-04 | Stop reason: HOSPADM

## 2022-04-02 RX ORDER — DEXTROSE MONOHYDRATE 50 MG/ML
100 INJECTION, SOLUTION INTRAVENOUS PRN
Status: DISCONTINUED | OUTPATIENT
Start: 2022-04-02 | End: 2022-04-04 | Stop reason: HOSPADM

## 2022-04-02 RX ORDER — IPRATROPIUM BROMIDE AND ALBUTEROL SULFATE 2.5; .5 MG/3ML; MG/3ML
1 SOLUTION RESPIRATORY (INHALATION)
Status: DISCONTINUED | OUTPATIENT
Start: 2022-04-02 | End: 2022-04-04

## 2022-04-02 RX ORDER — ONDANSETRON 4 MG/1
4 TABLET, ORALLY DISINTEGRATING ORAL EVERY 8 HOURS PRN
Status: DISCONTINUED | OUTPATIENT
Start: 2022-04-02 | End: 2022-04-04 | Stop reason: HOSPADM

## 2022-04-02 RX ORDER — INSULIN GLARGINE 100 [IU]/ML
0.25 INJECTION, SOLUTION SUBCUTANEOUS NIGHTLY
Status: DISCONTINUED | OUTPATIENT
Start: 2022-04-02 | End: 2022-04-03

## 2022-04-02 RX ORDER — HYDROXYZINE HYDROCHLORIDE 10 MG/1
25 TABLET, FILM COATED ORAL 3 TIMES DAILY PRN
Status: DISCONTINUED | OUTPATIENT
Start: 2022-04-02 | End: 2022-04-04 | Stop reason: HOSPADM

## 2022-04-02 RX ADMIN — TRAMADOL HYDROCHLORIDE 100 MG: 50 TABLET, COATED ORAL at 20:44

## 2022-04-02 RX ADMIN — INSULIN GLARGINE 25 UNITS: 100 INJECTION, SOLUTION SUBCUTANEOUS at 20:45

## 2022-04-02 RX ADMIN — GABAPENTIN 600 MG: 300 CAPSULE ORAL at 10:52

## 2022-04-02 RX ADMIN — INSULIN LISPRO 2 UNITS: 100 INJECTION, SOLUTION INTRAVENOUS; SUBCUTANEOUS at 12:08

## 2022-04-02 RX ADMIN — FUROSEMIDE 40 MG: 10 INJECTION, SOLUTION INTRAMUSCULAR; INTRAVENOUS at 09:04

## 2022-04-02 RX ADMIN — INSULIN LISPRO 8 UNITS: 100 INJECTION, SOLUTION INTRAVENOUS; SUBCUTANEOUS at 12:13

## 2022-04-02 RX ADMIN — MAGNESIUM SULFATE HEPTAHYDRATE 2000 MG: 40 INJECTION, SOLUTION INTRAVENOUS at 13:58

## 2022-04-02 RX ADMIN — Medication 10 ML: at 09:04

## 2022-04-02 RX ADMIN — IPRATROPIUM BROMIDE AND ALBUTEROL SULFATE 1 AMPULE: .5; 3 SOLUTION RESPIRATORY (INHALATION) at 20:30

## 2022-04-02 RX ADMIN — Medication 2 PUFF: at 20:30

## 2022-04-02 RX ADMIN — CYCLOBENZAPRINE 10 MG: 10 TABLET, FILM COATED ORAL at 20:44

## 2022-04-02 RX ADMIN — HYDROXYZINE HYDROCHLORIDE 25 MG: 10 TABLET ORAL at 18:39

## 2022-04-02 RX ADMIN — AZITHROMYCIN MONOHYDRATE 500 MG: 500 INJECTION, POWDER, LYOPHILIZED, FOR SOLUTION INTRAVENOUS at 00:16

## 2022-04-02 RX ADMIN — IPRATROPIUM BROMIDE AND ALBUTEROL SULFATE 1 AMPULE: .5; 3 SOLUTION RESPIRATORY (INHALATION) at 15:49

## 2022-04-02 RX ADMIN — ENOXAPARIN SODIUM 40 MG: 40 INJECTION SUBCUTANEOUS at 09:04

## 2022-04-02 RX ADMIN — IPRATROPIUM BROMIDE AND ALBUTEROL SULFATE 1 AMPULE: .5; 3 SOLUTION RESPIRATORY (INHALATION) at 11:32

## 2022-04-02 RX ADMIN — FUROSEMIDE 40 MG: 10 INJECTION, SOLUTION INTRAMUSCULAR; INTRAVENOUS at 18:02

## 2022-04-02 RX ADMIN — Medication 2 PUFF: at 11:32

## 2022-04-02 RX ADMIN — ATORVASTATIN CALCIUM 10 MG: 10 TABLET, FILM COATED ORAL at 10:52

## 2022-04-02 RX ADMIN — Medication 10 ML: at 18:02

## 2022-04-02 RX ADMIN — GABAPENTIN 600 MG: 300 CAPSULE ORAL at 20:44

## 2022-04-02 RX ADMIN — GABAPENTIN 600 MG: 300 CAPSULE ORAL at 13:55

## 2022-04-02 RX ADMIN — FUROSEMIDE 40 MG: 10 INJECTION, SOLUTION INTRAMUSCULAR; INTRAVENOUS at 04:57

## 2022-04-02 RX ADMIN — IPRATROPIUM BROMIDE AND ALBUTEROL SULFATE 1 AMPULE: .5; 3 SOLUTION RESPIRATORY (INHALATION) at 07:29

## 2022-04-02 RX ADMIN — TRAMADOL HYDROCHLORIDE 100 MG: 50 TABLET, COATED ORAL at 11:02

## 2022-04-02 ASSESSMENT — PAIN DESCRIPTION - LOCATION: LOCATION: GENERALIZED

## 2022-04-02 ASSESSMENT — ENCOUNTER SYMPTOMS
WHEEZING: 1
CHEST TIGHTNESS: 1
SORE THROAT: 0
DIARRHEA: 0
BACK PAIN: 0
ABDOMINAL PAIN: 0
EYE PAIN: 0
NAUSEA: 0
SHORTNESS OF BREATH: 1
COUGH: 1
VOMITING: 0

## 2022-04-02 ASSESSMENT — PAIN - FUNCTIONAL ASSESSMENT: PAIN_FUNCTIONAL_ASSESSMENT: PREVENTS OR INTERFERES SOME ACTIVE ACTIVITIES AND ADLS

## 2022-04-02 ASSESSMENT — PAIN SCALES - GENERAL
PAINLEVEL_OUTOF10: 6
PAINLEVEL_OUTOF10: 7
PAINLEVEL_OUTOF10: 8
PAINLEVEL_OUTOF10: 9
PAINLEVEL_OUTOF10: 7

## 2022-04-02 ASSESSMENT — PAIN DESCRIPTION - PAIN TYPE
TYPE: ACUTE PAIN
TYPE: ACUTE PAIN

## 2022-04-02 ASSESSMENT — PAIN DESCRIPTION - FREQUENCY: FREQUENCY: CONTINUOUS

## 2022-04-02 ASSESSMENT — PAIN DESCRIPTION - ONSET: ONSET: ON-GOING

## 2022-04-02 NOTE — ED NOTES
Notified RT that Dr. Eriberto Hernandez is putting in a BiPAP order.      Angel Adjutant, RN  04/01/22 [id number]

## 2022-04-02 NOTE — H&P
Hospital Medicine History & Physical      Patient: Trina Mock  :   1975  MRN:   [id number]  Date of Service: 22    Chief Complaint   Patient presents with    Shortness of Breath     pt to ED with c/o SOB related to COPD       HISTORY OF PRESENT ILLNESS:    Trina Mock is a 50 y.o. female. She presented from home for dyspnea. She has a h/o COPD and chronic respiratory failure. She was hospitalized here at [facility] 3/15 -  for acute on chronic respiratory failure. At that time she also reported she had run out of O2 at home and that her CPAP machine was not functioning. She was initially placed in the ICU for possible intubation, but she refused intubation and was successfully supported with BiPAP. She signed out AMA because she was dissatisfied with treatment of her chronic back pain. She was to follow up with her pulmonologist, Dr. Jann Foley, on 22 to obtain new O2 supplies. She states she takes only medications that are prescribed to her, none obtained illicitly. Review of Systems:  All pertinent positives and negatives are as noted in the HPI section. All other systems were reviewed and are negative.     Past Medical History:   Diagnosis Date    Acute cystitis with hematuria     Bacteremia 2017    staph aureus    CHF (congestive heart failure) (HCC)     Colonization status 12    DNA probe negative for MRSA    COPD [facility]ch[facility] obstructive pulmonary disease) [facility] [address]     Diabetes mellitus ([facility] [address]     FOR ABOUT 4 YEARS    DM (diabetes mellitus) ([facility] [address]     Drug abuse, IV (HCC)     Hepatitis     Hepatitis C antibody positive in blood 2017    Hepatitis C AB positive     Heroin overdose ([facility] [address]     Hyperlipidemia 3/15/2022    Hypertension     MRSA infection     LUNGS    Neuropathy     legs with pain    Other disorders of kidney and ureter     KIDNEY FAILURE, ACUTE    Pneumonia     Smoking history        Past Surgical History:   Procedure Laterality Date    BRONCHOSCOPY  12/21/2017    BAL    CYSTOSCOPY  1/5/15    cysto with left stent placement    CYSTOSCOPY  10/6/15    stent removal    CYSTOSCOPY  11/09/2019    left uretroscopy with stent placement    CYSTOSCOPY Left 11/13/2019    LEFT URETEROSCOPY WITH HOLMIUM LASER LITHOTRIPSY, STENT REMOVAL,  STONE EXTRACTION     CYSTOSCOPY INSERTION / REMOVAL STENT / STONE Left 11/9/2019    CYSTOSCOPY, URETEROSCOPY, STENT PLACEMENT performed by Marshell Duane at [address] [place] [address] / Veronica Bates / Nabila Thao Left 11/13/2019    CYSTOSCOPY, LEFT URETEROSCOPY WITH HOLMIUM LASER LITHOTRIPSY, STENT REMOVAL,  STONE EXTRACTION performed by Javad Reddy MD at [facility]           Prior to Admission medications    Medication Sig Start Date End Date Taking? Authorizing Provider   OXYGEN Inhale 4 L into the lungs continuous via nasal canula 3/10/22   Celso Philip MD   albuterol sulfate  (90 Base) MCG/ACT inhaler TAKE 2 PUFFS BY MOUTH EVERY 6 HOURS AS NEEDED FOR WHEEZE 3/9/22   Celso Philip MD   atorvastatin (LIPITOR) 10 MG tablet TAKE 1 TABLET BY MOUTH EVERY DAY 3/9/22   Celso Philip MD   [facility] Lancets Ultra-Thin 30G MISC USE AS DIRECTED TO TEST BLOOD SUGAR 3/9/22   Celso Philip MD   metFORMIN (GLUCOPHAGE) 500 MG tablet TAKE 1 TABLET BY MOUTH TWICE A DAY WITH MEALS 3/9/22   Celso Philip MD   lisinopril (PRINIVIL;ZESTRIL) 30 MG tablet TAKE 1 TABLET BY MOUTH EVERY DAY 3/9/22   Celso Philip MD   escitalopram (LEXAPRO) 10 MG tablet Take 1 tablet by mouth daily 3/9/22   Celso Philip MD   fluticasone-salmeterol (ADVAIR DISKUS) 250-50 MCG/DOSE AEPB Inhale 1 puff into the lungs every 12 hours 3/9/22   Celso Philip MD   gabapentin (NEURONTIN) 600 MG tablet Take 1 tablet by mouth 3 times daily for 30 days.  3/9/22 4/8/22  Celso Philip MD   methylPREDNISolone (MEDROL DOSEPACK) 4 MG tablet Take by mouth. 2/3/22   Bard Tim MD   glucose monitoring (FREESTYLE FREEDOM) kit 1 kit by Does not apply route daily 9/8/21   Madison Jane DO   blood glucose monitor strips Test 4 times a day & as needed for symptoms of irregular blood glucose. Dispense sufficient amount for indicated testing frequency plus additional to accommodate PRN testing needs. 9/8/21   Madison Jane DO   Lancets MISC 1 each by Does not apply route 4 times daily 9/8/21   Madison Jane DO   glucose monitoring kit (FREESTYLE) monitoring kit 1 kit by Does not apply route daily E11.9 4/5/21   Madison Jane DO   ROBAFEN DM COUGH  MG/5ML syrup  1/8/21   Historical Provider, MD       Allergies:   Pcn [penicillins], Aspirin, Pcn [penicillins], and Sulfamethoxazole-trimethoprim    Social:   reports that she has been smoking e-cigarettes and cigarettes. She has a 16.50 pack-year smoking history. She has never used smokeless tobacco.   reports no history of alcohol use. Social History     Substance and Sexual Activity   Drug Use No    Comment: Heroin       Family History   Problem Relation Age of Onset    Heart Disease Mother     No Known Problems Father     Heart Disease Sister     No Known Problems Brother     No Known Problems Maternal Grandmother     No Known Problems Maternal Grandfather     No Known Problems Paternal Grandmother     No Known Problems Paternal Grandfather     No Known Problems Other        PHYSICAL EXAM:  I performed this physical examination.     Vitals:  Patient Vitals for the past 24 hrs:   BP Temp Temp src Pulse Resp SpO2 Height Weight   04/02/22 0110     27      04/01/22 2346     27      04/01/22 2328 (!) 113/97   105 17 92 %     04/01/22 2259 124/79   106 15 96 %     04/01/22 2227 139/76   106 24 94 %     04/01/22 2208      94 %     04/01/22 2204 (!) 153/99 98.5 °F (36.9 °C) Oral 119 18 (!) 70 % 5' 3\" (1.6 m) 220 lb (99.8 kg) No intake or output data in the 24 hours ending 04/02/22 0135    Vent Settings: BiPAP 16 / 8 50%  f ~ 20/min w/ Vt ~ 350    GEN:  Appearance:  Obese female on BiPAP . Level of Consciousness:  Asleep on BiPAP, difficult to arouse, and uncooperative when awake. .    Orientation:    Patient did eventually wake up and began sobbing that nothing was working at home and she doesn't know what to do    HEENT: Sclera anicteric.  no conjunctival chemosis. moist mucus membranes. no specific or diagnostic oral lesions. NECK:  no signs of meningismus. Jugular veins not discernbiel. Carotid pulses  2+.  no cervical lymphadenopathy. no thyromegaly. CV:  regular rhythm. normal S1 & S2.    no murmur. no rub.  no gallop. PULM:  Chest excursion is symmetric. Breath sounds are severely diminished throughout. Adventitious sounds:  Crackles over both lower lobes, wheezes throughout    AB:  Abdominal shape is obese. Bowel sounds are active. Generally soft to palpation. no tenderness is present. no involuntary guarding. no rebound guarding. EXTR:  Skin is warm. Capillary refill brisk. no specific or pathognomic rash. no clubbing. 3-4+ pitting edema throughout the legs into the buttocks. Markedly hyperemic tender skin of both legs especially below the knees. Pulses 2+ x 4  Hands and feet are dirty as is clothing.       LABS:  Lab Results   Component Value Date    WBC 8.4 04/01/2022    HGB 14.1 04/01/2022    HCT 45.9 04/01/2022    MCV 81.0 04/01/2022     04/01/2022     Lab Results   Component Value Date    CREATININE 1.2 (H) 04/01/2022    BUN 23 (H) 04/01/2022     (L) 04/01/2022    K 5.0 04/01/2022    CL 95 (L) 04/01/2022    CO2 32 04/01/2022     Lab Results   Component Value Date    ALT 19 04/01/2022    AST 27 04/01/2022    ALKPHOS 76 04/01/2022    BILITOT 0.5 04/01/2022     Lab Results   Component Value Date    CKTOTAL 912 (H) 07/01/2015    TROPONINI <0.01 04/01/2022 No results for input(s): PHART, RXX7RHS, PO2ART in the last 72 hours. IMAGING:  XR SHOULDER RIGHT (MIN 2 VIEWS)    Result Date: 4/1/2022  EXAMINATION: THREE XRAY VIEWS OF THE RIGHT SHOULDER 4/1/2022 10:21 pm COMPARISON: 01/14/2016 radiograph HISTORY: ORDERING SYSTEM PROVIDED HISTORY: R shoulder pain s/p fall TECHNOLOGIST PROVIDED HISTORY: Reason for exam:->R shoulder pain s/p fall Reason for Exam: right shoulder pain, fall FINDINGS: No acute fracture or dislocation of the right shoulder. No significant degenerative change. Soft tissues are unremarkable. No acute finding of the right shoulder. XR CHEST PORTABLE    Result Date: 4/1/2022  EXAMINATION: ONE XRAY VIEW OF THE CHEST 4/1/2022 10:21 pm COMPARISON: 03/15/2022 radiograph HISTORY: ORDERING SYSTEM PROVIDED HISTORY: shortness of breath, hypoxic TECHNOLOGIST PROVIDED HISTORY: Reason for exam:->shortness of breath, hypoxic Reason for Exam: shortness of breath, hypoxic FINDINGS: The heart is enlarged. The mediastinum is normal.  Moderate perihilar opacities have increased centrally and there are diffuse ground-glass opacities in the mid and lower lung zones bilaterally. No skeletal finding. Bilateral perihilar and lower zone airspace changes that may represent pulmonary edema or underlying pneumonitis. I directly reviewed all recent imaging studies as well as pertinent prior studies. Radiology reports may or may not be available at the time of my review. EKG:  New and pertinent prior tracings were directly reviewed. My interpretation is as follows:  Sinus tachycardia with right atrial enlargement and right axis deviation.     Active Hospital Problems    Diagnosis Date Noted    History of noncompliance with medical treatment [Z91.19] 11/29/2021    Acute on chronic respiratory failure with hypoxia and hypercapnia (HCC) [X33.51, J96.22] 11/26/2021    Type 2 diabetes mellitus without complication, without long-term current use of insulin ([facility] [E11.9] 11/17/2018    Morbid obesity due to excess calories ([facility] [E66.01] 08/23/2017    Smoker [F17.200] 01/19/2012       ASSESSMENT & PLAN  Acute on Chronic Respiratory Failure, Severe COPD  -  Titrate level of respiratory support according to patient's needs. Target goal SpO2 = 90-94%. Currently patient is requiring BiPAP 16/8 50% support. At baseline she uses 4L/min O2.  -  Acute component seems predominantly due to hypervolemia. Diurese starting with lasix 40mg IV BID.  -  No strong suspicion for pneumonia. Procal = . No further empiric abx.  -  Continue home ICS/LABA (or equivalent), as-needed bronchodilators, and NRT also provided. Hypervolemia  -  Likely cardiogenic. Last echo 11/10/2019 LVEF was hyperdynamic. RVSP was severely elevated. -  Echos have been ordered in the interim, but she has not completed then for one reason or another. Will request another this admission. DM2  -  Hold all oral antidiabetic agents. Start s.c. Insulin regimen based on weight. Pain  -  Opioid medications will be avoided in lieu of patient's mental and respiratory status. Continue home gabapentin. Suspected substance abuse  -  Urine tox repeatedly positive for oxycodone, other opiates, and sometimes other substances. Per PDMP review none of these are prescribed, only gabapentin. DVT prophylaxis: SCDs, lovenox  Code Status:  Full code  Disposition:  Inpatient for the foreseeable future.     Rhys Watters MD MD

## 2022-04-02 NOTE — PROGRESS NOTES
4 Eyes Skin Assessment     The patient is being assess for   Admission    I agree that 2 RN's have performed a thorough Head to Toe Skin Assessment on the patient. ALL assessment sites listed below have been assessed. Areas assessed for pressure by both nurses:   [x]   Head, Face, and Ears   [x]   Shoulders, Back, and Chest, Abdomen  [x]   Arms, Elbows, and Hands   [x]   Coccyx, Sacrum, and Ischium  [x]   Legs, Feet, and Heels        Skin Assessed Under all Medical Devices by both nurses:  O2 device tubing and Bipap mask              All Mepilex Borders were peeled back and area peeked at by both nurses:  Yes  Please list where Mepilex Borders are located:  none in place                Co-signer eSignature: Sherri Gonzales RN  Does the Patient have Skin Breakdown related to pressure?   No              Andres Prevention initiated:  No  Wound Care Orders initiated:  No      WOC nurse consulted for Pressure Injury (Stage 3,4, Unstageable, DTI, NWPT, Complex wounds)and New or Established Ostomies:  No  Primary Nurse eSignature: Electronically signed by Marilu Quispe RN on 4/2/22 at 4:06 AM EDT

## 2022-04-02 NOTE — ED NOTES
Bedside report w/ floor RN. Pt transported by bed w/ RT d/t CPAP.       Paolo Araujo RN  04/02/22 [id number]

## 2022-04-02 NOTE — PROGRESS NOTES
04/02/22 0110   NIV Type   NIV Started/Stopped On   Equipment Type V60   Mode Bilevel   Mask Type Full face mask   Mask Size Small   Settings/Measurements   IPAP 16 cmH20   CPAP/EPAP 8 cmH2O   Rate Ordered 20   Resp 27   FiO2  50 %   Vt Exhaled 421 mL   Minute Volume 9 Liters   Mask Leak (lpm) 7 lpm   Comfort Level Good   Using Accessory Muscles No   SpO2 92   Alarm Settings   Alarms On Y

## 2022-04-02 NOTE — ED PROVIDER NOTES
[address] [facility]  ED  EMERGENCY DEPARTMENT ENCOUNTER      Pt Name: Yolanda Hilario  MRN: [id number]  Arm[place]rank 1975  Date of evaluation: 4/1/2022  Provider: Hillary Rudolph MD    [address]       Chief Complaint   Patient presents with    Shortness of Breath     pt to ED with c/o SOB related to COPD     Shortness of breath. HISTORY OF PRESENT ILLNESS   (Location/Symptom, Timing/Onset,Context/Setting, Quality, Duration, Modifying Factors, Severity)  Note limiting factors. Yolanda Hilario is a 50 y.o. female who presents to the ED with a chief complaint of shortness of breath for the past several days. Onset gradual, rapidly progressively worse over the course of the past few days. Patient states she is out of her nebulizer equipment, medications, and that her oxygen concentrator is broken. She feels generally weak, shortness of breath described as severe, associated with wheezing, productive cough, chest tightness and has been feeling more confused. She states that she fell the other day and hurt her right shoulder as well. Symptoms not otherwise alleviated or exacerbated by other factors. NursingNotes were reviewed. REVIEW OF SYSTEMS    (2-9 systems for level 4, 10 or more for level 5)     Review of Systems   Constitutional: Negative for chills and fever. HENT: Negative for congestion and sore throat. Eyes: Negative for pain and visual disturbance. Respiratory: Positive for cough, chest tightness, shortness of breath and wheezing. Cardiovascular: Positive for chest pain and leg swelling. Negative for palpitations. Gastrointestinal: Negative for abdominal pain, diarrhea, nausea and vomiting. Genitourinary: Negative for dysuria and frequency. Musculoskeletal: Positive for arthralgias (Right shoulder pain). Negative for back pain and neck pain. Skin: Negative for rash and wound. Neurological: Negative for dizziness, weakness and light-headedness.         PAST MEDICAL sufficient amount for indicated testing frequency plus additional to accommodate PRN testing needs. [facility] LANCETS ULTRA-THIN 30G MISC    USE AS DIRECTED TO TEST BLOOD SUGAR    ESCITALOPRAM (LEXAPRO) 10 MG TABLET    Take 1 tablet by mouth daily    FLUTICASONE-SALMETEROL (ADVAIR DISKUS) 250-50 MCG/DOSE AEPB    Inhale 1 puff into the lungs every 12 hours    GABAPENTIN (NEURONTIN) 600 MG TABLET    Take 1 tablet by mouth 3 times daily for 30 days. GLUCOSE MONITORING (FREESTYLE FREEDOM) KIT    1 kit by Does not apply route daily    GLUCOSE MONITORING KIT (FREESTYLE) MONITORING KIT    1 kit by Does not apply route daily E11.9    LANCETS MISC    1 each by Does not apply route 4 times daily    LISINOPRIL (PRINIVIL;ZESTRIL) 30 MG TABLET    TAKE 1 TABLET BY MOUTH EVERY DAY    METFORMIN (GLUCOPHAGE) 500 MG TABLET    TAKE 1 TABLET BY MOUTH TWICE A DAY WITH MEALS    METHYLPREDNISOLONE (MEDROL DOSEPACK) 4 MG TABLET    Take by mouth. OXYGEN    Inhale 4 L into the lungs continuous via nasal canula    ROBAFEN DM COUGH  MG/5ML SYRUP           ALLERGIES     Pcn [penicillins], Aspirin, Pcn [penicillins], and Sulfamethoxazole-trimethoprim    FAMILY HISTORY       Family History   Problem Relation Age of Onset    Heart Disease Mother     No Known Problems Father     Heart Disease Sister     No Known Problems Brother     No Known Problems Maternal Grandmother     No Known Problems Maternal Grandfather     No Known Problems Paternal Grandmother     No Known Problems Paternal Grandfather     No Known Problems Other           SOCIAL HISTORY       Social History     Socioeconomic History    Marital status:       Spouse name: None    Number of children: 2    Years of education: None    Highest education level: None   Occupational History    None   Tobacco Use    Smoking status: Current Every Day Smoker     Packs/day: 0.50     Years: 33.00     Pack years: 16.50     Types: E-Cigarettes, Cigarettes    Smokeless tobacco: Never Used   Vaping Use    Vaping Use: Every day    Substances: Always   Substance and Sexual Activity    Alcohol use: No    Drug use: No     Comment: Heroin    Sexual activity: Not Currently     Partners: Male   Other Topics Concern    None   Social History Narrative    ** Merged History Encounter **          Social Determinants of Health     Financial Resource Strain: Low Risk     Difficulty of Paying Living Expenses: Not hard at all   Food Insecurity: No Food Insecurity    Worried About Running Out of Food in the Last Year: Never true    Akil of Food in the Last Year: Never true   Transportation Needs: No Transportation Needs    Lack of Transportation (Medical): No    Lack of Transportation (Non-Medical):  No   Physical Activity:     Days of Exercise per Week: Not on file    Minutes of Exercise per Session: Not on file   Stress:     Feeling of Stress : Not on file   Social Connections:     Frequency of Communication with Friends and Family: Not on file    Frequency of Social Gatherings with Friends and Family: Not on file    Attends Cheondoism Services: Not on file    Active Member of [address] [place] [address] or Organizations: Not on file    Attends Club or Organization Meetings: Not on file    Marital Status: Not on file   Intimate Partner Violence:     Fear of Current or Ex-Partner: Not on file    Emotionally Abused: Not on file    Physically Abused: Not on file    Sexually Abused: Not on file   Housing Stability: Unknown    Unable to Pay for Housing in the Last Year: No    Number of [place] in the Last Year: Not on file    Unstable Housing in the Last Year: No       SCREENINGS    Freeland Coma Scale  Eye Opening: Spontaneous  Best Verbal Response: Oriented  Best Motor Response: Obeys commands  Harvey Coma Scale Score: 15        PHYSICAL EXAM    (up to 7 for level 4, 8 or more for level 5)     ED Triage Vitals [04/01/22 2204]   BP Temp Temp Source Pulse Resp SpO2 Height Weight   (!) 153/99 98.5 °F (36.9 °C) Oral 119 18 (!) 70 % 5' 3\" (1.6 m) 220 lb (99.8 kg)       General: Alert and oriented appropriately for age, severe respiratory distress. Eye: Normal conjunctiva. Sclera anicteric. HENT: Oral mucosa is moist.  Respiratory: Respirations even and labored, expiratory wheezing throughout. Cardiovascular: Normal rate, Regular rhythm. Intact peripheral pulses. Gastrointestinal: Soft, Non-tender, Non-distended. : deferred. Musculoskeletal: No swelling. Right shoulder tender to palpation over the anterior joint line. No deformity. Integumentary: Warm, Dry. Neurologic: Alert and appropriate for age. No focal deficits. Psychiatric: Cooperative. DIAGNOSTIC RESULTS     EKG: All EKG's are interpreted by the Emergency Department Physician who either signs or Co-signsthis chart in the absence of a cardiologist.    The Ekg interpreted by me shows  Rhythm sinus tachycardia  Rate of 101 bpm  Axis is right axis deviation  Intervals and durations within normal limits. ST Segments: Nonspecific ST abnormalities. Compared to prior EKG dated March 15, 2022, nonspecific ST abnormalities are new. There is baseline artifact which does affect interpretation. RADIOLOGY:   Non-plain filmimages such as CT, Ultrasound and MRI are read by the radiologist. Plain radiographic images are visualized and preliminarily interpreted by the emergency physician with the below findings:      Interpretation per the Radiologist below, if available at the time ofthis note:    XR SHOULDER RIGHT (MIN 2 VIEWS)   Final Result   No acute finding of the right shoulder. XR CHEST PORTABLE   Final Result   Bilateral perihilar and lower zone airspace changes that may represent   pulmonary edema or underlying pneumonitis.                LABS:  Labs Reviewed   CBC WITH AUTO DIFFERENTIAL - Abnormal; Notable for the following components:       Result Value    RBC 5.67 (*)     MCH 24.9 (*)     MCHC 30.7 (*)     RDW 17.5 (*)     All other components within normal limits   BLOOD GAS, VENOUS - Abnormal; Notable for the following components:    pH, Emeka 7.300 (*)     pCO2, Emeka 70.6 (*)     pO2, Emeka 110.5 (*)     HCO3, Venous 34.0 (*)     Base Excess, Emeka 4.9 (*)     Carboxyhemoglobin 10.3 (*)     All other components within normal limits   COMPREHENSIVE METABOLIC PANEL W/ REFLEX TO MG FOR LOW K - Abnormal; Notable for the following components:    Sodium 134 (*)     Chloride 95 (*)     Glucose 130 (*)     BUN 23 (*)     CREATININE 1.2 (*)     GFR Non- 48 (*)     GFR  58 (*)     All other components within normal limits   BRAIN NATRIURETIC PEPTIDE - Abnormal; Notable for the following components:    Pro-BNP 5,085 (*)     All other components within normal limits   LACTIC ACID   TROPONIN       All other labs were within normal range or not returned as of this dictation. EMERGENCY DEPARTMENT COURSE and DIFFERENTIAL DIAGNOSIS/MDM:   Vitals:    Vitals:    04/01/22 2259 04/01/22 2328 04/01/22 2346 04/02/22 0110   BP: 124/79 (!) 113/97     Pulse: 106 105     Resp: 15 17 27 27   Temp:       TempSrc:       SpO2: 96% 92%     Weight:       Height:             Medical decision making: Reddy Lui is a 51 yo F with h/o COPD who p/w SOB, out of O2 at home, chronically on 4L, came in on RA satting 58%, wheezing and diminished throughout. Placed on nasal cannula at 5 L to improvement, found to have acute on chronic hypoxemic and hypercapnic respiratory failure, work-up including CBC, CMP, VBG, BNP, lactate, troponin. Chest x-ray. EKG. Found to have pH 7.3, pCO2 71, placed on bipap, given nebs, IV methylprednisolone and IV azithromycin. Has significant improvement on reassessment but still requiring BiPAP.   Admitted to the hospitalist.    Medications   ipratropium-albuterol (DUONEB) nebulizer solution 2 ampule (2 ampules Inhalation Given 4/1/22 5659)   methylPREDNISolone sodium (SOLU-MEDROL) injection 125 mg (125 mg IntraVENous Given 4/1/22 1875)   azithromycin (ZITHROMAX) 500 mg in D5W 250ml addavial (500 mg IntraVENous New Bag 4/2/22 0016)           CRITICAL CARE TIME   I personally saw the patient and independently provided 56 minutes of non-concurrent critical care out of the total shared critical care time provided, excluding separately reportable procedures. There was a high probability of clinically significant/life threatening deterioration in the patient's condition which required my urgent intervention. Frequent reassessments of patient's hemodynamic status, mental status, respiratory status. Management of noninvasive positive pressure ventilation for treatment of combined respiratory failure with hypoxia and hypercapnia. FINAL IMPRESSION      1. Acute on chronic respiratory failure with hypoxia and hypercapnia (HCC)    2.  COPD exacerbation ([facility] [address]    3. Pulmonary edema cardiac cause [facility])          DISPOSITION/PLAN   DISPOSITION Decision To Admit 04/01/2022 11:52:56 PM        (Please note that portions of this note were completed with a voice recognition program.Efforts were made to edit the dictations but occasionally words are mis-transcribed.)    Nicole Spencer MD (electronically signed)  Attending Emergency Physician          Nicole Spencer MD  04/02/22 0164

## 2022-04-02 NOTE — PROGRESS NOTES
Hospitalist Progress Note Addendum    The patient was admitted after midnight for   Principal Problem:    Acute on chronic respiratory failure with hypoxia and hypercapnia (HCC)  Active Problems:    Smoker    Morbid obesity due to excess calories (HCC)    Type 2 diabetes mellitus without complication, without long-term current use of insulin (HCC)    History of noncompliance with medical treatment  Resolved Problems:    * No resolved hospital problems. *  .    Brief update:  50 y.o. female. She presented from home for dyspnea. She has a h/o COPD and chronic respiratory failure.     She was hospitalized here at [facility] 3/15 - 17/22 for acute on chronic respiratory failure. At that time she also reported she had run out of O2 at home and that her CPAP machine was not functioning. She was initially placed in the ICU for possible intubation, but she refused intubation and was successfully supported with BiPAP. She signed out AMA because she was dissatisfied with treatment of her chronic back pain. She was to follow up with her pulmonologist, Dr. Rosa Carias, on 4/9/22 to obtain new O2 supplies.     She states she takes only medications that are prescribed to her, none obtained illicitly. Has been diuresing and on and off of BiPAP throughout night. She needs a new CPAP machine, but has not called the company - she just had a sleep study last year. Asking for pain meds because she hurts all over and wants food.     /86   Pulse 95   Temp 98.4 °F (36.9 °C) (Oral)   Resp 18   Ht 5' 4\" (1.626 m)   Wt 237 lb 6.4 oz (107.7 kg)   LMP 06/16/2021   SpO2 93%   BMI 40.75 kg/m²       CBC with Differential:    Lab Results   Component Value Date    WBC 8.4 04/02/2022    RBC 6.02 04/02/2022    HGB 14.9 04/02/2022    HCT 48.2 04/02/2022     04/02/2022    MCV 80.1 04/02/2022    MCH 24.8 04/02/2022    MCHC 30.9 04/02/2022    RDW 17.8 04/02/2022    SEGSPCT 60.9 07/28/2012    BANDSPCT 15 04/02/2022    LYMPHOPCT 2.0 04/02/2022    MONOPCT 1.0 04/02/2022    EOSPCT 0.9 01/18/2012    BASOPCT 0.0 04/02/2022    MONOSABS 0.1 04/02/2022    LYMPHSABS 0.2 04/02/2022    EOSABS 0.0 04/02/2022    BASOSABS 0.0 04/02/2022    DIFFTYPE Auto 07/28/2012     BMP:    Lab Results   Component Value Date     04/02/2022    K 5.5 04/02/2022    K 5.0 04/01/2022    CL 95 04/02/2022    CO2 26 04/02/2022    BUN 22 04/02/2022    LABALBU 3.7 04/01/2022    CREATININE 1.1 04/02/2022    CALCIUM 8.5 04/02/2022    GFRAA >60 04/02/2022    GFRAA >60 07/29/2012    LABGLOM 53 04/02/2022    GLUCOSE 143 04/02/2022       The patient has been seen and examined. She wants pain medication and food and wants to get her home oxygen. She did leave [facility] due to not getting IV pain meds only 2 weeks ago. I have the following recommendations:    1. Acute on chronic resp failure with hypoxia - off and on BiPAP and oxygen - will have social work to see to arrange for home stuff  2. Chronic pain - pt has substance abuse issue, will order tramadol - will see how that is tolerated - tox screen is negative at this time  3.   Fluid overload - has lasix and will continue to diurese          Electronically signed by Joel Eckert MD on 4/2/2022 at 7:20 AM

## 2022-04-02 NOTE — PROGRESS NOTES
04/01/22 8896   NIV Type   $NIV $Daily Charge   Skin Assessment Clean, dry, & intact   Skin Protection for O2 Device Yes   Location Nose   NIV Started/Stopped On   Equipment Type V60   Mode Bilevel   Mask Type Full face mask   Mask Size Small   Settings/Measurements   IPAP 16 cmH20   CPAP/EPAP 8 cmH2O   Rate Ordered 20   Resp 27   FiO2  30 %   Vt Exhaled 346 mL   Minute Volume 8.6 Liters   Mask Leak (lpm) 5 lpm   Comfort Level Good   Using Accessory Muscles No   SpO2 94   Alarm Settings   Alarms On Y   Press Low Alarm 6 cmH2O   High Pressure Alarm 30 cmH2O   Resp Rate Low Alarm 6   High Respiratory Rate 40 br/min

## 2022-04-02 NOTE — PROGRESS NOTES
04/02/22 0337   NIV Type   $NIV $Daily Charge   Skin Assessment Clean, dry, & intact   Skin Protection for O2 Device Yes   Location Nose   NIV Started/Stopped On   Equipment Type V60   Mode Bilevel   Mask Type Full face mask   Mask Size Small   Settings/Measurements   IPAP 16 cmH20   CPAP/EPAP 8 cmH2O   Rate Ordered 12   Resp 18   FiO2  50 %   Vt Exhaled 543 mL   Minute Volume 9 Liters   Mask Leak (lpm) 1 lpm   Comfort Level Good   Using Accessory Muscles No   SpO2 95   Alarm Settings   Alarms On Y

## 2022-04-02 NOTE — PROGRESS NOTES
04/02/22 0734   NIV Type   Skin Protection for O2 Device Yes   Location Nose   NIV Started/Stopped On   Equipment Type v60   Mode Bilevel   Mask Type Full face mask   Mask Size Small   Settings/Measurements   IPAP 16 cmH20   CPAP/EPAP 8 cmH2O   Resp 17   FiO2  50 %   Vt Exhaled 490 mL   Minute Volume 7.4 Liters   Mask Leak (lpm) 0 lpm   Comfort Level Good   Using Accessory Muscles No   SpO2 94   Breath Sounds   Right Upper Lobe Diminished   Right Middle Lobe Diminished   Right Lower Lobe Diminished   Left Upper Lobe Diminished   Left Lower Lobe Diminished   Patient Observation   Observations mepilex on niose, hhn with bipap   Alarm Settings   Alarms On Y   Press Low Alarm 6 cmH2O   High Pressure Alarm 30 cmH2O   Delay Alarm 20 sec(s)   Apnea (secs) 20 secs   Resp Rate Low Alarm 6   High Respiratory Rate 40 br/min

## 2022-04-03 LAB
ANION GAP SERPL CALCULATED.3IONS-SCNC: 10 MMOL/L (ref 3–16)
BASOPHILS ABSOLUTE: 0 K/UL (ref 0–0.2)
BASOPHILS RELATIVE PERCENT: 0.3 %
BUN BLDV-MCNC: 34 MG/DL (ref 7–20)
CALCIUM SERPL-MCNC: 8.4 MG/DL (ref 8.3–10.6)
CHLORIDE BLD-SCNC: 92 MMOL/L (ref 99–110)
CO2: 36 MMOL/L (ref 21–32)
CREAT SERPL-MCNC: 1.4 MG/DL (ref 0.6–1.1)
EOSINOPHILS ABSOLUTE: 0 K/UL (ref 0–0.6)
EOSINOPHILS RELATIVE PERCENT: 0.1 %
GFR AFRICAN AMERICAN: 48
GFR NON-AFRICAN AMERICAN: 40
GLUCOSE BLD-MCNC: 101 MG/DL (ref 70–99)
GLUCOSE BLD-MCNC: 101 MG/DL (ref 70–99)
GLUCOSE BLD-MCNC: 150 MG/DL (ref 70–99)
GLUCOSE BLD-MCNC: 197 MG/DL (ref 70–99)
GLUCOSE BLD-MCNC: 256 MG/DL (ref 70–99)
HCT VFR BLD CALC: 44.8 % (ref 36–48)
HEMOGLOBIN: 13.7 G/DL (ref 12–16)
LYMPHOCYTES ABSOLUTE: 2.1 K/UL (ref 1–5.1)
LYMPHOCYTES RELATIVE PERCENT: 20.9 %
MAGNESIUM: 2.1 MG/DL (ref 1.8–2.4)
MCH RBC QN AUTO: 24.7 PG (ref 26–34)
MCHC RBC AUTO-ENTMCNC: 30.7 G/DL (ref 31–36)
MCV RBC AUTO: 80.4 FL (ref 80–100)
MONOCYTES ABSOLUTE: 1 K/UL (ref 0–1.3)
MONOCYTES RELATIVE PERCENT: 9.4 %
NEUTROPHILS ABSOLUTE: 7.1 K/UL (ref 1.7–7.7)
NEUTROPHILS RELATIVE PERCENT: 69.3 %
PDW BLD-RTO: 17.9 % (ref 12.4–15.4)
PERFORMED ON: ABNORMAL
PLATELET # BLD: 166 K/UL (ref 135–450)
PMV BLD AUTO: 8.7 FL (ref 5–10.5)
POTASSIUM SERPL-SCNC: 4.2 MMOL/L (ref 3.5–5.1)
RBC # BLD: 5.57 M/UL (ref 4–5.2)
SODIUM BLD-SCNC: 138 MMOL/L (ref 136–145)
WBC # BLD: 10.2 K/UL (ref 4–11)

## 2022-04-03 PROCEDURE — 36415 COLL VENOUS BLD VENIPUNCTURE: CPT

## 2022-04-03 PROCEDURE — 85025 COMPLETE CBC W/AUTO DIFF WBC: CPT

## 2022-04-03 PROCEDURE — 6370000000 HC RX 637 (ALT 250 FOR IP): Performed by: INTERNAL MEDICINE

## 2022-04-03 PROCEDURE — 6360000002 HC RX W HCPCS: Performed by: INTERNAL MEDICINE

## 2022-04-03 PROCEDURE — 94660 CPAP INITIATION&MGMT: CPT

## 2022-04-03 PROCEDURE — 6370000000 HC RX 637 (ALT 250 FOR IP): Performed by: NURSE PRACTITIONER

## 2022-04-03 PROCEDURE — 83735 ASSAY OF MAGNESIUM: CPT

## 2022-04-03 PROCEDURE — 94761 N-INVAS EAR/PLS OXIMETRY MLT: CPT

## 2022-04-03 PROCEDURE — 2700000000 HC OXYGEN THERAPY PER DAY

## 2022-04-03 PROCEDURE — 80048 BASIC METABOLIC PNL TOTAL CA: CPT

## 2022-04-03 PROCEDURE — 94640 AIRWAY INHALATION TREATMENT: CPT

## 2022-04-03 PROCEDURE — 2060000000 HC ICU INTERMEDIATE R&B

## 2022-04-03 RX ORDER — PREDNISONE 20 MG/1
40 TABLET ORAL DAILY
Status: DISCONTINUED | OUTPATIENT
Start: 2022-04-03 | End: 2022-04-04 | Stop reason: HOSPADM

## 2022-04-03 RX ORDER — INSULIN GLARGINE 100 [IU]/ML
25 INJECTION, SOLUTION SUBCUTANEOUS NIGHTLY
Status: DISCONTINUED | OUTPATIENT
Start: 2022-04-03 | End: 2022-04-04 | Stop reason: HOSPADM

## 2022-04-03 RX ORDER — HYDROCODONE BITARTRATE AND ACETAMINOPHEN 5; 325 MG/1; MG/1
2 TABLET ORAL EVERY 6 HOURS PRN
Status: DISCONTINUED | OUTPATIENT
Start: 2022-04-03 | End: 2022-04-04 | Stop reason: HOSPADM

## 2022-04-03 RX ORDER — INSULIN GLARGINE 100 [IU]/ML
20 INJECTION, SOLUTION SUBCUTANEOUS NIGHTLY
Status: DISCONTINUED | OUTPATIENT
Start: 2022-04-03 | End: 2022-04-03

## 2022-04-03 RX ORDER — HYDROCODONE BITARTRATE AND ACETAMINOPHEN 5; 325 MG/1; MG/1
1 TABLET ORAL EVERY 6 HOURS PRN
Status: DISCONTINUED | OUTPATIENT
Start: 2022-04-03 | End: 2022-04-04 | Stop reason: HOSPADM

## 2022-04-03 RX ORDER — ESCITALOPRAM OXALATE 10 MG/1
10 TABLET ORAL DAILY
Status: DISCONTINUED | OUTPATIENT
Start: 2022-04-03 | End: 2022-04-04 | Stop reason: HOSPADM

## 2022-04-03 RX ORDER — FUROSEMIDE 40 MG/1
40 TABLET ORAL DAILY
Status: DISCONTINUED | OUTPATIENT
Start: 2022-04-04 | End: 2022-04-04 | Stop reason: HOSPADM

## 2022-04-03 RX ADMIN — Medication 2 PUFF: at 08:02

## 2022-04-03 RX ADMIN — HYDROXYZINE HYDROCHLORIDE 25 MG: 10 TABLET ORAL at 21:50

## 2022-04-03 RX ADMIN — IPRATROPIUM BROMIDE AND ALBUTEROL SULFATE 1 AMPULE: .5; 3 SOLUTION RESPIRATORY (INHALATION) at 20:33

## 2022-04-03 RX ADMIN — IPRATROPIUM BROMIDE AND ALBUTEROL SULFATE 1 AMPULE: .5; 3 SOLUTION RESPIRATORY (INHALATION) at 08:02

## 2022-04-03 RX ADMIN — HYDROCODONE BITARTRATE AND ACETAMINOPHEN 2 TABLET: 5; 325 TABLET ORAL at 12:25

## 2022-04-03 RX ADMIN — ACETAMINOPHEN 650 MG: 325 TABLET ORAL at 16:08

## 2022-04-03 RX ADMIN — CYCLOBENZAPRINE 10 MG: 10 TABLET, FILM COATED ORAL at 21:50

## 2022-04-03 RX ADMIN — TRAMADOL HYDROCHLORIDE 100 MG: 50 TABLET, COATED ORAL at 10:04

## 2022-04-03 RX ADMIN — CYCLOBENZAPRINE 10 MG: 10 TABLET, FILM COATED ORAL at 18:04

## 2022-04-03 RX ADMIN — HYDROCODONE BITARTRATE AND ACETAMINOPHEN 2 TABLET: 5; 325 TABLET ORAL at 18:04

## 2022-04-03 RX ADMIN — HYDROXYZINE HYDROCHLORIDE 25 MG: 10 TABLET ORAL at 16:09

## 2022-04-03 RX ADMIN — GABAPENTIN 600 MG: 300 CAPSULE ORAL at 20:36

## 2022-04-03 RX ADMIN — Medication 2 PUFF: at 20:33

## 2022-04-03 RX ADMIN — INSULIN LISPRO 8 UNITS: 100 INJECTION, SOLUTION INTRAVENOUS; SUBCUTANEOUS at 16:12

## 2022-04-03 RX ADMIN — PREDNISONE 40 MG: 20 TABLET ORAL at 12:25

## 2022-04-03 RX ADMIN — INSULIN LISPRO 2 UNITS: 100 INJECTION, SOLUTION INTRAVENOUS; SUBCUTANEOUS at 16:12

## 2022-04-03 RX ADMIN — IPRATROPIUM BROMIDE AND ALBUTEROL SULFATE 1 AMPULE: .5; 3 SOLUTION RESPIRATORY (INHALATION) at 16:31

## 2022-04-03 RX ADMIN — IPRATROPIUM BROMIDE AND ALBUTEROL SULFATE 1 AMPULE: .5; 3 SOLUTION RESPIRATORY (INHALATION) at 12:09

## 2022-04-03 RX ADMIN — ATORVASTATIN CALCIUM 10 MG: 10 TABLET, FILM COATED ORAL at 10:20

## 2022-04-03 RX ADMIN — ESCITALOPRAM OXALATE 10 MG: 10 TABLET ORAL at 12:26

## 2022-04-03 RX ADMIN — INSULIN LISPRO 3 UNITS: 100 INJECTION, SOLUTION INTRAVENOUS; SUBCUTANEOUS at 20:37

## 2022-04-03 RX ADMIN — CYCLOBENZAPRINE 10 MG: 10 TABLET, FILM COATED ORAL at 10:07

## 2022-04-03 RX ADMIN — GABAPENTIN 600 MG: 300 CAPSULE ORAL at 10:03

## 2022-04-03 RX ADMIN — GABAPENTIN 600 MG: 300 CAPSULE ORAL at 16:09

## 2022-04-03 RX ADMIN — INSULIN GLARGINE 25 UNITS: 100 INJECTION, SOLUTION SUBCUTANEOUS at 20:38

## 2022-04-03 RX ADMIN — Medication 3 MG: at 20:37

## 2022-04-03 RX ADMIN — ENOXAPARIN SODIUM 40 MG: 40 INJECTION SUBCUTANEOUS at 10:10

## 2022-04-03 ASSESSMENT — PAIN DESCRIPTION - PROGRESSION: CLINICAL_PROGRESSION: NOT CHANGED

## 2022-04-03 ASSESSMENT — PAIN SCALES - GENERAL
PAINLEVEL_OUTOF10: 8
PAINLEVEL_OUTOF10: 10
PAINLEVEL_OUTOF10: 8
PAINLEVEL_OUTOF10: 9
PAINLEVEL_OUTOF10: 10

## 2022-04-03 ASSESSMENT — PAIN DESCRIPTION - ORIENTATION
ORIENTATION: RIGHT
ORIENTATION: RIGHT;LEFT

## 2022-04-03 ASSESSMENT — PAIN - FUNCTIONAL ASSESSMENT: PAIN_FUNCTIONAL_ASSESSMENT: PREVENTS OR INTERFERES SOME ACTIVE ACTIVITIES AND ADLS

## 2022-04-03 ASSESSMENT — PAIN DESCRIPTION - LOCATION
LOCATION: GENERALIZED;HAND;LEG
LOCATION: LEG

## 2022-04-03 ASSESSMENT — PAIN DESCRIPTION - PAIN TYPE
TYPE: ACUTE PAIN
TYPE: ACUTE PAIN

## 2022-04-03 ASSESSMENT — ENCOUNTER SYMPTOMS: TACHYPNEA: 1

## 2022-04-03 ASSESSMENT — PAIN DESCRIPTION - ONSET: ONSET: ON-GOING

## 2022-04-03 ASSESSMENT — PAIN DESCRIPTION - FREQUENCY: FREQUENCY: CONTINUOUS

## 2022-04-03 ASSESSMENT — PAIN DESCRIPTION - DESCRIPTORS: DESCRIPTORS: CONSTANT;SHARP;TINGLING

## 2022-04-03 NOTE — PROGRESS NOTES
04/02/22 2134   NIV Type   Skin Protection for O2 Device N/A   NIV Started/Stopped On   Equipment Type bi   Mode Bilevel   Mask Type Full face mask   Mask Size Small   Settings/Measurements   IPAP 16 cmH20   CPAP/EPAP 8 cmH2O   Rate Ordered 12   Resp 22   Insp Rise Time (%) 1 %   FiO2  50 %   I Time/ I Time % 0.9 s   Vt Exhaled 594 mL   Minute Volume 13.1 Liters   Mask Leak (lpm) 2 lpm   Comfort Level Good   Using Accessory Muscles No   SpO2 95   Breath Sounds   Right Upper Lobe Diminished   Right Middle Lobe Diminished   Right Lower Lobe Diminished   Left Upper Lobe Diminished   Left Lower Lobe Diminished   Patient Observation   Observations pt sleepy    Alarm Settings   Alarms On Y   Press Low Alarm 6 cmH2O   High Pressure Alarm 30 cmH2O   Delay Alarm 20 sec(s)   Resp Rate Low Alarm 6   High Respiratory Rate 40 br/min

## 2022-04-03 NOTE — PROGRESS NOTES
04/03/22 0023   NIV Type   $NIV $Daily Charge   NIV Started/Stopped On   Equipment Type v60   Mode Bilevel   Mask Type Full face mask   Mask Size Small   Settings/Measurements   IPAP 16 cmH20   CPAP/EPAP 8 cmH2O   Rate Ordered 12   Resp 21   FiO2  45 %   Vt Exhaled 432 mL   Mask Leak (lpm) 34 lpm   Comfort Level Good   Using Accessory Muscles No   SpO2 96   Breath Sounds   Right Upper Lobe Diminished   Right Middle Lobe Diminished   Right Lower Lobe Diminished   Left Upper Lobe Diminished   Left Lower Lobe Diminished   Alarm Settings   Alarms On Y   Press Low Alarm 6 cmH2O   High Pressure Alarm 30 cmH2O   Resp Rate Low Alarm 6   High Respiratory Rate 40 br/min

## 2022-04-03 NOTE — PROGRESS NOTES
mometasone-formoterol  2 puff Inhalation BID    gabapentin  600 mg Oral TID    enoxaparin  40 mg SubCUTAneous Daily    insulin glargine  0.25 Units/kg SubCUTAneous Nightly    insulin lispro  0.08 Units/kg SubCUTAneous TID WC    insulin lispro  0-12 Units SubCUTAneous TID     insulin lispro  0-6 Units SubCUTAneous Nightly    nicotine  1 patch TransDERmal Daily    ipratropium-albuterol  1 ampule Inhalation Q4H WA       PRN Meds:  glucose, glucagon (rDNA), dextrose, sodium chloride flush, sodium chloride, potassium chloride **OR** potassium alternative oral replacement **OR** potassium chloride, magnesium sulfate, ondansetron **OR** ondansetron, acetaminophen **OR** acetaminophen, melatonin, albuterol, dextrose bolus (hypoglycemia) **OR** dextrose bolus (hypoglycemia), traMADol **OR** traMADol, hydrOXYzine, cyclobenzaprine    IV:   dextrose      sodium chloride           Intake/Output Summary (Last 24 hours) at 4/3/2022 0706  Last data filed at 4/3/2022 0515  Gross per 24 hour   Intake 1585.6 ml   Output 6175 ml   Net -4589.4 ml       Results:  CBC:   Recent Labs     04/01/22 2241 04/02/22  0508 04/03/22  0515   WBC 8.4 8.4 10.2   HGB 14.1 14.9 13.7   HCT 45.9 48.2* 44.8   MCV 81.0 80.1 80.4    165 166     BMP:   Recent Labs     04/01/22 2241 04/02/22  0508 04/03/22  0515   * 134* 138   K 5.0 5.5* 4.2   CL 95* 95* 92*   CO2 32 26 36*   BUN 23* 22* 34*   CREATININE 1.2* 1.1 1.4*     Mag: No results for input(s): MAG in the last 72 hours. Phos:   Lab Results   Component Value Date    PHOS 3.9 11/18/2018     No results found for: GLU    LIVER PROFILE:   Recent Labs     04/01/22 2241   AST 27   ALT 19   BILITOT 0.5   ALKPHOS 76     PT/INR: No results for input(s): PROTIME, INR in the last 72 hours. APTT: No results for input(s): APTT in the last 72 hours.   UA:  Recent Labs     04/02/22  0505   COLORU Yellow   PHUR 6.0  6.0   CLARITYU Clear   SPECGRAV 1.015   LEUKOCYTESUR Negative UROBILINOGEN 0.2   BILIRUBINUR Negative   BLOODU Negative   GLUCOSEU Negative       Invalid input(s): ABG  Lab Results   Component Value Date    CALCIUM 8.4 04/03/2022    PHOS 3.9 11/18/2018               Electronically signed by Babar Barba MD on 4/3/2022 at 7:06 AM

## 2022-04-03 NOTE — PROGRESS NOTES
Page sent to Dr. Williamson Batch with update of patient and patient's request to see her at bedside. 4/3/22 10:50 AM   [phone] Hospital or Facility: [facility] [place] From: Italo Kingman RE: Evan Ramirez 1975 RM: [id number] Patient requesting to see you at bedside. She is very upset about her plan of care and pain control. Patient appears very anxious, unable to have a constructive conversation with staff or participate in education. She keeps repeating that she needs to get out of here to go take care of her pain and threatening to leave AMA. IV access was lost as IV was on floor upon my assessment this morning, so IV Lasix was not administered. Also, home life seems to be very stressful and brother reported Asbestos was recently found in home. Called  to discuss patients options. Please call me or see me before seeing patient if possible. Thank you!  Need Callback: NEEDS CALLBACK C4 PROGRESSIVE CARE  Unread

## 2022-04-03 NOTE — PROGRESS NOTES
04/03/22 0333   NIV Type   NIV Started/Stopped On   Equipment Type v60   Mode Bilevel   Mask Type Full face mask   Mask Size Small   Settings/Measurements   IPAP 16 cmH20   CPAP/EPAP 8 cmH2O   Rate Ordered 12   Resp 16   FiO2  45 %   Vt Exhaled 501 mL   Mask Leak (lpm) 40 lpm   Comfort Level Good   Using Accessory Muscles No   Alarm Settings   Alarms On Y   Press Low Alarm 6 cmH2O   High Pressure Alarm 30 cmH2O   Resp Rate Low Alarm 6   High Respiratory Rate 40 br/min

## 2022-04-04 VITALS
HEIGHT: 64 IN | DIASTOLIC BLOOD PRESSURE: 81 MMHG | OXYGEN SATURATION: 92 % | WEIGHT: 237.22 LBS | HEART RATE: 105 BPM | RESPIRATION RATE: 20 BRPM | SYSTOLIC BLOOD PRESSURE: 142 MMHG | BODY MASS INDEX: 40.5 KG/M2 | TEMPERATURE: 98.7 F

## 2022-04-04 LAB
ANION GAP SERPL CALCULATED.3IONS-SCNC: 7 MMOL/L (ref 3–16)
BUN BLDV-MCNC: 22 MG/DL (ref 7–20)
CALCIUM SERPL-MCNC: 8.2 MG/DL (ref 8.3–10.6)
CHLORIDE BLD-SCNC: 98 MMOL/L (ref 99–110)
CO2: 37 MMOL/L (ref 21–32)
CREAT SERPL-MCNC: 0.9 MG/DL (ref 0.6–1.1)
GFR AFRICAN AMERICAN: >60
GFR NON-AFRICAN AMERICAN: >60
GLUCOSE BLD-MCNC: 116 MG/DL (ref 70–99)
GLUCOSE BLD-MCNC: 87 MG/DL (ref 70–99)
MAGNESIUM: 2.1 MG/DL (ref 1.8–2.4)
PERFORMED ON: NORMAL
POTASSIUM SERPL-SCNC: 4.6 MMOL/L (ref 3.5–5.1)
SODIUM BLD-SCNC: 142 MMOL/L (ref 136–145)

## 2022-04-04 PROCEDURE — 6370000000 HC RX 637 (ALT 250 FOR IP): Performed by: INTERNAL MEDICINE

## 2022-04-04 PROCEDURE — 2700000000 HC OXYGEN THERAPY PER DAY

## 2022-04-04 PROCEDURE — 94660 CPAP INITIATION&MGMT: CPT

## 2022-04-04 PROCEDURE — 94640 AIRWAY INHALATION TREATMENT: CPT

## 2022-04-04 PROCEDURE — 83735 ASSAY OF MAGNESIUM: CPT

## 2022-04-04 PROCEDURE — 6360000002 HC RX W HCPCS: Performed by: INTERNAL MEDICINE

## 2022-04-04 PROCEDURE — 36415 COLL VENOUS BLD VENIPUNCTURE: CPT

## 2022-04-04 PROCEDURE — 94761 N-INVAS EAR/PLS OXIMETRY MLT: CPT

## 2022-04-04 PROCEDURE — 80048 BASIC METABOLIC PNL TOTAL CA: CPT

## 2022-04-04 RX ORDER — IPRATROPIUM BROMIDE AND ALBUTEROL SULFATE 2.5; .5 MG/3ML; MG/3ML
1 SOLUTION RESPIRATORY (INHALATION) 2 TIMES DAILY
Status: DISCONTINUED | OUTPATIENT
Start: 2022-04-04 | End: 2022-04-04 | Stop reason: HOSPADM

## 2022-04-04 RX ADMIN — PREDNISONE 40 MG: 20 TABLET ORAL at 09:00

## 2022-04-04 RX ADMIN — ENOXAPARIN SODIUM 40 MG: 40 INJECTION SUBCUTANEOUS at 09:00

## 2022-04-04 RX ADMIN — IPRATROPIUM BROMIDE AND ALBUTEROL SULFATE 1 AMPULE: .5; 3 SOLUTION RESPIRATORY (INHALATION) at 08:13

## 2022-04-04 RX ADMIN — GABAPENTIN 600 MG: 300 CAPSULE ORAL at 09:00

## 2022-04-04 RX ADMIN — HYDROCODONE BITARTRATE AND ACETAMINOPHEN 2 TABLET: 5; 325 TABLET ORAL at 09:00

## 2022-04-04 RX ADMIN — HYDROCODONE BITARTRATE AND ACETAMINOPHEN 2 TABLET: 5; 325 TABLET ORAL at 02:45

## 2022-04-04 RX ADMIN — Medication 2 PUFF: at 08:14

## 2022-04-04 RX ADMIN — ATORVASTATIN CALCIUM 10 MG: 10 TABLET, FILM COATED ORAL at 09:01

## 2022-04-04 RX ADMIN — FUROSEMIDE 40 MG: 40 TABLET ORAL at 09:00

## 2022-04-04 RX ADMIN — ESCITALOPRAM OXALATE 10 MG: 10 TABLET ORAL at 09:00

## 2022-04-04 RX ADMIN — HYDROXYZINE HYDROCHLORIDE 25 MG: 10 TABLET ORAL at 09:01

## 2022-04-04 ASSESSMENT — PAIN DESCRIPTION - ORIENTATION: ORIENTATION: LEFT;RIGHT

## 2022-04-04 ASSESSMENT — PAIN SCALES - GENERAL
PAINLEVEL_OUTOF10: 8
PAINLEVEL_OUTOF10: 7
PAINLEVEL_OUTOF10: 8

## 2022-04-04 ASSESSMENT — PAIN DESCRIPTION - LOCATION: LOCATION: FOOT;HAND;LEG

## 2022-04-04 ASSESSMENT — PAIN DESCRIPTION - PAIN TYPE: TYPE: CHRONIC PAIN

## 2022-04-04 NOTE — CARE COORDINATION
Attempted to see for assessment. Patient was up dressed in room and ready to leave. Leaving  AMA. Has home O2 with [employer] but per Mansoor Sanon needs all new testing not being billed. Patient refused to stay for this. Has BiPAP at home that needs repaired. Mansoor Sanon states she owns this already and will need to bring it in to office to be serviced. Relayed this to patient. She refused to stay and was adamant about leaving AMA. Patent

## 2022-04-04 NOTE — PROGRESS NOTES
Writer continued to encourage pt to stay so that she could receive current treatment that is ordered and so that O2 for oxygen and cpap machine could be sorted out, so that she could have these devices at home when discharged from the hospital. Pt still adamant about leaving, AMA paper provided to pt, signed by pt and witnessed by Gary Arshad. Tele box was returned to nurses station.   Christian Hart RN  4/4/2022

## 2022-04-04 NOTE — DISCHARGE SUMMARY
It looks like this patient left before I could meet her. My partners were treating her for a COPD exacerbation and respiratory failure. I see that she left AMA last month as well. Nursing tells me she was not confused, had capacity to make her own decisions, and that the issue was narcotic-related.

## 2022-04-04 NOTE — PROGRESS NOTES
BP (!) 142/81   Pulse 105   Temp 98.7 °F (37.1 °C) (Oral)   Resp 20   Ht 5' 4\" (1.626 m)   Wt 237 lb 3.4 oz (107.6 kg)   LMP 06/16/2021   SpO2 92%   BMI 40.72 kg/m²  on 4L O2 per nasal cannula. Pt with complaints of chronic leg/feet/hand pain \"8\", prn norco given. Pt extremely anxious, prn atarax given. Pt states that brother is not answering phone, that he always answers, is handicapped. Pt also states that does not feel well, pain medication is not helping that we are giving pt. Pt is wanting to leave AMA. Writer attempted talking to pt, writer spoke with Dr. Jazmyn Lawrence, stated would see pt next. Writer informed pt, still remains anxious, agitated and tearful. Pt requesting hernandez to be removed, hernandez removed at this time. Pt removed tele monitor. Writer encouraged pt to talk with MD when comes to see pt.   Mariel Gomez RN  4/4/2022

## 2022-04-04 NOTE — PROGRESS NOTES
04/04/22 0406   NIV Type   Skin Protection for O2 Device Yes   Location Nose   NIV Started/Stopped On   Equipment Type v60   Mode Bilevel   Mask Type Full face mask   Settings/Measurements   IPAP 16 cmH20   CPAP/EPAP 8 cmH2O   Rate Ordered 12   Resp 19   Insp Rise Time (%) 1 %   FiO2  40 %   I Time/ I Time % 0.9 s   Vt Exhaled 495 mL   Minute Volume 9.6 Liters   Mask Leak (lpm) 1 lpm   Comfort Level Good   Using Accessory Muscles No   SpO2 98   Alarm Settings   Alarms On Y   Press Low Alarm 6 cmH2O   High Pressure Alarm 30 cmH2O   Delay Alarm 20 sec(s)   Resp Rate Low Alarm 6   High Respiratory Rate 40 br/min

## 2022-04-04 NOTE — PROGRESS NOTES
04/04/22 0048   NIV Type   $NIV $Daily Charge   NIV Started/Stopped On   Equipment Type v60   Mode Bilevel   Mask Type Full face mask   Settings/Measurements   IPAP 16 cmH20   CPAP/EPAP 8 cmH2O   Rate Ordered 12   Resp 15   Insp Rise Time (%) 1 %   FiO2  45 %  (decreased to 40% at this time )   I Time/ I Time % 0.9 s   Vt Exhaled 598 mL   Minute Volume 9 Liters   Mask Leak (lpm) 5 lpm   Comfort Level Good   Using Accessory Muscles No   SpO2 99   Alarm Settings   Alarms On Y   Press Low Alarm 6 cmH2O   High Pressure Alarm 30 cmH2O   Delay Alarm 20 sec(s)   Resp Rate Low Alarm 6   High Respiratory Rate 40 br/min

## 2022-04-04 NOTE — PROGRESS NOTES
04/03/22 2157   NIV Type   NIV Started/Stopped On   Equipment Type v60   Mode Bilevel   Mask Type Full face mask   Settings/Measurements   IPAP 16 cmH20   CPAP/EPAP 8 cmH2O   Rate Ordered 12   Resp 23   Insp Rise Time (%) 1 %   FiO2  45 %   I Time/ I Time % 0.9 s   Vt Exhaled 815 mL   Minute Volume 18.5 Liters   Mask Leak (lpm) 0 lpm   Comfort Level Good   Using Accessory Muscles No   SpO2 94   Alarm Settings   Alarms On Y   Press Low Alarm 6 cmH2O   High Pressure Alarm 30 cmH2O   Delay Alarm 20 sec(s)   Resp Rate Low Alarm 6   High Respiratory Rate 40 br/min

## 2022-04-04 NOTE — PROGRESS NOTES
04/04/22 0818   RT Protocol   History Pulmonary Disease 1   Respiratory pattern 0   Breath sounds 2   Cough 0   Indications for Bronchodilator Therapy On home bronchodilators   Bronchodilator Assessment Score 3

## 2022-04-08 DIAGNOSIS — J44.1 COPD EXACERBATION (HCC): ICD-10-CM

## 2022-04-08 RX ORDER — ALBUTEROL SULFATE 90 UG/1
AEROSOL, METERED RESPIRATORY (INHALATION)
Qty: 6.7 EACH | Refills: 0 | Status: SHIPPED | OUTPATIENT
Start: 2022-04-08

## 2022-04-08 NOTE — TELEPHONE ENCOUNTER
Medication:   Requested Prescriptions     Pending Prescriptions Disp Refills    albuterol sulfate  (90 Base) MCG/ACT inhaler [Pharmacy Med Name: ALBUTEROL HFA (PROVENTIL) INH] 6.7 each 0     Sig: INHALE 2 PUFFS BY MOUTH EVERY 6 HOURS AS NEEDED FOR WHEEZE        Last Filled:      Patient Phone Number: [phone] (home)     Last appt: 2/3/2022   Next appt: Visit date not found    Last OARRS:   RX Monitoring 4/1/2020   Periodic Controlled Substance Monitoring No signs of potential drug abuse or diversion identified.

## 2022-05-04 DIAGNOSIS — E11.9 TYPE 2 DIABETES MELLITUS WITHOUT COMPLICATION, WITHOUT LONG-TERM CURRENT USE OF INSULIN (HCC): ICD-10-CM

## 2022-05-04 RX ORDER — ATORVASTATIN CALCIUM 10 MG/1
TABLET, FILM COATED ORAL
Qty: 30 TABLET | Refills: 0 | OUTPATIENT
Start: 2022-05-04

## 2023-05-30 NOTE — DISCHARGE SUMMARY
Have Your Skin Lesions Been Treated?: not been treated Hospital Medicine Discharge Summary    Patient ID: Rosana [place]      Patient's PCP: Maurisio Wong MD    Admit Date: 3/15/2022     Discharge Date: 3/17/2022      Admitting Provider: Angi Garcia MD     Discharge Provider: Love Paulino MD     Discharge Diagnoses: Active Hospital Problems    Diagnosis     COPD exacerbation ([facility] [address] [J44.1]      Priority: High    Acute on chronic diastolic (congestive) heart failure (HCC) [I50.33]     Acute metabolic encephalopathy [J71.04]     Hyperlipidemia [E78.5]     Acute pain of right shoulder [M25.511]     History of heroin use [Z87.898]     Pulmonary hypertension ([address] [I27.20]     Acute on chronic respiratory failure with hypoxia and hypercapnia (HCC) [J96.21, J96.22]     Hepatitis C [B19.20]     Type 2 diabetes mellitus without complication, without long-term current use of insulin (HCC) [E11.9]     Pulmonary vascular congestion on CXR [R09.89]     Morbid obesity due to excess calories ([address] [E66.01]     Essential hypertension [I10]        The patient was seen and examined on day of discharge and this discharge summary is in conjunction with any daily progress note from day of discharge. Hospital Course:     Pt is an 48y.o. year-old female with a history of hypertension, hyperlipidemia, diabetes mellitus, COPD, pulmonary hypertension, chronic respiratory failure with hypoxia and hypercapnia, chronic diastolic congestive heart failure, hepatitis C, a history of heroin use and morbid obesity. At baseline she requires 4 L of oxygen via nasal cannula continuously. She presents to the emergency room for evaluation of increasing shortness of breath and lethargy. She states that she has been out of oxygen for the past 3 weeks and that her CPAP machine broke approximately 2.5 weeks ago. She reports worsening shortness of breath which is now severe.   She states that her shortness of breath is worse with minimal exertion and she can only Is This A New Presentation, Or A Follow-Up?: Skin Lesions speak 4-5 words before she has to stop and catch her breath. She also complains of right shoulder pain for the past day which radiates up her posterior right neck. She denies any injuries. She is being admitted for further evaluation and treatment. Associated signs and symptoms do not include chest pain, lightheaded, dizziness, diaphoresis, orthopnea, paroxysmal nocturnal dyspnea, fever or chills. No recent medication changes. Patient had worsening respiratory failure. Transferred to ICU for possible intubation. Patient refused intubation and was continued on Bipap. Patient alert this AM when examined by this physician. Complaining of back pain that was chronic but worse than usual. Requesting IV pain meds. Lumbar xray and echo ordered. Per nurse patient was very upset and wanted to sign out AMA. Patient signed out and walked downstairs. Physical Exam Performed:     /60   Pulse 95   Temp 98.2 °F (36.8 °C) (Axillary)   Resp 18   Ht 5' 3\" (1.6 m)   Wt 217 lb 2.5 oz (98.5 kg)   LMP 06/16/2021   SpO2 93%   BMI 38.47 kg/m²       General appearance: Moderate distress, restless. HEENT:  Normal cephalic, atraumatic without obvious deformity. Pupils equal, round, and reactive to light. Extra ocular muscles intact. Conjunctivae/corneas clear. Neck: Supple, with full range of motion. No jugular venous distention. Trachea midline. Respiratory:  Normal respiratory effort. Clear to auscultation, bilaterally without Rales/Wheezes/Rhonchi. Coarse breath sounds. Cardiovascular:  Regular rate and rhythm with normal S1/S2 without murmurs, rubs or gallops. Abdomen: Soft, non-tender, non-distended with normal bowel sounds. Musculoskeletal:  No clubbing, cyanosis or edema bilaterally. Full range of motion without deformity. Skin: Skin color, texture, turgor normal.  No rashes or lesions. Neurologic:  Neurovascularly intact without any focal sensory/motor deficits.  Cranial nerves: II-XII intact, Additional History: Pt noticed a new spot that had dark rings around it while he was out in the sun last week- declines pain or itching. He also has a sore on his arm that he doesn’t remember doing anything to get that. grossly non-focal.  Psychiatric:  Alert and oriented, appears very restless. Capillary Refill: Brisk,< 3 seconds   Peripheral Pulses: +2 palpable, equal bilaterally     Labs: For convenience and continuity at follow-up the following most recent labs are provided:      CBC:    Lab Results   Component Value Date    WBC 15.7 03/17/2022    HGB 15.3 03/17/2022    HCT 49.4 03/17/2022     03/17/2022       Renal:    Lab Results   Component Value Date     03/17/2022    K 4.2 03/17/2022    CL 88 03/17/2022    CO2 41 03/17/2022    BUN 19 03/17/2022    CREATININE 0.7 03/17/2022    CALCIUM 8.6 03/17/2022    PHOS 3.9 11/18/2018         Significant Diagnostic Studies    Radiology:   XR ACUTE ABD SERIES CHEST 1 VW   Final Result   Increasing airspace disease at the lung bases, left greater than right,   either atelectasis or pneumonia      Nonobstructive bowel gas pattern. Moderate stool load is seen in colon         XR ABDOMEN (KUB) (SINGLE AP VIEW)   Final Result   1. The NG tube tip and side port are noted in the gastric antrum. CT CHEST PULMONARY EMBOLISM W CONTRAST   Final Result   1. No evidence of pulmonary embolic disease. 2. No acute pulmonary findings. Bibasilar atelectasis. 3. Mild cardiomegaly. Mildly enlarged main pulmonary artery which can be   seen with pulmonary hypertension. CT HEAD WO CONTRAST   Final Result   No acute intracranial abnormality. XR CHEST PORTABLE   Final Result   1. Bibasilar pulmonary opacities are seen, which could represent atelectasis   or multifocal pneumonia. 2. Stable cardiomegaly. XR LUMBAR SPINE (2-3 VIEWS)    (Results Pending)      Medical problems:  1. Resp failure  2. CHF  3. Metabolic encephalopathy  4. Back pain  5. DMII  6.  HTN        Consults:     IP CONSULT TO SOCIAL WORK  IP CONSULT TO PULMONOLOGY  IP CONSULT TO HEART FAILURE NURSE/COORDINATOR  IP CONSULT TO DIETITIAN  IP CONSULT TO CRITICAL CARE    Disposition:  SIGNED OUT AMA     Condition at Discharge: Unstable    Discharge Instructions/Follow-up:    Patient was instructed to remain in hospital      Signed:    Rosette Fernández MD   3/17/2022      Thank you Kiel Erickson MD for the opportunity to be involved in this patient's care. If you have any questions or concerns please feel free to contact me at [phone].

## 2024-08-28 NOTE — PROGRESS NOTES
Patient came to the office for her 3:00pm appointment. Patient was coughing (new cough for 2 days) and feeling short of breath. Since she screened red she was not able to come into the office. Patient unfortunately does not have a phone to do a virtual visit with me either. I did go out to see patient she appeared well and was talking with me. I did place a pulse ox on her finger and O2 varied from 91% down to 84% briefly (while patient was up walking and yelling at me). I had patient sit down and O2 came up to 93%. Patient refused EMS multiple times and stated that if they came she would not go with them. She did say that she would have son take her to the ER as soon as she got home. Patient was not driving, but had  there who was able to take her right home to then go to the ER. Patient understands that she can receive oxygen from the ER. She is very concerned because she is out of O2. She was assured that they would not discharge her without what she needed for home.      The DO Kathy Continue Regimen: Accutane 60mg qd Detail Level: Zone

## (undated) DEVICE — MEDI-VAC NON-CONDUCTIVE SUCTION TUBING: Brand: CARDINAL HEALTH

## (undated) DEVICE — NITINOL STONE RETRIEVAL BASKET: Brand: ZERO TIP

## (undated) DEVICE — CYSTO/BLADDER IRRIGATION SET, REGULATING CLAMP

## (undated) DEVICE — GOWN SIRUS NONREIN LG W/TWL: Brand: MEDLINE INDUSTRIES, INC.

## (undated) DEVICE — CANNULA NSL 13FT TUBE AD ETCO2 DIV SAMP M

## (undated) DEVICE — INVIEW CLEAR LEGGINGS: Brand: CONVERTORS

## (undated) DEVICE — BOWL MED L 32OZ PLAS W/ MOLD GRAD EZ OPN PEEL PCH

## (undated) DEVICE — GUIDEWIRE VASC STR 3 CM 0.035 INX150 CM STD NIT ZIPWIRE

## (undated) DEVICE — DBD-PACK,CYSTOSCOPY,PK VI,AURORA: Brand: MEDLINE

## (undated) DEVICE — OPEN-END FLEXI-TIP URETERAL CATHETER: Brand: FLEXI-TIP

## (undated) DEVICE — Z DUP USE 2522782 SOLUTION IRRIG 1000ML STRL H2O PLAS CONTAINER UROMATIC

## (undated) DEVICE — GLOVE ORANGE PI 7 1/2   MSG9075

## (undated) DEVICE — SYRINGE MED 10ML LUERLOCK TIP W/O SFTY DISP

## (undated) DEVICE — PREP SOL PVP IODINE 4%  4 OZ/BTL

## (undated) DEVICE — GAUZE,SPONGE,4"X4",8PLY,STRL,LF,10/TRAY: Brand: MEDLINE

## (undated) DEVICE — CATHETER URET 5FR L70CM TIP 8FR OPN END CONE TIP INJ HUB

## (undated) DEVICE — BAG URIN STRL FOR URO CTCH SYS

## (undated) DEVICE — Z DUP USE 2139333 GUIDEWIRE UROLOGICAL STR STD 0.035 IN X150 CM REG ZIPWIRE LF

## (undated) DEVICE — CIRCUIT ANES L72IN 3L BACT AND VIR FLTR EL CONN SGL LIMB

## (undated) DEVICE — Z CONVERTED USE 2271043 CONTAINER SPEC COLL 4OZ SCR ON LID PEEL PCH

## (undated) DEVICE — SOLUTION IV IRRIG 500ML 0.9% SODIUM CHL 2F7123